# Patient Record
Sex: MALE | Race: WHITE | HISPANIC OR LATINO | Employment: OTHER | URBAN - METROPOLITAN AREA
[De-identification: names, ages, dates, MRNs, and addresses within clinical notes are randomized per-mention and may not be internally consistent; named-entity substitution may affect disease eponyms.]

---

## 2017-01-17 ENCOUNTER — TELEPHONE (OUTPATIENT)
Dept: FAMILY MEDICINE | Facility: CLINIC | Age: 62
End: 2017-01-17

## 2017-01-17 NOTE — TELEPHONE ENCOUNTER
Placed call to pt, no answer. Phone rings several times, then changes to busy. Pt will need appt prior to refills.

## 2017-01-17 NOTE — TELEPHONE ENCOUNTER
----- Message from Juli Geronimo sent at 1/17/2017  1:25 PM CST -----  Contact: self  Needs refill on Norco and Xanax.  Please call back at

## 2017-01-20 RX ORDER — HYDROCODONE BITARTRATE AND ACETAMINOPHEN 10; 325 MG/1; MG/1
1 TABLET ORAL 3 TIMES DAILY PRN
Qty: 90 TABLET | Refills: 0 | Status: SHIPPED | OUTPATIENT
Start: 2017-01-20 | End: 2017-01-23 | Stop reason: SDUPTHER

## 2017-01-20 RX ORDER — ALPRAZOLAM 1 MG/1
TABLET ORAL
Qty: 90 TABLET | Refills: 0 | Status: SHIPPED | OUTPATIENT
Start: 2017-01-20 | End: 2017-01-23 | Stop reason: SDUPTHER

## 2017-01-23 RX ORDER — ALPRAZOLAM 1 MG/1
TABLET ORAL
Qty: 90 TABLET | Refills: 0 | Status: SHIPPED | OUTPATIENT
Start: 2017-01-23 | End: 2017-02-20 | Stop reason: SDUPTHER

## 2017-01-23 RX ORDER — HYDROCODONE BITARTRATE AND ACETAMINOPHEN 10; 325 MG/1; MG/1
1 TABLET ORAL 3 TIMES DAILY PRN
Qty: 90 TABLET | Refills: 0 | Status: SHIPPED | OUTPATIENT
Start: 2017-01-23 | End: 2017-02-20 | Stop reason: SDUPTHER

## 2017-01-23 NOTE — TELEPHONE ENCOUNTER
----- Message from Shefali Johnson sent at 1/23/2017  2:01 PM CST -----  Contact:  call //[phone]    Calling to  Get  Refills  On hydrocodone ,xanax // pt  Stated  He   Requested  Several times   Last  Week // please call

## 2017-01-23 NOTE — TELEPHONE ENCOUNTER
----- Message from Jessica Bose sent at 1/23/2017  2:26 PM CST -----  Contact: Patient  Patient called stating that he is having a problem with his refills with [facility].Have additional questions Please call back at [phone]. Thanks,

## 2017-02-20 RX ORDER — ALPRAZOLAM 1 MG/1
TABLET ORAL
Qty: 90 TABLET | Refills: 0 | OUTPATIENT
Start: 2017-02-20

## 2017-02-20 RX ORDER — HYDROCODONE BITARTRATE AND ACETAMINOPHEN 10; 325 MG/1; MG/1
1 TABLET ORAL 3 TIMES DAILY PRN
Qty: 90 TABLET | Refills: 0 | OUTPATIENT
Start: 2017-02-20

## 2017-02-22 RX ORDER — HYDROCODONE BITARTRATE AND ACETAMINOPHEN 10; 325 MG/1; MG/1
1 TABLET ORAL 3 TIMES DAILY PRN
Qty: 90 TABLET | Refills: 0 | Status: SHIPPED | OUTPATIENT
Start: 2017-02-22 | End: 2017-03-20 | Stop reason: SDUPTHER

## 2017-02-22 RX ORDER — ALPRAZOLAM 1 MG/1
1 TABLET ORAL 3 TIMES DAILY
Qty: 90 TABLET | Refills: 0 | Status: SHIPPED | OUTPATIENT
Start: 2017-02-22 | End: 2017-03-20 | Stop reason: SDUPTHER

## 2017-03-06 ENCOUNTER — LAB VISIT (OUTPATIENT)
Dept: LAB | Facility: HOSPITAL | Age: 62
End: 2017-03-06
Attending: FAMILY MEDICINE
Payer: MEDICARE

## 2017-03-06 DIAGNOSIS — I10 ESSENTIAL HYPERTENSION: ICD-10-CM

## 2017-03-06 DIAGNOSIS — Z11.59 NEED FOR HEPATITIS C SCREENING TEST: ICD-10-CM

## 2017-03-06 LAB
ALBUMIN SERPL BCP-MCNC: 3.7 G/DL
ALP SERPL-CCNC: 65 U/L
ALT SERPL W/O P-5'-P-CCNC: 15 U/L
ANION GAP SERPL CALC-SCNC: 7 MMOL/L
AST SERPL-CCNC: 17 U/L
BILIRUB SERPL-MCNC: 0.5 MG/DL
BUN SERPL-MCNC: 18 MG/DL
CALCIUM SERPL-MCNC: 9.2 MG/DL
CHLORIDE SERPL-SCNC: 105 MMOL/L
CHOLEST/HDLC SERPL: 5.4 {RATIO}
CO2 SERPL-SCNC: 30 MMOL/L
CREAT SERPL-MCNC: 1.1 MG/DL
EST. GFR  (AFRICAN AMERICAN): >60 ML/MIN/1.73 M^2
EST. GFR  (NON AFRICAN AMERICAN): >60 ML/MIN/1.73 M^2
GLUCOSE SERPL-MCNC: 117 MG/DL
HCV AB SERPL QL IA: NEGATIVE
HDL/CHOLESTEROL RATIO: 18.5 %
HDLC SERPL-MCNC: 222 MG/DL
HDLC SERPL-MCNC: 41 MG/DL
LDLC SERPL CALC-MCNC: 158.2 MG/DL
NONHDLC SERPL-MCNC: 181 MG/DL
POTASSIUM SERPL-SCNC: 4.2 MMOL/L
PROT SERPL-MCNC: 7.3 G/DL
SODIUM SERPL-SCNC: 142 MMOL/L
TRIGL SERPL-MCNC: 114 MG/DL

## 2017-03-06 PROCEDURE — 36415 COLL VENOUS BLD VENIPUNCTURE: CPT | Mod: PO

## 2017-03-06 PROCEDURE — 80061 LIPID PANEL: CPT

## 2017-03-06 PROCEDURE — 80053 COMPREHEN METABOLIC PANEL: CPT

## 2017-03-06 PROCEDURE — 86803 HEPATITIS C AB TEST: CPT

## 2017-03-08 ENCOUNTER — OFFICE VISIT (OUTPATIENT)
Dept: FAMILY MEDICINE | Facility: CLINIC | Age: 62
End: 2017-03-08
Payer: MEDICARE

## 2017-03-08 VITALS
HEIGHT: 72 IN | DIASTOLIC BLOOD PRESSURE: 84 MMHG | WEIGHT: 263.88 LBS | HEART RATE: 72 BPM | SYSTOLIC BLOOD PRESSURE: 152 MMHG | BODY MASS INDEX: 35.74 KG/M2

## 2017-03-08 DIAGNOSIS — F41.9 ANXIETY: ICD-10-CM

## 2017-03-08 DIAGNOSIS — M79.7 FIBROMYALGIA: ICD-10-CM

## 2017-03-08 DIAGNOSIS — I10 ESSENTIAL HYPERTENSION: ICD-10-CM

## 2017-03-08 DIAGNOSIS — G89.4 CHRONIC PAIN SYNDROME: Primary | ICD-10-CM

## 2017-03-08 DIAGNOSIS — M51.9 LUMBAR DISC DISEASE: ICD-10-CM

## 2017-03-08 PROCEDURE — 99499 UNLISTED E&M SERVICE: CPT | Mod: S$GLB,,, | Performed by: FAMILY MEDICINE

## 2017-03-08 PROCEDURE — 3079F DIAST BP 80-89 MM HG: CPT | Mod: S$GLB,,, | Performed by: FAMILY MEDICINE

## 2017-03-08 PROCEDURE — 99214 OFFICE O/P EST MOD 30 MIN: CPT | Mod: S$GLB,,, | Performed by: FAMILY MEDICINE

## 2017-03-08 PROCEDURE — 99999 PR PBB SHADOW E&M-EST. PATIENT-LVL III: CPT | Mod: PBBFAC,,, | Performed by: FAMILY MEDICINE

## 2017-03-08 PROCEDURE — 3077F SYST BP >= 140 MM HG: CPT | Mod: S$GLB,,, | Performed by: FAMILY MEDICINE

## 2017-03-08 PROCEDURE — 1160F RVW MEDS BY RX/DR IN RCRD: CPT | Mod: S$GLB,,, | Performed by: FAMILY MEDICINE

## 2017-03-08 RX ORDER — CITALOPRAM 20 MG/1
20 TABLET, FILM COATED ORAL DAILY
Qty: 30 TABLET | Refills: 11 | Status: SHIPPED | OUTPATIENT
Start: 2017-03-08 | End: 2017-04-07

## 2017-03-15 ENCOUNTER — LAB VISIT (OUTPATIENT)
Dept: LAB | Facility: HOSPITAL | Age: 62
End: 2017-03-15
Attending: FAMILY MEDICINE
Payer: MEDICARE

## 2017-03-15 DIAGNOSIS — G89.4 CHRONIC PAIN SYNDROME: ICD-10-CM

## 2017-03-15 LAB
AMPHET+METHAMPHET UR QL: NEGATIVE
BARBITURATES UR QL SCN>200 NG/ML: NEGATIVE
BENZODIAZ UR QL SCN>200 NG/ML: NORMAL
BZE UR QL SCN: NEGATIVE
CANNABINOIDS UR QL SCN: NEGATIVE
CREAT UR-MCNC: 115 MG/DL
ETHANOL UR-MCNC: <10 MG/DL
METHADONE UR QL SCN>300 NG/ML: NEGATIVE
OPIATES UR QL SCN: NORMAL
PCP UR QL SCN>25 NG/ML: NEGATIVE
TOXICOLOGY INFORMATION: NORMAL

## 2017-03-15 PROCEDURE — 80307 DRUG TEST PRSMV CHEM ANLYZR: CPT

## 2017-03-19 NOTE — PROGRESS NOTES
HISTORY OF PRESENT ILLNESS:  A pleasant male, 61 years of age, well known to me.    He has a history of chronic pain syndrome, fibromyalgia, hypertension and   anxiety.  He also has significant lumbar disk disease, retired .  He is   working on smoking cessation.  No recent chest pain, shortness of breath,   nausea, vomiting, urgency, frequency or dysuria.  Pain and anxiety have been   generally well controlled.  We did discuss health maintenance and cancer   screening today.    PHYSICAL EXAMINATION:  CHEST:  Clear.  EXTREMITIES:  No edema of the extremities.  ABDOMEN:  Soft and nontender.  Blood pressure slightly elevated, much improved   at home.  No scleral icterus, jaundice or hepatosplenomegaly.  MUSCULOSKELETAL:  No acute joint swelling.  BACK:  No cervical, thoracic or lumbar spine tenderness.    ASSESSMENT:  Fibromyalgia, chronic pain, hypertension, anxiety, lumbar disk   disease.    PLAN:  We addressed each issue.  We increased his Celexa from 10 mg to 20 mg.    We will order urine toxicology screen.  We will see him back in followup.      RUSTY  dd: 03/19/2017 12:48:37 (CDT)  td: 03/19/2017 22:37:10 (CDT)  Doc ID   #[id number]  Job ID #[id number]    CC:

## 2017-03-20 NOTE — TELEPHONE ENCOUNTER
----- Message from Paulette Macias sent at 3/20/2017 12:55 PM CDT -----  Contact: Self-  [phone]  Patient needs a refill on rx hydrocodone, rx xanax with [facility].  Thanks!

## 2017-03-21 RX ORDER — ALPRAZOLAM 1 MG/1
TABLET ORAL
Qty: 90 TABLET | Refills: 0 | Status: SHIPPED | OUTPATIENT
Start: 2017-03-21 | End: 2017-04-18 | Stop reason: SDUPTHER

## 2017-03-21 RX ORDER — HYDROCODONE BITARTRATE AND ACETAMINOPHEN 10; 325 MG/1; MG/1
TABLET ORAL
Qty: 90 TABLET | Refills: 0 | Status: SHIPPED | OUTPATIENT
Start: 2017-03-21 | End: 2017-04-18 | Stop reason: SDUPTHER

## 2017-03-21 NOTE — TELEPHONE ENCOUNTER
Dr Lacy,  Dr Godinez is out of office on vacation. Pt last ov 3/8/17. May we have refill?  Thanks

## 2017-03-21 NOTE — TELEPHONE ENCOUNTER
----- Message from Miriam Jenkins sent at 3/20/2017  4:35 PM CDT -----  Contact: patient  Patient calling in regards to following up on his refill request for Xanax and Hydrocodone. Please advise.  Call back .  Thanks!  [facility] - [place], [place] - [address] [place] [address]  [address] [address]  Phone: [phone] Fax: [phone]

## 2017-03-22 RX ORDER — ALPRAZOLAM 1 MG/1
TABLET ORAL
Qty: 90 TABLET | Refills: 0 | Status: SHIPPED | OUTPATIENT
Start: 2017-03-22 | End: 2017-04-18 | Stop reason: SDUPTHER

## 2017-03-22 RX ORDER — HYDROCODONE BITARTRATE AND ACETAMINOPHEN 10; 325 MG/1; MG/1
TABLET ORAL
Qty: 90 TABLET | Refills: 0 | Status: SHIPPED | OUTPATIENT
Start: 2017-03-22 | End: 2017-04-18 | Stop reason: SDUPTHER

## 2017-03-22 NOTE — TELEPHONE ENCOUNTER
----- Message from Shilpi Peter sent at 3/22/2017  9:33 AM CDT -----  Contact: self  Patient called regarding a refill of medication. Stating he contacted on Friday regarding, had the pharmacy to submit request twice and as of today no refills. Please contact [phone]    hydrocodone-acetaminophen  mg Tab  XANAX 1 MG tablet    [facility] - [place], [place] - [address] [place] [address]  [address] [address]  Phone: [phone] Fax: [phone]

## 2017-03-22 NOTE — TELEPHONE ENCOUNTER
Dr Ortiziance  Transmission failed to pharmacy for the pts refill. Please advise if you can resend. [facility]

## 2017-03-22 NOTE — TELEPHONE ENCOUNTER
----- Message from Mary WHITE Tenaricardo sent at 3/22/2017 11:17 AM CDT -----  Contact: Patient [phone]  Rishi, patient [phone], Patient has called several times to get his Rx refilled Rx ok Hydrocodone and Xanax. [facility] stated they have not received the renewal.    Call to POD. Patient is now out of medication and very upset. Stated he has been calling since last Friday.    Please be advised after a supervisor checked the pharmacy send. It has a note that transmission failed, and may need to be called into pharmacy.    Please resend  And notify patient at [phone] of ETA.     [facility]  [address] [address]  Phone: [phone] Fax: [phone]    Please advise. Thanks.

## 2017-03-22 NOTE — TELEPHONE ENCOUNTER
----- Message from Shilpi Hinkle sent at 3/22/2017 12:18 PM CDT -----  Patient states his hydrocodone-acetaminophen 10-325mg (NORCO) 10-32 &   alprazolam (XANAX) 1 MG tablet5 mg Tab didn't go through to pharmacy please call patient [phone]    Patient states that it has been a week since he has requested Rx's    [facility] - [place] - [address] [place] [address]  [address] [address]  Phone: [phone] Fax: [phone]

## 2017-03-22 NOTE — TELEPHONE ENCOUNTER
----- Message from Mary WHITE Tenaricardo sent at 3/22/2017 11:17 AM CDT -----  Contact: Patient [phone]  Rishi, patient [phone], Patient has called several times to get his Rx refilled Rx ok Hydrocodone and Xanax. [facility] stated they have not received the renewal.    Call to POD. Patient is now out of medication and very upset. Stated he has been calling since last Friday.    Please be advised after a supervisor checked the pharmacy send. It has a note that transmission failed, and may need to be called into pharmacy.    Please resend  And notify patient at [phone] of ETA.     [facility]  [address] [address]  Phone: [phone] Fax: [phone]    Please advise. Thanks.

## 2017-04-18 NOTE — TELEPHONE ENCOUNTER
----- Message from Lexy Monroe sent at 4/18/2017  9:21 AM CDT -----  Contact: Fred  Patient requesting refill Rx Hydrocodone and Xanax sent to     [facility] - [facility] [address]  [address] [address]  Phone: [phone] Fax: [phone]    Out of medication. Please call [phone]. Thanks!

## 2017-04-19 RX ORDER — HYDROCODONE BITARTRATE AND ACETAMINOPHEN 10; 325 MG/1; MG/1
TABLET ORAL
Qty: 90 TABLET | Refills: 0 | Status: SHIPPED | OUTPATIENT
Start: 2017-04-19 | End: 2017-05-15 | Stop reason: SDUPTHER

## 2017-04-19 RX ORDER — ALPRAZOLAM 1 MG/1
TABLET ORAL
Qty: 90 TABLET | Refills: 0 | Status: SHIPPED | OUTPATIENT
Start: 2017-04-19 | End: 2017-05-15 | Stop reason: SDUPTHER

## 2017-04-21 RX ORDER — HYDROCODONE BITARTRATE AND ACETAMINOPHEN 10; 325 MG/1; MG/1
TABLET ORAL
Qty: 90 TABLET | Refills: 0 | Status: SHIPPED | OUTPATIENT
Start: 2017-04-21 | End: 2017-06-05 | Stop reason: SDUPTHER

## 2017-04-21 RX ORDER — ALPRAZOLAM 1 MG/1
TABLET ORAL
Qty: 90 TABLET | Refills: 0 | Status: SHIPPED | OUTPATIENT
Start: 2017-04-21 | End: 2017-07-20 | Stop reason: SDUPTHER

## 2017-05-22 RX ORDER — HYDROCODONE BITARTRATE AND ACETAMINOPHEN 10; 325 MG/1; MG/1
TABLET ORAL
Qty: 90 TABLET | Refills: 0 | Status: SHIPPED | OUTPATIENT
Start: 2017-05-22 | End: 2017-06-13 | Stop reason: SDUPTHER

## 2017-05-22 RX ORDER — ALPRAZOLAM 1 MG/1
TABLET ORAL
Qty: 90 TABLET | Refills: 0 | Status: SHIPPED | OUTPATIENT
Start: 2017-05-22 | End: 2017-06-05 | Stop reason: SDUPTHER

## 2017-05-22 RX ORDER — HYDROCODONE BITARTRATE AND ACETAMINOPHEN 10; 325 MG/1; MG/1
TABLET ORAL
Qty: 90 TABLET | Refills: 0 | Status: SHIPPED | OUTPATIENT
Start: 2017-05-22 | End: 2017-07-20 | Stop reason: SDUPTHER

## 2017-05-25 NOTE — PROGRESS NOTES
Patient, Fred Blackwell (MRN #[id number]), presented with a recorded BMI of 35.79 kg/m^2 and a documented comorbidity(s):  - Hypertension  to which the severe obesity is a contributing factor. This is consistent with the definition of severe obesity (BMI 35.0-35.9) with comorbidity (ICD-10 E66.01, Z68.35). The patient's severe obesity was monitored, evaluated, addressed and/or treated. This addendum to the medical record is made on 05/25/2017.

## 2017-06-05 NOTE — TELEPHONE ENCOUNTER
Spoke w/ pt. Med refill. Not out of med but will be estab care with Dr. Mckeon on 6/30/17. Does not want to run out of med. Aware you are out of clinic. Please advise.

## 2017-06-05 NOTE — TELEPHONE ENCOUNTER
----- Message from RT Ang sent at 6/5/2017  1:53 PM CDT -----  Contact: pt    pt , requesting medication refill: Xanax and Hydrocodone, please call to confirm, thanks.

## 2017-06-06 RX ORDER — ALPRAZOLAM 1 MG/1
TABLET ORAL
Qty: 90 TABLET | Refills: 0 | Status: SHIPPED | OUTPATIENT
Start: 2017-06-06 | End: 2017-06-13 | Stop reason: SDUPTHER

## 2017-06-06 RX ORDER — HYDROCODONE BITARTRATE AND ACETAMINOPHEN 10; 325 MG/1; MG/1
1 TABLET ORAL 3 TIMES DAILY PRN
Qty: 90 TABLET | Refills: 0 | Status: SHIPPED | OUTPATIENT
Start: 2017-06-06 | End: 2017-06-13 | Stop reason: SDUPTHER

## 2017-06-13 ENCOUNTER — TELEPHONE (OUTPATIENT)
Dept: FAMILY MEDICINE | Facility: CLINIC | Age: 62
End: 2017-06-13

## 2017-06-13 ENCOUNTER — OFFICE VISIT (OUTPATIENT)
Dept: FAMILY MEDICINE | Facility: CLINIC | Age: 62
End: 2017-06-13
Payer: MEDICARE

## 2017-06-13 VITALS
RESPIRATION RATE: 16 BRPM | OXYGEN SATURATION: 98 % | SYSTOLIC BLOOD PRESSURE: 128 MMHG | HEART RATE: 78 BPM | TEMPERATURE: 99 F | BODY MASS INDEX: 36.05 KG/M2 | DIASTOLIC BLOOD PRESSURE: 74 MMHG | WEIGHT: 266.19 LBS | HEIGHT: 72 IN

## 2017-06-13 DIAGNOSIS — F17.200 TOBACCO USE DISORDER: ICD-10-CM

## 2017-06-13 DIAGNOSIS — J01.00 ACUTE MAXILLARY SINUSITIS, RECURRENCE NOT SPECIFIED: Primary | ICD-10-CM

## 2017-06-13 DIAGNOSIS — J98.9 REACTIVE AIRWAY DISEASE THAT IS NOT ASTHMA: ICD-10-CM

## 2017-06-13 PROCEDURE — 99214 OFFICE O/P EST MOD 30 MIN: CPT | Mod: S$GLB,,, | Performed by: NURSE PRACTITIONER

## 2017-06-13 RX ORDER — PREDNISONE 20 MG/1
TABLET ORAL
Qty: 18 TABLET | Refills: 0 | Status: SHIPPED | OUTPATIENT
Start: 2017-06-13 | End: 2017-07-20

## 2017-06-13 RX ORDER — ALBUTEROL SULFATE 0.83 MG/ML
2.5 SOLUTION RESPIRATORY (INHALATION) EVERY 6 HOURS PRN
Qty: 30 ML | Refills: 3 | Status: SHIPPED | OUTPATIENT
Start: 2017-06-13 | End: 2017-07-20

## 2017-06-13 RX ORDER — DOXYCYCLINE 100 MG/1
100 CAPSULE ORAL 2 TIMES DAILY
Qty: 14 CAPSULE | Refills: 0 | Status: SHIPPED | OUTPATIENT
Start: 2017-06-13 | End: 2017-06-20

## 2017-06-13 RX ORDER — GUAIFENESIN 600 MG/1
1200 TABLET, EXTENDED RELEASE ORAL 2 TIMES DAILY
COMMUNITY
End: 2017-08-14

## 2017-06-13 NOTE — TELEPHONE ENCOUNTER
Please provide suggestions for a physician that can assist this patient with managing his chronic pain and anxiety issues. Thank you.

## 2017-06-13 NOTE — PROGRESS NOTES
Subjective:       Patient ID: Fred Blackwell is a 61 y.o. male.    Chief Complaint: Sinus Problem (C/o a sinus drip that is going to chest)    The patient has a long history of smoking but has never been diagnosed with COPD.  He has never been seen by pulmonology.      URI    This is a new problem. Episode onset: one week. The problem has been rapidly worsening. Associated symptoms include congestion, coughing, headaches, a plugged ear sensation, rhinorrhea, sneezing, a sore throat and wheezing. Pertinent negatives include no abdominal pain, chest pain, diarrhea, dysuria, ear pain, joint pain, joint swelling, nausea, neck pain, rash, sinus pain, swollen glands or vomiting.     Review of Systems   Constitutional: Positive for appetite change and fatigue.   HENT: Positive for congestion, postnasal drip, rhinorrhea, sinus pressure, sneezing and sore throat. Negative for ear pain.    Eyes: Negative for pain and redness.   Respiratory: Positive for cough and wheezing. Negative for choking, chest tightness and shortness of breath.    Cardiovascular: Negative for chest pain and palpitations.   Gastrointestinal: Negative for abdominal distention, abdominal pain, constipation, diarrhea, nausea and vomiting.   Endocrine: Negative for polydipsia and polyphagia.   Genitourinary: Negative for dysuria and hematuria.   Musculoskeletal: Negative for arthralgias, joint pain, joint swelling, myalgias and neck pain.   Skin: Negative for color change, pallor and rash.   Neurological: Positive for headaches. Negative for dizziness and light-headedness.       Objective:       Vitals:    06/13/17 1618   BP: 128/74   Pulse: 78   Resp: 16   Temp: 99 °F (37.2 °C)   TempSrc: Oral   SpO2: 98%   Weight: 120.8 kg (266 lb 3.3 oz)   Height: 6' (1.829 m)   PainSc: 0-No pain       Physical Exam   Constitutional: He is oriented to person, place, and time. He appears well-developed and well-nourished.   Obese male   HENT:   Head:  Normocephalic and atraumatic.   Right Ear: Hearing, tympanic membrane, external ear and ear canal normal.   Left Ear: Hearing, tympanic membrane, external ear and ear canal normal.   Nose: Mucosal edema and rhinorrhea present. No nose lacerations or sinus tenderness. Right sinus exhibits maxillary sinus tenderness and frontal sinus tenderness. Left sinus exhibits maxillary sinus tenderness and frontal sinus tenderness.   Mouth/Throat: Uvula is midline and mucous membranes are normal. Posterior oropharyngeal edema and posterior oropharyngeal erythema present.   Eyes: Conjunctivae are normal. Right eye exhibits no discharge. Left eye exhibits no discharge. No scleral icterus.   Left eye droop, pt states that he had bell's palsy 35 years ago   Neck: Normal range of motion. Neck supple. No tracheal deviation present.   Cardiovascular: Normal rate and regular rhythm.    No murmur heard.  Pulmonary/Chest: Effort normal. No stridor. No respiratory distress.   Inspiratory and expiratory wheezing noted throughout lung fields.   Musculoskeletal: Normal range of motion.   Lymphadenopathy:     He has cervical adenopathy.   Neurological: He is alert and oriented to person, place, and time.   Skin: Skin is warm and dry.   Psychiatric: He has a normal mood and affect. His behavior is normal. Judgment and thought content normal.       Assessment:       1. Acute maxillary sinusitis, recurrence not specified    2. Tobacco use disorder    3. Reactive airway disease that is not asthma        Plan:       Fred Santos was seen today for sinus problem.    Diagnoses and all orders for this visit:    Acute maxillary sinusitis, recurrence not specified  -     doxycycline (VIBRAMYCIN) 100 MG Cap; Take 1 capsule (100 mg total) by mouth 2 (two) times daily.    RAD-possible COPD exacerbation.  The patient has never seen a pulmonologist.  I did recommend this.  -     predniSONE (DELTASONE) 20 MG tablet; Take 3 tablets daily for 3 days, then 2  tablets daily for 3 days, then 1 tablet daily for 3 days.  -     albuterol (PROVENTIL) 2.5 mg /3 mL (0.083 %) nebulizer solution; Take 3 mLs (2.5 mg total) by nebulization every 6 (six) hours as needed for Wheezing. Rescue    Tobacco use disorder    Smoking cessation counseling provided

## 2017-06-15 ENCOUNTER — PATIENT OUTREACH (OUTPATIENT)
Dept: ADMINISTRATIVE | Facility: HOSPITAL | Age: 62
End: 2017-06-15
Payer: MEDICARE

## 2017-06-15 NOTE — LETTER
Patsy 15, 2017    Fred Blackwell  [address]  [place] [address]             [facility]  [address]  [facility] [address]  Phone: [phone] Dear Mr. Blakcwell:    [facility] is committed to your overall health.  To help you get the most out of each of your visits, we will review your information to make sure you are up to date on all of your recommended tests and/or procedures.      Dr. Ariadna Mckeon has found that you may be due for shingles and pneumonia immunizations.     Medicare does not cover all immunizations to be given in the clinic.  Check your benefits to ensure that you do not need to receive your immunizations at the pharmacy.    If you have had any of the above done at another facility, please bring the records or information with you so that your record at [facility] will be complete.  If you would like to schedule any of these, please contact me.    If you are currently taking medication, please bring it with you to your appointment for review.    If you have any questions or concerns, please don't hesitate to call.    Thank you for letting us care for you,  Pretty Munroe LPN Clinical Care Coordinator  [facility]  [phone]

## 2017-06-16 ENCOUNTER — OFFICE VISIT (OUTPATIENT)
Dept: FAMILY MEDICINE | Facility: CLINIC | Age: 62
End: 2017-06-16
Payer: MEDICARE

## 2017-06-16 DIAGNOSIS — J98.9 REACTIVE AIRWAY DISEASE THAT IS NOT ASTHMA: ICD-10-CM

## 2017-06-16 DIAGNOSIS — J32.9 SINUSITIS, UNSPECIFIED CHRONICITY, UNSPECIFIED LOCATION: Primary | ICD-10-CM

## 2017-06-16 PROCEDURE — 99213 OFFICE O/P EST LOW 20 MIN: CPT | Mod: S$GLB,,, | Performed by: NURSE PRACTITIONER

## 2017-06-16 PROCEDURE — 99499 UNLISTED E&M SERVICE: CPT | Mod: S$GLB,,, | Performed by: NURSE PRACTITIONER

## 2017-06-20 VITALS
WEIGHT: 263 LBS | TEMPERATURE: 99 F | HEIGHT: 72 IN | SYSTOLIC BLOOD PRESSURE: 138 MMHG | BODY MASS INDEX: 35.62 KG/M2 | HEART RATE: 72 BPM | RESPIRATION RATE: 20 BRPM | DIASTOLIC BLOOD PRESSURE: 86 MMHG

## 2017-06-20 NOTE — PROGRESS NOTES
Subjective:       Patient ID: Fred Blackwell is a 61 y.o. male.    Chief Complaint: Follow-up    The patient was seen by me on 6/13/17 and diagnosed with sinusitis and reactive airway disease.  He does have a long history of smoking but was significantly wheezing in the office at our last visit.  He was put on doxycycline, prednisone taper as well as given an albuterol inhaler.  He tells me that he is feeling significantly better and without any new complaints today.  He was told to follow-up today so I can recheck his lungs.      Review of Systems   Constitutional: Positive for appetite change and fatigue.   HENT: Positive for congestion, postnasal drip, rhinorrhea, sinus pressure, sneezing and sore throat.    Eyes: Negative for pain and redness.   Respiratory: Positive for cough and wheezing. Negative for choking, chest tightness and shortness of breath.    Cardiovascular: Negative for chest pain and palpitations.   Gastrointestinal: Negative for abdominal distention, abdominal pain, constipation, diarrhea, nausea and vomiting.   Endocrine: Negative for polydipsia and polyphagia.   Genitourinary: Negative for dysuria and hematuria.   Musculoskeletal: Negative for arthralgias, joint swelling and myalgias.   Skin: Negative for color change and pallor.   Neurological: Positive for headaches. Negative for dizziness and light-headedness.       Objective:       Vitals:    06/16/17 1549 06/16/17 1609   BP: (!) 152/106 138/86   Pulse: 72    Resp: 20    Temp: 98.5 °F (36.9 °C)    TempSrc: Oral    Weight: 119.3 kg (263 lb 0.1 oz)    Height: 6' (1.829 m)    PainSc: 0-No pain        Physical Exam   Constitutional: He is oriented to person, place, and time. He appears well-developed.   Obese male   HENT:   Head: Normocephalic and atraumatic.   Right Ear: Hearing, tympanic membrane, external ear and ear canal normal.   Left Ear: Hearing, tympanic membrane, external ear and ear canal normal.   Nose: Mucosal edema and  rhinorrhea present. No nose lacerations or sinus tenderness. Right sinus exhibits maxillary sinus tenderness and frontal sinus tenderness. Left sinus exhibits maxillary sinus tenderness and frontal sinus tenderness.   Mouth/Throat: Uvula is midline and mucous membranes are normal. Posterior oropharyngeal edema and posterior oropharyngeal erythema present.   Eyes: Conjunctivae are normal. Right eye exhibits no discharge. Left eye exhibits no discharge. No scleral icterus.   Neck: Normal range of motion. Neck supple. No tracheal deviation present.   Cardiovascular: Normal rate and regular rhythm.    No murmur heard.  Pulmonary/Chest: Effort normal and breath sounds normal. No stridor. No respiratory distress. He has no wheezes. He has no rales. He exhibits no tenderness.   O2 sats 98%.   Musculoskeletal: Normal range of motion.   Lymphadenopathy:     He has no cervical adenopathy.   Neurological: He is alert and oriented to person, place, and time.   Skin: Skin is warm and dry.   Psychiatric: He has a normal mood and affect. His behavior is normal. Judgment and thought content normal.       Assessment:       1. Sinusitis, unspecified chronicity, unspecified location    2. Reactive airway disease that is not asthma        Plan:       Fred Santos was seen today for follow-up.    Diagnoses and all orders for this visit:    Sinusitis, unspecified chronicity, unspecified location/Reactive airway disease that is not asthma  The patient has significantly improved.  Continue current medications

## 2017-07-20 ENCOUNTER — OFFICE VISIT (OUTPATIENT)
Dept: FAMILY MEDICINE | Facility: CLINIC | Age: 62
End: 2017-07-20
Payer: MEDICARE

## 2017-07-20 VITALS
RESPIRATION RATE: 17 BRPM | BODY MASS INDEX: 35.66 KG/M2 | TEMPERATURE: 98 F | SYSTOLIC BLOOD PRESSURE: 152 MMHG | OXYGEN SATURATION: 97 % | HEART RATE: 64 BPM | HEIGHT: 72 IN | WEIGHT: 263.25 LBS | DIASTOLIC BLOOD PRESSURE: 94 MMHG

## 2017-07-20 DIAGNOSIS — Z72.0 TOBACCO ABUSE: ICD-10-CM

## 2017-07-20 DIAGNOSIS — F13.20 ANXIOLYTIC DEPENDENCE: ICD-10-CM

## 2017-07-20 DIAGNOSIS — F41.0 PANIC ATTACK: ICD-10-CM

## 2017-07-20 DIAGNOSIS — R73.09 ELEVATED GLUCOSE: ICD-10-CM

## 2017-07-20 DIAGNOSIS — M51.36 DEGENERATIVE DISC DISEASE, LUMBAR: ICD-10-CM

## 2017-07-20 DIAGNOSIS — Z12.5 SCREENING FOR PROSTATE CANCER: ICD-10-CM

## 2017-07-20 DIAGNOSIS — Z23 NEED FOR PNEUMOCOCCAL VACCINATION: ICD-10-CM

## 2017-07-20 DIAGNOSIS — M50.30 DEGENERATIVE DISC DISEASE, CERVICAL: ICD-10-CM

## 2017-07-20 DIAGNOSIS — K21.9 GASTROESOPHAGEAL REFLUX DISEASE WITHOUT ESOPHAGITIS: ICD-10-CM

## 2017-07-20 DIAGNOSIS — F11.20 UNCOMPLICATED OPIOID DEPENDENCE: ICD-10-CM

## 2017-07-20 DIAGNOSIS — M54.17 RADICULAR PAIN OF LUMBOSACRAL REGION: ICD-10-CM

## 2017-07-20 DIAGNOSIS — E78.5 HYPERLIPIDEMIA, UNSPECIFIED HYPERLIPIDEMIA TYPE: ICD-10-CM

## 2017-07-20 DIAGNOSIS — F41.1 GAD (GENERALIZED ANXIETY DISORDER): ICD-10-CM

## 2017-07-20 DIAGNOSIS — I10 ESSENTIAL HYPERTENSION: Primary | ICD-10-CM

## 2017-07-20 DIAGNOSIS — G51.0 RIGHT-SIDED BELL'S PALSY: ICD-10-CM

## 2017-07-20 PROBLEM — M51.369 DEGENERATIVE DISC DISEASE, LUMBAR: Status: ACTIVE | Noted: 2017-07-20

## 2017-07-20 PROCEDURE — 90732 PPSV23 VACC 2 YRS+ SUBQ/IM: CPT | Mod: S$GLB,,, | Performed by: INTERNAL MEDICINE

## 2017-07-20 PROCEDURE — 99499 UNLISTED E&M SERVICE: CPT | Mod: S$GLB,,, | Performed by: INTERNAL MEDICINE

## 2017-07-20 PROCEDURE — G0009 ADMIN PNEUMOCOCCAL VACCINE: HCPCS | Mod: S$GLB,,, | Performed by: INTERNAL MEDICINE

## 2017-07-20 PROCEDURE — 99214 OFFICE O/P EST MOD 30 MIN: CPT | Mod: S$GLB,,, | Performed by: INTERNAL MEDICINE

## 2017-07-20 RX ORDER — ALPRAZOLAM 1 MG/1
1 TABLET ORAL 3 TIMES DAILY
Qty: 90 TABLET | Refills: 0 | Status: SHIPPED | OUTPATIENT
Start: 2017-07-20 | End: 2017-08-14 | Stop reason: SDUPTHER

## 2017-07-20 RX ORDER — ATORVASTATIN CALCIUM 40 MG/1
40 TABLET, FILM COATED ORAL DAILY
Qty: 90 TABLET | Refills: 3 | Status: SHIPPED | OUTPATIENT
Start: 2017-07-20 | End: 2017-10-11

## 2017-07-20 RX ORDER — ASPIRIN 81 MG/1
81 TABLET ORAL DAILY
Refills: 0 | COMMUNITY
Start: 2017-07-20 | End: 2017-10-11

## 2017-07-20 RX ORDER — ALPRAZOLAM 1 MG/1
1 TABLET ORAL 3 TIMES DAILY
Qty: 90 TABLET | Refills: 0 | Status: SHIPPED | OUTPATIENT
Start: 2017-07-20 | End: 2017-07-20 | Stop reason: SDUPTHER

## 2017-07-20 RX ORDER — HYDROCODONE BITARTRATE AND ACETAMINOPHEN 10; 325 MG/1; MG/1
TABLET ORAL
Qty: 90 TABLET | Refills: 0 | Status: SHIPPED | OUTPATIENT
Start: 2017-07-20 | End: 2017-07-20 | Stop reason: SDUPTHER

## 2017-07-20 RX ORDER — AMLODIPINE BESYLATE 5 MG/1
5 TABLET ORAL DAILY
Qty: 30 TABLET | Refills: 11 | Status: SHIPPED | OUTPATIENT
Start: 2017-07-20 | End: 2017-10-11

## 2017-07-20 RX ORDER — HYDROCODONE BITARTRATE AND ACETAMINOPHEN 10; 325 MG/1; MG/1
TABLET ORAL
Qty: 90 TABLET | Refills: 0 | Status: SHIPPED | OUTPATIENT
Start: 2017-07-20 | End: 2017-08-14 | Stop reason: SDUPTHER

## 2017-07-20 RX ORDER — DULOXETIN HYDROCHLORIDE 30 MG/1
30 CAPSULE, DELAYED RELEASE ORAL DAILY
Qty: 30 CAPSULE | Refills: 11 | Status: SHIPPED | OUTPATIENT
Start: 2017-07-20 | End: 2017-10-11

## 2017-07-20 NOTE — PATIENT INSTRUCTIONS
[facility]  [address] [facility] · [phone]   Directions   Website     2   [facility]  [address] [facility] [address] [facility] [address] · [phone]   Directions   Website     3   [facility]  [address] [facility] [address] [place] · [phone]   Directions   Website     4   [facility]  [facility] [address] [place] [address] · [phone]   Directions

## 2017-07-20 NOTE — PROGRESS NOTES
Subjective:       Patient ID: Fred Blackwell is a 61 y.o. male.    Medication List with Changes/Refills   New Medications    AMLODIPINE (NORVASC) 5 MG TABLET    Take 1 tablet (5 mg total) by mouth once daily.    ASPIRIN (ECOTRIN) 81 MG EC TABLET    Take 1 tablet (81 mg total) by mouth once daily.    ATORVASTATIN (LIPITOR) 40 MG TABLET    Take 1 tablet (40 mg total) by mouth once daily.    DULOXETINE (CYMBALTA) 30 MG CAPSULE    Take 1 capsule (30 mg total) by mouth once daily.   Current Medications    ALBUTEROL 90 MCG/ACTUATION INHALER    Inhale 2 puffs into the lungs every 6 (six) hours as needed for Wheezing.    GUAIFENESIN (MUCINEX) 600 MG 12 HR TABLET    Take 1,200 mg by mouth 2 (two) times daily.    MOEXIPRIL-HYDROCHLOROTHIAZIDE (UNIRETIC) 15-25 MG PER TABLET    Take 1 tablet by mouth once daily.    OMEPRAZOLE (PRILOSEC) 20 MG CAPSULE    Take 1 capsule (20 mg total) by mouth once daily.   Changed and/or Refilled Medications    Modified Medication Previous Medication    ALPRAZOLAM (XANAX) 1 MG TABLET alprazolam (XANAX) 1 MG tablet       Take 1 tablet (1 mg total) by mouth 3 (three) times daily.    TAKE 1 TABLET (1 MG TOTAL) BY MOUTH THREE TIMES A DAY.    HYDROCODONE-ACETAMINOPHEN 10-325MG (NORCO)  MG TAB hydrocodone-acetaminophen 10-325mg (NORCO)  mg Tab       TAKE 1 TABLET BY MOUTH 3 (THREE) TIMES DAILY AS NEEDED.    TAKE 1 TABLET BY MOUTH 3 (THREE) TIMES DAILY AS NEEDED.   Discontinued Medications    ALBUTEROL (PROVENTIL) 2.5 MG /3 ML (0.083 %) NEBULIZER SOLUTION    Take 3 mLs (2.5 mg total) by nebulization every 6 (six) hours as needed for Wheezing. Rescue    CITALOPRAM (CELEXA) 10 MG TABLET    TAKE 1 TABLET BY MOUTH ONCE A DAY    DULOXETINE (CYMBALTA) 30 MG CAPSULE    Take 1 capsule (30 mg total) by mouth once daily.    DULOXETINE (CYMBALTA) 60 MG CAPSULE    Take 1 capsule (60 mg total) by mouth once daily.    PREDNISONE (DELTASONE) 20 MG TABLET    Take 3 tablets daily for 3 days, then 2  tablets daily for 3 days, then 1 tablet daily for 3 days.       Chief Complaint: Follow-up (1 month fu for bronchitis)  He is here today to establish care.     He has hypertension that is uncontrolled on moexipril-HCTZ 15-25 mg qday.  He has had HTN for over 30 years.  He has no known CAD.  He denies chest pain or shortness of breath.     He has hyperlipidemia that is not being treated. His most recent lipids were 222/114/40/158 on 3/2017.  He is not on aspirin.     He has GERD that is controlled on omeprazole 20 mg qday.  He says symptoms are well controlled but if he misses a dose of medication then he has symptoms.  His last EGD was many years ago.     He has a history of Bell's palsy with residual right eye ptosis for many years.  He says eye is red and gets very dry.  He can close but he must do it purposefully.      He continues to smoke but he is trying to cut back. He is now smoking only 3 cigarettes a day.  He has a history of over 45 pack years.     He has severe anxiety dx over 35 years ago. He says he gets panic attacks that feel like heart attacks. He ended up in ER and was started on xanax tid. He has been on this medication ever since.  He describes his panic attacks as nauseous with abdominal cramps, a rushing feeling in his body and then chest pain with palpitations.  This is relieved with xanax. He says if he does not take xanax then he can not sleep.  His last fill was on 6/15/17 #90 and he gets #90 pills every month.      He has a history of chronic pain and fibromyalgia. He has severe lumbar degenerative disc disease with right leg radiculopathy. His most recent MRI of the lumbar spine was in 2012 that showed multilevel disc disease most pronounced at L3-L4 with disc bulge and possible contact with Left nerve root and at L4-L5 with disc bulge and contact with right nerve root.  He had a cervical MRI in 2012 that showed multilevel degenerative changes with osteophyte complexes from C3-C7.  He has  been seen by neurosurgery in the past but he is adamant about not wanting surgery.  He was seen by pain management in the past and had MARTY which he reports did help temporarily with his pain.  He describes pain as constant in low back with intermittent sharp pain down right leg. Pain is 6/10 on a good day and 10/10 on a bad day.  Pain is worse with sitting and lifting and better with standing and readjusting position. He is constantly trying to lean back to alleviate his pain.  He denies weakness but occasionally has tingling in outer ankle on right.  He is taking hydrocodone 10 mg tid for many years.  He has been out of medication for 3 days and is complains of severe pain.  He was recently started on celexa 10 mg and one month ago increased to 20 mg qday to help with his pain. He does feel this helps a little.      Colonoscopy---he thinks in the last 2 years  Tdap---2016 at ER   Influenza vaccine---none  Pneumovax 23----none  Shingles vaccine-----none    Review of Systems   Constitutional: Negative for appetite change, fatigue, fever and unexpected weight change.   HENT: Positive for postnasal drip. Negative for congestion, ear pain, hearing loss, sore throat and trouble swallowing.    Eyes: Negative for pain and visual disturbance.   Respiratory: Negative for cough, chest tightness, shortness of breath and wheezing.    Cardiovascular: Negative for chest pain, palpitations and leg swelling.   Gastrointestinal: Negative for abdominal pain, blood in stool, constipation, diarrhea, nausea and vomiting.   Endocrine: Negative for polyuria.   Genitourinary: Negative for dysuria and hematuria.   Musculoskeletal: Positive for arthralgias and back pain. Negative for myalgias.   Skin: Negative for rash.   Allergic/Immunologic: Positive for environmental allergies.   Neurological: Negative for dizziness, weakness, numbness and headaches.   Hematological: Does not bruise/bleed easily.   Psychiatric/Behavioral: Positive for  sleep disturbance. Negative for dysphoric mood and suicidal ideas. The patient is nervous/anxious.        Objective:      Vitals:    07/20/17 0756   BP: (!) 152/94   BP Location: Right arm   Patient Position: Sitting   BP Method: Manual   Pulse: 64   Resp: 17   Temp: 98.1 °F (36.7 °C)   TempSrc: Oral   SpO2: 97%   Weight: 119.4 kg (263 lb 3.7 oz)   Height: 6' (1.829 m)     Body mass index is 35.7 kg/m².  Physical Exam    General appearance: No acute distress, cooperative  Eyes: PERRL, EOMI, conjunctiva clear on left, right eye with erythema and ointment in eye  Ears: normal external ear and pinna, tm clear without drainage, canals clear  Nose: Normal mucosa without drainage  Throat: no exudates or erythema, tonsils not enlarged  Mouth: no sores or lesions, moist mucous membranes, good dentition  Neck: FROM, soft, supple, no thyromegaly, no bruits  Lymph: no anterior or posterior cervical adenopathy  Heart::  Regular rate and rhythm, no murmur  Lung: Clear to ascultation bilaterally, no wheezing, no rales, no rhonchi, no distress  Abdomen: Soft, nontender, no distention, no hepatosplenomegaly, bowel sounds normal, no guarding, no rebound, no peritoneal signs  Skin: no rashes, no lesions  Extremities: no edema, no cyanosis  Neuro: CN 2-12 intact (except weakness of eyelid closure---good facial movement, no droop), 5/5 muscle strength upper and lower extremity bilaterally, 2+ DTRs UE and LE bilaterally, normal gait, normal sensation  Peripheral pulses: 2+ pedal pulses bilaterally, good perfusion and color  Musculoskeletal: FROM, good strenth, no tenderness  Joint: normal appearance, no swelling, no warmth, no deformity in all joints    Assessment:       1. Essential hypertension    2. Hyperlipidemia, unspecified hyperlipidemia type    3. Tobacco abuse    4. SHANNAN (generalized anxiety disorder)    5. Panic attack    6. Anxiolytic dependence    7. Degenerative disc disease, lumbar    8. Radicular pain of lumbosacral region     9. Degenerative disc disease, cervical    10. Uncomplicated opioid dependence    11. Gastroesophageal reflux disease without esophagitis    12. Right-sided Bell's palsy    13. Elevated glucose    14. Screening for prostate cancer    15. Need for pneumococcal vaccination        Plan:       Essential hypertension  Uncontrolled and will add amlodipine 5 mg qday to his regimen. He will f/u in 3 weeks to recheck BP.    -     amlodipine (NORVASC) 5 MG tablet; Take 1 tablet (5 mg total) by mouth once daily.  Dispense: 30 tablet; Refill: 11    Hyperlipidemia, unspecified hyperlipidemia type  Uncontrolled and will start atorvastatin 40 mg qday and recheck lipids in 3 months. Will start aspirin daily.   -     atorvastatin (LIPITOR) 40 MG tablet; Take 1 tablet (40 mg total) by mouth once daily.  Dispense: 90 tablet; Refill: 3  -     aspirin (ECOTRIN) 81 MG EC tablet; Take 1 tablet (81 mg total) by mouth once daily.; Refill: 0  -     Lipid panel; Future; Expected date: 07/20/2017  -     Comprehensive metabolic panel; Future; Expected date: 07/20/2017    Tobacco abuse  Strongly advised to stop smoking.     SHANNAN (generalized anxiety disorder) with panic attacks.   Uncontrolled because he is off anxiolytics now for 3 days after taking xanax 1 mg tid x 35 years. Long discussion about anxiety and current stand of care.  I will stop celexa and start cymbalta ( to help with pain and anxiety).  Will continue xanax tid for now because he will need a very slow wean over months to get off xanax and I am not sure after 35 years if he will be able to achieve this goal.  We will start once his pain is stable.    -     duloxetine (CYMBALTA) 30 MG capsule; Take 1 capsule (30 mg total) by mouth once daily.  Dispense: 30 capsule; Refill: 11  -     TSH; Future; Expected date: 07/20/2017  -     alprazolam (XANAX) 1 MG tablet; Take 1 tablet (1 mg total) by mouth 3 (three) times daily.  Dispense: 90 tablet; Refill: 0    Anxiolytic  dependence  Refill given today. No evidence of abuse by .  Stop date is 8/19/17.      Degenerative disc disease, lumbar with radicular pain and Degenerative disc disease, cervical  Uncontrolled pain requiring chronic hydrocodone.  I will refer him to pain clinic for management of his opioids and I am hoping they discuss possibly changing him to a patch (butrans) to help his pain.  This is a better long term treatment then tid pain medications.  He is also open to trying minimally invasive procedures to help his back pain.  I think he will need an MRI but will let pain management guide his treatment.  Referral made and a list of providers given.    -     Ambulatory referral to Pain Clinic  -     hydrocodone-acetaminophen 10-325mg (NORCO)  mg Tab; TAKE 1 TABLET BY MOUTH 3 (THREE) TIMES DAILY AS NEEDED.  Dispense: 90 tablet; Refill: 0    Uncomplicated opioid dependence  No evidence of abuse by . His last fill was 6/15/17 #90.  Will given him one rx today until he can establish with pain clinic.  Stop date is 8/19/17.      Gastroesophageal reflux disease without esophagitis  Controlled on omeprazole daily.     Right-sided Bell's palsy  Unchanged for many years with only partial recovery of eyelid function.     Elevated glucose  -     Hemoglobin A1c; Future; Expected date: 07/20/2017    Screening for prostate cancer  -     PSA, Screening; Future; Expected date: 07/20/2017    Need for pneumococcal vaccination  -     Pneumococcal Polysaccharide Vaccine (23 Valent) (SQ/IM)    Return in about 3 weeks (around 8/10/2017) for recheck anxiety, pain, BP.

## 2017-08-14 ENCOUNTER — OFFICE VISIT (OUTPATIENT)
Dept: FAMILY MEDICINE | Facility: CLINIC | Age: 62
End: 2017-08-14
Payer: MEDICARE

## 2017-08-14 VITALS
HEIGHT: 72 IN | DIASTOLIC BLOOD PRESSURE: 104 MMHG | RESPIRATION RATE: 18 BRPM | HEART RATE: 78 BPM | OXYGEN SATURATION: 97 % | WEIGHT: 263.88 LBS | BODY MASS INDEX: 35.74 KG/M2 | TEMPERATURE: 98 F | SYSTOLIC BLOOD PRESSURE: 158 MMHG

## 2017-08-14 DIAGNOSIS — M51.36 DEGENERATIVE DISC DISEASE, LUMBAR: ICD-10-CM

## 2017-08-14 DIAGNOSIS — F41.1 GAD (GENERALIZED ANXIETY DISORDER): ICD-10-CM

## 2017-08-14 DIAGNOSIS — I10 ESSENTIAL HYPERTENSION: Primary | ICD-10-CM

## 2017-08-14 DIAGNOSIS — F13.20 ANXIOLYTIC DEPENDENCE: ICD-10-CM

## 2017-08-14 PROCEDURE — 99499 UNLISTED E&M SERVICE: CPT | Mod: S$GLB,,, | Performed by: INTERNAL MEDICINE

## 2017-08-14 PROCEDURE — 3080F DIAST BP >= 90 MM HG: CPT | Mod: S$GLB,,, | Performed by: INTERNAL MEDICINE

## 2017-08-14 PROCEDURE — 3077F SYST BP >= 140 MM HG: CPT | Mod: S$GLB,,, | Performed by: INTERNAL MEDICINE

## 2017-08-14 PROCEDURE — 99214 OFFICE O/P EST MOD 30 MIN: CPT | Mod: S$GLB,,, | Performed by: INTERNAL MEDICINE

## 2017-08-14 PROCEDURE — 3008F BODY MASS INDEX DOCD: CPT | Mod: S$GLB,,, | Performed by: INTERNAL MEDICINE

## 2017-08-14 RX ORDER — HYDROCODONE BITARTRATE AND ACETAMINOPHEN 10; 325 MG/1; MG/1
TABLET ORAL
Qty: 90 TABLET | Refills: 0 | Status: SHIPPED | OUTPATIENT
Start: 2017-08-14 | End: 2017-10-11

## 2017-08-14 RX ORDER — ALPRAZOLAM 1 MG/1
1 TABLET ORAL 3 TIMES DAILY
Qty: 90 TABLET | Refills: 0 | Status: SHIPPED | OUTPATIENT
Start: 2017-08-14 | End: 2017-09-14 | Stop reason: SDUPTHER

## 2017-08-14 RX ORDER — LISINOPRIL AND HYDROCHLOROTHIAZIDE 10; 12.5 MG/1; MG/1
1 TABLET ORAL DAILY
Qty: 90 TABLET | Refills: 3 | Status: SHIPPED | OUTPATIENT
Start: 2017-08-14 | End: 2017-10-11

## 2017-08-14 NOTE — PROGRESS NOTES
Subjective:       Patient ID: Fred Blackwell is a 62 y.o. male.    Medication List with Changes/Refills   New Medications    LISINOPRIL-HYDROCHLOROTHIAZIDE (PRINZIDE,ZESTORETIC) 10-12.5 MG PER TABLET    Take 1 tablet by mouth once daily.   Current Medications    ALBUTEROL 90 MCG/ACTUATION INHALER    Inhale 2 puffs into the lungs every 6 (six) hours as needed for Wheezing.    ALPRAZOLAM (XANAX) 1 MG TABLET    Take 1 tablet (1 mg total) by mouth 3 (three) times daily.    AMLODIPINE (NORVASC) 5 MG TABLET    Take 1 tablet (5 mg total) by mouth once daily.    ASPIRIN (ECOTRIN) 81 MG EC TABLET    Take 1 tablet (81 mg total) by mouth once daily.    ATORVASTATIN (LIPITOR) 40 MG TABLET    Take 1 tablet (40 mg total) by mouth once daily.    DULOXETINE (CYMBALTA) 30 MG CAPSULE    Take 1 capsule (30 mg total) by mouth once daily.    HYDROCODONE-ACETAMINOPHEN 10-325MG (NORCO)  MG TAB    TAKE 1 TABLET BY MOUTH 3 (THREE) TIMES DAILY AS NEEDED.    OMEPRAZOLE (PRILOSEC) 20 MG CAPSULE    Take 1 capsule (20 mg total) by mouth once daily.   Discontinued Medications    GUAIFENESIN (MUCINEX) 600 MG 12 HR TABLET    Take 1,200 mg by mouth 2 (two) times daily.    MOEXIPRIL-HYDROCHLOROTHIAZIDE (UNIRETIC) 15-25 MG PER TABLET    Take 1 tablet by mouth once daily.       Chief Complaint: Follow-up, elevated BP and Medication Refill  He is here today for a BP check.     He does not check his BP at home. He is taking amlodipine 5 mg qday and tolerating well. He denies chest pain or shortness of breath.     He has a history of chronic pain and is taking ATC opioids. As discussed at his last visit he was to establish with pain clinic. He has not made an appt at this time and is due for a refill on 8/19/17.    He also has anxiety for many years and is taking xanax tid.  Again we discussed at his last visit the increase risk with opioids and xanax use.      Review of Systems   Constitutional: Negative for appetite change, fatigue, fever  and unexpected weight change.   HENT: Negative for congestion, ear pain, hearing loss, sore throat and trouble swallowing.    Eyes: Negative for pain and visual disturbance.   Respiratory: Negative for cough, chest tightness, shortness of breath and wheezing.    Cardiovascular: Negative for chest pain, palpitations and leg swelling.   Gastrointestinal: Negative for abdominal pain, blood in stool, constipation, diarrhea, nausea and vomiting.   Endocrine: Negative for polyuria.   Genitourinary: Negative for dysuria and hematuria.   Musculoskeletal: Positive for arthralgias and back pain. Negative for myalgias.   Skin: Negative for rash.   Neurological: Negative for dizziness, weakness, numbness and headaches.   Hematological: Does not bruise/bleed easily.   Psychiatric/Behavioral: Negative for dysphoric mood, sleep disturbance and suicidal ideas. The patient is not nervous/anxious.        Objective:      Vitals:    08/14/17 0958   BP: (!) 158/104   BP Location: Left arm   Patient Position: Sitting   BP Method: Large (Manual)   Pulse: 78   Resp: 18   Temp: 97.9 °F (36.6 °C)   TempSrc: Oral   SpO2: 97%   Weight: 119.7 kg (263 lb 14.3 oz)   Height: 6' (1.829 m)     Body mass index is 35.79 kg/m².  Physical Exam    General appearance: No acute distress, cooperative  Neck: FROM, soft, supple, no thyromegaly, no bruits  Lymph: no anterior or posterior cervical adenopathy  Heart::  Regular rate and rhythm, no murmur  Lung: Clear to ascultation bilaterally, no wheezing, no rales, no rhonchi, no distress  Abdomen: Soft, nontender, no distention, no hepatosplenomegaly, bowel sounds normal, no guarding, no rebound, no peritoneal signs  Skin: no rashes, no lesions  Extremities: no edema, no cyanosis  Neuro: no focal abnormalities, strength 5/5 b/l UE and LE, 2+ DTRs b/l UE and LE, normal gait  Peripheral pulses: 2+ pedal pulses bilaterally, good perfusion and color      Assessment:       1. Essential hypertension    2. SHANNAN  (generalized anxiety disorder)    3. Anxiolytic dependence    4. Degenerative disc disease, lumbar        Plan:       Essential hypertension  Uncontrolled and will add lisinopril-HCTZ to his regimen. He will f/u in 2 weeks with nurse to recheck BP. He is to continue amlodipine 5 mg qday.   -     lisinopril-hydrochlorothiazide (PRINZIDE,ZESTORETIC) 10-12.5 mg per tablet; Take 1 tablet by mouth once daily.  Dispense: 90 tablet; Refill: 3    SHANNAN (generalized anxiety disorder) with Anxiolytic dependence  As we discussed in detail at his last appt this is not a safe practice and we need to wean him off xanax.  I wanted to start this process once he was established with a pain doctor incase changes were made to his regimen. I have explained this in detail and he understands.  Will refill xanax today and at his f/u in one month will discuss our strategy to wean off xanax (start with 1 tablet bid x 3 weeks then continue to decrease dose).   Rx for xanax given today #90 with stop date of 9/18/17.      Degenerative disc disease, lumbar  Controlled on chronic opioids.  I have asked him to establish with pain clinic. He has not done this yet. I will refill for one more month but will not continue to fill after this. Stop date is 9/18/17 # 90 given today.      Return in about 2 weeks (around 8/28/2017) for recheck BP.  He has an appt schedule with me for f/u on chronic medical issues on 10/2017 (he will run out of xanax prior to this appt and will need another refill but I am no longer going to fill hydrocodone after today).

## 2017-08-18 NOTE — TELEPHONE ENCOUNTER
Patient currently on lisinopril/HCTZ, please advise if patient is to discontinue the requested medication.

## 2017-09-12 DIAGNOSIS — F41.1 GAD (GENERALIZED ANXIETY DISORDER): ICD-10-CM

## 2017-09-12 DIAGNOSIS — M51.36 DEGENERATIVE DISC DISEASE, LUMBAR: ICD-10-CM

## 2017-09-12 NOTE — TELEPHONE ENCOUNTER
"Spoke w/ pt , he has not scheduled an appt w/ pain management yet. He says that the closest physician on his insurance is in [place]. Pt is trying to find someone closer. Pt is asking "for one more refill and I won't bother her again" . Pt informed that at last visit it was discussed that he would not receive any more refills on pain meds and that he is to be weened off Xanax . Please advise.--lp   "

## 2017-09-12 NOTE — TELEPHONE ENCOUNTER
----- Message from Giovani Macias sent at 9/12/2017 12:20 PM CDT -----  Contact: same  Patient called in and is requesting refills on the following 2 Rx's:    1.  Hydrocodone 10/325  2.  Xanax 1 mg      [facility] - [place] [address]  [address] [address]  Phone: [phone] Fax: [phone]    Patient call back number is [phone]

## 2017-09-14 ENCOUNTER — TELEPHONE (OUTPATIENT)
Dept: FAMILY MEDICINE | Facility: CLINIC | Age: 62
End: 2017-09-14

## 2017-09-14 DIAGNOSIS — F41.1 GAD (GENERALIZED ANXIETY DISORDER): ICD-10-CM

## 2017-09-14 DIAGNOSIS — F41.1 GAD (GENERALIZED ANXIETY DISORDER): Primary | ICD-10-CM

## 2017-09-14 RX ORDER — HYDROCODONE BITARTRATE AND ACETAMINOPHEN 10; 325 MG/1; MG/1
TABLET ORAL
Qty: 90 TABLET | Refills: 0 | OUTPATIENT
Start: 2017-09-14

## 2017-09-14 RX ORDER — ALPRAZOLAM 1 MG/1
1 TABLET ORAL 3 TIMES DAILY
Qty: 90 TABLET | Refills: 0 | OUTPATIENT
Start: 2017-09-14

## 2017-09-14 RX ORDER — ALPRAZOLAM 1 MG/1
1 TABLET ORAL 3 TIMES DAILY
Qty: 90 TABLET | Refills: 0 | Status: SHIPPED | OUTPATIENT
Start: 2017-09-14 | End: 2017-10-11 | Stop reason: SDUPTHER

## 2017-09-14 NOTE — TELEPHONE ENCOUNTER
----- Message from Miriam Jenkins sent at 9/14/2017 12:47 PM CDT -----  Contact: patient  Patient calling in regards to requesting a refill for Hydrocodone and Xanax. Please advise.  Call back   Thanks!  [facility] - [place], [place] - [address] [place] [address]  [address] [address]  Phone: [phone] Fax: [phone]

## 2017-09-14 NOTE — TELEPHONE ENCOUNTER
Returned pt's call. Pt inquiring about rx refills on alprazolam and hydrocone. Rx has been previously pended. Please advise.

## 2017-09-15 ENCOUNTER — TELEPHONE (OUTPATIENT)
Dept: FAMILY MEDICINE | Facility: CLINIC | Age: 62
End: 2017-09-15

## 2017-09-15 NOTE — TELEPHONE ENCOUNTER
"Pt says that his Celexa was changed to Cymbalta. Pt says that he took 2 doses and he felt dizzy, nausea. Pt stopped Cymbalta immediately. Pt went back to Celexa. Pt states that his blood pressure medication was changed and he does not understand why these med changes were done . Pt says that when he saw Dr Tyler , his meds were working "just fine" . Offered pt a sooner appt to see Dr Mckoen, pt refused. --lp  "

## 2017-09-15 NOTE — TELEPHONE ENCOUNTER
As we discussed last visit. I am no longer filling his pain medication. He was to establish with pain management. I did fill his xanax.     I do not manage chronic anxiolytic therapy and I am referring him to psychiatry for further management.     Please schedule

## 2017-09-15 NOTE — TELEPHONE ENCOUNTER
Pt aware that Xanax was sent in to the pharmacy . Pt aware that he is being referred to psychiatry for future management of Xanax. Offered to give pt # to Psych, pt refused . Pt said that he is not going to see psych. Pt states he will keep appt sched in October with Dr Mckeon.--lp

## 2017-09-15 NOTE — TELEPHONE ENCOUNTER
Pt aware that refills for Xanax and [place]  were refused . Pt was not happy . Pt states that he does not understand why he cannot get one more refill. Pt thought he would be weened off the Xanax. He says his body is shaking because he has not had his Xanax. Pt asked for Dr Mckeon to call pt to explain this to him .--lp

## 2017-10-06 ENCOUNTER — PATIENT OUTREACH (OUTPATIENT)
Dept: ADMINISTRATIVE | Facility: HOSPITAL | Age: 62
End: 2017-10-06

## 2017-10-06 NOTE — LETTER
October 6, 2017    Fred Blackwell  [address]  [place] [address]             [facility]  [address]  [facility] [address]  Phone: [phone] Dear Mr. Blackwell:    [facility] is committed to your overall health.  To help you get the most out of each of your visits, we will review your information to make sure you are up to date on all of your recommended tests and/or procedures.      Dr. Ariadna Mckeon has found that your chart shows you may be due for shingles and flu immunizations.     Medicare does not cover all immunizations to be given in the clinic.  Check your benefits to ensure that you do not need to receive your immunizations at the pharmacy.    If you have had any of the above done at another facility, please bring the records or information with you so that your record at [facility] will be complete.  If you would like to schedule any of these, please contact me.    If you are currently taking medication, please bring it with you to your appointment for review.    If you have any questions or concerns, please don't hesitate to call.    Thank you for letting us care for you,  Pretty Munroe LPN Clinical Care Coordinator  [facility] [place]  [phone]

## 2017-10-11 ENCOUNTER — OFFICE VISIT (OUTPATIENT)
Dept: FAMILY MEDICINE | Facility: CLINIC | Age: 62
End: 2017-10-11
Payer: MEDICARE

## 2017-10-11 VITALS
SYSTOLIC BLOOD PRESSURE: 168 MMHG | HEART RATE: 70 BPM | HEIGHT: 72 IN | DIASTOLIC BLOOD PRESSURE: 98 MMHG | WEIGHT: 261.69 LBS | BODY MASS INDEX: 35.44 KG/M2

## 2017-10-11 VITALS
HEIGHT: 72 IN | WEIGHT: 261.69 LBS | SYSTOLIC BLOOD PRESSURE: 138 MMHG | RESPIRATION RATE: 18 BRPM | HEART RATE: 72 BPM | DIASTOLIC BLOOD PRESSURE: 86 MMHG | BODY MASS INDEX: 35.44 KG/M2

## 2017-10-11 DIAGNOSIS — I10 ESSENTIAL HYPERTENSION: Primary | ICD-10-CM

## 2017-10-11 DIAGNOSIS — Z00.00 ENCOUNTER FOR PREVENTIVE HEALTH EXAMINATION: Primary | ICD-10-CM

## 2017-10-11 DIAGNOSIS — F41.0 PANIC ATTACK: ICD-10-CM

## 2017-10-11 DIAGNOSIS — F17.200 TOBACCO DEPENDENCE: ICD-10-CM

## 2017-10-11 DIAGNOSIS — F11.20 UNCOMPLICATED OPIOID DEPENDENCE: ICD-10-CM

## 2017-10-11 DIAGNOSIS — M50.30 DEGENERATIVE DISC DISEASE, CERVICAL: ICD-10-CM

## 2017-10-11 DIAGNOSIS — E78.5 HYPERLIPIDEMIA, UNSPECIFIED HYPERLIPIDEMIA TYPE: ICD-10-CM

## 2017-10-11 DIAGNOSIS — M79.7 FIBROMYALGIA: ICD-10-CM

## 2017-10-11 DIAGNOSIS — M51.36 DEGENERATIVE DISC DISEASE, LUMBAR: ICD-10-CM

## 2017-10-11 DIAGNOSIS — F13.20 ANXIOLYTIC DEPENDENCE: ICD-10-CM

## 2017-10-11 DIAGNOSIS — G51.0 RIGHT-SIDED BELL'S PALSY: ICD-10-CM

## 2017-10-11 DIAGNOSIS — F41.1 GAD (GENERALIZED ANXIETY DISORDER): ICD-10-CM

## 2017-10-11 DIAGNOSIS — I70.0 AORTOILIAC STENOSIS: ICD-10-CM

## 2017-10-11 DIAGNOSIS — K21.9 GASTROESOPHAGEAL REFLUX DISEASE WITHOUT ESOPHAGITIS: ICD-10-CM

## 2017-10-11 DIAGNOSIS — I10 ESSENTIAL HYPERTENSION: ICD-10-CM

## 2017-10-11 DIAGNOSIS — M54.17 RADICULAR PAIN OF LUMBOSACRAL REGION: ICD-10-CM

## 2017-10-11 DIAGNOSIS — B35.1 ONYCHOMYCOSIS: ICD-10-CM

## 2017-10-11 PROCEDURE — G0439 PPPS, SUBSEQ VISIT: HCPCS | Mod: S$GLB,,, | Performed by: NURSE PRACTITIONER

## 2017-10-11 PROCEDURE — 99214 OFFICE O/P EST MOD 30 MIN: CPT | Mod: S$GLB,,, | Performed by: FAMILY MEDICINE

## 2017-10-11 PROCEDURE — 99999 PR PBB SHADOW E&M-EST. PATIENT-LVL IV: CPT | Mod: PBBFAC,,, | Performed by: NURSE PRACTITIONER

## 2017-10-11 PROCEDURE — 99499 UNLISTED E&M SERVICE: CPT | Mod: S$GLB,,, | Performed by: FAMILY MEDICINE

## 2017-10-11 PROCEDURE — 99499 UNLISTED E&M SERVICE: CPT | Mod: S$GLB,,, | Performed by: NURSE PRACTITIONER

## 2017-10-11 PROCEDURE — 99999 PR PBB SHADOW E&M-EST. PATIENT-LVL III: CPT | Mod: PBBFAC,,, | Performed by: FAMILY MEDICINE

## 2017-10-11 RX ORDER — ALPRAZOLAM 1 MG/1
1 TABLET ORAL 3 TIMES DAILY PRN
Qty: 80 TABLET | Refills: 0 | Status: SHIPPED | OUTPATIENT
Start: 2017-10-11 | End: 2017-11-07 | Stop reason: SDUPTHER

## 2017-10-11 RX ORDER — HYDROCODONE BITARTRATE AND ACETAMINOPHEN 10; 325 MG/1; MG/1
TABLET ORAL
Qty: 80 TABLET | Refills: 0 | Status: SHIPPED | OUTPATIENT
Start: 2017-10-11 | End: 2017-11-07 | Stop reason: SDUPTHER

## 2017-10-11 RX ORDER — OMEPRAZOLE 20 MG/1
20 CAPSULE, DELAYED RELEASE ORAL DAILY
Qty: 90 CAPSULE | Refills: 3 | Status: SHIPPED | OUTPATIENT
Start: 2017-10-11 | End: 2018-11-21 | Stop reason: SDUPTHER

## 2017-10-11 NOTE — PATIENT INSTRUCTIONS
Counseling and Referral of Other Preventative  (Italic type indicates deductible and co-insurance are waived)    Patient Name: Fred Blackwell  Today's Date: 10/11/2017      SERVICE LIMITATIONS RECOMMENDATION    Vaccines    · Pneumococcal (once after 65)    · Influenza (annually)    · Hepatitis B (if medium/high risk)    · Prevnar 13      Hepatitis B medium/high risk factors:       - End-stage renal disease       - Hemophiliacs who received Factor VII or         IX concentrates       - Clients of institutions for the mentally             retarded       - Persons who live in the same house as          a HepB carrier       - Homosexual men       - Illicit injectable drug abusers     Pneumococcal: Done, no repeat necessary     Influenza: Scheduled - see appointments     Hepatitis B: N/A     Prevnar 13: N/A    Prostate cancer screening (annually to age 75)     Prostate specific antigen (PSA) Shared decision making with Provider. Sometimes a co-pay may be required if the patient decides to have this test. The USPSTF no longer recommends prostate cancer screening routinely in medicine: not to follow    Colorectal cancer screening (to age 75)    · Fecal occult blood test (annual)  · Flexible sigmoidoscopy (5y)  · Screening colonoscopy (10y)  · Barium enema   Last done 2011, recommend to repeat every 6-7  years    Diabetes self-management training (no USPSTF recommendations)  Requires referral by treating physician for patient with diabetes or renal disease. 10 hours of initial DSMT sessions of no less than 30 minutes each in a continuous 12-month period. 2 hours of follow-up DSMT in subsequent years.  N/A    Glaucoma screening (no USPSTF recommendation)  Diabetes mellitus, family history   , age 50 or over    American, age 65 or over  Recommend follow up with eye care professional regularly    Medical nutrition therapy for diabetes or renal disease (no recommended schedule)  Requires  referral by treating physician for patient with diabetes or renal disease or kidney transplant within the past 3 years.  Can be provided in same year as diabetes self-management training (DSMT), and CMS recommends medical nutrition therapy take place after DSMT. Up to 3 hours for initial year and 2 hours in subsequent years.  N/A    Cardiovascular screening blood tests (every 5 years)  · Fasting lipid panel  Order as a panel if possible  Done this year, repeat every year    Diabetes screening tests (at least every 3 years, Medicare covers annually or at 6-month intervals for prediabetic patients)  · Fasting blood sugar (FBS) or glucose tolerance test (GTT)  Patient must be diagnosed with one of the following:       - Hypertension       - Dyslipidemia       - Obesity (BMI 30kg/m2)       - Previous elevated impaired FBS or GTT       ... or any two of the following:       - Overweight (BMI 25 but <30)       - Family history of diabetes       - Age 65 or older       - History of gestational diabetes or birth of baby weighing more than 9 pounds  Done this year, repeat every year    Abdominal aortic aneurysm screening (once)  · Sonogram   Limited to patients who meet one of the following criteria:       - Men who are 65-75 years old and have smoked more than 100 cigarette in their lifetime       - Anyone with a family history of abdominal aortic aneurysm       - Anyone recommended for screening by the USPSTF  N/A    HIV screening (annually for increased risk patients)  · HIV-1 and HIV-2 by EIA, or TURNER, rapid antibody test or oral mucosa transudate  Patients must be at increased risk for HIV infection per USPSTF guidelines or pregnant. Tests covered annually for patient at increased risk or as requested by the patient. Pregnant patients may receive up to 3 tests during pregnancy.  Risks discussed, screening is not recommended    Smoking cessation counseling (up to 8 sessions per year)  Patients must be asymptomatic of  tobacco-related conditions to receive as a preventative service.  Counseled for 3-10 minutes.    Subsequent annual wellness visit  At least 12 months since last AWV  Return in one year     The following information is provided to all patients.  This information is to help you find resources for any of the problems found today that may be affecting your health:                Living healthy guide: www.[facility].[place].[facility]      Understanding Diabetes: www.diabetes.org      Eating healthy: www.cdc.gov/healthyweight      CDC home safety checklist: www.cdc.gov/steadi/patient.html      Agency on Aging: www.goea.[place].[facility]      Alcoholics anonymous (AA): www.aa.org      Physical Activity: www.leida.nih.gov/ms3yshi      Tobacco use: www.quitwithusla.org

## 2017-10-11 NOTE — PROGRESS NOTES
Subjective:       Patient ID: Fred Blackwell is a 62 y.o. male.    Chief Complaint: Establish Care (Patient here to establish care )    Here for acute visit for f/u htn , pain syndrome, and anxiety.  Has been seeing Dr. Ariadna Mckeon after seeing Dr. Tyler.  On uniretic currently for bp with good results.  Taking xanax tid for years for anxiety/panic attacks.  Has seen psychology in past for breathing techniques.      Hypertension   This is a chronic problem. The current episode started more than 1 year ago. The problem is controlled. Pertinent negatives include no chest pain, palpitations or shortness of breath. Past treatments include diuretics. The current treatment provides moderate improvement.     Review of Systems   Constitutional: Negative for chills, fatigue and fever.   Respiratory: Negative for cough, chest tightness and shortness of breath.    Cardiovascular: Negative for chest pain, palpitations and leg swelling.   Gastrointestinal: Negative for abdominal distention and abdominal pain.   Endocrine: Negative for cold intolerance and heat intolerance.   Musculoskeletal: Positive for arthralgias.   Skin: Negative for rash.   Psychiatric/Behavioral: Negative for dysphoric mood. The patient is not nervous/anxious.        Objective:      Physical Exam   Constitutional: He appears well-developed and well-nourished.   HENT:   Head: Normocephalic and atraumatic.   Cardiovascular: Normal rate, regular rhythm and normal heart sounds.    Pulmonary/Chest: Effort normal and breath sounds normal.   Psychiatric: He has a normal mood and affect.   Nursing note and vitals reviewed.      Assessment:       1. Essential hypertension    2. SHANNAN (generalized anxiety disorder)    3. Degenerative disc disease, lumbar    4. Hyperlipidemia, unspecified hyperlipidemia type    5. Anxiolytic dependence    6. Uncomplicated opioid dependence        Plan:       Essential hypertension    SHANNAN (generalized anxiety disorder)  -      alprazolam (XANAX) 1 MG tablet; Take 1 tablet (1 mg total) by mouth 3 (three) times daily as needed for Anxiety.  Dispense: 80 tablet; Refill: 0    Degenerative disc disease, lumbar  -     hydrocodone-acetaminophen 10-325mg (NORCO)  mg Tab; TAKE 1 TABLET BY MOUTH 3 (THREE) TIMES DAILY AS NEEDED.  Dispense: 80 tablet; Refill: 0    Hyperlipidemia, unspecified hyperlipidemia type    Anxiolytic dependence    Uncomplicated opioid dependence    Other orders  -     omeprazole (PRILOSEC) 20 MG capsule; Take 1 capsule (20 mg total) by mouth once daily.  Dispense: 90 capsule; Refill: 3      Lengthy discussion of current state of meds and needs to cut back and try to wean with max #60 per month.  Will do #80 this month.    Checked state database and no irregularities.      Return in about 1 month (around 11/11/2017), or if symptoms worsen or fail to improve.

## 2017-10-11 NOTE — PROGRESS NOTES
Fred Chumckenna Rishi presented for a  Medicare AWV and comprehensive Health Risk Assessment today. The following components were reviewed and updated:    · Medical history  · Family History  · Social history  · Allergies and Current Medications  · Health Risk Assessment  · Health Maintenance  · Care Team     ** See Completed Assessments for Annual Wellness Visit within the encounter summary.**       The following assessments were completed:  · Living Situation  · CAGE  · Depression Screening  · Timed Get Up and Go  · Whisper Test  · Cognitive Function Screening          · Nutrition Screening  · ADL Screening  · PAQ Screening    Pt reports getting upset when discussing meds at check-in.  Takes bp meds as prescribed. Has appt with pcp today immediately after HRA appt    Vitals:    10/11/17 1301 10/11/17 1339   BP: (!) 155/106 (!) 168/98   BP Location: Left arm    Patient Position: Sitting    BP Method: Large (Automatic)    Pulse: 70    Weight: 118.7 kg (261 lb 11 oz)    Height: 6' (1.829 m)      Body mass index is 35.49 kg/m².  Physical Exam   Constitutional: He appears well-developed and well-nourished.   HENT:   Head: Normocephalic and atraumatic.   Cardiovascular: Normal rate, regular rhythm and normal heart sounds.    Pulmonary/Chest: Effort normal and breath sounds normal.   Neurological: He is alert.   Skin: Skin is warm and dry.   Psychiatric: He has a normal mood and affect.   Nursing note and vitals reviewed.        Diagnoses and health risks identified today and associated recommendations/orders:    1. Encounter for preventive health examination  Reviewed health maintenance and provided recommendations  Provided written rx for flu vaccine  - NURSING COMMUNICATION: Create [facility] Account  - Hypertension Digital Medicine (HDMP) Enrollment Order  - Hypertension Digital Medicine (HDMP): Assign Onboarding Questionnaires    2. Essential hypertension    Poorly controlled  Recommend f/u with pcp  Followed by ROBERT  Nelson Bahena MD .       3. Hyperlipidemia, unspecified hyperlipidemia type  Continue to monitor   Followed by ROBERT Bahena MD .       4. Aortoiliac stenosis  Continue to monitor lipids  Followed by MD Weston Will       5. SHANNAN (generalized anxiety disorder)  Stable.   Taking xanax  Lengthy discussion regarding long term use of xanax  Followed by MD Weston Will       6. Anxiolytic dependence  Stable.   Encourage alternative approach for anxiety control  Followed by MD Weston Will       7. Degenerative disc disease, cervical  Stable.   Controlled on current medications.   Followed by ROBERT Bahena MD .       8. Degenerative disc disease, lumbar  Stable.   Controlled on current medications.  Followed by ROBERT Bahena MD .       9. Gastroesophageal reflux disease without esophagitis  Stable.   Controlled on current medications.  Followed by ROBERT Bahena MD .       10. Radicular pain of lumbosacral region  Stable.   Controlled on current medications.  Followed by ROBERT Bahena MD .       11. Fibromyalgia  Stable.   Controlled on current medications.  Followed by ROBERT Bahena MD .       12. Uncomplicated opioid dependence  Stable.   Continue to narcotic meds as prescribed  Recommend est care with pain mgmt  Followed by ROBERT Bahena MD .       13. Tobacco dependence  Stable.   Encourage to continue to decrease amount of smoking  Followed by ROBERT Bahena MD .       14. Right-sided Bell's palsy  Stable.     Followed by ROBERT Bahena MD .       15. Panic attack  Stable.   Controlled on current medications and relaxation techniques.  Followed by ROBERT Bahena MD .       16. Onychomycosis  Stable.     Followed by ROBERT Bahena MD .         Patricia Fred Santos with a 5-10 year written screening schedule and personal prevention plan. Recommendations were developed using the USPSTF age appropriate recommendations. Education, counseling,  and referrals were provided as needed. After Visit Summary printed and given to patient which includes a list of additional screenings\tests needed.    Return in about 1 year (around 10/11/2018).    Aubrie Sebastian NP

## 2017-10-19 ENCOUNTER — TELEPHONE (OUTPATIENT)
Dept: FAMILY MEDICINE | Facility: CLINIC | Age: 62
End: 2017-10-19

## 2017-10-19 DIAGNOSIS — E78.5 HYPERLIPIDEMIA, UNSPECIFIED HYPERLIPIDEMIA TYPE: ICD-10-CM

## 2017-10-19 DIAGNOSIS — I10 ESSENTIAL HYPERTENSION: ICD-10-CM

## 2017-10-19 DIAGNOSIS — F41.0 PANIC ATTACK: Primary | ICD-10-CM

## 2017-10-19 DIAGNOSIS — Z12.5 SCREENING FOR PROSTATE CANCER: ICD-10-CM

## 2017-10-19 NOTE — TELEPHONE ENCOUNTER
Patient called requesting lab orders, cannot sign PSA via [facility]. Labs were previously scheduled for Dr Mckeon but are no longer valid. Please sign pended orders. Thank you.

## 2017-10-19 NOTE — TELEPHONE ENCOUNTER
----- Message from Keri Tijerina sent at 10/19/2017  8:55 AM CDT -----  Contact: pt   Wants lab ordered   Call back

## 2017-10-20 NOTE — TELEPHONE ENCOUNTER
----- Message from Klarissa Barnard sent at 10/19/2017  1:28 PM CDT -----  [phone] ... Not sure he came in on Monday for labs / but according to our records he checked in at 12:30 10/16

## 2017-10-28 ENCOUNTER — LAB VISIT (OUTPATIENT)
Dept: LAB | Facility: HOSPITAL | Age: 62
End: 2017-10-28
Attending: FAMILY MEDICINE
Payer: MEDICARE

## 2017-10-28 DIAGNOSIS — E78.5 HYPERLIPIDEMIA, UNSPECIFIED HYPERLIPIDEMIA TYPE: ICD-10-CM

## 2017-10-28 DIAGNOSIS — Z12.5 SCREENING FOR PROSTATE CANCER: ICD-10-CM

## 2017-10-28 DIAGNOSIS — I10 ESSENTIAL HYPERTENSION: ICD-10-CM

## 2017-10-28 LAB
ALBUMIN SERPL BCP-MCNC: 3.5 G/DL
ALP SERPL-CCNC: 70 U/L
ALT SERPL W/O P-5'-P-CCNC: 16 U/L
ANION GAP SERPL CALC-SCNC: 11 MMOL/L
AST SERPL-CCNC: 16 U/L
BASOPHILS # BLD AUTO: 0.09 K/UL
BASOPHILS NFR BLD: 1.1 %
BILIRUB SERPL-MCNC: 0.8 MG/DL
BUN SERPL-MCNC: 17 MG/DL
CALCIUM SERPL-MCNC: 9.4 MG/DL
CHLORIDE SERPL-SCNC: 100 MMOL/L
CHOLEST SERPL-MCNC: 222 MG/DL
CHOLEST/HDLC SERPL: 5.4 {RATIO}
CO2 SERPL-SCNC: 31 MMOL/L
COMPLEXED PSA SERPL-MCNC: 0.39 NG/ML
CREAT SERPL-MCNC: 1 MG/DL
DIFFERENTIAL METHOD: NORMAL
EOSINOPHIL # BLD AUTO: 0.1 K/UL
EOSINOPHIL NFR BLD: 1.8 %
ERYTHROCYTE [DISTWIDTH] IN BLOOD BY AUTOMATED COUNT: 13.1 %
EST. GFR  (AFRICAN AMERICAN): >60 ML/MIN/1.73 M^2
EST. GFR  (NON AFRICAN AMERICAN): >60 ML/MIN/1.73 M^2
GLUCOSE SERPL-MCNC: 127 MG/DL
HCT VFR BLD AUTO: 46.4 %
HDLC SERPL-MCNC: 41 MG/DL
HDLC SERPL: 18.5 %
HGB BLD-MCNC: 15.1 G/DL
IMM GRANULOCYTES # BLD AUTO: 0.02 K/UL
IMM GRANULOCYTES NFR BLD AUTO: 0.3 %
LDLC SERPL CALC-MCNC: 161.4 MG/DL
LYMPHOCYTES # BLD AUTO: 3.7 K/UL
LYMPHOCYTES NFR BLD: 46.2 %
MCH RBC QN AUTO: 28.1 PG
MCHC RBC AUTO-ENTMCNC: 32.5 G/DL
MCV RBC AUTO: 86 FL
MONOCYTES # BLD AUTO: 0.7 K/UL
MONOCYTES NFR BLD: 8.3 %
NEUTROPHILS # BLD AUTO: 3.4 K/UL
NEUTROPHILS NFR BLD: 42.3 %
NONHDLC SERPL-MCNC: 181 MG/DL
NRBC BLD-RTO: 0 /100 WBC
PLATELET # BLD AUTO: 222 K/UL
PMV BLD AUTO: 11.5 FL
POTASSIUM SERPL-SCNC: 3.4 MMOL/L
PROT SERPL-MCNC: 7.3 G/DL
RBC # BLD AUTO: 5.38 M/UL
SODIUM SERPL-SCNC: 142 MMOL/L
TRIGL SERPL-MCNC: 98 MG/DL
TSH SERPL DL<=0.005 MIU/L-ACNC: 1.75 UIU/ML
WBC # BLD AUTO: 7.94 K/UL

## 2017-10-28 PROCEDURE — 36415 COLL VENOUS BLD VENIPUNCTURE: CPT | Mod: PO

## 2017-10-28 PROCEDURE — 84443 ASSAY THYROID STIM HORMONE: CPT

## 2017-10-28 PROCEDURE — 84153 ASSAY OF PSA TOTAL: CPT

## 2017-10-28 PROCEDURE — 80053 COMPREHEN METABOLIC PANEL: CPT

## 2017-10-28 PROCEDURE — 85025 COMPLETE CBC W/AUTO DIFF WBC: CPT

## 2017-10-28 PROCEDURE — 80061 LIPID PANEL: CPT

## 2017-11-01 ENCOUNTER — LAB VISIT (OUTPATIENT)
Dept: LAB | Facility: HOSPITAL | Age: 62
End: 2017-11-01
Attending: FAMILY MEDICINE
Payer: MEDICARE

## 2017-11-01 ENCOUNTER — OFFICE VISIT (OUTPATIENT)
Dept: FAMILY MEDICINE | Facility: CLINIC | Age: 62
End: 2017-11-01
Payer: MEDICARE

## 2017-11-01 ENCOUNTER — CLINICAL SUPPORT (OUTPATIENT)
Dept: FAMILY MEDICINE | Facility: CLINIC | Age: 62
End: 2017-11-01
Attending: FAMILY MEDICINE
Payer: MEDICARE

## 2017-11-01 VITALS
DIASTOLIC BLOOD PRESSURE: 96 MMHG | WEIGHT: 262.56 LBS | SYSTOLIC BLOOD PRESSURE: 158 MMHG | BODY MASS INDEX: 35.56 KG/M2 | RESPIRATION RATE: 16 BRPM | HEART RATE: 72 BPM | HEIGHT: 72 IN

## 2017-11-01 DIAGNOSIS — I10 ESSENTIAL HYPERTENSION: ICD-10-CM

## 2017-11-01 DIAGNOSIS — F11.20 UNCOMPLICATED OPIOID DEPENDENCE: ICD-10-CM

## 2017-11-01 DIAGNOSIS — E78.5 HYPERLIPIDEMIA, UNSPECIFIED HYPERLIPIDEMIA TYPE: ICD-10-CM

## 2017-11-01 DIAGNOSIS — E11.69 TYPE 2 DIABETES MELLITUS WITH OTHER SPECIFIED COMPLICATION, WITHOUT LONG-TERM CURRENT USE OF INSULIN: Primary | ICD-10-CM

## 2017-11-01 DIAGNOSIS — M79.7 FIBROMYALGIA: ICD-10-CM

## 2017-11-01 DIAGNOSIS — E11.69 TYPE 2 DIABETES MELLITUS WITH OTHER SPECIFIED COMPLICATION, WITHOUT LONG-TERM CURRENT USE OF INSULIN: ICD-10-CM

## 2017-11-01 DIAGNOSIS — M54.17 RADICULAR PAIN OF LUMBOSACRAL REGION: ICD-10-CM

## 2017-11-01 PROBLEM — E11.9 TYPE 2 DIABETES MELLITUS, WITHOUT LONG-TERM CURRENT USE OF INSULIN: Status: ACTIVE | Noted: 2017-11-01

## 2017-11-01 LAB
ESTIMATED AVG GLUCOSE: 134 MG/DL
HBA1C MFR BLD HPLC: 6.3 %

## 2017-11-01 PROCEDURE — 99999 PR PBB SHADOW E&M-EST. PATIENT-LVL III: CPT | Mod: PBBFAC,,, | Performed by: FAMILY MEDICINE

## 2017-11-01 PROCEDURE — 99214 OFFICE O/P EST MOD 30 MIN: CPT | Mod: S$GLB,,, | Performed by: FAMILY MEDICINE

## 2017-11-01 PROCEDURE — 99499 UNLISTED E&M SERVICE: CPT | Mod: S$GLB,,, | Performed by: FAMILY MEDICINE

## 2017-11-01 PROCEDURE — 83036 HEMOGLOBIN GLYCOSYLATED A1C: CPT

## 2017-11-01 PROCEDURE — 36415 COLL VENOUS BLD VENIPUNCTURE: CPT | Mod: PO

## 2017-11-01 RX ORDER — LOSARTAN POTASSIUM AND HYDROCHLOROTHIAZIDE 12.5; 5 MG/1; MG/1
1 TABLET ORAL DAILY
Qty: 30 TABLET | Refills: 2 | Status: SHIPPED | OUTPATIENT
Start: 2017-11-01 | End: 2017-11-29

## 2017-11-01 NOTE — PROGRESS NOTES
Subjective:       Patient ID: Fred Blackwell is a 62 y.o. male.    Chief Complaint: Hypertension (Patient here for 1 month follow up)    Here for f/u chronic medical issues.  htn not controlled.  States doing well overall.      Hypertension   This is a chronic problem. The current episode started more than 1 year ago. The problem is uncontrolled. Pertinent negatives include no chest pain, palpitations or shortness of breath. Past treatments include diuretics.   Hyperlipidemia   This is a chronic problem. The current episode started more than 1 year ago. Pertinent negatives include no chest pain or shortness of breath.     Review of Systems   Constitutional: Negative for chills, fatigue and fever.   Respiratory: Negative for cough, chest tightness and shortness of breath.    Cardiovascular: Negative for chest pain, palpitations and leg swelling.   Gastrointestinal: Negative for abdominal distention and abdominal pain.   Endocrine: Negative for cold intolerance and heat intolerance.   Skin: Negative for rash.   Psychiatric/Behavioral: Negative for dysphoric mood. The patient is not nervous/anxious.        Objective:      Physical Exam   Constitutional: He appears well-developed and well-nourished.   HENT:   Head: Normocephalic and atraumatic.   Cardiovascular: Normal rate, regular rhythm and normal heart sounds.    Pulmonary/Chest: Effort normal and breath sounds normal.   Abdominal: Soft. Bowel sounds are normal.   Musculoskeletal: Normal range of motion.   Psychiatric: He has a normal mood and affect.   Nursing note and vitals reviewed.      Assessment:       1. Type 2 diabetes mellitus with other specified complication, without long-term current use of insulin    2. Essential hypertension    3. Hyperlipidemia, unspecified hyperlipidemia type    4. Uncomplicated opioid dependence    5. Fibromyalgia    6. Radicular pain of lumbosacral region        Plan:       Type 2 diabetes mellitus with other specified  complication, without long-term current use of insulin  -     Hemoglobin A1c; Future; Expected date: 11/01/2017  -     Microalbumin/creatinine urine ratio; Future; Expected date: 11/01/2017  -     Diabetic Eye Screening Photo; Future  -     Ambulatory referral to Podiatry    Essential hypertension    Hyperlipidemia, unspecified hyperlipidemia type    Uncomplicated opioid dependence    Fibromyalgia    Radicular pain of lumbosacral region    Other orders  -     losartan-hydrochlorothiazide 50-12.5 mg (HYZAAR) 50-12.5 mg per tablet; Take 1 tablet by mouth once daily.  Dispense: 30 tablet; Refill: 2      New onset dm II.  Update hga1c.  Will monitor chronic medical issues and continue current plan of care.      Return in about 1 month (around 12/1/2017), or if symptoms worsen or fail to improve.

## 2017-11-01 NOTE — PROGRESS NOTES
Fred Blackwell is a 62 y.o. male here for a diabetic eye screening with non-dilated fundus photos per Dr Bahena .    Patient cooperative?: Yes  Small pupils?: No  Last eye exam: 8/2/1965    For exam results, see Encounter Report.

## 2017-11-06 PROCEDURE — 92250 FUNDUS PHOTOGRAPHY W/I&R: CPT | Mod: S$GLB,,, | Performed by: OPTOMETRIST

## 2017-11-07 DIAGNOSIS — F41.1 GAD (GENERALIZED ANXIETY DISORDER): ICD-10-CM

## 2017-11-07 DIAGNOSIS — M51.36 DEGENERATIVE DISC DISEASE, LUMBAR: ICD-10-CM

## 2017-11-08 RX ORDER — ALPRAZOLAM 1 MG/1
1 TABLET ORAL 3 TIMES DAILY PRN
Qty: 80 TABLET | Refills: 0 | Status: SHIPPED | OUTPATIENT
Start: 2017-11-08 | End: 2017-11-29 | Stop reason: SDUPTHER

## 2017-11-08 RX ORDER — HYDROCODONE BITARTRATE AND ACETAMINOPHEN 10; 325 MG/1; MG/1
TABLET ORAL
Qty: 80 TABLET | Refills: 0 | Status: SHIPPED | OUTPATIENT
Start: 2017-11-08 | End: 2017-11-29 | Stop reason: SDUPTHER

## 2017-11-15 DIAGNOSIS — I10 ESSENTIAL HYPERTENSION: Primary | ICD-10-CM

## 2017-11-28 ENCOUNTER — LAB VISIT (OUTPATIENT)
Dept: LAB | Facility: HOSPITAL | Age: 62
End: 2017-11-28
Attending: FAMILY MEDICINE
Payer: MEDICARE

## 2017-11-28 DIAGNOSIS — I10 ESSENTIAL HYPERTENSION: ICD-10-CM

## 2017-11-28 LAB
ANION GAP SERPL CALC-SCNC: 10 MMOL/L
BUN SERPL-MCNC: 14 MG/DL
CALCIUM SERPL-MCNC: 9 MG/DL
CHLORIDE SERPL-SCNC: 102 MMOL/L
CO2 SERPL-SCNC: 31 MMOL/L
CREAT SERPL-MCNC: 0.9 MG/DL
EST. GFR  (AFRICAN AMERICAN): >60 ML/MIN/1.73 M^2
EST. GFR  (NON AFRICAN AMERICAN): >60 ML/MIN/1.73 M^2
GLUCOSE SERPL-MCNC: 110 MG/DL
POTASSIUM SERPL-SCNC: 4 MMOL/L
SODIUM SERPL-SCNC: 143 MMOL/L

## 2017-11-28 PROCEDURE — 80048 BASIC METABOLIC PNL TOTAL CA: CPT

## 2017-11-28 PROCEDURE — 36415 COLL VENOUS BLD VENIPUNCTURE: CPT | Mod: PO

## 2017-11-29 ENCOUNTER — OFFICE VISIT (OUTPATIENT)
Dept: FAMILY MEDICINE | Facility: CLINIC | Age: 62
End: 2017-11-29
Payer: MEDICARE

## 2017-11-29 VITALS
WEIGHT: 262.56 LBS | DIASTOLIC BLOOD PRESSURE: 96 MMHG | HEART RATE: 72 BPM | HEIGHT: 72 IN | RESPIRATION RATE: 18 BRPM | BODY MASS INDEX: 35.56 KG/M2 | SYSTOLIC BLOOD PRESSURE: 142 MMHG

## 2017-11-29 DIAGNOSIS — I10 ESSENTIAL HYPERTENSION: ICD-10-CM

## 2017-11-29 DIAGNOSIS — E11.69 TYPE 2 DIABETES MELLITUS WITH OTHER SPECIFIED COMPLICATION, WITHOUT LONG-TERM CURRENT USE OF INSULIN: Primary | ICD-10-CM

## 2017-11-29 DIAGNOSIS — M51.36 DEGENERATIVE DISC DISEASE, LUMBAR: ICD-10-CM

## 2017-11-29 DIAGNOSIS — E78.5 HYPERLIPIDEMIA, UNSPECIFIED HYPERLIPIDEMIA TYPE: ICD-10-CM

## 2017-11-29 DIAGNOSIS — F41.1 GAD (GENERALIZED ANXIETY DISORDER): ICD-10-CM

## 2017-11-29 PROCEDURE — 99999 PR PBB SHADOW E&M-EST. PATIENT-LVL III: CPT | Mod: PBBFAC,,, | Performed by: FAMILY MEDICINE

## 2017-11-29 PROCEDURE — 99499 UNLISTED E&M SERVICE: CPT | Mod: S$GLB,,, | Performed by: FAMILY MEDICINE

## 2017-11-29 PROCEDURE — 99214 OFFICE O/P EST MOD 30 MIN: CPT | Mod: S$GLB,,, | Performed by: FAMILY MEDICINE

## 2017-11-29 RX ORDER — LOSARTAN POTASSIUM AND HYDROCHLOROTHIAZIDE 12.5; 1 MG/1; MG/1
1 TABLET ORAL DAILY
Qty: 90 TABLET | Refills: 3 | Status: SHIPPED | OUTPATIENT
Start: 2017-11-29 | End: 2018-09-25 | Stop reason: SDUPTHER

## 2017-11-29 RX ORDER — ATORVASTATIN CALCIUM 40 MG/1
40 TABLET, FILM COATED ORAL DAILY
Qty: 90 TABLET | Refills: 3 | Status: SHIPPED | OUTPATIENT
Start: 2017-11-29 | End: 2019-04-01

## 2017-11-29 RX ORDER — CITALOPRAM 10 MG/1
10 TABLET ORAL DAILY
Qty: 90 TABLET | Refills: 3 | Status: SHIPPED | OUTPATIENT
Start: 2017-11-29 | End: 2018-10-30 | Stop reason: SDUPTHER

## 2017-11-29 RX ORDER — LOSARTAN POTASSIUM AND HYDROCHLOROTHIAZIDE 12.5; 5 MG/1; MG/1
1 TABLET ORAL DAILY
Qty: 30 TABLET | Refills: 2 | Status: CANCELLED | OUTPATIENT
Start: 2017-11-29 | End: 2018-11-29

## 2017-11-29 RX ORDER — HYDROCODONE BITARTRATE AND ACETAMINOPHEN 10; 325 MG/1; MG/1
TABLET ORAL
Qty: 80 TABLET | Refills: 0 | Status: SHIPPED | OUTPATIENT
Start: 2017-11-29 | End: 2018-01-03 | Stop reason: SDUPTHER

## 2017-11-29 RX ORDER — CITALOPRAM 10 MG/1
10 TABLET ORAL DAILY
COMMUNITY
End: 2017-11-29 | Stop reason: SDUPTHER

## 2017-11-29 RX ORDER — ALPRAZOLAM 1 MG/1
1 TABLET ORAL 3 TIMES DAILY PRN
Qty: 80 TABLET | Refills: 0 | Status: SHIPPED | OUTPATIENT
Start: 2017-11-29 | End: 2018-01-03 | Stop reason: SDUPTHER

## 2017-11-29 NOTE — PROGRESS NOTES
Subjective:       Patient ID: Fred Blackwell is a 62 y.o. male.    Chief Complaint: Diabetes (Patient here for 3 month follow up with review of labs. ) and Hypertension (Patient here for 3 month follow up)    Here for f/u chronic medical issues.  States doing well overall.  Mood improved.      Diabetes   He presents for his follow-up diabetic visit. He has type 2 diabetes mellitus. His disease course has been stable. Pertinent negatives for hypoglycemia include no nervousness/anxiousness. Pertinent negatives for diabetes include no chest pain and no fatigue.   Hypertension   This is a chronic problem. The current episode started more than 1 year ago. The problem is uncontrolled. Pertinent negatives include no chest pain, palpitations or shortness of breath. Past treatments include diuretics and angiotensin blockers. The current treatment provides mild improvement.     Review of Systems   Constitutional: Negative for chills, fatigue and fever.   Respiratory: Negative for cough, chest tightness and shortness of breath.    Cardiovascular: Negative for chest pain, palpitations and leg swelling.   Gastrointestinal: Negative for abdominal distention and abdominal pain.   Endocrine: Negative for cold intolerance and heat intolerance.   Skin: Negative for rash.   Psychiatric/Behavioral: Negative for dysphoric mood. The patient is not nervous/anxious.        Objective:      Physical Exam   Constitutional: He appears well-developed and well-nourished.   HENT:   Head: Normocephalic and atraumatic.   Cardiovascular: Normal rate, regular rhythm and normal heart sounds.    Pulses:       Dorsalis pedis pulses are 2+ on the right side, and 2+ on the left side.   Pulmonary/Chest: Effort normal and breath sounds normal.   Abdominal: Soft. Bowel sounds are normal.   Musculoskeletal:        Right foot: There is no deformity.        Left foot: There is no deformity.   Feet:   Right Foot:   Protective Sensation: 7 sites tested.  7 sites sensed.   Skin Integrity: Negative for skin breakdown.   Left Foot:   Protective Sensation: 7 sites tested. 7 sites sensed.   Skin Integrity: Negative for skin breakdown.   Psychiatric: He has a normal mood and affect.   Nursing note and vitals reviewed.      Assessment:       1. Type 2 diabetes mellitus with other specified complication, without long-term current use of insulin    2. Degenerative disc disease, lumbar    3. SHANNAN (generalized anxiety disorder)    4. Hyperlipidemia, unspecified hyperlipidemia type    5. Essential hypertension        Plan:       Type 2 diabetes mellitus with other specified complication, without long-term current use of insulin  -     atorvastatin (LIPITOR) 40 MG tablet; Take 1 tablet (40 mg total) by mouth once daily.  Dispense: 90 tablet; Refill: 3  -     Comprehensive metabolic panel; Future; Expected date: 11/29/2017  -     Lipid panel; Future; Expected date: 11/29/2017    Degenerative disc disease, lumbar  -     hydrocodone-acetaminophen 10-325mg (NORCO)  mg Tab; TAKE 1 TABLET BY MOUTH 3 (THREE) TIMES DAILY AS NEEDED.  Dispense: 80 tablet; Refill: 0    SHANNAN (generalized anxiety disorder)  -     ALPRAZolam (XANAX) 1 MG tablet; Take 1 tablet (1 mg total) by mouth 3 (three) times daily as needed for Anxiety.  Dispense: 80 tablet; Refill: 0    Hyperlipidemia, unspecified hyperlipidemia type  -     atorvastatin (LIPITOR) 40 MG tablet; Take 1 tablet (40 mg total) by mouth once daily.  Dispense: 90 tablet; Refill: 3  -     Comprehensive metabolic panel; Future; Expected date: 11/29/2017  -     Lipid panel; Future; Expected date: 11/29/2017    Essential hypertension  -     atorvastatin (LIPITOR) 40 MG tablet; Take 1 tablet (40 mg total) by mouth once daily.  Dispense: 90 tablet; Refill: 3  -     Comprehensive metabolic panel; Future; Expected date: 11/29/2017  -     Lipid panel; Future; Expected date: 11/29/2017    Other orders  -     Cancel: losartan-hydrochlorothiazide  50-12.5 mg (HYZAAR) 50-12.5 mg per tablet; Take 1 tablet by mouth once daily.  Dispense: 30 tablet; Refill: 2  -     losartan-hydrochlorothiazide 100-12.5 mg (HYZAAR) 100-12.5 mg Tab; Take 1 tablet by mouth once daily.  Dispense: 90 tablet; Refill: 3  -     citalopram (CELEXA) 10 MG tablet; Take 1 tablet (10 mg total) by mouth once daily.  Dispense: 90 tablet; Refill: 3      Increase hyzaar from 50/12.5 to 100/12.5 and nurse bp check in 2 weeks with home log.  Start lipitor for cholesterol.  Will monitor chronic medical issues and continue current plan of care.    Return in about 3 months (around 2/28/2018), or if symptoms worsen or fail to improve.

## 2017-12-13 ENCOUNTER — TELEPHONE (OUTPATIENT)
Dept: FAMILY MEDICINE | Facility: CLINIC | Age: 62
End: 2017-12-13

## 2017-12-13 ENCOUNTER — CLINICAL SUPPORT (OUTPATIENT)
Dept: FAMILY MEDICINE | Facility: CLINIC | Age: 62
End: 2017-12-13
Payer: MEDICARE

## 2017-12-13 VITALS — DIASTOLIC BLOOD PRESSURE: 90 MMHG | SYSTOLIC BLOOD PRESSURE: 130 MMHG

## 2017-12-13 PROCEDURE — 99999 PR PBB SHADOW E&M-EST. PATIENT-LVL I: CPT | Mod: PBBFAC,,,

## 2017-12-13 NOTE — PROGRESS NOTES
Patient is here for BP check. BP is 130/90 in L arm while seated. Patient does not have a follow up appt with Dr Bahena- last office visit note states he should return on or around 2/28/18 for follow up. Will ask Dr Bahena's nurse Ale for assistance in scheduling as the first available is not until mid April. Educated patient as follows:      Therapeutic Lifestyle Changes   LIFESTYLE CHANGE RECOMMENDATION APPROXIMATE REDUCTION IN SBP   Weight reduction Maintain a normal body weight                      (BMI 19-25) 5-20 mm Hg per 10 kg lost   Dash diet Consume a diet rich in fruits, vegetables, and low-fat dairy products with a reduced content of saturated fat and total fat 8-14 mg Hg   Low-sodium diet Consume <2400 mg of sodium per day 2-8 mg Hg   Increase physical activity Regular aerobic physical activity (i.e. brisk walking at least 40 minutes/session 3-4 days a week) 4-9 mm Hg   Limit alcohol consumption Less than 2 drinks/day in most men or less than 1 drink/day in women and lighter weight persons (1 drink = 12 oz. Beer, 5 oz. Wine, 1.5 oz. hard alcohol) 2-4 mm Hg   Smoking cessation Quit Smoking (Not reported) - known to reduce risk of developing cardiovascular disease.

## 2017-12-20 RX ORDER — ALBUTEROL SULFATE 0.83 MG/ML
2.5 SOLUTION RESPIRATORY (INHALATION) EVERY 6 HOURS PRN
Qty: 90 ML | Refills: 0 | Status: SHIPPED | OUTPATIENT
Start: 2017-12-20 | End: 2018-12-04 | Stop reason: SDUPTHER

## 2017-12-20 NOTE — TELEPHONE ENCOUNTER
Patient was seen in ER on 12/16 and was Dx with Flu. Patient is asking if he can get some albuterol for his nebulizer, states he has had it in the past and it works well. CXR from ER was clear. Please advise.

## 2017-12-20 NOTE — TELEPHONE ENCOUNTER
----- Message from Rolanda Goldstein sent at 12/20/2017  9:45 AM CST -----  Refill on Rx Albuterol and pump.  [facility].  Any questions call  [phone]

## 2018-01-03 ENCOUNTER — OFFICE VISIT (OUTPATIENT)
Dept: URGENT CARE | Facility: CLINIC | Age: 63
End: 2018-01-03
Payer: MEDICARE

## 2018-01-03 VITALS
DIASTOLIC BLOOD PRESSURE: 107 MMHG | HEART RATE: 84 BPM | RESPIRATION RATE: 16 BRPM | HEIGHT: 72 IN | OXYGEN SATURATION: 99 % | SYSTOLIC BLOOD PRESSURE: 177 MMHG | TEMPERATURE: 98 F | WEIGHT: 260 LBS | BODY MASS INDEX: 35.21 KG/M2

## 2018-01-03 DIAGNOSIS — F41.1 GAD (GENERALIZED ANXIETY DISORDER): ICD-10-CM

## 2018-01-03 DIAGNOSIS — M51.36 DEGENERATIVE DISC DISEASE, LUMBAR: ICD-10-CM

## 2018-01-03 DIAGNOSIS — S61.412A LACERATION OF SKIN OF LEFT HAND, INITIAL ENCOUNTER: Primary | ICD-10-CM

## 2018-01-03 PROCEDURE — 12042 INTMD RPR N-HF/GENIT2.6-7.5: CPT | Mod: S$GLB,,, | Performed by: FAMILY MEDICINE

## 2018-01-03 PROCEDURE — 99214 OFFICE O/P EST MOD 30 MIN: CPT | Mod: 25,S$GLB,, | Performed by: FAMILY MEDICINE

## 2018-01-03 RX ORDER — DOXYCYCLINE 100 MG/1
100 CAPSULE ORAL 2 TIMES DAILY
Qty: 20 CAPSULE | Refills: 0 | Status: SHIPPED | OUTPATIENT
Start: 2018-01-03 | End: 2018-01-13

## 2018-01-03 RX ORDER — MUPIROCIN 20 MG/G
OINTMENT TOPICAL
Qty: 22 G | Refills: 1 | Status: SHIPPED | OUTPATIENT
Start: 2018-01-03 | End: 2018-12-04

## 2018-01-04 NOTE — PROGRESS NOTES
Subjective:       Patient ID: Fred Blackwell is a 62 y.o. male.    Vitals:  height is 6' (1.829 m) and weight is 117.9 kg (260 lb). His tympanic temperature is 98.1 °F (36.7 °C). His blood pressure is 177/107 (abnormal) and his pulse is 84. His respiration is 16 and oxygen saturation is 99%.     Chief Complaint: Laceration (left hand, 20 mins ago. )    Laceration    The incident occurred less than 1 hour ago. The laceration is located on the left hand. The laceration mechanism was a clean knife. The pain is at a severity of 0/10. The patient is experiencing no pain. He reports no foreign bodies present. His tetanus status is UTD.     Review of Systems   Constitution: Negative for weakness and malaise/fatigue.   HENT: Negative for nosebleeds.    Cardiovascular: Negative for chest pain and syncope.   Respiratory: Negative for shortness of breath.    Skin: Negative for color change, dry skin and itching.   Musculoskeletal: Negative for back pain, joint pain and neck pain.   Gastrointestinal: Negative for abdominal pain.   Genitourinary: Negative for hematuria.   Neurological: Negative for dizziness and numbness.       Objective:      Physical Exam   Constitutional: He is oriented to person, place, and time. He appears well-developed and well-nourished. He is cooperative.  Non-toxic appearance. He does not appear ill. No distress.   HENT:   Head: Normocephalic and atraumatic.   Right Ear: Hearing, tympanic membrane and ear canal normal.   Left Ear: Hearing, tympanic membrane and ear canal normal.   Nose: No mucosal edema, rhinorrhea or nasal deformity. No epistaxis. Right sinus exhibits no maxillary sinus tenderness and no frontal sinus tenderness. Left sinus exhibits no maxillary sinus tenderness and no frontal sinus tenderness.   Mouth/Throat: Uvula is midline and mucous membranes are normal. No trismus in the jaw. Normal dentition. No uvula swelling. No posterior oropharyngeal erythema.   Eyes: Conjunctivae  and lids are normal. No scleral icterus.   Sclera clear bilat   Neck: Trachea normal, full passive range of motion without pain and phonation normal. Neck supple.   Cardiovascular: Normal rate and normal pulses.    Pulmonary/Chest: Effort normal. No respiratory distress.   Abdominal: Soft. Normal appearance and bowel sounds are normal. He exhibits no distension. There is no tenderness.   Musculoskeletal: Normal range of motion. He exhibits tenderness. He exhibits no edema or deformity.   Neurological: He is alert and oriented to person, place, and time. He exhibits normal muscle tone. Coordination normal.   Skin: Skin is warm and dry. Laceration noted. He is not diaphoretic. No pallor.        Psychiatric: He has a normal mood and affect. His speech is normal and behavior is normal. Judgment and thought content normal. Cognition and memory are normal.   Nursing note and vitals reviewed.      Assessment:       1. Laceration of skin of left hand, initial encounter        Plan:         Laceration of skin of left hand, initial encounter    Other orders  -     doxycycline (VIBRAMYCIN) 100 MG Cap; Take 1 capsule (100 mg total) by mouth 2 (two) times daily.  Dispense: 20 capsule; Refill: 0  -     mupirocin (BACTROBAN) 2 % ointment; Apply to affected area 3 times daily  Dispense: 22 g; Refill: 1

## 2018-01-04 NOTE — PROCEDURES
Laceration Repair  Date/Time: 1/3/2018 6:56 PM  Performed by: GAYLE MILLS  Authorized by: GAYLE MILLS   Body area: upper extremity  Location details: left thumb  Laceration length: 2.9 cm  Foreign bodies: no foreign bodies  Tendon involvement: none  Nerve involvement: superficial  Vascular damage: no  Anesthesia: local infiltration    Anesthesia:  Local Anesthetic: bupivacaine 0.25% without epinephrine  Anesthetic total: 3 mL  Irrigation solution: saline  Irrigation method: syringe  Amount of cleaning: standard  Debridement: none  Degree of undermining: none  Skin closure: 4-0 nylon (4)  Subcutaneous closure: 3-0 Chromic gut (3)  Number of sutures: 7  Technique: simple  Approximation: close  Approximation difficulty: complex  Dressing: antibiotic ointment and non-stick sterile dressing  Patient tolerance: Patient tolerated the procedure well with no immediate complications

## 2018-01-05 RX ORDER — HYDROCODONE BITARTRATE AND ACETAMINOPHEN 10; 325 MG/1; MG/1
TABLET ORAL
Qty: 80 TABLET | Refills: 0 | Status: SHIPPED | OUTPATIENT
Start: 2018-01-05 | End: 2018-02-05 | Stop reason: SDUPTHER

## 2018-01-05 RX ORDER — ALPRAZOLAM 1 MG/1
TABLET ORAL
Qty: 80 TABLET | Refills: 0 | Status: SHIPPED | OUTPATIENT
Start: 2018-01-05 | End: 2018-02-05 | Stop reason: SDUPTHER

## 2018-02-05 DIAGNOSIS — M51.36 DEGENERATIVE DISC DISEASE, LUMBAR: ICD-10-CM

## 2018-02-05 DIAGNOSIS — F41.1 GAD (GENERALIZED ANXIETY DISORDER): ICD-10-CM

## 2018-02-07 RX ORDER — ALPRAZOLAM 1 MG/1
TABLET ORAL
Qty: 80 TABLET | Refills: 0 | Status: SHIPPED | OUTPATIENT
Start: 2018-02-07 | End: 2018-03-05 | Stop reason: SDUPTHER

## 2018-02-07 RX ORDER — HYDROCODONE BITARTRATE AND ACETAMINOPHEN 10; 325 MG/1; MG/1
TABLET ORAL
Qty: 80 TABLET | Refills: 0 | Status: SHIPPED | OUTPATIENT
Start: 2018-02-07 | End: 2018-03-05 | Stop reason: SDUPTHER

## 2018-03-05 DIAGNOSIS — M51.36 DEGENERATIVE DISC DISEASE, LUMBAR: ICD-10-CM

## 2018-03-05 DIAGNOSIS — F41.1 GAD (GENERALIZED ANXIETY DISORDER): ICD-10-CM

## 2018-03-07 RX ORDER — ALPRAZOLAM 1 MG/1
TABLET ORAL
Qty: 80 TABLET | Refills: 0 | Status: SHIPPED | OUTPATIENT
Start: 2018-03-07 | End: 2018-04-05 | Stop reason: SDUPTHER

## 2018-03-07 RX ORDER — HYDROCODONE BITARTRATE AND ACETAMINOPHEN 10; 325 MG/1; MG/1
TABLET ORAL
Qty: 80 TABLET | Refills: 0 | Status: SHIPPED | OUTPATIENT
Start: 2018-03-07 | End: 2018-04-05 | Stop reason: SDUPTHER

## 2018-04-02 DIAGNOSIS — F41.1 GAD (GENERALIZED ANXIETY DISORDER): ICD-10-CM

## 2018-04-02 DIAGNOSIS — M51.36 DEGENERATIVE DISC DISEASE, LUMBAR: ICD-10-CM

## 2018-04-04 RX ORDER — ALPRAZOLAM 1 MG/1
TABLET ORAL
Qty: 80 TABLET | Refills: 0 | OUTPATIENT
Start: 2018-04-04

## 2018-04-04 RX ORDER — HYDROCODONE BITARTRATE AND ACETAMINOPHEN 10; 325 MG/1; MG/1
TABLET ORAL
Qty: 80 TABLET | Refills: 0 | OUTPATIENT
Start: 2018-04-04

## 2018-04-05 ENCOUNTER — OFFICE VISIT (OUTPATIENT)
Dept: FAMILY MEDICINE | Facility: CLINIC | Age: 63
End: 2018-04-05
Payer: MEDICARE

## 2018-04-05 ENCOUNTER — TELEPHONE (OUTPATIENT)
Dept: FAMILY MEDICINE | Facility: CLINIC | Age: 63
End: 2018-04-05

## 2018-04-05 VITALS
DIASTOLIC BLOOD PRESSURE: 94 MMHG | BODY MASS INDEX: 36.58 KG/M2 | HEART RATE: 70 BPM | HEIGHT: 72 IN | WEIGHT: 270.06 LBS | SYSTOLIC BLOOD PRESSURE: 158 MMHG

## 2018-04-05 DIAGNOSIS — S61.412A LACERATION OF LEFT HAND WITHOUT FOREIGN BODY, INITIAL ENCOUNTER: ICD-10-CM

## 2018-04-05 DIAGNOSIS — F41.1 GAD (GENERALIZED ANXIETY DISORDER): ICD-10-CM

## 2018-04-05 DIAGNOSIS — M51.36 DEGENERATIVE DISC DISEASE, LUMBAR: Primary | ICD-10-CM

## 2018-04-05 PROCEDURE — 99499 UNLISTED E&M SERVICE: CPT | Mod: S$GLB,,, | Performed by: FAMILY MEDICINE

## 2018-04-05 PROCEDURE — 99214 OFFICE O/P EST MOD 30 MIN: CPT | Mod: 25,S$GLB,, | Performed by: FAMILY MEDICINE

## 2018-04-05 PROCEDURE — 3080F DIAST BP >= 90 MM HG: CPT | Mod: CPTII,S$GLB,, | Performed by: FAMILY MEDICINE

## 2018-04-05 PROCEDURE — 3077F SYST BP >= 140 MM HG: CPT | Mod: CPTII,S$GLB,, | Performed by: FAMILY MEDICINE

## 2018-04-05 PROCEDURE — 99999 PR PBB SHADOW E&M-EST. PATIENT-LVL III: CPT | Mod: PBBFAC,,, | Performed by: FAMILY MEDICINE

## 2018-04-05 PROCEDURE — 99024 POSTOP FOLLOW-UP VISIT: CPT | Mod: S$GLB,,, | Performed by: FAMILY MEDICINE

## 2018-04-05 RX ORDER — ALPRAZOLAM 1 MG/1
TABLET ORAL
Qty: 80 TABLET | Refills: 0 | Status: SHIPPED | OUTPATIENT
Start: 2018-04-05 | End: 2018-05-01 | Stop reason: SDUPTHER

## 2018-04-05 RX ORDER — HYDROCODONE BITARTRATE AND ACETAMINOPHEN 10; 325 MG/1; MG/1
1 TABLET ORAL 3 TIMES DAILY PRN
Qty: 80 TABLET | Refills: 0 | Status: SHIPPED | OUTPATIENT
Start: 2018-04-05 | End: 2018-05-01 | Stop reason: SDUPTHER

## 2018-04-05 NOTE — PROGRESS NOTES
Subjective:       Patient ID: Fred Blackwell is a 62 y.o. male.    Chief Complaint: Follow-up (needs medication refills ) and Hypertension    Here for f/u htn and dm II. Prn pain and anxiety meds working well.  Still has stitches from February from urgent care after cutting hand with filet knife.        Hypertension   This is a chronic problem. The current episode started more than 1 year ago. The problem is controlled. Pertinent negatives include no chest pain, palpitations or shortness of breath.   Diabetes   He presents for his follow-up diabetic visit. He has type 2 diabetes mellitus. Pertinent negatives for diabetes include no chest pain and no fatigue.     Review of Systems   Constitutional: Negative for chills, fatigue and fever.   Respiratory: Negative for cough, chest tightness and shortness of breath.    Cardiovascular: Negative for chest pain, palpitations and leg swelling.   Gastrointestinal: Negative for abdominal distention and abdominal pain.   Endocrine: Negative for cold intolerance and heat intolerance.   Musculoskeletal: Positive for arthralgias.   Skin: Negative for rash.       Objective:      Physical Exam   Constitutional: He appears well-developed and well-nourished.   HENT:   Head: Normocephalic and atraumatic.   Cardiovascular: Normal rate, regular rhythm and normal heart sounds.    Pulmonary/Chest: Effort normal and breath sounds normal.   Psychiatric: He has a normal mood and affect.   Nursing note and vitals reviewed.    Suture Removal  Date/Time: 4/5/2018 4:03 PM  Performed by: MARITZA BIRCH  Authorized by: MARITZA BIRCH   Body area: upper extremity  Location details: left hand  Complications: No  Estimated blood loss (mL): 0  Specimens: No  Implants: No  Patient tolerance: Patient tolerated the procedure well with no immediate complications        Assessment:       1. Laceration of left hand without foreign body, initial encounter    2. SHANNAN (generalized anxiety  disorder)    3. Degenerative disc disease, lumbar        Plan:       Laceration of left hand without foreign body, initial encounter    SHANNAN (generalized anxiety disorder)  -     ALPRAZolam (XANAX) 1 MG tablet; TAKE 1 TABLET (1 MG TOTAL) BY MOUTH THREE TIMES A DAY AS NEEDED FOR ANXIETY.  Dispense: 80 tablet; Refill: 0    Degenerative disc disease, lumbar  -     hydrocodone-acetaminophen 10-325mg (NORCO)  mg Tab; Take 1 tablet by mouth 3 (three) times daily as needed.  Dispense: 80 tablet; Refill: 0    Other orders  -     SUTURE REMOVAL      Will monitor chronic medical issues and continue current plan of care.      Follow-up in about 3 months (around 7/5/2018), or if symptoms worsen or fail to improve.

## 2018-04-05 NOTE — TELEPHONE ENCOUNTER
----- Message from Maricarmen Lombardi sent at 4/5/2018 10:14 AM CDT -----  Patient requested to schedule a follow up appointment for medication refills on pain medicationg an xanax, offered first available on 05/24/18, patient is requesting to be seen sooner by Dr. Bahena, contact patient at [phone].     Thank you

## 2018-05-01 DIAGNOSIS — F41.1 GAD (GENERALIZED ANXIETY DISORDER): ICD-10-CM

## 2018-05-01 DIAGNOSIS — M51.36 DEGENERATIVE DISC DISEASE, LUMBAR: ICD-10-CM

## 2018-05-02 RX ORDER — ALPRAZOLAM 1 MG/1
TABLET ORAL
Qty: 80 TABLET | Refills: 0 | Status: SHIPPED | OUTPATIENT
Start: 2018-05-02 | End: 2018-06-04 | Stop reason: SDUPTHER

## 2018-05-02 RX ORDER — HYDROCODONE BITARTRATE AND ACETAMINOPHEN 10; 325 MG/1; MG/1
TABLET ORAL
Qty: 80 TABLET | Refills: 0 | Status: SHIPPED | OUTPATIENT
Start: 2018-05-02 | End: 2018-06-04 | Stop reason: SDUPTHER

## 2018-05-31 DIAGNOSIS — M51.36 DEGENERATIVE DISC DISEASE, LUMBAR: ICD-10-CM

## 2018-05-31 DIAGNOSIS — F41.1 GAD (GENERALIZED ANXIETY DISORDER): ICD-10-CM

## 2018-05-31 RX ORDER — HYDROCODONE BITARTRATE AND ACETAMINOPHEN 10; 325 MG/1; MG/1
TABLET ORAL
Qty: 80 TABLET | Refills: 0 | Status: CANCELLED | OUTPATIENT
Start: 2018-05-31

## 2018-05-31 RX ORDER — ALPRAZOLAM 1 MG/1
TABLET ORAL
Qty: 80 TABLET | Refills: 0 | Status: CANCELLED | OUTPATIENT
Start: 2018-05-31

## 2018-05-31 NOTE — TELEPHONE ENCOUNTER
----- Message from Miriam Gregory sent at 5/31/2018  3:19 PM CDT -----  Contact: Patient  Type:  RX Refill Request    Who Called:  Patient  Refill or New Rx:  Refill  RX Name and Strength:  ALPRAZolam (XANAX) 1 MG tablet  And  hydrocodone-acetaminophen 10-325mg (NORCO)  mg Tab  How is the patient currently taking it? (ex. 1XDay): Xanax- TAKE 1 TABLET (1 MG TOTAL) BY MOUTH THREE TIMES A DAY AS NEEDED FOR ANXIETY. And  Norco-TAKE 1 TABLET BY MOUTH THREE TIMES DAILY AS NEEDED.  Is this a 30 day or 90 day RX:  Both are 80 tablets  Preferred Pharmacy with phone number:    [facility] - [place], [place] - [facility] [address]  [address] [place] [address]  Phone: [phone] Fax: [phone]  Local or Mail Order:  Local  Ordering Provider:  Dr Jc Spear Call Back Number:   or   Additional Information:  Calling to request a refill. Please advise.

## 2018-06-01 ENCOUNTER — TELEPHONE (OUTPATIENT)
Dept: FAMILY MEDICINE | Facility: CLINIC | Age: 63
End: 2018-06-01

## 2018-06-01 NOTE — TELEPHONE ENCOUNTER
----- Message from Key Dubose sent at 6/1/2018  2:48 PM CDT -----  Contact: self  Refill (patient says he's completely out of medication)  ALPRAZolam (XANAX) 1 MG tablet  hydrocodone-acetaminophen 10-325mg (NORCO)  mg Tab  Callback number [phone]    [facility] - [place] [address]  [address] [address]  Phone: [phone] Fax: [phone]

## 2018-06-04 ENCOUNTER — TELEPHONE (OUTPATIENT)
Dept: FAMILY MEDICINE | Facility: CLINIC | Age: 63
End: 2018-06-04

## 2018-06-04 DIAGNOSIS — F41.1 GAD (GENERALIZED ANXIETY DISORDER): ICD-10-CM

## 2018-06-04 DIAGNOSIS — M51.36 DEGENERATIVE DISC DISEASE, LUMBAR: ICD-10-CM

## 2018-06-04 NOTE — TELEPHONE ENCOUNTER
----- Message from Glenda Carmona sent at 6/1/2018  4:27 PM CDT -----  Contact: patient   Patient calling to speak to the Nurse regarding prescription request. Please advise.   Call back    Thanks!

## 2018-06-05 ENCOUNTER — TELEPHONE (OUTPATIENT)
Dept: FAMILY MEDICINE | Facility: CLINIC | Age: 63
End: 2018-06-05

## 2018-06-05 RX ORDER — ALPRAZOLAM 1 MG/1
TABLET ORAL
Qty: 80 TABLET | Refills: 0 | Status: SHIPPED | OUTPATIENT
Start: 2018-06-05 | End: 2018-06-21 | Stop reason: SDUPTHER

## 2018-06-05 RX ORDER — HYDROCODONE BITARTRATE AND ACETAMINOPHEN 10; 325 MG/1; MG/1
1 TABLET ORAL 3 TIMES DAILY PRN
Qty: 80 TABLET | Refills: 0 | Status: SHIPPED | OUTPATIENT
Start: 2018-06-05 | End: 2018-07-02 | Stop reason: SDUPTHER

## 2018-06-05 RX ORDER — ALPRAZOLAM 1 MG/1
TABLET ORAL
Qty: 80 TABLET | Refills: 0 | OUTPATIENT
Start: 2018-06-05

## 2018-06-05 RX ORDER — HYDROCODONE BITARTRATE AND ACETAMINOPHEN 10; 325 MG/1; MG/1
TABLET ORAL
Qty: 80 TABLET | Refills: 0 | OUTPATIENT
Start: 2018-06-05

## 2018-06-05 NOTE — TELEPHONE ENCOUNTER
----- Message from Giovani WHITE Gilberto sent at 6/5/2018 11:16 AM CDT -----  Contact: same  Patient called in and wanted to check on the status of his refill request on the following Rx's:    1.  hydrocodone-acetaminophen 10-325mg (NORCO)  mg Tab, TAKE 1 TABLET BY MOUTH THREE TIMES DAILY AS NEEDED. Last filled for 80 tablets on 5/2/18    2.  ALPRAZolam (XANAX) 1 MG tablet, TAKE 1 TABLET (1 MG TOTAL) BY MOUTH THREE TIMES A DAY AS NEEDED FOR ANXIETY., last refilled for 80 tablets on 5/2/18      [facility] - [place] [address]  [address] [address]  Phone: [phone] Fax: [phone]    Patient call back number is [phone] cell or home [phone]

## 2018-06-07 DIAGNOSIS — E11.9 TYPE 2 DIABETES MELLITUS WITHOUT COMPLICATION: ICD-10-CM

## 2018-06-14 ENCOUNTER — PATIENT OUTREACH (OUTPATIENT)
Dept: ADMINISTRATIVE | Facility: HOSPITAL | Age: 63
End: 2018-06-14

## 2018-06-14 NOTE — LETTER
June 14, 2018    Fred Blackwell  [address]  [facility] [address]             [facility]  [address]  [facility] [address]  Phone: [phone] Dear Mr. Blackwell:    [facility] is committed to your overall health and would like to ensure that you are up to date on your recommended test and/or procedures.   Dr. Bahena has found that your chart shows you may be due for the following:    Shingles immunization  Colon cancer screening  Diabetic lab testing    If you have had any of the above done at another facility, please let us know so that we may obtain copies from that facility.  If you have a copy of these records, please provide a copy for us to scan into your chart.  You are welcome to request that the report be faxed to us at  ([phone]).      Otherwise, please schedule these appointments at your earliest convenience by calling [phone] or going to [email or web address].      If you have any questions or concerns, please don't hesitate to call.    Sincerely,  Kimberly Ferreira  Clinical Care Coordinator  [facility]  [address] [facility] [address]  [place] [address]  Phone: [phone]   Fax: [phone]

## 2018-06-14 NOTE — PROGRESS NOTES
Health Maintenance Due   Topic Date Due    Sign Pain Contract  08/02/1973    Complete Opioid Risk Tool  08/02/1973    Zoster Vaccine  08/02/2015    Colonoscopy  01/10/2018    Hemoglobin A1c  05/01/2018     Letter mailed

## 2018-06-18 ENCOUNTER — PES CALL (OUTPATIENT)
Dept: ADMINISTRATIVE | Facility: CLINIC | Age: 63
End: 2018-06-18

## 2018-06-21 DIAGNOSIS — F41.1 GAD (GENERALIZED ANXIETY DISORDER): ICD-10-CM

## 2018-06-21 RX ORDER — ALPRAZOLAM 1 MG/1
TABLET ORAL
Qty: 80 TABLET | Refills: 0 | Status: SHIPPED | OUTPATIENT
Start: 2018-06-21 | End: 2018-07-25 | Stop reason: SDUPTHER

## 2018-06-21 NOTE — TELEPHONE ENCOUNTER
----- Message from Shawna Zambrano sent at 6/21/2018 10:37 AM CDT -----  Contact: Self  Call placed to POD   Patient needs to speak to nurse about his medications   Patient states his wife passed away and he needs to just talk to Dr       Please call back [phone]    home  [phone]

## 2018-06-21 NOTE — TELEPHONE ENCOUNTER
"Spoke to patient.   States he found his wife dead two days ago and had to do CPR on her. States that the  is this weekend and that he needs a refill on Rx to get him through until he is able to see Dr Bahena next month.     Currently has 6 pills left.     Last refill was 2018 for #80 at 3x's a day PRN. Under these instructions, the patient should have enough to last until .   Patient admits to taking more that prescribed dose during this stressful time. Advised patient that changing the amount of frequency of how he takes this medication could interfere with his ability to obtain future refills.  Advised patient that he must adhere to physicians instructions and discuss changes with provider before initiating them. States he has been on this medication since  at higher doses, so he knows how to take it. Is asking, "please help me get through this difficult time".     Advised that this request would be reviewed by a provider, and that our focus is patient safety and not to make this time more difficult for him. But also encouraged to adhere to instructions for administration. Voiced understanding.       Medication pended for review.   Please advise.   "

## 2018-06-21 NOTE — TELEPHONE ENCOUNTER
Called to speak to patient, advised that the refill needed to last until appointment with Dr Bahena on 7/18. Discussed with patient that he needs to use medication with care as there will not be another refill until then.     Voiced appreciation and understanding of instructions.

## 2018-06-25 ENCOUNTER — TELEPHONE (OUTPATIENT)
Dept: FAMILY MEDICINE | Facility: CLINIC | Age: 63
End: 2018-06-25

## 2018-06-25 DIAGNOSIS — Z12.11 SCREENING FOR COLON CANCER: ICD-10-CM

## 2018-06-25 DIAGNOSIS — Z23 NEED FOR SHINGLES VACCINE: Primary | ICD-10-CM

## 2018-06-25 NOTE — TELEPHONE ENCOUNTER
Spoke to patient and he states he received a letter from DANIELA Harris stating he needs a colonoscopy, shingles vaccine and Hemoglobin A1C. Pt needing order for shingles vaccine and will put on nurse schedule. Pt also asking about colonoscopy. Pt would prefer to do fitkit. Is patient a candidate for that? Please advise. Thanks.

## 2018-06-25 NOTE — TELEPHONE ENCOUNTER
----- Message from Rodney Briones sent at 6/25/2018 12:31 PM CDT -----  Contact: alexis Blackwell is calling about a letter he received signed by Kimberly for some testing to be done. No orders were in the system. Please call him [phone] thanks!

## 2018-07-02 DIAGNOSIS — M51.36 DEGENERATIVE DISC DISEASE, LUMBAR: ICD-10-CM

## 2018-07-02 NOTE — TELEPHONE ENCOUNTER
----- Message from Alix Bennett sent at 7/2/2018  8:38 AM CDT -----  Contact: pt  Pt is requesting a refill on his (HYDROcodone-acetaminophen (NORCO)  mg per tablet), please call pt to advise  Call back   Thanks    ..  [facility] - [place], [place] - [address]  [address] [address]  Phone: [phone] Fax: [phone]       Statement Selected

## 2018-07-03 NOTE — TELEPHONE ENCOUNTER
----- Message from Marlin Allen sent at 7/3/2018  8:00 AM CDT -----  Contact: Patient  Fred, patient [phone] calling to reschedule his nurse visit for today, his AC broke and he has to stay home to try and get someone to fix it. Please advise. Thanks.

## 2018-07-05 DIAGNOSIS — M51.36 DEGENERATIVE DISC DISEASE, LUMBAR: ICD-10-CM

## 2018-07-05 RX ORDER — HYDROCODONE BITARTRATE AND ACETAMINOPHEN 10; 325 MG/1; MG/1
1 TABLET ORAL 3 TIMES DAILY PRN
Qty: 80 TABLET | Refills: 0 | Status: SHIPPED | OUTPATIENT
Start: 2018-07-05 | End: 2018-07-25 | Stop reason: SDUPTHER

## 2018-07-06 RX ORDER — HYDROCODONE BITARTRATE AND ACETAMINOPHEN 10; 325 MG/1; MG/1
TABLET ORAL
Qty: 80 TABLET | Refills: 0 | Status: SHIPPED | OUTPATIENT
Start: 2018-07-06 | End: 2018-08-02 | Stop reason: SDUPTHER

## 2018-07-25 ENCOUNTER — OFFICE VISIT (OUTPATIENT)
Dept: FAMILY MEDICINE | Facility: CLINIC | Age: 63
End: 2018-07-25
Payer: MEDICARE

## 2018-07-25 ENCOUNTER — LAB VISIT (OUTPATIENT)
Dept: LAB | Facility: HOSPITAL | Age: 63
End: 2018-07-25
Attending: FAMILY MEDICINE
Payer: MEDICARE

## 2018-07-25 VITALS
HEART RATE: 72 BPM | BODY MASS INDEX: 35.29 KG/M2 | SYSTOLIC BLOOD PRESSURE: 152 MMHG | WEIGHT: 260.56 LBS | HEIGHT: 72 IN | DIASTOLIC BLOOD PRESSURE: 100 MMHG

## 2018-07-25 DIAGNOSIS — I10 ESSENTIAL HYPERTENSION: ICD-10-CM

## 2018-07-25 DIAGNOSIS — E11.69 TYPE 2 DIABETES MELLITUS WITH OTHER SPECIFIED COMPLICATION, WITHOUT LONG-TERM CURRENT USE OF INSULIN: ICD-10-CM

## 2018-07-25 DIAGNOSIS — F41.1 GAD (GENERALIZED ANXIETY DISORDER): ICD-10-CM

## 2018-07-25 DIAGNOSIS — E78.5 HYPERLIPIDEMIA, UNSPECIFIED HYPERLIPIDEMIA TYPE: ICD-10-CM

## 2018-07-25 DIAGNOSIS — F11.20 UNCOMPLICATED OPIOID DEPENDENCE: ICD-10-CM

## 2018-07-25 DIAGNOSIS — E11.69 TYPE 2 DIABETES MELLITUS WITH OTHER SPECIFIED COMPLICATION, WITHOUT LONG-TERM CURRENT USE OF INSULIN: Primary | ICD-10-CM

## 2018-07-25 DIAGNOSIS — F13.20 ANXIOLYTIC DEPENDENCE: ICD-10-CM

## 2018-07-25 DIAGNOSIS — I70.0 AORTOILIAC STENOSIS: ICD-10-CM

## 2018-07-25 LAB
ALBUMIN SERPL BCP-MCNC: 4 G/DL
ALP SERPL-CCNC: 67 U/L
ALT SERPL W/O P-5'-P-CCNC: 17 U/L
ANION GAP SERPL CALC-SCNC: 6 MMOL/L
AST SERPL-CCNC: 15 U/L
BASOPHILS # BLD AUTO: 0.08 K/UL
BASOPHILS NFR BLD: 1.2 %
BILIRUB SERPL-MCNC: 0.6 MG/DL
BUN SERPL-MCNC: 11 MG/DL
CALCIUM SERPL-MCNC: 9.5 MG/DL
CHLORIDE SERPL-SCNC: 102 MMOL/L
CO2 SERPL-SCNC: 32 MMOL/L
CREAT SERPL-MCNC: 1 MG/DL
DIFFERENTIAL METHOD: NORMAL
EOSINOPHIL # BLD AUTO: 0 K/UL
EOSINOPHIL NFR BLD: 0.6 %
ERYTHROCYTE [DISTWIDTH] IN BLOOD BY AUTOMATED COUNT: 13.2 %
EST. GFR  (AFRICAN AMERICAN): >60 ML/MIN/1.73 M^2
EST. GFR  (NON AFRICAN AMERICAN): >60 ML/MIN/1.73 M^2
ESTIMATED AVG GLUCOSE: 126 MG/DL
GLUCOSE SERPL-MCNC: 140 MG/DL
HBA1C MFR BLD HPLC: 6 %
HCT VFR BLD AUTO: 47 %
HGB BLD-MCNC: 15.5 G/DL
IMM GRANULOCYTES # BLD AUTO: 0.02 K/UL
IMM GRANULOCYTES NFR BLD AUTO: 0.3 %
LYMPHOCYTES # BLD AUTO: 3.1 K/UL
LYMPHOCYTES NFR BLD: 45.4 %
MCH RBC QN AUTO: 29.2 PG
MCHC RBC AUTO-ENTMCNC: 33 G/DL
MCV RBC AUTO: 89 FL
MONOCYTES # BLD AUTO: 0.4 K/UL
MONOCYTES NFR BLD: 6.5 %
NEUTROPHILS # BLD AUTO: 3.1 K/UL
NEUTROPHILS NFR BLD: 46 %
NRBC BLD-RTO: 0 /100 WBC
PLATELET # BLD AUTO: 198 K/UL
PMV BLD AUTO: 11.3 FL
POTASSIUM SERPL-SCNC: 4.9 MMOL/L
PROT SERPL-MCNC: 7.5 G/DL
RBC # BLD AUTO: 5.3 M/UL
SODIUM SERPL-SCNC: 140 MMOL/L
WBC # BLD AUTO: 6.72 K/UL

## 2018-07-25 PROCEDURE — 99999 PR PBB SHADOW E&M-EST. PATIENT-LVL III: CPT | Mod: PBBFAC,,, | Performed by: FAMILY MEDICINE

## 2018-07-25 PROCEDURE — 3008F BODY MASS INDEX DOCD: CPT | Mod: CPTII,S$GLB,, | Performed by: FAMILY MEDICINE

## 2018-07-25 PROCEDURE — 3044F HG A1C LEVEL LT 7.0%: CPT | Mod: CPTII,S$GLB,, | Performed by: FAMILY MEDICINE

## 2018-07-25 PROCEDURE — 3080F DIAST BP >= 90 MM HG: CPT | Mod: CPTII,S$GLB,, | Performed by: FAMILY MEDICINE

## 2018-07-25 PROCEDURE — 36415 COLL VENOUS BLD VENIPUNCTURE: CPT | Mod: PO

## 2018-07-25 PROCEDURE — 99214 OFFICE O/P EST MOD 30 MIN: CPT | Mod: S$GLB,,, | Performed by: FAMILY MEDICINE

## 2018-07-25 PROCEDURE — 85025 COMPLETE CBC W/AUTO DIFF WBC: CPT

## 2018-07-25 PROCEDURE — 99499 UNLISTED E&M SERVICE: CPT | Mod: HCNC,S$GLB,, | Performed by: FAMILY MEDICINE

## 2018-07-25 PROCEDURE — 83036 HEMOGLOBIN GLYCOSYLATED A1C: CPT

## 2018-07-25 PROCEDURE — 3077F SYST BP >= 140 MM HG: CPT | Mod: CPTII,S$GLB,, | Performed by: FAMILY MEDICINE

## 2018-07-25 PROCEDURE — 80053 COMPREHEN METABOLIC PANEL: CPT

## 2018-07-25 RX ORDER — ALPRAZOLAM 1 MG/1
TABLET ORAL
Qty: 90 TABLET | Refills: 0 | Status: CANCELLED | OUTPATIENT
Start: 2018-07-25

## 2018-07-25 RX ORDER — ALPRAZOLAM 1 MG/1
TABLET ORAL
Qty: 90 TABLET | Refills: 0 | Status: SHIPPED | OUTPATIENT
Start: 2018-07-25 | End: 2018-07-25 | Stop reason: SDUPTHER

## 2018-07-25 RX ORDER — ALPRAZOLAM 1 MG/1
TABLET ORAL
Qty: 90 TABLET | Refills: 0 | Status: SHIPPED | OUTPATIENT
Start: 2018-07-25 | End: 2018-08-21 | Stop reason: SDUPTHER

## 2018-07-25 NOTE — PROGRESS NOTES
Subjective:       Patient ID: Fred Blackwell is a 62 y.o. male.    Chief Complaint: Diabetes; Hypertension; and Hyperlipidemia    Here for f/u htn and DM II and chronic medical issues.  His wife  3 weeks ago and dealing with that.      Diabetes   He presents for his follow-up diabetic visit. He has type 2 diabetes mellitus. His disease course has been stable. Hypoglycemia symptoms include nervousness/anxiousness. Pertinent negatives for diabetes include no chest pain and no fatigue.   Hypertension   This is a chronic problem. The current episode started more than 1 year ago. Pertinent negatives include no chest pain, palpitations or shortness of breath.   Hyperlipidemia   This is a chronic problem. The current episode started more than 1 year ago. Pertinent negatives include no chest pain or shortness of breath.     Review of Systems   Constitutional: Negative for chills, fatigue and fever.   Respiratory: Negative for cough, chest tightness and shortness of breath.    Cardiovascular: Negative for chest pain, palpitations and leg swelling.   Gastrointestinal: Negative for abdominal distention and abdominal pain.   Endocrine: Negative for cold intolerance and heat intolerance.   Musculoskeletal: Positive for arthralgias.   Skin: Negative for rash.   Psychiatric/Behavioral: Positive for dysphoric mood. The patient is nervous/anxious.        Objective:      Physical Exam   Constitutional: He appears well-developed and well-nourished.   HENT:   Head: Normocephalic and atraumatic.   Cardiovascular: Normal rate, regular rhythm and normal heart sounds.    Pulmonary/Chest: Effort normal and breath sounds normal.   Psychiatric: He has a normal mood and affect.   Nursing note and vitals reviewed.      Assessment:       1. Type 2 diabetes mellitus with other specified complication, without long-term current use of insulin    2. Essential hypertension    3. Hyperlipidemia, unspecified hyperlipidemia type    4.  Uncomplicated opioid dependence    5. Anxiolytic dependence    6. Aortoiliac stenosis    7. SHANNAN (generalized anxiety disorder)        Plan:       Type 2 diabetes mellitus with other specified complication, without long-term current use of insulin  -     Hemoglobin A1c; Future; Expected date: 07/25/2018  -     CBC auto differential; Future; Expected date: 07/25/2018  -     Comprehensive metabolic panel; Future; Expected date: 07/25/2018    Essential hypertension  -     Hemoglobin A1c; Future; Expected date: 07/25/2018  -     CBC auto differential; Future; Expected date: 07/25/2018  -     Comprehensive metabolic panel; Future; Expected date: 07/25/2018    Hyperlipidemia, unspecified hyperlipidemia type    Uncomplicated opioid dependence    Anxiolytic dependence    Aortoiliac stenosis    SHANNAN (generalized anxiety disorder)    Other orders  -     Cancel: ALPRAZolam (XANAX) 1 MG tablet; TAKE 1 TABLET (1 MG TOTAL) BY MOUTH THREE TIMES A DAY AS NEEDED FOR ANXIETY.  Dispense: 90 tablet; Refill: 0        Nurse bp check in 2 weeks. Update labs.  Will monitor chronic medical issues and continue current plan of care.    Follow-up in about 1 month (around 8/25/2018), or if symptoms worsen or fail to improve.

## 2018-08-02 DIAGNOSIS — M51.36 DEGENERATIVE DISC DISEASE, LUMBAR: ICD-10-CM

## 2018-08-02 RX ORDER — HYDROCODONE BITARTRATE AND ACETAMINOPHEN 10; 325 MG/1; MG/1
1 TABLET ORAL 3 TIMES DAILY PRN
Qty: 80 TABLET | Refills: 0 | Status: SHIPPED | OUTPATIENT
Start: 2018-08-02 | End: 2018-09-25 | Stop reason: SDUPTHER

## 2018-08-02 NOTE — TELEPHONE ENCOUNTER
----- Message from Shawna Zambrano sent at 8/2/2018  1:43 PM CDT -----  Contact: Self  Type:  RX Refill Request    Who Called:  Patient   Refill or New Rx:  Refill   RX Name and Strength:  HYDROcodone-acetaminophen (NORCO)  mg per tablet  How is the patient currently taking it? (ex. 1XDay):  TAKE 1 TABLET BY MOUTH THREE TIMES DAILY AS NEEDED.  Is this a 30 day or 90 day RX:  80  Preferred Pharmacy     [facility] [place] [address]  [address] [address]  Phone: [phone] Fax: [phone]  Local or Mail Order:  Local   Ordering Provider:  Josef Spear Call Back Number:  [phone] (home)     Additional Information:

## 2018-08-08 ENCOUNTER — PES CALL (OUTPATIENT)
Dept: ADMINISTRATIVE | Facility: CLINIC | Age: 63
End: 2018-08-08

## 2018-08-08 ENCOUNTER — CLINICAL SUPPORT (OUTPATIENT)
Dept: FAMILY MEDICINE | Facility: CLINIC | Age: 63
End: 2018-08-08
Payer: MEDICARE

## 2018-08-08 VITALS — DIASTOLIC BLOOD PRESSURE: 110 MMHG | HEART RATE: 76 BPM | SYSTOLIC BLOOD PRESSURE: 180 MMHG

## 2018-08-08 DIAGNOSIS — I10 ESSENTIAL HYPERTENSION: Primary | ICD-10-CM

## 2018-08-08 DIAGNOSIS — Z01.30 BP CHECK: Primary | ICD-10-CM

## 2018-08-08 PROCEDURE — 99999 PR PBB SHADOW E&M-EST. PATIENT-LVL II: CPT | Mod: PBBFAC,,,

## 2018-08-08 PROCEDURE — 99211 OFF/OP EST MAY X REQ PHY/QHP: CPT | Mod: S$GLB,,, | Performed by: FAMILY MEDICINE

## 2018-08-08 RX ORDER — AMLODIPINE BESYLATE 5 MG/1
5 TABLET ORAL DAILY
Qty: 90 TABLET | Refills: 1 | Status: SHIPPED | OUTPATIENT
Start: 2018-08-08 | End: 2018-08-21

## 2018-08-08 NOTE — PROGRESS NOTES
Dr. Bahena notified pt has new rx Norvasc 5 mg sent to [facility] and 2 week nurse visit.

## 2018-08-08 NOTE — PATIENT INSTRUCTIONS
Pt came in for bp check 180/110, recheck 180/110. Dr Bahena notified new rx sent to [facility] Norvasc 5 mg and scheduled 2 week nurse visit.

## 2018-08-08 NOTE — PROGRESS NOTES
Fred Blackwell 63 y.o. male is here today for Blood Pressure check.   History of HTN yes.    Review of patient's allergies indicates:   Allergen Reactions    Cymbalta [duloxetine] Nausea And Vomiting    Penicillins      Other reaction(s): Hives     Creatinine   Date Value Ref Range Status   07/25/2018 1.0 0.5 - 1.4 mg/dL Final     Sodium   Date Value Ref Range Status   07/25/2018 140 136 - 145 mmol/L Final     Potassium   Date Value Ref Range Status   07/25/2018 4.9 3.5 - 5.1 mmol/L Final   ]  Patient verifies taking blood pressure medications on a regular basis at the same time of the day.     Current Outpatient Prescriptions:     albuterol (PROVENTIL) 2.5 mg /3 mL (0.083 %) nebulizer solution, Take 3 mLs (2.5 mg total) by nebulization every 6 (six) hours as needed for Wheezing. Rescue, Disp: 90 mL, Rfl: 0    ALPRAZolam (XANAX) 1 MG tablet, TAKE 1 TABLET (1 MG TOTAL) BY MOUTH THREE TIMES A DAY AS NEEDED FOR ANXIETY., Disp: 90 tablet, Rfl: 0    amLODIPine (NORVASC) 5 MG tablet, Take 1 tablet (5 mg total) by mouth once daily., Disp: 90 tablet, Rfl: 1    atorvastatin (LIPITOR) 40 MG tablet, Take 1 tablet (40 mg total) by mouth once daily., Disp: 90 tablet, Rfl: 3    citalopram (CELEXA) 10 MG tablet, Take 1 tablet (10 mg total) by mouth once daily., Disp: 90 tablet, Rfl: 3    HYDROcodone-acetaminophen (NORCO)  mg per tablet, Take 1 tablet by mouth 3 (three) times daily as needed., Disp: 80 tablet, Rfl: 0    losartan-hydrochlorothiazide 100-12.5 mg (HYZAAR) 100-12.5 mg Tab, Take 1 tablet by mouth once daily., Disp: 90 tablet, Rfl: 3    mupirocin (BACTROBAN) 2 % ointment, Apply to affected area 3 times daily, Disp: 22 g, Rfl: 1    omeprazole (PRILOSEC) 20 MG capsule, Take 1 capsule (20 mg total) by mouth once daily., Disp: 90 capsule, Rfl: 3  Does patient have record of home blood pressure readings no. Readings have been averaging n/a.   Last dose of blood pressure medication was taken at  am.  Patient is asymptomatic.   Complains of na.    BP: (!) 180/110 , Pulse: 76 .    Blood pressure reading after 15 minutes was 180/100/, Pulse 76   notified.

## 2018-08-21 ENCOUNTER — OFFICE VISIT (OUTPATIENT)
Dept: FAMILY MEDICINE | Facility: CLINIC | Age: 63
End: 2018-08-21
Payer: MEDICARE

## 2018-08-21 VITALS
SYSTOLIC BLOOD PRESSURE: 157 MMHG | HEART RATE: 68 BPM | OXYGEN SATURATION: 97 % | HEIGHT: 72 IN | WEIGHT: 259.06 LBS | DIASTOLIC BLOOD PRESSURE: 95 MMHG | BODY MASS INDEX: 35.09 KG/M2

## 2018-08-21 VITALS
HEART RATE: 68 BPM | SYSTOLIC BLOOD PRESSURE: 152 MMHG | DIASTOLIC BLOOD PRESSURE: 98 MMHG | HEIGHT: 72 IN | WEIGHT: 259 LBS | RESPIRATION RATE: 18 BRPM | BODY MASS INDEX: 35.08 KG/M2

## 2018-08-21 DIAGNOSIS — F32.1 CURRENT MODERATE EPISODE OF MAJOR DEPRESSIVE DISORDER WITHOUT PRIOR EPISODE: ICD-10-CM

## 2018-08-21 DIAGNOSIS — I10 ESSENTIAL HYPERTENSION: ICD-10-CM

## 2018-08-21 DIAGNOSIS — F41.1 GAD (GENERALIZED ANXIETY DISORDER): ICD-10-CM

## 2018-08-21 DIAGNOSIS — M79.7 FIBROMYALGIA: ICD-10-CM

## 2018-08-21 DIAGNOSIS — F13.20 ANXIOLYTIC DEPENDENCE: ICD-10-CM

## 2018-08-21 DIAGNOSIS — F43.21 GRIEVING: ICD-10-CM

## 2018-08-21 DIAGNOSIS — E11.69 TYPE 2 DIABETES MELLITUS WITH OTHER SPECIFIED COMPLICATION, WITHOUT LONG-TERM CURRENT USE OF INSULIN: Primary | ICD-10-CM

## 2018-08-21 DIAGNOSIS — E66.01 SEVERE OBESITY (BMI 35.0-35.9 WITH COMORBIDITY): ICD-10-CM

## 2018-08-21 DIAGNOSIS — E11.9 DIABETES MELLITUS, STABLE: ICD-10-CM

## 2018-08-21 DIAGNOSIS — Z00.00 ENCOUNTER FOR PREVENTIVE HEALTH EXAMINATION: Primary | ICD-10-CM

## 2018-08-21 DIAGNOSIS — F17.200 TOBACCO DEPENDENCE: ICD-10-CM

## 2018-08-21 DIAGNOSIS — I70.0 AORTOILIAC STENOSIS: ICD-10-CM

## 2018-08-21 DIAGNOSIS — E11.69 TYPE 2 DIABETES MELLITUS WITH OTHER SPECIFIED COMPLICATION, WITHOUT LONG-TERM CURRENT USE OF INSULIN: ICD-10-CM

## 2018-08-21 DIAGNOSIS — M51.36 DEGENERATIVE DISC DISEASE, LUMBAR: ICD-10-CM

## 2018-08-21 DIAGNOSIS — F11.20 UNCOMPLICATED OPIOID DEPENDENCE: ICD-10-CM

## 2018-08-21 DIAGNOSIS — M50.30 DEGENERATIVE DISC DISEASE, CERVICAL: ICD-10-CM

## 2018-08-21 DIAGNOSIS — E78.5 HYPERLIPIDEMIA, UNSPECIFIED HYPERLIPIDEMIA TYPE: ICD-10-CM

## 2018-08-21 PROCEDURE — 99499 UNLISTED E&M SERVICE: CPT | Mod: S$GLB,,, | Performed by: NURSE PRACTITIONER

## 2018-08-21 PROCEDURE — 99999 PR PBB SHADOW E&M-EST. PATIENT-LVL III: CPT | Mod: PBBFAC,,, | Performed by: FAMILY MEDICINE

## 2018-08-21 PROCEDURE — 3080F DIAST BP >= 90 MM HG: CPT | Mod: CPTII,S$GLB,, | Performed by: FAMILY MEDICINE

## 2018-08-21 PROCEDURE — 3044F HG A1C LEVEL LT 7.0%: CPT | Mod: CPTII,S$GLB,, | Performed by: FAMILY MEDICINE

## 2018-08-21 PROCEDURE — 3080F DIAST BP >= 90 MM HG: CPT | Mod: CPTII,S$GLB,, | Performed by: NURSE PRACTITIONER

## 2018-08-21 PROCEDURE — 3044F HG A1C LEVEL LT 7.0%: CPT | Mod: CPTII,S$GLB,, | Performed by: NURSE PRACTITIONER

## 2018-08-21 PROCEDURE — 99999 PR PBB SHADOW E&M-EST. PATIENT-LVL V: CPT | Mod: PBBFAC,,, | Performed by: NURSE PRACTITIONER

## 2018-08-21 PROCEDURE — 99214 OFFICE O/P EST MOD 30 MIN: CPT | Mod: S$GLB,,, | Performed by: FAMILY MEDICINE

## 2018-08-21 PROCEDURE — 3077F SYST BP >= 140 MM HG: CPT | Mod: CPTII,S$GLB,, | Performed by: NURSE PRACTITIONER

## 2018-08-21 PROCEDURE — 3008F BODY MASS INDEX DOCD: CPT | Mod: CPTII,S$GLB,, | Performed by: FAMILY MEDICINE

## 2018-08-21 PROCEDURE — G0439 PPPS, SUBSEQ VISIT: HCPCS | Mod: S$GLB,,, | Performed by: NURSE PRACTITIONER

## 2018-08-21 PROCEDURE — 3077F SYST BP >= 140 MM HG: CPT | Mod: CPTII,S$GLB,, | Performed by: FAMILY MEDICINE

## 2018-08-21 PROCEDURE — 99499 UNLISTED E&M SERVICE: CPT | Mod: HCNC,S$GLB,, | Performed by: FAMILY MEDICINE

## 2018-08-21 RX ORDER — AMLODIPINE BESYLATE 10 MG/1
10 TABLET ORAL DAILY
Qty: 90 TABLET | Refills: 1 | Status: SHIPPED | OUTPATIENT
Start: 2018-08-21 | End: 2019-02-12 | Stop reason: SDUPTHER

## 2018-08-21 RX ORDER — ALPRAZOLAM 1 MG/1
TABLET ORAL
Qty: 90 TABLET | Refills: 0 | Status: SHIPPED | OUTPATIENT
Start: 2018-08-21 | End: 2018-09-19 | Stop reason: SDUPTHER

## 2018-08-21 NOTE — PROGRESS NOTES
Subjective:       Patient ID: Fred Blackwell is a 63 y.o. male.    Chief Complaint: Diabetes (Patient here for 1 month follow up) and Depression (Patient here for 1 month follow yo)    Here for f/u htn and DM II. Still dealing with grief. Doing well overall and had hra today.      Hypertension   This is a chronic problem. The current episode started more than 1 year ago. The problem is uncontrolled. Pertinent negatives include no chest pain, palpitations or shortness of breath.   Hyperlipidemia   This is a chronic problem. The current episode started more than 1 year ago. Pertinent negatives include no chest pain or shortness of breath.   Diabetes   He presents for his follow-up diabetic visit. He has type 2 diabetes mellitus. His disease course has been stable. Pertinent negatives for hypoglycemia include no nervousness/anxiousness. Pertinent negatives for diabetes include no chest pain and no fatigue.     Review of Systems   Constitutional: Negative for chills, fatigue and fever.   Respiratory: Negative for cough, chest tightness and shortness of breath.    Cardiovascular: Negative for chest pain, palpitations and leg swelling.   Gastrointestinal: Negative for abdominal distention and abdominal pain.   Endocrine: Negative for cold intolerance and heat intolerance.   Skin: Negative for rash.   Psychiatric/Behavioral: Negative for dysphoric mood. The patient is not nervous/anxious.        Objective:      Physical Exam   Constitutional: He appears well-developed and well-nourished.   HENT:   Head: Normocephalic and atraumatic.   Cardiovascular: Normal rate, regular rhythm and normal heart sounds.   Pulmonary/Chest: Effort normal and breath sounds normal.   Psychiatric: He has a normal mood and affect.   Nursing note and vitals reviewed.      Assessment:       1. Type 2 diabetes mellitus with other specified complication, without long-term current use of insulin    2. SHANNAN (generalized anxiety disorder)    3.  Degenerative disc disease, lumbar        Plan:       Type 2 diabetes mellitus with other specified complication, without long-term current use of insulin    SHANNAN (generalized anxiety disorder)  -     ALPRAZolam (XANAX) 1 MG tablet; TAKE 1 TABLET (1 MG TOTAL) BY MOUTH THREE TIMES A DAY AS NEEDED FOR ANXIETY.  Dispense: 90 tablet; Refill: 0    Degenerative disc disease, lumbar    Other orders  -     amLODIPine (NORVASC) 10 MG tablet; Take 1 tablet (10 mg total) by mouth once daily.  Dispense: 90 tablet; Refill: 1      Increase norvasc from 5 mg to 10 mg ; nurse bp check in 2 weeks.  Start grief counseling as planned.  Will monitor chronic medical issues and continue current plan of care.      Follow-up in about 1 month (around 9/21/2018), or if symptoms worsen or fail to improve.

## 2018-08-21 NOTE — PROGRESS NOTES
Fred Blackwell presented for a  Medicare AWV and comprehensive Health Risk Assessment today. The following components were reviewed and updated:    · Medical history  · Family History  · Social history  · Allergies and Current Medications  · Health Risk Assessment  · Health Maintenance  · Care Team     Review of Systems   Constitutional: Negative for fever, malaise/fatigue and weight loss.   Respiratory: Negative for cough, shortness of breath and wheezing.    Cardiovascular: Negative for chest pain.   Gastrointestinal: Negative for abdominal pain, blood in stool, constipation, diarrhea, nausea and vomiting.   Neurological: Negative for dizziness and weakness.   Psychiatric/Behavioral: Positive for depression (grieving from wifes death).     ** See Completed Assessments for Annual Wellness Visit within the encounter summary.**     The following assessments were completed:  · Living Situation  · CAGE  · Depression Screening  · Timed Get Up and Go  · Whisper Test  · Cognitive Function Screening        · Nutrition Screening  · ADL Screening  · PAQ Screening    Vitals:    08/21/18 1309   BP: (!) 157/95   BP Location: Left arm   Patient Position: Sitting   BP Method: Medium (Automatic)   Pulse: 68   SpO2: 97%   Weight: 117.5 kg (259 lb 0.7 oz)   Height: 6' (1.829 m)     Body mass index is 35.13 kg/m².  Physical Exam   Constitutional: He is oriented to person, place, and time. He appears well-nourished.   Cardiovascular: Normal rate, regular rhythm, normal heart sounds and intact distal pulses.   Pulmonary/Chest: Effort normal and breath sounds normal.   Neurological: He is alert and oriented to person, place, and time.   Skin: Skin is warm and dry.   Vitals reviewed.        Diagnoses and health risks identified today and associated recommendations/orders:    1. Encounter for preventive health examination  Reviewed and discussed health maintenance.    FitKit was given to patient on 8/21/2018 1:58 PM   -  Ambulatory consult to Psychiatry  - Varicella zoster antibody, IgG; Future  - Ambulatory referral to Smoking Cessation Program    2. Uncomplicated opioid dependence  Stable- continue current treatment and follow up routinely with PCP     3. Anxiolytic dependence  Stable- continue current treatment and follow up routinely with PCP     4. Degenerative disc disease, lumbar  Stable- continue current treatment and follow up routinely with PCP     5. Degenerative disc disease, cervical  Stable- continue current treatment and follow up routinely with PCP     6. Essential hypertension  Stable- continue current treatment and follow up routinely with PCP     7. Hyperlipidemia, unspecified hyperlipidemia type  Stable- continue current treatment and follow up routinely with PCP     8. Aortoiliac stenosis  Stable- continue current treatment and follow up routinely with PCP     9. Type 2 diabetes mellitus with other specified complication, without long-term current use of insulin  Stable- continue current treatment and follow up routinely with PCP   Encouraged healthy eating, weight loss and routine exercise     10. Diabetes mellitus, stable  Stable- continue current treatment and follow up routinely with PCP   Encouraged healthy eating, weight loss and routine exercise     11. Tobacco dependence  - Ambulatory referral to Smoking Cessation Program    12. Fibromyalgia  sore throat a    13. Current moderate episode of major depressive disorder without prior episode  Grieving from wifes unexpected death  - Ambulatory consult to Psychiatry  - Ambulatory referral to Smoking Cessation Program  Recommend increasing Celexa. Pt will discuss with PCP today    14. Grieving  Grieving from wifes unexpected death  - Ambulatory consult to Psychiatry  Recommend increasing Celexa. Pt will discuss with PCP today    15. Severe obesity (BMI 35.0-35.9 with comorbidity)  Encouraged healthy eating, weight loss and routine exercise     I offered to  discuss end of life issues, including information on how to make advance directives that the patient could use to name someone who would make medical decisions on their behalf if they became too ill to make themselves.    _X_Patient declined  ___Patient is interested, I provided paper work and offered to discuss.    Provided Fred Santos with a 5-10 year written screening schedule and personal prevention plan. Recommendations were developed using the USPSTF age appropriate recommendations. Education, counseling, and referrals were provided as needed. After Visit Summary printed and given to patient which includes a list of additional screenings\tests needed.    Colleen Guerrero, NP

## 2018-08-21 NOTE — PATIENT INSTRUCTIONS
Counseling and Referral of Other Preventative  (Italic type indicates deductible and co-insurance are waived)    Patient Name: Fred Blackwell  Today's Date: 8/21/2018    Health Maintenance       Date Due Completion Date    Fecal Occult Blood Test (FOBT)/FitKit 1955 ---    Sign Pain Contract 08/02/1973 ---    Complete Opioid Risk Tool 08/02/1973 ---    Zoster Vaccine 08/02/2015 ---    Colonoscopy 01/10/2018 1/10/2011 (Done)    Override on 1/10/2011: Done (legacy documents, repeat in 7 years)    Influenza Vaccine 08/01/2018 10/22/2017    Lipid Panel 10/28/2018 10/28/2017    Eye Exam 11/08/2018 11/8/2017    Foot Exam 11/29/2018 11/29/2017    Hemoglobin A1c 01/25/2019 7/25/2018    Pneumococcal PPSV23 (Medium Risk) (2) 07/20/2022 7/20/2017    TETANUS VACCINE 09/29/2024 9/29/2014 (Done)    Override on 9/29/2014: Done        No orders of the defined types were placed in this encounter.    The following information is provided to all patients.  This information is to help you find resources for any of the problems found today that may be affecting your health:                Living healthy guide: www.[facility].[place].gov      Understanding Diabetes: www.diabetes.org      Eating healthy: www.cdc.gov/healthyweight      CDC home safety checklist: www.cdc.gov/steadi/patient.html      Agency on Aging: www.goea.[place].[facility]      Alcoholics anonymous (AA): www.aa.org      Physical Activity: www.leida.nih.gov/yn7idzx      Tobacco use: www.quitwithusla.org

## 2018-08-29 ENCOUNTER — TELEPHONE (OUTPATIENT)
Dept: FAMILY MEDICINE | Facility: CLINIC | Age: 63
End: 2018-08-29

## 2018-08-29 NOTE — TELEPHONE ENCOUNTER
Spoke to pt and let him know that we would not handle the billing part of care. I looked in pt chart and on 11/1/17 the pt did have the eye screening photos taken. Advised pt that he in fact did have the eye screening done on 11/1/17. Pt was questioning that it sated he saw an eye dr. I advised pt that the dr listed on the bill is probably the provider who read his results. Advised pt that if he has any other questions in regard to the bill that he should contact our billing department. Pt thanked me for breaking it down for him. Advised pt that if he needed anything else to let us know. Pt verbalized understanding.

## 2018-08-29 NOTE — TELEPHONE ENCOUNTER
----- Message from Svitlana Carmen sent at 8/29/2018  9:09 AM CDT -----  Contact: self  Patient is having a problem with a bill stating he had the diabetic eye exam which he is saying never happened. Billing states it was done on 11/06/18. Please call patient at [phone]. Thanks!

## 2018-09-06 ENCOUNTER — TELEPHONE (OUTPATIENT)
Dept: SMOKING CESSATION | Facility: CLINIC | Age: 63
End: 2018-09-06

## 2018-09-19 DIAGNOSIS — F41.1 GAD (GENERALIZED ANXIETY DISORDER): ICD-10-CM

## 2018-09-19 RX ORDER — ALPRAZOLAM 1 MG/1
TABLET ORAL
Qty: 90 TABLET | Refills: 0 | Status: SHIPPED | OUTPATIENT
Start: 2018-09-19 | End: 2018-10-17 | Stop reason: SDUPTHER

## 2018-09-19 NOTE — TELEPHONE ENCOUNTER
----- Message from Meg Soriano sent at 9/19/2018 10:26 AM CDT -----  Contact: self  Pt called to request a refill of ALPRAZolam (XANAX) 1 MG tablet to be sent to [facility]. Pt can be reached at [phone].

## 2018-09-25 ENCOUNTER — OFFICE VISIT (OUTPATIENT)
Dept: FAMILY MEDICINE | Facility: CLINIC | Age: 63
End: 2018-09-25
Payer: MEDICARE

## 2018-09-25 VITALS
WEIGHT: 270.06 LBS | SYSTOLIC BLOOD PRESSURE: 152 MMHG | DIASTOLIC BLOOD PRESSURE: 88 MMHG | HEART RATE: 76 BPM | HEIGHT: 72 IN | BODY MASS INDEX: 36.58 KG/M2

## 2018-09-25 DIAGNOSIS — I10 ESSENTIAL HYPERTENSION: ICD-10-CM

## 2018-09-25 DIAGNOSIS — E11.69 TYPE 2 DIABETES MELLITUS WITH OTHER SPECIFIED COMPLICATION, WITHOUT LONG-TERM CURRENT USE OF INSULIN: Primary | ICD-10-CM

## 2018-09-25 DIAGNOSIS — M51.36 DEGENERATIVE DISC DISEASE, LUMBAR: ICD-10-CM

## 2018-09-25 DIAGNOSIS — E78.5 HYPERLIPIDEMIA, UNSPECIFIED HYPERLIPIDEMIA TYPE: ICD-10-CM

## 2018-09-25 PROCEDURE — 99213 OFFICE O/P EST LOW 20 MIN: CPT | Mod: PBBFAC,PO | Performed by: FAMILY MEDICINE

## 2018-09-25 PROCEDURE — 3079F DIAST BP 80-89 MM HG: CPT | Mod: CPTII,,, | Performed by: FAMILY MEDICINE

## 2018-09-25 PROCEDURE — 99214 OFFICE O/P EST MOD 30 MIN: CPT | Mod: S$PBB,,, | Performed by: FAMILY MEDICINE

## 2018-09-25 PROCEDURE — 99499 UNLISTED E&M SERVICE: CPT | Mod: HCNC,S$GLB,, | Performed by: FAMILY MEDICINE

## 2018-09-25 PROCEDURE — 3077F SYST BP >= 140 MM HG: CPT | Mod: CPTII,,, | Performed by: FAMILY MEDICINE

## 2018-09-25 PROCEDURE — 99999 PR PBB SHADOW E&M-EST. PATIENT-LVL III: CPT | Mod: PBBFAC,,, | Performed by: FAMILY MEDICINE

## 2018-09-25 PROCEDURE — 3008F BODY MASS INDEX DOCD: CPT | Mod: CPTII,,, | Performed by: FAMILY MEDICINE

## 2018-09-25 PROCEDURE — 3044F HG A1C LEVEL LT 7.0%: CPT | Mod: CPTII,,, | Performed by: FAMILY MEDICINE

## 2018-09-25 RX ORDER — LOSARTAN POTASSIUM AND HYDROCHLOROTHIAZIDE 12.5; 1 MG/1; MG/1
1 TABLET ORAL DAILY
Qty: 90 TABLET | Refills: 3 | Status: SHIPPED | OUTPATIENT
Start: 2018-09-25 | End: 2019-09-26 | Stop reason: SDUPTHER

## 2018-09-25 RX ORDER — HYDROCODONE BITARTRATE AND ACETAMINOPHEN 10; 325 MG/1; MG/1
1 TABLET ORAL 3 TIMES DAILY PRN
Qty: 80 TABLET | Refills: 0 | Status: SHIPPED | OUTPATIENT
Start: 2018-09-25 | End: 2018-10-30 | Stop reason: SDUPTHER

## 2018-09-25 NOTE — PROGRESS NOTES
Subjective:       Patient ID: Fred Blackwell is a 63 y.o. male.    Chief Complaint: Depression (follow up )    Here for f/u depression. Improving since his daughter moved in.  Fishing for fun again. Ran out of hyzaar.      Hypertension   This is a chronic problem. The current episode started more than 1 year ago. Pertinent negatives include no chest pain, palpitations or shortness of breath.   Diabetes   He presents for his follow-up diabetic visit. He has type 2 diabetes mellitus. Pertinent negatives for diabetes include no chest pain and no fatigue.     Review of Systems   Constitutional: Negative for chills, fatigue and fever.   Respiratory: Negative for cough, chest tightness and shortness of breath.    Cardiovascular: Negative for chest pain, palpitations and leg swelling.   Endocrine: Negative for cold intolerance and heat intolerance.   Skin: Negative for rash.       Objective:      Physical Exam   Constitutional: He appears well-developed and well-nourished.   HENT:   Head: Normocephalic and atraumatic.   Cardiovascular: Normal rate, regular rhythm and normal heart sounds.   Pulmonary/Chest: Effort normal and breath sounds normal.   Psychiatric: He has a normal mood and affect.   Nursing note and vitals reviewed.      Assessment:       1. Type 2 diabetes mellitus with other specified complication, without long-term current use of insulin    2. Degenerative disc disease, lumbar    3. Essential hypertension    4. Hyperlipidemia, unspecified hyperlipidemia type        Plan:       Type 2 diabetes mellitus with other specified complication, without long-term current use of insulin    Degenerative disc disease, lumbar  -     HYDROcodone-acetaminophen (NORCO)  mg per tablet; Take 1 tablet by mouth 3 (three) times daily as needed.  Dispense: 80 tablet; Refill: 0    Essential hypertension    Hyperlipidemia, unspecified hyperlipidemia type    Other orders  -     losartan-hydrochlorothiazide 100-12.5 mg  (HYZAAR) 100-12.5 mg Tab; Take 1 tablet by mouth once daily.  Dispense: 90 tablet; Refill: 3    restart hyzaar and nurse bp check in 2 weeks.  Will monitor chronic medical issues and continue current plan of care.      Follow-up in about 3 months (around 12/25/2018), or if symptoms worsen or fail to improve.

## 2018-10-09 ENCOUNTER — TELEPHONE (OUTPATIENT)
Dept: FAMILY MEDICINE | Facility: CLINIC | Age: 63
End: 2018-10-09

## 2018-10-09 NOTE — TELEPHONE ENCOUNTER
----- Message from Paulette Macias sent at 10/9/2018  8:55 AM CDT -----  Type:  Sooner Apoointment Request    Caller is requesting a sooner appointment.  Caller declined first available appointment listed below.  Caller will not accept being placed on the waitlist and is requesting a message be sent to doctor.    Name of Caller: self When is the first available appointment?  NA   Symptoms:  NA Best Call Back Number:  [phone]  Additional Information:  Patient asking to reschedule nurse appointment for today or any day of the week to be blood pressure checked

## 2018-10-10 ENCOUNTER — TELEPHONE (OUTPATIENT)
Dept: FAMILY MEDICINE | Facility: CLINIC | Age: 63
End: 2018-10-10

## 2018-10-10 ENCOUNTER — CLINICAL SUPPORT (OUTPATIENT)
Dept: FAMILY MEDICINE | Facility: CLINIC | Age: 63
End: 2018-10-10
Payer: MEDICARE

## 2018-10-10 VITALS — SYSTOLIC BLOOD PRESSURE: 142 MMHG | DIASTOLIC BLOOD PRESSURE: 90 MMHG

## 2018-10-10 DIAGNOSIS — Z01.30 BP CHECK: Primary | ICD-10-CM

## 2018-10-10 PROCEDURE — 99999 PR PBB SHADOW E&M-EST. PATIENT-LVL I: CPT | Mod: PBBFAC,,,

## 2018-10-10 PROCEDURE — 99211 OFF/OP EST MAY X REQ PHY/QHP: CPT | Mod: S$PBB,,, | Performed by: FAMILY MEDICINE

## 2018-10-10 PROCEDURE — 99211 OFF/OP EST MAY X REQ PHY/QHP: CPT | Mod: PBBFAC,PO

## 2018-10-10 NOTE — TELEPHONE ENCOUNTER
Please see message below:    Pt had B/P check 148/100 to right arm, rechecked 142/90 to left arm pt states he doesn't drink water and add salt to diet, pt encouraged to drink fluids and stop adding salt to diet, pt verbalized understanding. Pt rescheduled for nurse visit. Please advise.

## 2018-10-10 NOTE — PROGRESS NOTES
.  Fred Blackwell 63 y.o. male is here today for Blood Pressure check.   History of HTN yes.    Review of patient's allergies indicates:   Allergen Reactions    Cymbalta [duloxetine] Nausea And Vomiting    Penicillins      Other reaction(s): Hives     Creatinine   Date Value Ref Range Status   07/25/2018 1.0 0.5 - 1.4 mg/dL Final     Sodium   Date Value Ref Range Status   07/25/2018 140 136 - 145 mmol/L Final     Potassium   Date Value Ref Range Status   07/25/2018 4.9 3.5 - 5.1 mmol/L Final   ]  Patient verifies taking blood pressure medications on a regular basis at the same time of the day.     Current Outpatient Medications:     albuterol (PROVENTIL) 2.5 mg /3 mL (0.083 %) nebulizer solution, Take 3 mLs (2.5 mg total) by nebulization every 6 (six) hours as needed for Wheezing. Rescue, Disp: 90 mL, Rfl: 0    ALPRAZolam (XANAX) 1 MG tablet, TAKE 1 TABLET (1 MG TOTAL) BY MOUTH THREE TIMES A DAY AS NEEDED FOR ANXIETY., Disp: 90 tablet, Rfl: 0    amLODIPine (NORVASC) 10 MG tablet, Take 1 tablet (10 mg total) by mouth once daily., Disp: 90 tablet, Rfl: 1    atorvastatin (LIPITOR) 40 MG tablet, Take 1 tablet (40 mg total) by mouth once daily., Disp: 90 tablet, Rfl: 3    citalopram (CELEXA) 10 MG tablet, Take 1 tablet (10 mg total) by mouth once daily., Disp: 90 tablet, Rfl: 3    HYDROcodone-acetaminophen (NORCO)  mg per tablet, Take 1 tablet by mouth 3 (three) times daily as needed., Disp: 80 tablet, Rfl: 0    losartan-hydrochlorothiazide 100-12.5 mg (HYZAAR) 100-12.5 mg Tab, Take 1 tablet by mouth once daily., Disp: 90 tablet, Rfl: 3    mupirocin (BACTROBAN) 2 % ointment, Apply to affected area 3 times daily, Disp: 22 g, Rfl: 1    omeprazole (PRILOSEC) 20 MG capsule, Take 1 capsule (20 mg total) by mouth once daily., Disp: 90 capsule, Rfl: 3  Does patient have record of home blood pressure readings no. Readings have been averaging n/a.   Last dose of blood pressure medication was taken at  am.  Patient is asymptomatic.   Complains of n/a.    BP: (!) 142/90 ,   .    Blood pressure reading after 15 minutes was 148/100, Pulse n/a.  Dr. Bahena notified.

## 2018-10-17 DIAGNOSIS — F41.1 GAD (GENERALIZED ANXIETY DISORDER): ICD-10-CM

## 2018-10-17 NOTE — TELEPHONE ENCOUNTER
----- Message from Natali Chawla sent at 10/17/2018  1:32 PM CDT -----  Contact: Patient  Type:  RX Refill Request    Who Called:  Patient  Refill or New Rx:  Refill  RX Name and Strength:     ALPRAZolam (XANAX) 1 MG tablet   How is the patient currently taking it? (ex. 1XDay):    Is this a 30 day or 90 day RX:    Preferred Pharmacy with phone number:    [facility] - [place], [place] - [address] [place] [address]  Phone: [phone] Fax: [phone]  Local or Mail Order:  Local  Ordering Provider:  Josef Spear Call Back Number:  [phone]  Additional Information:

## 2018-10-18 RX ORDER — ALPRAZOLAM 1 MG/1
TABLET ORAL
Qty: 90 TABLET | Refills: 0 | Status: SHIPPED | OUTPATIENT
Start: 2018-10-18 | End: 2018-11-15 | Stop reason: SDUPTHER

## 2018-10-30 DIAGNOSIS — M51.36 DEGENERATIVE DISC DISEASE, LUMBAR: ICD-10-CM

## 2018-10-30 RX ORDER — HYDROCODONE BITARTRATE AND ACETAMINOPHEN 10; 325 MG/1; MG/1
1 TABLET ORAL 3 TIMES DAILY PRN
Qty: 80 TABLET | Refills: 0 | Status: SHIPPED | OUTPATIENT
Start: 2018-10-30 | End: 2018-11-26 | Stop reason: SDUPTHER

## 2018-10-30 RX ORDER — CITALOPRAM 10 MG/1
10 TABLET ORAL DAILY
Qty: 90 TABLET | Refills: 3 | Status: SHIPPED | OUTPATIENT
Start: 2018-10-30 | End: 2020-04-20 | Stop reason: SDUPTHER

## 2018-10-30 NOTE — TELEPHONE ENCOUNTER
----- Message from Vania Mart sent at 10/30/2018  1:05 PM CDT -----  Contact: pt  Pt calling states needs refill on HYDROcodone-acetaminophen (NORCO)  mg per tablet and citalopram (CELEXA) 10 MG tablet ..[phone]        .      [facility] - [address] [address]  Phone: [phone] Fax: [phone]

## 2018-11-02 DIAGNOSIS — E11.9 TYPE 2 DIABETES MELLITUS WITHOUT COMPLICATION: ICD-10-CM

## 2018-11-09 ENCOUNTER — PATIENT OUTREACH (OUTPATIENT)
Dept: ADMINISTRATIVE | Facility: HOSPITAL | Age: 63
End: 2018-11-09

## 2018-11-09 NOTE — LETTER
November 9, 2018    Fred Santos Rishi  [address]  [place] [address]             [facility]  [address]  [facility] [address]  Phone: [phone]   Dear Fred Santos    [facility] is committed to your overall health and would like to ensure that you are up to date on your recommended test and/or procedures.   W Nelson Bahena MD has found that your chart shows you may be due for the following:    Influenza and Shingles Immunizations  Colon cancer screening  Annual Dilated Eye Exam    Fasting cholesterol labs (Lipid Panel),  Order has been placed    Also,   After reviewing your chart, it has been documented that a FIT KIT (colorectal cancer screening kit) was dispensed to you on 8/21/18,  but the lab has yet to receive the kit so that we may update your health maintenance.   This is a friendly reminder to complete this test (the instructions will inform you how to complete this test) and then return your sample in the postage-paid return envelope within 24 hours of collection.      If you have had any of the above done at another facility, please let us know so that we may obtain copies from that facility.  If you have a copy of these records, please provide a copy for us to scan into your chart.  You are welcome to request that the report be faxed to us at  ([phone]).      Otherwise, please schedule these appointments at your earliest convenience by calling [phone] or going to [email or web address].    Sincerely,    Kimberly Ferreira  Clinical Care Coordinator  [facility] [address] [facility] [address]  [place] [address]  Phone: [phone]   Fax: [phone]

## 2018-11-09 NOTE — LETTER
November 9, 2018    Fred Blackwell  [address]  [place] [address]             [facility]  [address]  [facility] [address]  Phone: [phone] Dear Fred Blackwell    [facility] is committed to your overall health and would like to ensure that you are up to date on your recommended test and/or procedures.   W Nelson Bahena MD has found that your chart shows you may be due for the following:    ***    If you have had any of the above done at another facility, please let us know so that we may obtain copies from that facility.  If you have a copy of these records, please provide a copy for us to scan into your chart.  You are welcome to request that the report be faxed to us at  ([phone]).      Otherwise, please schedule these appointments at your earliest convenience by calling [phone] or going to [email or web address].    If you have any questions or concerns, please don't hesitate to call.    Sincerely,        Kimberly Ferreira LPN

## 2018-11-09 NOTE — PROGRESS NOTES
Health Maintenance Due   Topic Date Due    Sign Pain Contract  08/02/1973    Complete Opioid Risk Tool  08/02/1973    Zoster Vaccine  08/02/2015    Colonoscopy  01/10/2018    Influenza Vaccine  08/01/2018    Lipid Panel  10/28/2018    Eye Exam  11/08/2018    Foot Exam  11/29/2018     Letter mailed

## 2018-11-15 DIAGNOSIS — F41.1 GAD (GENERALIZED ANXIETY DISORDER): ICD-10-CM

## 2018-11-15 NOTE — TELEPHONE ENCOUNTER
----- Message from RT Ang sent at 11/15/2018 10:24 AM CST -----  Contact: pt   Pt , requesting medication refill: Xanax, please call to confirm, thanks.

## 2018-11-16 RX ORDER — ALPRAZOLAM 1 MG/1
TABLET ORAL
Qty: 90 TABLET | Refills: 1 | Status: SHIPPED | OUTPATIENT
Start: 2018-11-16 | End: 2019-01-14 | Stop reason: SDUPTHER

## 2018-11-20 DIAGNOSIS — M51.36 DEGENERATIVE DISC DISEASE, LUMBAR: ICD-10-CM

## 2018-11-20 RX ORDER — HYDROCODONE BITARTRATE AND ACETAMINOPHEN 10; 325 MG/1; MG/1
1 TABLET ORAL 3 TIMES DAILY PRN
Qty: 80 TABLET | Refills: 0 | OUTPATIENT
Start: 2018-11-20

## 2018-11-20 NOTE — TELEPHONE ENCOUNTER
----- Message from Moses Brown sent at 11/20/2018 11:25 AM CST -----  Type:  RX Refill Request    Who Called:  Patient  Refill or New Rx:  Refill  RX Name and Strength:  HYDROcodone-acetaminophen (NORCO)  mg per tablet  How is the patient currently taking it? (ex. 1XDay):  3XDay  Is this a 30 day or 90 day RX:  30  Preferred Pharmacy with phone number:    [facility] [place] [address] [address]  [address] [place] [address]  Phone: [phone] Fax: [phone]    [facility] - [place] [address] [address]  [address]  [facility] [address]  Phone: [phone] Fax: [phone]          Local or Mail Order:  Local  Ordering Provider:  Josef Spear Call Back Number:  [phone]

## 2018-11-22 RX ORDER — OMEPRAZOLE 20 MG/1
20 CAPSULE, DELAYED RELEASE ORAL DAILY
Qty: 90 CAPSULE | Refills: 3 | Status: SHIPPED | OUTPATIENT
Start: 2018-11-22 | End: 2019-07-30 | Stop reason: SDUPTHER

## 2018-11-26 DIAGNOSIS — M51.36 DEGENERATIVE DISC DISEASE, LUMBAR: ICD-10-CM

## 2018-11-26 NOTE — TELEPHONE ENCOUNTER
----- Message from Librado Aguilar sent at 11/26/2018  1:24 PM CST -----  Type:  RX Refill Request    Who Called:  Patient  Refill or New Rx:  Refill  RX Name and Strength:  Hydrocodone  Preferred Pharmacy with phone number:    [facility] - [place], [place] - [address] [place] [address]  [address] [address]  Phone: [phone] Fax: [phone]  Local or Mail Order:  Local  Ordering Provider:  Same  Best Call Back Number: [phone]

## 2018-11-27 RX ORDER — HYDROCODONE BITARTRATE AND ACETAMINOPHEN 10; 325 MG/1; MG/1
1 TABLET ORAL 3 TIMES DAILY PRN
Qty: 80 TABLET | Refills: 0 | Status: SHIPPED | OUTPATIENT
Start: 2018-11-27 | End: 2018-12-27 | Stop reason: SDUPTHER

## 2018-11-27 RX ORDER — HYDROCODONE BITARTRATE AND ACETAMINOPHEN 10; 325 MG/1; MG/1
TABLET ORAL
Qty: 80 TABLET | Refills: 0 | OUTPATIENT
Start: 2018-11-27

## 2018-12-03 DIAGNOSIS — E11.9 TYPE 2 DIABETES MELLITUS WITHOUT COMPLICATION, UNSPECIFIED WHETHER LONG TERM INSULIN USE: ICD-10-CM

## 2018-12-04 ENCOUNTER — HOSPITAL ENCOUNTER (OUTPATIENT)
Dept: RADIOLOGY | Facility: HOSPITAL | Age: 63
Discharge: HOME | End: 2018-12-04
Attending: NURSE PRACTITIONER
Payer: MEDICARE

## 2018-12-04 ENCOUNTER — OFFICE VISIT (OUTPATIENT)
Dept: FAMILY MEDICINE | Facility: CLINIC | Age: 63
End: 2018-12-04
Payer: MEDICARE

## 2018-12-04 VITALS
HEIGHT: 72 IN | RESPIRATION RATE: 20 BRPM | DIASTOLIC BLOOD PRESSURE: 90 MMHG | WEIGHT: 270.31 LBS | TEMPERATURE: 98 F | OXYGEN SATURATION: 95 % | HEART RATE: 70 BPM | BODY MASS INDEX: 36.61 KG/M2 | SYSTOLIC BLOOD PRESSURE: 140 MMHG

## 2018-12-04 DIAGNOSIS — E66.01 SEVERE OBESITY (BMI 35.0-35.9 WITH COMORBIDITY): ICD-10-CM

## 2018-12-04 DIAGNOSIS — J20.8 ACUTE BACTERIAL BRONCHITIS: Primary | ICD-10-CM

## 2018-12-04 DIAGNOSIS — R06.2 WHEEZING: ICD-10-CM

## 2018-12-04 DIAGNOSIS — B96.89 BACTERIAL SINUSITIS: ICD-10-CM

## 2018-12-04 DIAGNOSIS — F17.200 CURRENT EVERY DAY SMOKER: ICD-10-CM

## 2018-12-04 DIAGNOSIS — B96.89 ACUTE BACTERIAL BRONCHITIS: Primary | ICD-10-CM

## 2018-12-04 DIAGNOSIS — R05.9 COUGH: ICD-10-CM

## 2018-12-04 DIAGNOSIS — J32.9 BACTERIAL SINUSITIS: ICD-10-CM

## 2018-12-04 DIAGNOSIS — B96.89 ACUTE BACTERIAL BRONCHITIS: ICD-10-CM

## 2018-12-04 DIAGNOSIS — J20.8 ACUTE BACTERIAL BRONCHITIS: ICD-10-CM

## 2018-12-04 PROCEDURE — 3077F SYST BP >= 140 MM HG: CPT | Mod: CPTII,HCNC,S$GLB, | Performed by: NURSE PRACTITIONER

## 2018-12-04 PROCEDURE — 99999 PR PBB SHADOW E&M-EST. PATIENT-LVL V: CPT | Mod: PBBFAC,HCNC,, | Performed by: NURSE PRACTITIONER

## 2018-12-04 PROCEDURE — 3080F DIAST BP >= 90 MM HG: CPT | Mod: CPTII,HCNC,S$GLB, | Performed by: NURSE PRACTITIONER

## 2018-12-04 PROCEDURE — 99214 OFFICE O/P EST MOD 30 MIN: CPT | Mod: 25,HCNC,S$GLB, | Performed by: NURSE PRACTITIONER

## 2018-12-04 PROCEDURE — 3008F BODY MASS INDEX DOCD: CPT | Mod: CPTII,HCNC,S$GLB, | Performed by: NURSE PRACTITIONER

## 2018-12-04 PROCEDURE — 71046 X-RAY EXAM CHEST 2 VIEWS: CPT | Mod: TC,FY,HCNC,PO

## 2018-12-04 PROCEDURE — 71046 X-RAY EXAM CHEST 2 VIEWS: CPT | Mod: 26,HCNC,, | Performed by: RADIOLOGY

## 2018-12-04 PROCEDURE — 94640 AIRWAY INHALATION TREATMENT: CPT | Mod: HCNC,S$GLB,, | Performed by: NURSE PRACTITIONER

## 2018-12-04 RX ORDER — ALBUTEROL SULFATE 0.83 MG/ML
2.5 SOLUTION RESPIRATORY (INHALATION)
Status: COMPLETED | OUTPATIENT
Start: 2018-12-04 | End: 2018-12-04

## 2018-12-04 RX ORDER — PREDNISONE 20 MG/1
40 TABLET ORAL DAILY
Qty: 10 TABLET | Refills: 0 | Status: SHIPPED | OUTPATIENT
Start: 2018-12-04 | End: 2018-12-09

## 2018-12-04 RX ORDER — BENZONATATE 100 MG/1
100 CAPSULE ORAL 3 TIMES DAILY PRN
Qty: 20 CAPSULE | Refills: 0 | Status: SHIPPED | OUTPATIENT
Start: 2018-12-04 | End: 2018-12-14

## 2018-12-04 RX ORDER — DOXYCYCLINE 100 MG/1
100 CAPSULE ORAL 2 TIMES DAILY
Qty: 14 CAPSULE | Refills: 0 | Status: SHIPPED | OUTPATIENT
Start: 2018-12-04 | End: 2018-12-11

## 2018-12-04 RX ORDER — ALBUTEROL SULFATE 0.83 MG/ML
2.5 SOLUTION RESPIRATORY (INHALATION) EVERY 6 HOURS PRN
Qty: 90 ML | Refills: 0 | Status: SHIPPED | OUTPATIENT
Start: 2018-12-04 | End: 2021-06-09 | Stop reason: SDUPTHER

## 2018-12-04 RX ADMIN — ALBUTEROL SULFATE 2.5 MG: 0.83 SOLUTION RESPIRATORY (INHALATION) at 09:12

## 2018-12-04 NOTE — PATIENT INSTRUCTIONS
Bronchitis, Antibiotic Treatment (Adult)    Bronchitis is an infection of the air passages (bronchial tubes) in your lungs. It often occurs when you have a cold. This illness is contagious during the first few days and is spread through the air by coughing and sneezing, or by direct contact (touching the sick person and then touching your own eyes, nose, or mouth).  Symptoms of bronchitis include cough with mucus (phlegm) and low-grade fever. Bronchitis usually lasts 7 to 14 days. Mild cases can be treated with simple home remedies. More severe infection is treated with an antibiotic.  Home care  Follow these guidelines when caring for yourself at home:  · If your symptoms are severe, rest at home for the first 2 to 3 days. When you go back to your usual activities, don't let yourself get too tired.  · Do not smoke. Also avoid being exposed to secondhand smoke.  · You may use over-the-counter medicines to control fever or pain, unless another medicine was prescribed. (Note: If you have chronic liver or kidney disease or have ever had a stomach ulcer or gastrointestinal bleeding, talk with your healthcare provider before using these medicines. Also talk to your provider if you are taking medicine to prevent blood clots.) Aspirin should never be given to anyone younger than 18 years of age who is ill with a viral infection or fever. It may cause severe liver or brain damage.  · Your appetite may be poor, so a light diet is fine. Avoid dehydration by drinking 6 to 8 glasses of fluids per day (such as water, soft drinks, sports drinks, juices, tea, or soup). Extra fluids will help loosen secretions in the nose and lungs.  · Over-the-counter cough, cold, and sore-throat medicines will not shorten the length of the illness, but they may be helpful to reduce symptoms. (Note: Do not use decongestants if you have high blood pressure.)  · Finish all antibiotic medicine. Do this even if you are feeling better after only a  few days.  Follow-up care  Follow up with your healthcare provider, or as advised. If you had an X-ray or ECG (electrocardiogram), a specialist will review it. You will be notified of any new findings that may affect your care.  Note: If you are age 65 or older, or if you have a chronic lung disease or condition that affects your immune system, or you smoke, talk to your healthcare provider about having pneumococcal vaccinations and a yearly influenza vaccination (flu shot).  When to seek medical advice  Call your healthcare provider right away if any of these occur:  · Fever of 100.4°F (38°C) or higher  · Coughing up increased amounts of colored sputum  · Weakness, drowsiness, headache, facial pain, ear pain, or a stiff neck  Call 911, or get immediate medical care  Contact emergency services right away if any of these occur.  · Coughing up blood  · Worsening weakness, drowsiness, headache, or stiff neck  · Trouble breathing, wheezing, or pain with breathing  Date Last Reviewed: 9/13/2015 © 2000-2017 [employer]. [address] [place] [address]. All rights reserved. This information is not intended as a substitute for professional medical care. Always follow your healthcare professional's instructions.        Chronic Lung Disease: Preventing Lung Infections  Chronic lung diseases include chronic obstructive pulmonary disease (COPD), which includes chronic bronchitis and emphysema. Other chronic lung diseases include pulmonary fibrosis, sarcoidosis, and other conditions. When you have chronic lung diseases, it's very important to protect yourself from respiratory infections, like colds, the flu, and lung infections. Infections may cause your lung condition to worsen. Although you can't completely avoid them, there are things you can do to lessen the chance of infections.    Take precautions  Taking the following precautions can help you avoid illness:  · Remember to keep your hands away from  your nose and mouth. Germs on your hands get into your respiratory system this way.  · Wash your hands often. When you wash them:  ¨ Use soap and warm water.  ¨ Rub your hands together well for at least 20 seconds.  ¨ Make sure to rinse them well.  ¨ Dry your hands on clean towels or air-dry them.  · Use hand  containing alcohol, if you are unable to wash your hands. Use the  after touching doorknobs, handles, and supermarket carts, for example, since lots of people touch them. Then wash your hands as soon as you can.  · To help prevent the flu, get a flu vaccination every year. This may be given at your healthcare provider's office, a drugstore, or pharmacy, or at work. Get your flu shot as soon as the vaccines are available in your area. This is usually around September each year.  · To help prevent pneumococcal pneumonia, get pneumonia vaccinations. Talk with your healthcare provider about which pneumococcal vaccinations you need.  · Try to stay away from people with respiratory infections, such as colds or the flu. Stay away from crowded places, like shopping centers or movie theatres during cold and flu season.  · If you smoke, think about quitting. In addition to causing or worsening many lung conditions, the lung damage from smoking increases your risk of infections. Stay away from others who smoke, too. This is also harmful and increases your chance of infections.  Date Last Reviewed: 4/14/2016  © 2526-4284 [employer]. [address] [place] [address], [place] [address]. All rights reserved. This information is not intended as a substitute for professional medical care. Always follow your healthcare professional's instructions.

## 2018-12-04 NOTE — PROGRESS NOTES
Subjective:       Patient ID: Fred Blackwell is a 63 y.o. male.    Chief Complaint: Bronchitis (pt states he gets every year); Cough (constant ); and Wheezing    Patient who is new to me presents with cough and congestion. He is a current everyday smoker. He has not been able to smoke because of this cough.       Cough   This is a recurrent problem. The current episode started 1 to 4 weeks ago. The problem has been unchanged. The cough is productive of sputum (color change in sputum). Associated symptoms include a fever (mild ), myalgias (rib pain), nasal congestion, postnasal drip, rhinorrhea and wheezing. Pertinent negatives include no chest pain, chills, ear pain, headaches, sore throat, shortness of breath, sweats or weight loss. Nothing aggravates the symptoms. Risk factors for lung disease include smoking/tobacco exposure. Treatments tried: nebulizer breathing treatment, nightquil, dayquil.     Review of Systems   Constitutional: Positive for fever (mild ). Negative for chills, fatigue and weight loss.   HENT: Positive for congestion, postnasal drip, rhinorrhea, sinus pressure and sinus pain. Negative for ear pain and sore throat.    Respiratory: Positive for cough and wheezing. Negative for shortness of breath.    Cardiovascular: Negative for chest pain and leg swelling.   Gastrointestinal: Negative for abdominal distention and abdominal pain.   Genitourinary: Negative for dysuria and flank pain.   Musculoskeletal: Positive for myalgias (rib pain).   Skin: Negative for color change and pallor.   Neurological: Negative for light-headedness, numbness and headaches.       Objective:      Physical Exam   Constitutional: He is oriented to person, place, and time. He appears well-developed and well-nourished.   HENT:   Head: Normocephalic and atraumatic.   Right Ear: Hearing, tympanic membrane, external ear and ear canal normal.   Left Ear: Hearing, tympanic membrane, external ear and ear canal normal.    Nose: Mucosal edema and rhinorrhea present. Right sinus exhibits maxillary sinus tenderness. Right sinus exhibits no frontal sinus tenderness. Left sinus exhibits maxillary sinus tenderness. Left sinus exhibits no frontal sinus tenderness.   Eyes: Conjunctivae and EOM are normal. Pupils are equal, round, and reactive to light.   Neck: Normal range of motion. Neck supple.   Cardiovascular: Normal rate and regular rhythm.   Pulmonary/Chest: Effort normal. He has wheezes.   Wheezing ausculted throughout lungs s/p breathing treatment breath sounds improved   Abdominal: Soft. Bowel sounds are normal.   Musculoskeletal: Normal range of motion.   Neurological: He is alert and oriented to person, place, and time.   Skin: Skin is warm and dry.   Nursing note and vitals reviewed.      Assessment:       1. Acute bacterial bronchitis    2. Current every day smoker    3. Cough    4. Bacterial sinusitis    5. Severe obesity (BMI 35.0-35.9 with comorbidity)    6. Wheezing        Plan:       Acute bacterial bronchitis  -     X-Ray Chest PA And Lateral; Future; Expected date: 12/04/2018  -     doxycycline (MONODOX) 100 MG capsule; Take 1 capsule (100 mg total) by mouth 2 (two) times daily. for 7 days  Dispense: 14 capsule; Refill: 0  -     predniSONE (DELTASONE) 20 MG tablet; Take 2 tablets (40 mg total) by mouth once daily. for 5 days  Dispense: 10 tablet; Refill: 0    Current every day smoker  Advised on the benefits of smoking cessation.   Cough  -     X-Ray Chest PA And Lateral; Future; Expected date: 12/04/2018  -     benzonatate (TESSALON) 100 MG capsule; Take 1 capsule (100 mg total) by mouth 3 (three) times daily as needed.  Dispense: 20 capsule; Refill: 0  -     predniSONE (DELTASONE) 20 MG tablet; Take 2 tablets (40 mg total) by mouth once daily. for 5 days  Dispense: 10 tablet; Refill: 0    Bacterial sinusitis  -     doxycycline (MONODOX) 100 MG capsule; Take 1 capsule (100 mg total) by mouth 2 (two) times daily. for 7  days  Dispense: 14 capsule; Refill: 0    Severe obesity (BMI 35.0-35.9 with comorbidity)  Weight loss recommended with well balanced diet and portion controlled meals and increased activity.     Wheezing  -     albuterol (PROVENTIL) 2.5 mg /3 mL (0.083 %) nebulizer solution; Take 3 mLs (2.5 mg total) by nebulization every 6 (six) hours as needed for Wheezing. Rescue  Dispense: 90 mL; Refill: 0  -     X-Ray Chest PA And Lateral; Future; Expected date: 12/04/2018  -     albuterol nebulizer solution 2.5 mg  -     predniSONE (DELTASONE) 20 MG tablet; Take 2 tablets (40 mg total) by mouth once daily. for 5 days  Dispense: 10 tablet; Refill: 0      Chest xray today. Advised on s/s that warrant follow up. Discussed OTCs. Patient to follow up with any new or concerning symptoms.

## 2018-12-05 ENCOUNTER — TELEPHONE (OUTPATIENT)
Dept: FAMILY MEDICINE | Facility: CLINIC | Age: 63
End: 2018-12-05

## 2018-12-05 RX ORDER — FLUTICASONE PROPIONATE 50 MCG
1 SPRAY, SUSPENSION (ML) NASAL DAILY
Qty: 1 BOTTLE | Refills: 0 | Status: SHIPPED | OUTPATIENT
Start: 2018-12-05 | End: 2019-04-01 | Stop reason: SDUPTHER

## 2018-12-05 NOTE — TELEPHONE ENCOUNTER
----- Message from Joie Evangelista NP sent at 12/4/2018 11:54 AM CST -----  Please call the patient and let him know the chest xray is normal. No pneumonia. If he has any questions he can call us at anytime. Thanks.

## 2018-12-17 ENCOUNTER — TELEPHONE (OUTPATIENT)
Dept: FAMILY MEDICINE | Facility: CLINIC | Age: 63
End: 2018-12-17

## 2018-12-17 NOTE — TELEPHONE ENCOUNTER
----- Message from Lexy James sent at 12/17/2018  1:47 PM CST -----  Contact: self  Patient [phone] is returning call to Nurse Winters from earlier today/please call

## 2018-12-17 NOTE — TELEPHONE ENCOUNTER
----- Message from Valerio Rojas sent at 12/17/2018  1:10 PM CST -----  Contact: pt  Type:  RX Refill Request    Who Called:  pt  Refill or New Rx:  refill  RX Name and Strength:  ALPRAZolam (XANAX) 1 MG tablet  How is the patient currently taking it? (ex. 1XDay):  3 x day  Is this a 30 day or 90 day RX:  30  Preferred Pharmacy with phone number:    [facility] [place] [address]  [address]  [facility] [address]  Phone: [phone] Fax: [phone]    [facility] - [place] [address]  [address]  [facility] [address]  Phone: [phone] Fax: [phone]    Local or Mail Order:    Ordering Provider:    Best Call Back Number:  [phone]  Additional Information:

## 2018-12-27 ENCOUNTER — OFFICE VISIT (OUTPATIENT)
Dept: FAMILY MEDICINE | Facility: CLINIC | Age: 63
End: 2018-12-27
Payer: MEDICARE

## 2018-12-27 VITALS
WEIGHT: 270.5 LBS | DIASTOLIC BLOOD PRESSURE: 82 MMHG | SYSTOLIC BLOOD PRESSURE: 136 MMHG | HEART RATE: 75 BPM | BODY MASS INDEX: 36.69 KG/M2 | OXYGEN SATURATION: 97 %

## 2018-12-27 DIAGNOSIS — E11.69 TYPE 2 DIABETES MELLITUS WITH OTHER SPECIFIED COMPLICATION, WITHOUT LONG-TERM CURRENT USE OF INSULIN: Primary | ICD-10-CM

## 2018-12-27 DIAGNOSIS — E78.5 HYPERLIPIDEMIA, UNSPECIFIED HYPERLIPIDEMIA TYPE: ICD-10-CM

## 2018-12-27 DIAGNOSIS — I10 ESSENTIAL HYPERTENSION: ICD-10-CM

## 2018-12-27 DIAGNOSIS — M51.36 DEGENERATIVE DISC DISEASE, LUMBAR: ICD-10-CM

## 2018-12-27 DIAGNOSIS — Z12.5 SCREENING PSA (PROSTATE SPECIFIC ANTIGEN): ICD-10-CM

## 2018-12-27 PROCEDURE — 3075F SYST BP GE 130 - 139MM HG: CPT | Mod: CPTII,HCNC,S$GLB, | Performed by: FAMILY MEDICINE

## 2018-12-27 PROCEDURE — G0008 ADMIN INFLUENZA VIRUS VAC: HCPCS | Mod: HCNC,S$GLB,, | Performed by: FAMILY MEDICINE

## 2018-12-27 PROCEDURE — 3008F BODY MASS INDEX DOCD: CPT | Mod: CPTII,HCNC,S$GLB, | Performed by: FAMILY MEDICINE

## 2018-12-27 PROCEDURE — 99999 PR PBB SHADOW E&M-EST. PATIENT-LVL III: CPT | Mod: PBBFAC,HCNC,, | Performed by: FAMILY MEDICINE

## 2018-12-27 PROCEDURE — 99214 OFFICE O/P EST MOD 30 MIN: CPT | Mod: HCNC,25,S$GLB, | Performed by: FAMILY MEDICINE

## 2018-12-27 PROCEDURE — 99499 UNLISTED E&M SERVICE: CPT | Mod: HCNC,S$GLB,, | Performed by: FAMILY MEDICINE

## 2018-12-27 PROCEDURE — 3079F DIAST BP 80-89 MM HG: CPT | Mod: CPTII,HCNC,S$GLB, | Performed by: FAMILY MEDICINE

## 2018-12-27 PROCEDURE — 90686 IIV4 VACC NO PRSV 0.5 ML IM: CPT | Mod: HCNC,S$GLB,, | Performed by: FAMILY MEDICINE

## 2018-12-27 PROCEDURE — 3044F HG A1C LEVEL LT 7.0%: CPT | Mod: CPTII,HCNC,S$GLB, | Performed by: FAMILY MEDICINE

## 2018-12-27 RX ORDER — HYDROCODONE BITARTRATE AND ACETAMINOPHEN 10; 325 MG/1; MG/1
1 TABLET ORAL 3 TIMES DAILY PRN
Qty: 80 TABLET | Refills: 0 | Status: SHIPPED | OUTPATIENT
Start: 2018-12-27 | End: 2019-01-29 | Stop reason: SDUPTHER

## 2018-12-27 NOTE — PROGRESS NOTES
"Subjective:       Patient ID: Fred Blackwell is a 63 y.o. male.    Chief Complaint: Anxiety; Hypertension; and Depression    Here for f/u htn and diabetes. Still dealing with chronic back pain and prn norco helps. Ok in state  database.  Home bp running 130s/80s. First [place] since his wife  this summer. His daughter and grand kids now live with him and have given him "new life."      Hypertension   This is a chronic problem. The current episode started more than 1 year ago. The problem is controlled. Pertinent negatives include no chest pain, palpitations or shortness of breath.   Diabetes   He presents for his follow-up diabetic visit. He has type 2 diabetes mellitus. His disease course has been stable. Pertinent negatives for hypoglycemia include no nervousness/anxiousness. Pertinent negatives for diabetes include no chest pain and no fatigue.     Review of Systems   Constitutional: Negative for chills, fatigue and fever.   Respiratory: Negative for cough, chest tightness and shortness of breath.    Cardiovascular: Negative for chest pain, palpitations and leg swelling.   Endocrine: Negative for cold intolerance and heat intolerance.   Skin: Negative for rash.   Psychiatric/Behavioral: Negative for dysphoric mood. The patient is not nervous/anxious.        Objective:      Physical Exam   Constitutional: He appears well-developed and well-nourished.   HENT:   Head: Normocephalic and atraumatic.   Cardiovascular: Normal rate, regular rhythm and normal heart sounds.   Pulmonary/Chest: Effort normal and breath sounds normal.   Psychiatric: He has a normal mood and affect.   Nursing note and vitals reviewed.      Assessment:       1. Type 2 diabetes mellitus with other specified complication, without long-term current use of insulin    2. Degenerative disc disease, lumbar    3. Essential hypertension    4. Hyperlipidemia, unspecified hyperlipidemia type    5. Screening PSA (prostate specific " antigen)        Plan:       Type 2 diabetes mellitus with other specified complication, without long-term current use of insulin  -     Diabetes Digital Medicine (DDMP) Enrollment Order  -     Diabetes Digital Medicine (DDMP): Assign Onboarding Questionnaires  -     NURSING COMMUNICATION: Create [facility] Account  -     NURSING COMMUNICATION: Create [facility] Account  -     Hypertension Digital Medicine (HDMP) Enrollment Order  -     Hypertension Digital Medicine (HDMP): Assign Onboarding Questionnaires  -     CBC auto differential; Future; Expected date: 12/27/2018  -     Comprehensive metabolic panel; Future; Expected date: 12/27/2018  -     Lipid panel; Future; Expected date: 12/27/2018  -     TSH; Future; Expected date: 12/27/2018  -     Microalbumin/creatinine urine ratio; Future; Expected date: 12/27/2018  -     Hemoglobin A1c; Future; Expected date: 12/27/2018  -     Ambulatory referral to Optometry  -     Ambulatory referral to Podiatry    Degenerative disc disease, lumbar  -     HYDROcodone-acetaminophen (NORCO)  mg per tablet; Take 1 tablet by mouth 3 (three) times daily as needed.  Dispense: 80 tablet; Refill: 0    Essential hypertension  -     Diabetes Digital Medicine (DDMP) Enrollment Order  -     Diabetes Digital Medicine (DDMP): Assign Onboarding Questionnaires  -     NURSING COMMUNICATION: Create [facility] Account  -     NURSING COMMUNICATION: Create [facility] Account  -     Hypertension Digital Medicine (HDMP) Enrollment Order  -     Hypertension Digital Medicine (HDMP): Assign Onboarding Questionnaires  -     CBC auto differential; Future; Expected date: 12/27/2018  -     Comprehensive metabolic panel; Future; Expected date: 12/27/2018  -     Lipid panel; Future; Expected date: 12/27/2018  -     TSH; Future; Expected date: 12/27/2018  -     Microalbumin/creatinine urine ratio; Future; Expected date: 12/27/2018  -     Hemoglobin A1c; Future; Expected date: 12/27/2018    Hyperlipidemia,  unspecified hyperlipidemia type  -     CBC auto differential; Future; Expected date: 12/27/2018  -     Comprehensive metabolic panel; Future; Expected date: 12/27/2018  -     Lipid panel; Future; Expected date: 12/27/2018  -     TSH; Future; Expected date: 12/27/2018  -     Microalbumin/creatinine urine ratio; Future; Expected date: 12/27/2018  -     Hemoglobin A1c; Future; Expected date: 12/27/2018    Screening PSA (prostate specific antigen)  -     PSA, Screening; Future; Expected date: 12/27/2018    Other orders  -     Cancel: Lipid panel; Future; Expected date: 12/27/2018  -     Influenza - Quadrivalent (3 years & older) (PF)      Mailed fitkit last week.  Will monitor chronic medical issues and continue current plan of care.      Follow-up in about 3 months (around 3/27/2019), or if symptoms worsen or fail to improve.

## 2019-01-14 DIAGNOSIS — F41.1 GAD (GENERALIZED ANXIETY DISORDER): ICD-10-CM

## 2019-01-14 NOTE — TELEPHONE ENCOUNTER
----- Message from Natali Chawla sent at 1/14/2019  9:57 AM CST -----  Contact: Patient  Type:  RX Refill Request    Who Called:  Patient   Refill or New Rx:  Refill  RX Name and Strength:    ALPRAZolam (XANAX) 1 MG tablet  How is the patient currently taking it? (ex. 1XDay):    Is this a 30 day or 90 day RX:    Preferred Pharmacy with phone number:    [facility] - [address] [address]  Phone: [phone] Fax: [phone]  Local or Mail Order:  Local  Ordering Provider:  Josef pSear Call Back Number:  [phone]  Additional Information:

## 2019-01-16 RX ORDER — ALPRAZOLAM 1 MG/1
TABLET ORAL
Qty: 90 TABLET | Refills: 1 | Status: SHIPPED | OUTPATIENT
Start: 2019-01-16 | End: 2019-04-11 | Stop reason: SDUPTHER

## 2019-01-28 ENCOUNTER — TELEPHONE (OUTPATIENT)
Dept: FAMILY MEDICINE | Facility: CLINIC | Age: 64
End: 2019-01-28

## 2019-01-28 DIAGNOSIS — M51.36 DEGENERATIVE DISC DISEASE, LUMBAR: ICD-10-CM

## 2019-01-28 NOTE — TELEPHONE ENCOUNTER
----- Message from Paulette Macias sent at 1/28/2019  9:58 AM CST -----  Type:  RX Refill Request    Who Called:  Self   Refill or New Rx:  Refill RX Name and Strength: hydrocodone  it? (ex. 1XDay):  3 x day  Is this a 30 day or 90 day RX:  30 day supply Preferred Pharmacy with phone number: [facility]   Local or Mail Order:  Local   Ordering Provider:  Dr Josef Spear Call Back Number:  [phone]  Additional Information:

## 2019-01-29 DIAGNOSIS — M51.36 DEGENERATIVE DISC DISEASE, LUMBAR: ICD-10-CM

## 2019-01-29 NOTE — TELEPHONE ENCOUNTER
----- Message from Shilpi Peter sent at 1/29/2019  2:56 PM CST -----  Type:  RX Refill Request    Who Called:  Patient  Refill or New Rx:  refill  RX Name and Strength: HYDROcodone-acetaminophen (NORCO)  mg per tablet  How is the patient currently taking it? (ex. 1XDay):  Na  Is this a 30 day or 90 day RX:  30  Preferred Pharmacy with phone number:    [facility] - [place], [place] - [address] [place] [address]  [address] [place] [address]  Phone: [phone] Fax: [phone]  Local or Mail Order:  local  Ordering Provider:  Josef Spear Call Back Number:  [phone] (home)     Additional Information:  Na

## 2019-01-30 RX ORDER — HYDROCODONE BITARTRATE AND ACETAMINOPHEN 10; 325 MG/1; MG/1
TABLET ORAL
Qty: 80 TABLET | Refills: 0 | Status: SHIPPED | OUTPATIENT
Start: 2019-01-30 | End: 2019-02-01 | Stop reason: SDUPTHER

## 2019-01-31 ENCOUNTER — TELEPHONE (OUTPATIENT)
Dept: FAMILY MEDICINE | Facility: CLINIC | Age: 64
End: 2019-01-31

## 2019-01-31 NOTE — TELEPHONE ENCOUNTER
----- Message from Paulette Macias sent at 1/31/2019 10:49 AM CST -----  Type:  RX Refill Request    Who Called:  Self   Refill or New Rx: refill   RX Name and Strength:  Hydrocodone   How is the patient currently taking it? (ex. 1XDay):  3 x day  Is this a 30 day or 90 day RX:  30 day supply  Preferred Pharmacy with phone number: [facility]   Local or Mail Order:  Local Ordering Provider:  CHANTAL  Best Call Back Number:  [phone] Additional Information:

## 2019-02-01 DIAGNOSIS — M51.36 DEGENERATIVE DISC DISEASE, LUMBAR: ICD-10-CM

## 2019-02-01 RX ORDER — HYDROCODONE BITARTRATE AND ACETAMINOPHEN 10; 325 MG/1; MG/1
1 TABLET ORAL 3 TIMES DAILY PRN
Qty: 80 TABLET | Refills: 0 | Status: SHIPPED | OUTPATIENT
Start: 2019-02-01 | End: 2019-04-01 | Stop reason: SDUPTHER

## 2019-02-01 NOTE — TELEPHONE ENCOUNTER
----- Message from Ankita Olson sent at 2/1/2019  1:46 PM CST -----  Contact: [phone]  Patient is requesting a call back from the nurse stated its an issue with the communication to the [facility], the pharmacy never received the prescription.  Stated she been waiting on this request since Monday.   Please call the patient upon request at phone number [phone].

## 2019-02-01 NOTE — TELEPHONE ENCOUNTER
Transmission failed to transmit to pharmacy. Could you please send in for us as Dr Bahena is out .

## 2019-02-03 RX ORDER — HYDROCODONE BITARTRATE AND ACETAMINOPHEN 10; 325 MG/1; MG/1
TABLET ORAL
Qty: 80 TABLET | Refills: 0 | OUTPATIENT
Start: 2019-02-03

## 2019-02-13 RX ORDER — AMLODIPINE BESYLATE 10 MG/1
10 TABLET ORAL DAILY
Qty: 90 TABLET | Refills: 1 | Status: SHIPPED | OUTPATIENT
Start: 2019-02-13 | End: 2019-08-16 | Stop reason: SDUPTHER

## 2019-02-28 ENCOUNTER — PATIENT OUTREACH (OUTPATIENT)
Dept: ADMINISTRATIVE | Facility: HOSPITAL | Age: 64
End: 2019-02-28

## 2019-02-28 NOTE — PROGRESS NOTES
Health Maintenance Due   Topic Date Due    Sign Pain Contract  08/02/1973    Complete Opioid Risk Tool  08/02/1973    Zoster Vaccine  08/02/2015    Colonoscopy  01/10/2018    Lipid Panel  10/28/2018    Eye Exam  11/08/2018    Foot Exam  11/29/2018

## 2019-02-28 NOTE — LETTER
February 28, 2019    Fred Santos Rishi  [address]  [place] [address]             [facility]  [address]  [facility] [address]  Phone: [phone] Dear Fred Santos    [facility] is committed to your overall health and would like to ensure that you are up to date on your recommended test and/or procedures.   ROBERT Bahena MD has found that your chart shows you may be due for the following:    Shingles Immunization  Annual Dilated Eye Exam  Diabetic Foot Exam  Diabetic lab test, orders placed    Also, Our records indicate that you are overdue for your colorectal cancer screening.  After reviewing your chart, it has been documented that a FIT KIT (colorectal cancer screening kit) was given at a clinic visit or  mailed to you,  but the lab has yet to receive the kit so that we may update your chart.   This is a friendly reminder to complete this test (the instructions will tell you how) and then return your sample in the postage-paid return envelope within 24 hours of collection.  Please remember to put the collection date on your sample.    If you have had any of the above done at another facility, please let us know so that we may obtain copies from that facility.  If you have a copy of these records, please provide a copy for us to scan into your chart.  You are welcome to request that the report be faxed to us at  ([phone]).      If you have an upcoming scheduled appointment for the above test and/or procedures, please disregard this letter. Otherwise, please schedule these appointments at your earliest convenience by calling [phone] or going to [email or web address].      Thank you for letting us care for you,    MD Kimberly Will [facility]  Clinical Care Coordinator  [facility]

## 2019-03-18 ENCOUNTER — PATIENT OUTREACH (OUTPATIENT)
Dept: ADMINISTRATIVE | Facility: HOSPITAL | Age: 64
End: 2019-03-18

## 2019-03-19 ENCOUNTER — PATIENT OUTREACH (OUTPATIENT)
Dept: ADMINISTRATIVE | Facility: HOSPITAL | Age: 64
End: 2019-03-19

## 2019-03-19 ENCOUNTER — TELEPHONE (OUTPATIENT)
Dept: FAMILY MEDICINE | Facility: CLINIC | Age: 64
End: 2019-03-19

## 2019-03-19 NOTE — TELEPHONE ENCOUNTER
----- Message from Vania Mart sent at 3/18/2019  4:55 PM CDT -----  Contact: pt  Pt states that he mailed off the kit for the colonscopy and he is wanting to know if it was received,if not can he do it 3/25 when he comes in for labs?...[phone]

## 2019-03-25 ENCOUNTER — LAB VISIT (OUTPATIENT)
Dept: LAB | Facility: HOSPITAL | Age: 64
End: 2019-03-25
Attending: FAMILY MEDICINE
Payer: MEDICARE

## 2019-03-25 DIAGNOSIS — E11.69 TYPE 2 DIABETES MELLITUS WITH OTHER SPECIFIED COMPLICATION, WITHOUT LONG-TERM CURRENT USE OF INSULIN: ICD-10-CM

## 2019-03-25 DIAGNOSIS — E78.5 HYPERLIPIDEMIA, UNSPECIFIED HYPERLIPIDEMIA TYPE: ICD-10-CM

## 2019-03-25 DIAGNOSIS — Z12.5 SCREENING PSA (PROSTATE SPECIFIC ANTIGEN): ICD-10-CM

## 2019-03-25 DIAGNOSIS — I10 ESSENTIAL HYPERTENSION: ICD-10-CM

## 2019-03-25 LAB
ALBUMIN SERPL BCP-MCNC: 3.9 G/DL (ref 3.5–5.2)
ALP SERPL-CCNC: 88 U/L (ref 55–135)
ALT SERPL W/O P-5'-P-CCNC: 19 U/L (ref 10–44)
ANION GAP SERPL CALC-SCNC: 8 MMOL/L (ref 8–16)
AST SERPL-CCNC: 12 U/L (ref 10–40)
BASOPHILS # BLD AUTO: 0.09 K/UL (ref 0–0.2)
BASOPHILS NFR BLD: 1.1 % (ref 0–1.9)
BILIRUB SERPL-MCNC: 0.4 MG/DL (ref 0.1–1)
BUN SERPL-MCNC: 16 MG/DL (ref 8–23)
CALCIUM SERPL-MCNC: 9.7 MG/DL (ref 8.7–10.5)
CHLORIDE SERPL-SCNC: 102 MMOL/L (ref 95–110)
CHOLEST SERPL-MCNC: 246 MG/DL (ref 120–199)
CHOLEST/HDLC SERPL: 5.5 {RATIO} (ref 2–5)
CO2 SERPL-SCNC: 30 MMOL/L (ref 23–29)
COMPLEXED PSA SERPL-MCNC: 0.3 NG/ML (ref 0–4)
CREAT SERPL-MCNC: 1 MG/DL (ref 0.5–1.4)
DIFFERENTIAL METHOD: NORMAL
EOSINOPHIL # BLD AUTO: 0.1 K/UL (ref 0–0.5)
EOSINOPHIL NFR BLD: 1.5 % (ref 0–8)
ERYTHROCYTE [DISTWIDTH] IN BLOOD BY AUTOMATED COUNT: 13.2 % (ref 11.5–14.5)
EST. GFR  (AFRICAN AMERICAN): >60 ML/MIN/1.73 M^2
EST. GFR  (NON AFRICAN AMERICAN): >60 ML/MIN/1.73 M^2
ESTIMATED AVG GLUCOSE: 220 MG/DL (ref 68–131)
GLUCOSE SERPL-MCNC: 264 MG/DL (ref 70–110)
HBA1C MFR BLD HPLC: 9.3 % (ref 4–5.6)
HCT VFR BLD AUTO: 45.7 % (ref 40–54)
HDLC SERPL-MCNC: 45 MG/DL (ref 40–75)
HDLC SERPL: 18.3 % (ref 20–50)
HGB BLD-MCNC: 15 G/DL (ref 14–18)
IMM GRANULOCYTES # BLD AUTO: 0.01 K/UL (ref 0–0.04)
IMM GRANULOCYTES NFR BLD AUTO: 0.1 % (ref 0–0.5)
LDLC SERPL CALC-MCNC: 180.6 MG/DL (ref 63–159)
LYMPHOCYTES # BLD AUTO: 3.5 K/UL (ref 1–4.8)
LYMPHOCYTES NFR BLD: 43.1 % (ref 18–48)
MCH RBC QN AUTO: 28.7 PG (ref 27–31)
MCHC RBC AUTO-ENTMCNC: 32.8 G/DL (ref 32–36)
MCV RBC AUTO: 87 FL (ref 82–98)
MONOCYTES # BLD AUTO: 0.6 K/UL (ref 0.3–1)
MONOCYTES NFR BLD: 7.7 % (ref 4–15)
NEUTROPHILS # BLD AUTO: 3.8 K/UL (ref 1.8–7.7)
NEUTROPHILS NFR BLD: 46.5 % (ref 38–73)
NONHDLC SERPL-MCNC: 201 MG/DL
NRBC BLD-RTO: 0 /100 WBC
PLATELET # BLD AUTO: 205 K/UL (ref 150–350)
PMV BLD AUTO: 11.2 FL (ref 9.2–12.9)
POTASSIUM SERPL-SCNC: 3.9 MMOL/L (ref 3.5–5.1)
PROT SERPL-MCNC: 7.6 G/DL (ref 6–8.4)
RBC # BLD AUTO: 5.23 M/UL (ref 4.6–6.2)
SODIUM SERPL-SCNC: 140 MMOL/L (ref 136–145)
TRIGL SERPL-MCNC: 102 MG/DL (ref 30–150)
TSH SERPL DL<=0.005 MIU/L-ACNC: 2.15 UIU/ML (ref 0.4–4)
WBC # BLD AUTO: 8.22 K/UL (ref 3.9–12.7)

## 2019-03-25 PROCEDURE — 83036 HEMOGLOBIN GLYCOSYLATED A1C: CPT | Mod: HCNC

## 2019-03-25 PROCEDURE — 84443 ASSAY THYROID STIM HORMONE: CPT | Mod: HCNC

## 2019-03-25 PROCEDURE — 84153 ASSAY OF PSA TOTAL: CPT | Mod: HCNC

## 2019-03-25 PROCEDURE — 85025 COMPLETE CBC W/AUTO DIFF WBC: CPT | Mod: HCNC

## 2019-03-25 PROCEDURE — 80053 COMPREHEN METABOLIC PANEL: CPT | Mod: HCNC

## 2019-03-25 PROCEDURE — 80061 LIPID PANEL: CPT | Mod: HCNC

## 2019-03-25 PROCEDURE — 36415 COLL VENOUS BLD VENIPUNCTURE: CPT | Mod: HCNC,PO

## 2019-03-29 DIAGNOSIS — E11.69 TYPE 2 DIABETES MELLITUS WITH OTHER SPECIFIED COMPLICATION, WITHOUT LONG-TERM CURRENT USE OF INSULIN: ICD-10-CM

## 2019-04-01 ENCOUNTER — OFFICE VISIT (OUTPATIENT)
Dept: FAMILY MEDICINE | Facility: CLINIC | Age: 64
End: 2019-04-01
Payer: MEDICARE

## 2019-04-01 VITALS
TEMPERATURE: 98 F | BODY MASS INDEX: 36.82 KG/M2 | HEIGHT: 72 IN | SYSTOLIC BLOOD PRESSURE: 138 MMHG | OXYGEN SATURATION: 97 % | HEART RATE: 75 BPM | DIASTOLIC BLOOD PRESSURE: 82 MMHG | WEIGHT: 271.81 LBS

## 2019-04-01 DIAGNOSIS — E78.5 HYPERLIPIDEMIA, UNSPECIFIED HYPERLIPIDEMIA TYPE: ICD-10-CM

## 2019-04-01 DIAGNOSIS — I70.0 AORTOILIAC STENOSIS: ICD-10-CM

## 2019-04-01 DIAGNOSIS — E11.69 TYPE 2 DIABETES MELLITUS WITH OTHER SPECIFIED COMPLICATION, WITHOUT LONG-TERM CURRENT USE OF INSULIN: Primary | ICD-10-CM

## 2019-04-01 DIAGNOSIS — F32.1 CURRENT MODERATE EPISODE OF MAJOR DEPRESSIVE DISORDER WITHOUT PRIOR EPISODE: ICD-10-CM

## 2019-04-01 DIAGNOSIS — I10 ESSENTIAL HYPERTENSION: ICD-10-CM

## 2019-04-01 DIAGNOSIS — M54.17 RADICULAR PAIN OF LUMBOSACRAL REGION: ICD-10-CM

## 2019-04-01 DIAGNOSIS — M51.36 DEGENERATIVE DISC DISEASE, LUMBAR: ICD-10-CM

## 2019-04-01 DIAGNOSIS — E66.01 MORBID OBESITY: ICD-10-CM

## 2019-04-01 DIAGNOSIS — F11.20 UNCOMPLICATED OPIOID DEPENDENCE: ICD-10-CM

## 2019-04-01 DIAGNOSIS — Z12.11 SCREEN FOR COLON CANCER: ICD-10-CM

## 2019-04-01 PROCEDURE — 99999 PR PBB SHADOW E&M-EST. PATIENT-LVL IV: ICD-10-PCS | Mod: PBBFAC,HCNC,, | Performed by: FAMILY MEDICINE

## 2019-04-01 PROCEDURE — 3008F BODY MASS INDEX DOCD: CPT | Mod: HCNC,CPTII,S$GLB, | Performed by: FAMILY MEDICINE

## 2019-04-01 PROCEDURE — 3075F PR MOST RECENT SYSTOLIC BLOOD PRESS GE 130-139MM HG: ICD-10-PCS | Mod: HCNC,CPTII,S$GLB, | Performed by: FAMILY MEDICINE

## 2019-04-01 PROCEDURE — 3046F PR MOST RECENT HEMOGLOBIN A1C LEVEL > 9.0%: ICD-10-PCS | Mod: HCNC,CPTII,S$GLB, | Performed by: FAMILY MEDICINE

## 2019-04-01 PROCEDURE — 3075F SYST BP GE 130 - 139MM HG: CPT | Mod: HCNC,CPTII,S$GLB, | Performed by: FAMILY MEDICINE

## 2019-04-01 PROCEDURE — 99214 OFFICE O/P EST MOD 30 MIN: CPT | Mod: HCNC,S$GLB,, | Performed by: FAMILY MEDICINE

## 2019-04-01 PROCEDURE — 3079F PR MOST RECENT DIASTOLIC BLOOD PRESSURE 80-89 MM HG: ICD-10-PCS | Mod: HCNC,CPTII,S$GLB, | Performed by: FAMILY MEDICINE

## 2019-04-01 PROCEDURE — 3079F DIAST BP 80-89 MM HG: CPT | Mod: HCNC,CPTII,S$GLB, | Performed by: FAMILY MEDICINE

## 2019-04-01 PROCEDURE — 3046F HEMOGLOBIN A1C LEVEL >9.0%: CPT | Mod: HCNC,CPTII,S$GLB, | Performed by: FAMILY MEDICINE

## 2019-04-01 PROCEDURE — 99214 PR OFFICE/OUTPT VISIT, EST, LEVL IV, 30-39 MIN: ICD-10-PCS | Mod: HCNC,S$GLB,, | Performed by: FAMILY MEDICINE

## 2019-04-01 PROCEDURE — 99499 RISK ADDL DX/OHS AUDIT: ICD-10-PCS | Mod: HCNC,S$GLB,, | Performed by: FAMILY MEDICINE

## 2019-04-01 PROCEDURE — 3008F PR BODY MASS INDEX (BMI) DOCUMENTED: ICD-10-PCS | Mod: HCNC,CPTII,S$GLB, | Performed by: FAMILY MEDICINE

## 2019-04-01 PROCEDURE — 99499 UNLISTED E&M SERVICE: CPT | Mod: HCNC,S$GLB,, | Performed by: FAMILY MEDICINE

## 2019-04-01 PROCEDURE — 99999 PR PBB SHADOW E&M-EST. PATIENT-LVL IV: CPT | Mod: PBBFAC,HCNC,, | Performed by: FAMILY MEDICINE

## 2019-04-01 RX ORDER — HYDROCODONE BITARTRATE AND ACETAMINOPHEN 10; 325 MG/1; MG/1
1 TABLET ORAL 3 TIMES DAILY PRN
Qty: 80 TABLET | Refills: 0 | Status: SHIPPED | OUTPATIENT
Start: 2019-04-01 | End: 2019-04-30 | Stop reason: SDUPTHER

## 2019-04-01 NOTE — PROGRESS NOTES
Subjective:       Patient ID: Fred Blackwell is a 63 y.o. male.    Chief Complaint: Diabetes (3mth fu); Diabetic Eye Photo; Diabetic Foot Exam; and FITKIT    Here for f/u htn and dm II. Doing well overall. Prn meds working still for him. Had recent labs.    Hypertension   This is a chronic problem. The current episode started more than 1 year ago. The problem is controlled. Pertinent negatives include no chest pain, palpitations or shortness of breath.   Hyperlipidemia   This is a chronic problem. The current episode started more than 1 year ago. The problem is uncontrolled. Pertinent negatives include no chest pain or shortness of breath.   Diabetes   He presents for his follow-up diabetic visit. He has type 2 diabetes mellitus. His disease course has been worsening. Pertinent negatives for hypoglycemia include no nervousness/anxiousness. Pertinent negatives for diabetes include no chest pain and no fatigue.     Review of Systems   Constitutional: Negative for chills, fatigue and fever.   Respiratory: Negative for cough, chest tightness and shortness of breath.    Cardiovascular: Negative for chest pain, palpitations and leg swelling.   Endocrine: Negative for cold intolerance and heat intolerance.   Skin: Negative for rash.   Psychiatric/Behavioral: Negative for dysphoric mood. The patient is not nervous/anxious.        Objective:      Physical Exam   Constitutional: He appears well-developed and well-nourished.   HENT:   Head: Normocephalic and atraumatic.   Cardiovascular: Normal rate, regular rhythm and normal heart sounds.   Pulmonary/Chest: Effort normal and breath sounds normal.   Psychiatric: He has a normal mood and affect.   Nursing note and vitals reviewed.      Assessment:       1. Type 2 diabetes mellitus with other specified complication, without long-term current use of insulin    2. Hyperlipidemia, unspecified hyperlipidemia type    3. Essential hypertension    4. Radicular pain of  lumbosacral region    5. Degenerative disc disease, lumbar    6. Morbid obesity    7. Uncomplicated opioid dependence    8. Current moderate episode of major depressive disorder without prior episode    9. Aortoiliac stenosis    10. Screen for colon cancer        Plan:       Type 2 diabetes mellitus with other specified complication, without long-term current use of insulin  -     CBC auto differential; Future; Expected date: 04/01/2019  -     Comprehensive metabolic panel; Future; Expected date: 04/01/2019  -     Hemoglobin A1c; Future; Expected date: 04/01/2019  -     Lipid panel; Future; Expected date: 04/01/2019  -     Microalbumin/creatinine urine ratio; Future; Expected date: 04/01/2019  -     TSH; Future; Expected date: 04/01/2019  -     Ambulatory consult to Diabetic Education    Hyperlipidemia, unspecified hyperlipidemia type  -     CBC auto differential; Future; Expected date: 04/01/2019  -     Comprehensive metabolic panel; Future; Expected date: 04/01/2019  -     Hemoglobin A1c; Future; Expected date: 04/01/2019  -     Lipid panel; Future; Expected date: 04/01/2019  -     Microalbumin/creatinine urine ratio; Future; Expected date: 04/01/2019  -     TSH; Future; Expected date: 04/01/2019    Essential hypertension  -     CBC auto differential; Future; Expected date: 04/01/2019  -     Comprehensive metabolic panel; Future; Expected date: 04/01/2019  -     Hemoglobin A1c; Future; Expected date: 04/01/2019  -     Lipid panel; Future; Expected date: 04/01/2019  -     Microalbumin/creatinine urine ratio; Future; Expected date: 04/01/2019  -     TSH; Future; Expected date: 04/01/2019    Radicular pain of lumbosacral region    Degenerative disc disease, lumbar  -     HYDROcodone-acetaminophen (NORCO)  mg per tablet; Take 1 tablet by mouth 3 (three) times daily as needed.  Dispense: 80 tablet; Refill: 0    Morbid obesity    Uncomplicated opioid dependence    Current moderate episode of major depressive  disorder without prior episode    Aortoiliac stenosis    Screen for colon cancer  -     Fecal Immunochemical Test (iFOBT); Future; Expected date: 04/01/2019        Refer to diabetes education as he agrees he has much room in his diet for improvement.  hga1c and lipids not at goal.  Will monitor chronic medical issues and continue current plan of care.  Fibro and mood stable.  Will monitor chronic medical issues and continue current plan of care.  FitKit was given to patient on 4/1/2019 11:30 AM   humana setting up eye exam.    Follow up in about 4 months (around 8/1/2019), or if symptoms worsen or fail to improve.

## 2019-04-04 RX ORDER — FLUTICASONE PROPIONATE 50 MCG
SPRAY, SUSPENSION (ML) NASAL
Qty: 16 G | Refills: 0 | Status: SHIPPED | OUTPATIENT
Start: 2019-04-04 | End: 2020-03-25

## 2019-04-11 DIAGNOSIS — F41.1 GAD (GENERALIZED ANXIETY DISORDER): ICD-10-CM

## 2019-04-11 RX ORDER — ALPRAZOLAM 1 MG/1
TABLET ORAL
Qty: 90 TABLET | Refills: 1 | Status: SHIPPED | OUTPATIENT
Start: 2019-04-11 | End: 2019-06-06 | Stop reason: SDUPTHER

## 2019-04-11 NOTE — TELEPHONE ENCOUNTER
Phoned pt in regards to message. Instructed pt that the refill is waiting to be reviewed by Dr Bahena. Pt voiced understanding and states that he is completely out of the medication. Please review and advise. Thank you. CLC

## 2019-04-11 NOTE — TELEPHONE ENCOUNTER
----- Message from Maricarmen Lombardi sent at 4/11/2019  9:39 AM CDT -----  Type:  RX Refill Request    Who Called:  Patient   Refill or New Rx:  refill  RX Name and Strength: ALPRAZolam (XANAX) 1 MG tablet  How is the patient currently taking it? (ex. 1XDay):  3 x day  Is this a 30 day or 90 day RX:  30  Preferred Pharmacy with phone number:    [facility] - [place], [place] - [facility] [address]  [address] [place] [address]  Phone: [phone] Fax: [phone]  Local or Mail Order:  local  Ordering Provider:  Jc Spear Call Back Number:  [phone]  Additional Information:  Please contact patient to advise when refill sent

## 2019-04-30 DIAGNOSIS — M51.36 DEGENERATIVE DISC DISEASE, LUMBAR: ICD-10-CM

## 2019-04-30 NOTE — TELEPHONE ENCOUNTER
----- Message from Miriam Gregory sent at 4/30/2019  9:12 AM CDT -----  Contact: Patient  Type:  RX Refill Request    Who Called:  Patient  Refill or New Rx:  Refill  RX Name and Strength:  HYDROcodone-acetaminophen (NORCO)  mg per tablet  How is the patient currently taking it? (ex. 1XDay):  Take 1 tablet by mouth 3 (three) times daily as needed. - Oral  Is this a 30 day or 90 day RX:  90 day  Preferred Pharmacy with phone number:    [facility] - [place], [place] - [address]  [address] [address]  Phone: [phone] Fax: [phone]  Local or Mail Order: Local  Ordering Provider:  Dr Jc Spear Call Back Number:    Additional Information: Calling to request a refill. Please advise.

## 2019-04-30 NOTE — TELEPHONE ENCOUNTER
----- Message from Miriam Gregory sent at 4/30/2019  9:12 AM CDT -----  Contact: Patient  Type:  RX Refill Request    Who Called:  Patient  Refill or New Rx:  Refill  RX Name and Strength:  HYDROcodone-acetaminophen (NORCO)  mg per tablet  How is the patient currently taking it? (ex. 1XDay):  Take 1 tablet by mouth 3 (three) times daily as needed. - Oral  Is this a 30 day or 90 day RX:  90 day  Preferred Pharmacy with phone number:    [facility] - [place], [place] - [address]  [address] [address]  Phone: [phone] Fax: [phone]  Local or Mail Order: Local  Ordering Provider:  Dr Jc Spear Call Back Number:    Additional Information: Calling to request a refill. Please advise.

## 2019-05-01 RX ORDER — HYDROCODONE BITARTRATE AND ACETAMINOPHEN 10; 325 MG/1; MG/1
1 TABLET ORAL 3 TIMES DAILY PRN
Qty: 80 TABLET | Refills: 0 | OUTPATIENT
Start: 2019-05-01

## 2019-05-01 RX ORDER — HYDROCODONE BITARTRATE AND ACETAMINOPHEN 10; 325 MG/1; MG/1
1 TABLET ORAL 3 TIMES DAILY PRN
Qty: 80 TABLET | Refills: 0 | Status: SHIPPED | OUTPATIENT
Start: 2019-05-01 | End: 2019-05-28 | Stop reason: SDUPTHER

## 2019-05-28 DIAGNOSIS — M51.36 DEGENERATIVE DISC DISEASE, LUMBAR: ICD-10-CM

## 2019-05-29 RX ORDER — HYDROCODONE BITARTRATE AND ACETAMINOPHEN 10; 325 MG/1; MG/1
TABLET ORAL
Qty: 80 TABLET | Refills: 0 | Status: SHIPPED | OUTPATIENT
Start: 2019-05-29 | End: 2019-06-27 | Stop reason: SDUPTHER

## 2019-06-06 DIAGNOSIS — F41.1 GAD (GENERALIZED ANXIETY DISORDER): ICD-10-CM

## 2019-06-06 RX ORDER — ALPRAZOLAM 1 MG/1
TABLET ORAL
Qty: 90 TABLET | Refills: 1 | Status: SHIPPED | OUTPATIENT
Start: 2019-06-06 | End: 2019-08-05 | Stop reason: SDUPTHER

## 2019-06-06 NOTE — TELEPHONE ENCOUNTER
----- Message from Noel Palma sent at 6/6/2019 11:36 AM CDT -----  Contact: Patient  [phone]  Type:  RX Refill Request    Who Called:  Patient  [phone]    Refill or New Rx: Refill    RX Name and Strength:  ALPRAZolam (XANAX) 1 MG tablet 90 tablet 1 4/11/2019    Sig: TAKE 1 TABLET (1 MG TOTAL) BY MOUTH THREE TIMES A DAY AS NEEDED FOR ANXIETY.   Sent to pharmacy as: ALPRAZolam (XANAX) 1 MG tablet       How is the patient currently taking it? (ex. 1XDay):  See above.  Is this a 30 day or 90 day RX:  30 day    Preferred Pharmacy with phone number:      [facility] - [place] [address] [address]  [address] [facility] [address] [place] [address]  Phone: [phone] Fax: [phone]    Local or Mail Order: Local  Ordering Provider:  Dr. Josef Spear Call Back Number:  Patient  [phone]    Additional Information:  Advised he will be out of the medication tomorrow. Please send in today.

## 2019-06-27 DIAGNOSIS — M51.36 DEGENERATIVE DISC DISEASE, LUMBAR: ICD-10-CM

## 2019-06-27 NOTE — TELEPHONE ENCOUNTER
----- Message from Maricarmen Kirkland sent at 6/27/2019  9:20 AM CDT -----  Type:  RX Refill Request    Who Called: Patient  RX Name and Strength:  HYDROcodone-acetaminophen (NORCO)  mg per tablet  Preferred Pharmacy with phone number: [facility]  Best Call Back Number:  [phone]  Additional Information:

## 2019-06-28 ENCOUNTER — TELEPHONE (OUTPATIENT)
Dept: FAMILY MEDICINE | Facility: CLINIC | Age: 64
End: 2019-06-28

## 2019-06-28 RX ORDER — HYDROCODONE BITARTRATE AND ACETAMINOPHEN 10; 325 MG/1; MG/1
1 TABLET ORAL 3 TIMES DAILY PRN
Qty: 80 TABLET | Refills: 0 | OUTPATIENT
Start: 2019-06-28

## 2019-06-28 RX ORDER — HYDROCODONE BITARTRATE AND ACETAMINOPHEN 10; 325 MG/1; MG/1
TABLET ORAL
Qty: 80 TABLET | Refills: 0 | Status: SHIPPED | OUTPATIENT
Start: 2019-06-28 | End: 2019-07-29 | Stop reason: SDUPTHER

## 2019-07-11 ENCOUNTER — TELEPHONE (OUTPATIENT)
Dept: ADMINISTRATIVE | Facility: HOSPITAL | Age: 64
End: 2019-07-11

## 2019-07-29 DIAGNOSIS — M51.36 DEGENERATIVE DISC DISEASE, LUMBAR: ICD-10-CM

## 2019-07-29 NOTE — TELEPHONE ENCOUNTER
----- Message from Sherwin Saravia sent at 7/29/2019 10:25 AM CDT -----  Contact: self   Patient need a refill on  Hydrocodone please send to [facility], any questions please call back at [phone] (home)     [facility] - [address] [address]  Phone: [phone] Fax: [phone]

## 2019-07-30 ENCOUNTER — LAB VISIT (OUTPATIENT)
Dept: LAB | Facility: HOSPITAL | Age: 64
End: 2019-07-30
Attending: FAMILY MEDICINE
Payer: MEDICARE

## 2019-07-30 DIAGNOSIS — E11.69 TYPE 2 DIABETES MELLITUS WITH OTHER SPECIFIED COMPLICATION, WITHOUT LONG-TERM CURRENT USE OF INSULIN: ICD-10-CM

## 2019-07-30 DIAGNOSIS — I10 ESSENTIAL HYPERTENSION: ICD-10-CM

## 2019-07-30 DIAGNOSIS — E78.5 HYPERLIPIDEMIA, UNSPECIFIED HYPERLIPIDEMIA TYPE: ICD-10-CM

## 2019-07-30 LAB
ALBUMIN/CREAT UR: 10 UG/MG (ref 0–30)
CREAT UR-MCNC: 270 MG/DL (ref 23–375)
MICROALBUMIN UR DL<=1MG/L-MCNC: 27 UG/ML

## 2019-07-30 PROCEDURE — 82043 UR ALBUMIN QUANTITATIVE: CPT | Mod: HCNC

## 2019-07-30 RX ORDER — OMEPRAZOLE 20 MG/1
20 CAPSULE, DELAYED RELEASE ORAL DAILY
Qty: 90 CAPSULE | Refills: 3 | Status: SHIPPED | OUTPATIENT
Start: 2019-07-30 | End: 2020-11-12

## 2019-07-30 RX ORDER — HYDROCODONE BITARTRATE AND ACETAMINOPHEN 10; 325 MG/1; MG/1
TABLET ORAL
Qty: 80 TABLET | Refills: 0 | Status: SHIPPED | OUTPATIENT
Start: 2019-07-30 | End: 2019-12-23 | Stop reason: SDUPTHER

## 2019-07-30 NOTE — TELEPHONE ENCOUNTER
----- Message from Sherwin Saravia sent at 7/30/2019  3:11 PM CDT -----  Contact: self   Patient need a refill on PRILOSEC 20 MG , HYDROcodone-acetaminophen please send to [facility], any  Questions please call back at [phone] (home)     [facility] - [place], [place] - [address] [place] [address] [address]  [address] [address]  Phone: [phone] Fax: [phone]

## 2019-07-31 DIAGNOSIS — F41.1 GAD (GENERALIZED ANXIETY DISORDER): ICD-10-CM

## 2019-07-31 RX ORDER — HYDROCODONE BITARTRATE AND ACETAMINOPHEN 10; 325 MG/1; MG/1
1 TABLET ORAL 3 TIMES DAILY PRN
Qty: 80 TABLET | Refills: 0 | Status: SHIPPED | OUTPATIENT
Start: 2019-07-31 | End: 2019-09-26 | Stop reason: SDUPTHER

## 2019-08-04 RX ORDER — ALPRAZOLAM 1 MG/1
TABLET ORAL
Qty: 90 TABLET | Refills: 1 | OUTPATIENT
Start: 2019-08-04

## 2019-08-05 ENCOUNTER — OFFICE VISIT (OUTPATIENT)
Dept: FAMILY MEDICINE | Facility: CLINIC | Age: 64
End: 2019-08-05
Payer: MEDICARE

## 2019-08-05 ENCOUNTER — TELEPHONE (OUTPATIENT)
Dept: FAMILY MEDICINE | Facility: CLINIC | Age: 64
End: 2019-08-05

## 2019-08-05 VITALS
HEART RATE: 64 BPM | SYSTOLIC BLOOD PRESSURE: 138 MMHG | RESPIRATION RATE: 16 BRPM | BODY MASS INDEX: 34.37 KG/M2 | WEIGHT: 253.75 LBS | HEIGHT: 72 IN | DIASTOLIC BLOOD PRESSURE: 86 MMHG

## 2019-08-05 DIAGNOSIS — E78.5 HYPERLIPIDEMIA, UNSPECIFIED HYPERLIPIDEMIA TYPE: ICD-10-CM

## 2019-08-05 DIAGNOSIS — K21.9 GASTROESOPHAGEAL REFLUX DISEASE WITHOUT ESOPHAGITIS: ICD-10-CM

## 2019-08-05 DIAGNOSIS — F41.1 GAD (GENERALIZED ANXIETY DISORDER): ICD-10-CM

## 2019-08-05 DIAGNOSIS — Z12.11 SCREEN FOR COLON CANCER: ICD-10-CM

## 2019-08-05 DIAGNOSIS — E11.69 TYPE 2 DIABETES MELLITUS WITH OTHER SPECIFIED COMPLICATION, WITHOUT LONG-TERM CURRENT USE OF INSULIN: Primary | ICD-10-CM

## 2019-08-05 DIAGNOSIS — I10 ESSENTIAL HYPERTENSION: ICD-10-CM

## 2019-08-05 PROCEDURE — 99214 PR OFFICE/OUTPT VISIT, EST, LEVL IV, 30-39 MIN: ICD-10-PCS | Mod: HCNC,S$GLB,, | Performed by: FAMILY MEDICINE

## 2019-08-05 PROCEDURE — 99999 PR PBB SHADOW E&M-EST. PATIENT-LVL III: ICD-10-PCS | Mod: PBBFAC,HCNC,, | Performed by: FAMILY MEDICINE

## 2019-08-05 PROCEDURE — 3079F DIAST BP 80-89 MM HG: CPT | Mod: HCNC,CPTII,S$GLB, | Performed by: FAMILY MEDICINE

## 2019-08-05 PROCEDURE — 3075F PR MOST RECENT SYSTOLIC BLOOD PRESS GE 130-139MM HG: ICD-10-PCS | Mod: HCNC,CPTII,S$GLB, | Performed by: FAMILY MEDICINE

## 2019-08-05 PROCEDURE — 3079F PR MOST RECENT DIASTOLIC BLOOD PRESSURE 80-89 MM HG: ICD-10-PCS | Mod: HCNC,CPTII,S$GLB, | Performed by: FAMILY MEDICINE

## 2019-08-05 PROCEDURE — 3008F PR BODY MASS INDEX (BMI) DOCUMENTED: ICD-10-PCS | Mod: HCNC,CPTII,S$GLB, | Performed by: FAMILY MEDICINE

## 2019-08-05 PROCEDURE — 3046F PR MOST RECENT HEMOGLOBIN A1C LEVEL > 9.0%: ICD-10-PCS | Mod: HCNC,CPTII,S$GLB, | Performed by: FAMILY MEDICINE

## 2019-08-05 PROCEDURE — 3046F HEMOGLOBIN A1C LEVEL >9.0%: CPT | Mod: HCNC,CPTII,S$GLB, | Performed by: FAMILY MEDICINE

## 2019-08-05 PROCEDURE — 3008F BODY MASS INDEX DOCD: CPT | Mod: HCNC,CPTII,S$GLB, | Performed by: FAMILY MEDICINE

## 2019-08-05 PROCEDURE — 99999 PR PBB SHADOW E&M-EST. PATIENT-LVL III: CPT | Mod: PBBFAC,HCNC,, | Performed by: FAMILY MEDICINE

## 2019-08-05 PROCEDURE — 3075F SYST BP GE 130 - 139MM HG: CPT | Mod: HCNC,CPTII,S$GLB, | Performed by: FAMILY MEDICINE

## 2019-08-05 PROCEDURE — 99214 OFFICE O/P EST MOD 30 MIN: CPT | Mod: HCNC,S$GLB,, | Performed by: FAMILY MEDICINE

## 2019-08-05 RX ORDER — ALPRAZOLAM 1 MG/1
1 TABLET ORAL 3 TIMES DAILY PRN
Qty: 90 TABLET | Refills: 2 | Status: SHIPPED | OUTPATIENT
Start: 2019-08-05 | End: 2019-10-28 | Stop reason: SDUPTHER

## 2019-08-05 NOTE — PROGRESS NOTES
Subjective:       Patient ID: Fred Blackwell is a 64 y.o. male.    Chief Complaint: Diabetes and Medication Refill    Here for f/u htn and dm II. Had recent labs.    Hypertension   This is a chronic problem. The current episode started more than 1 year ago. The problem is controlled. Pertinent negatives include no chest pain, palpitations or shortness of breath.   Diabetes   He presents for his follow-up diabetic visit. He has type 2 diabetes mellitus. His disease course has been worsening. Pertinent negatives for hypoglycemia include no nervousness/anxiousness. Pertinent negatives for diabetes include no chest pain and no fatigue.     Review of Systems   Constitutional: Negative for chills, fatigue and fever.   Respiratory: Negative for cough, chest tightness and shortness of breath.    Cardiovascular: Negative for chest pain, palpitations and leg swelling.   Endocrine: Negative for cold intolerance and heat intolerance.   Skin: Negative for rash.   Psychiatric/Behavioral: Negative for dysphoric mood. The patient is not nervous/anxious.        Objective:      Physical Exam   Constitutional: He appears well-developed and well-nourished.   HENT:   Head: Normocephalic and atraumatic.   Cardiovascular: Normal rate, regular rhythm and normal heart sounds.   Pulmonary/Chest: Effort normal and breath sounds normal.   Psychiatric: He has a normal mood and affect.   Nursing note and vitals reviewed.      Assessment:       1. Type 2 diabetes mellitus with other specified complication, without long-term current use of insulin    2. Hyperlipidemia, unspecified hyperlipidemia type    3. Essential hypertension    4. Gastroesophageal reflux disease without esophagitis    5. Screen for colon cancer    6. SHANNAN (generalized anxiety disorder)        Plan:       Type 2 diabetes mellitus with other specified complication, without long-term current use of insulin  -     Ambulatory consult to Diabetic Education    Hyperlipidemia,  unspecified hyperlipidemia type    Essential hypertension    Gastroesophageal reflux disease without esophagitis    Screen for colon cancer  -     Case request GI: COLONOSCOPY    SHANNAN (generalized anxiety disorder)  -     ALPRAZolam (XANAX) 1 MG tablet; Take 1 tablet (1 mg total) by mouth 3 (three) times daily as needed for Anxiety.  Dispense: 90 tablet; Refill: 2      Mood stable  Due for cscope  Get eye and foot exam  htn stable  Refer to diabetes management  Follow up in about 3 months (around 11/5/2019), or if symptoms worsen or fail to improve.

## 2019-08-05 NOTE — TELEPHONE ENCOUNTER
----- Message from Jazmyne Cantrell sent at 8/5/2019 10:17 AM CDT -----  Contact: Patient  Type:  Sooner Apoointment Request    Caller is requesting a sooner appointment.  Caller declined first available appointment listed below.  Caller will not accept being placed on the waitlist and is requesting a message be sent to doctor.    Name of Caller:  Patient  When is the first available appointment?  12/24  Symptoms:  Medication refills-Xanax  Best Call Back Number: [phone]    Additional Information:  Patient is in need of medication refills, states that he has requested his medication on 7/31 and was told today 8/5 that he is ineligible for a refill until he has an office visit

## 2019-08-05 NOTE — TELEPHONE ENCOUNTER
----- Message from Jazmyne Cantrell sent at 8/5/2019 10:17 AM CDT -----  Contact: Patient  Type:  Sooner Apoointment Request    Caller is requesting a sooner appointment.  Caller declined first available appointment listed below.  Caller will not accept being placed on the waitlist and is requesting a message be sent to doctor.    Name of Caller:  Patient  When is the first available appointment?  12/24  Symptoms:  Medication refills-Xanax  Best Call Back Number: [phone]    Additional Information:  Patient is in need of medication refills, states that he has requested his medication on 7/31 and was told today 8/5 that he is ineligible for a refill until he has an office visit

## 2019-08-07 ENCOUNTER — TELEPHONE (OUTPATIENT)
Dept: FAMILY MEDICINE | Facility: CLINIC | Age: 64
End: 2019-08-07

## 2019-08-07 NOTE — TELEPHONE ENCOUNTER
I attempted to contact Pt to schedule his 3 month follow. A voicemail message was left for him to return the call to the office.

## 2019-08-07 NOTE — TELEPHONE ENCOUNTER
----- Message from Raquel Shah sent at 8/7/2019 11:18 AM CDT -----  Contact: self at check out  08/05/19  This patient needs a 3 month follow up with Dr. Bahena. Please call patient to schedule.

## 2019-08-14 ENCOUNTER — CLINICAL SUPPORT (OUTPATIENT)
Dept: DIABETES | Facility: CLINIC | Age: 64
End: 2019-08-14
Payer: MEDICARE

## 2019-08-14 VITALS — WEIGHT: 250.88 LBS | HEIGHT: 72 IN | BODY MASS INDEX: 33.98 KG/M2

## 2019-08-14 DIAGNOSIS — E11.69 TYPE 2 DIABETES MELLITUS WITH OTHER SPECIFIED COMPLICATION, WITHOUT LONG-TERM CURRENT USE OF INSULIN: ICD-10-CM

## 2019-08-14 DIAGNOSIS — E11.69 TYPE 2 DIABETES MELLITUS WITH OTHER SPECIFIED COMPLICATION, WITHOUT LONG-TERM CURRENT USE OF INSULIN: Primary | ICD-10-CM

## 2019-08-14 PROCEDURE — 99999 PR PBB SHADOW E&M-EST. PATIENT-LVL I: ICD-10-PCS | Mod: PBBFAC,HCNC,,

## 2019-08-14 PROCEDURE — 99999 PR PBB SHADOW E&M-EST. PATIENT-LVL I: CPT | Mod: PBBFAC,HCNC,,

## 2019-08-14 PROCEDURE — G0108 DIAB MANAGE TRN  PER INDIV: HCPCS | Mod: HCNC,S$GLB,, | Performed by: DIETITIAN, REGISTERED

## 2019-08-14 PROCEDURE — G0108 PR DIAB MANAGE TRN  PER INDIV: ICD-10-PCS | Mod: HCNC,S$GLB,, | Performed by: DIETITIAN, REGISTERED

## 2019-08-14 RX ORDER — LANCETS 33 GAUGE
1 EACH MISCELLANEOUS 2 TIMES DAILY
Qty: 100 EACH | Refills: 6 | Status: CANCELLED | OUTPATIENT
Start: 2019-08-14

## 2019-08-14 NOTE — PROGRESS NOTES
"Diabetes Education  Author: Sammie Vigil RD, CDE  Date: 2019    Diabetes Care Management Summary  Diabetes Education Record Assessment/Progress: Initial-Patient reports that he was surprised to hear that his sugars were high.  His wife  last year at this time and his daughter has moved in with her family which is causing some stress and change in dietary habits.  Since seeing Dr. Bahena he has cut out all sugar in his drinks and sweet desserts and has lost 3 lbs.    Current Diabetes Risk Level: High     Last A1c:   Lab Results   Component Value Date    HGBA1C 11.0 (H) 2019     Last Visit with Diabetes Educator: : 2019  Last OPCM Referral: : Not Found   Enrolled in OPCM: No    Diabetes Type  Diabetes Type : Type II    Diabetes History  Diabetes Diagnosis: 0-1 year  Current Treatment: Has not been started on dm meds  Reviewed Problem List with Patient: Yes    Health Maintenance was reviewed today with patient. Discussed with patient importance of routine eye exams, foot exams/foot care, blood work (i.e.: A1c, microalbumin, and lipid), dental visits, yearly flu vaccine, and pneumonia vaccine as indicated by PCP. Patient verbalized understanding.     Health Maintenance Topics with due status: Not Due       Topic Last Completion Date    TETANUS VACCINE 2014    Lipid Panel 2019    Hemoglobin A1c 2019     Health Maintenance Due   Topic Date Due    Colonoscopy  01/10/2018    Eye Exam  2018    Foot Exam  2018     Nutrition  Meal Planning: skipping meals, snacks between meal, eats out seldom, artificial sweeteners-Patient reports that he had previously been consuming large amount of hawaiian punch and bowls of ice cream late at night.  Daughter cooks "creole" meals at night and he is generally eating only 1 meal daily.  Recently has changed his diet and stopped the regular sweetened drinks and desserts.      Monitoring   Monitoring: Provided One Touch Verio meter " since he has never checked sugars before- unsure if this meter will be covered on his plan  Self Monitoring : Will begin testing twice daily  Blood Glucose Logs: No-Sugar was checked while in clinic today and it was 292 fasting  Do you use a personal continuous glucose monitor: No  In the last month, how often have you had a low blood sugar reaction: never  Can you tell when your blood sugar is too high: yes  How do you treat high blood sugar: tired, thirsty, and frequent urination - states symptoms are improving    Exercise   Exercise Type: (No programmed activity)    Current Diabetes Treatment   Current Treatment: Has not been started on dm meds    Social History  Preferred Learning Method: Face to Face  Primary Support: Self, Daughterwife  a year ago  Occupation: (retired brick layer)  Smoking Status: Current Smoker    Barriers to Change  Barriers to Change: None  Learning Challenges : None    Readiness to Learn   Readiness to Learn : Acceptance    Cultural Influences  Cultural Influences: No    Diabetes Education Assessment/Progress  Diabetes Disease Process (diabetes disease process and treatment options): Discussion, Comprehends Key Points, Written Materials Provided-Reviewed goal blood sugars and A1c value.  Discussed steady climb with A1c value overe the past year    Nutrition (Incorporating nutritional management into one's lifestyle): Discussion-Reviewed carb sources and appropriate amounts per meal and snack.  Discussed label reading and foods to limit and avoid.  Encouraged him to try to eat on a more regular schedule throughout the day vs 1 big meal    Physical Activity (incorporating physical activity into one's lifestyle): Discussion    Medications (states correct name, dose, onset, peak, duration, side effects & timing of meds): Discussion-Patient is on no dm medications currently    Monitoring (monitoring blood glucose/other parameters & using results): Discussion, Demonstration, Comprehends Key  Points-Instructed patient to begin monitoring at least twice daily.  Demonstrated how to use meter he was given.  Will get Dr. Bahena to send in script for dm supplies.  Would like for him to keep logs to bring to Endo appt on 9/10    Acute Complications (preventing, detecting, and treating acute complications): Discussion-No recent hypoglycemia.  Reviewed symptoms, prevention and treatment    Chronic Complications (preventing, detecting, and treating chronic complications): Discussion-Discussed importance of lowing blood sugars to prevent complications.  Discussed yearly eye exam    Cognitive (knowledge of self-management skills, functional health literacy): Discussion-Patient seems open to making necessary changes to help lower his blood sugar.  Has already taken steps to improve his diet and plans on increasing activity    Goals  Patient has selected/evaluated goals during today's session: Yes, selected  Healthy Eating: Set-Will eat 3 meals daily    Diabetes Care Plan/Intervention  Education Plan/Intervention: Patient agrees to see Endo NP for medical managment.  Scheduled him with Anna Negron on 9/10.  Stressed that he should bring logs with him to that appointment for review.  No meds have been written up to this point despite A1c increasing from 9 to 11%.  He is doing well with lifestyle modifications and will continue to work on his diet and intake in interim.  Written materials provided and encouraged pt to call with any questions/concerns.      Diabetes Meal Plan  Calories: 2000  Carbohydrate Per Meal: 45-60g  Carbohydrate Per Snack : 15-20g    Today's Self-Management Care Plan was developed with the patient's input and is based on barriers identified during today's assessment.    The long and short-term goals in the care plan were written with the patient/caregiver's input. The patient has agreed to work toward these goals to improve his overall diabetes control.      The patient received a copy of  today's self-management plan and verbalized understanding of the care plan, goals, and all of today's instructions.      The patient was encouraged to communicate with his physician and care team regarding his condition(s) and treatment.  I provided the patient with my contact information today and encouraged him to contact me via phone or patient portal as needed.     Education Units of Time   Time Spent: 60 min

## 2019-08-15 NOTE — PROGRESS NOTES
Refill Authorization Note     is requesting a refill authorization.    Brief assessment and rationale for refill: APPROVE: prr  Name and strength of medication: Diabetic Testing Supplies meter test strips lancets        Medication Therapy Plan: DM- lco(LOV); A1C> 7; Approve 12 more months     Medication reconciliation completed: No              How patient will take medication: test bid           Comments:   Diabetic Supplies Testing Frequency   BID    Last A1C: 6 months   Lab Results   Component Value Date    HGBA1C 11.0 (H) 07/30/2019    HGBA1C 9.3 (H) 03/25/2019    HGBA1C 6.0 (H) 07/25/2018    No results found for: LABA1C       Digital Medicine Diabetes Data: (values will display if enrolled)   Last 6 Patient Entered Readings                                          Most Recent A1c:      There is no flowsheet data to display.        - No concurrent serious illness or elevated hypoglycemic risk               APPOINTMENTS(past 12m or future 3m authorizing provider)    Date Provider   LAST VISIT  8/5/2019 MARITZA Bahena MD   NEXT VISIT  Visit date not found MARITZA Bahena MD

## 2019-08-16 RX ORDER — LANCETS
EACH MISCELLANEOUS
Qty: 200 EACH | Refills: 3 | Status: SHIPPED | OUTPATIENT
Start: 2019-08-16

## 2019-08-16 RX ORDER — INSULIN PUMP SYRINGE, 3 ML
EACH MISCELLANEOUS
Qty: 1 EACH | Refills: 0 | Status: SHIPPED | OUTPATIENT
Start: 2019-08-16

## 2019-08-20 RX ORDER — AMLODIPINE BESYLATE 10 MG/1
10 TABLET ORAL DAILY
Qty: 90 TABLET | Refills: 1 | Status: SHIPPED | OUTPATIENT
Start: 2019-08-20 | End: 2020-02-18

## 2019-09-10 ENCOUNTER — OFFICE VISIT (OUTPATIENT)
Dept: ENDOCRINOLOGY | Facility: CLINIC | Age: 64
End: 2019-09-10
Payer: MEDICARE

## 2019-09-10 VITALS
HEIGHT: 72 IN | HEART RATE: 80 BPM | WEIGHT: 249 LBS | DIASTOLIC BLOOD PRESSURE: 78 MMHG | SYSTOLIC BLOOD PRESSURE: 132 MMHG | BODY MASS INDEX: 33.72 KG/M2

## 2019-09-10 DIAGNOSIS — F17.200 TOBACCO DEPENDENCE: ICD-10-CM

## 2019-09-10 DIAGNOSIS — E78.5 HYPERLIPIDEMIA, UNSPECIFIED HYPERLIPIDEMIA TYPE: ICD-10-CM

## 2019-09-10 DIAGNOSIS — E11.65 TYPE 2 DIABETES MELLITUS WITH HYPERGLYCEMIA, WITHOUT LONG-TERM CURRENT USE OF INSULIN: Primary | ICD-10-CM

## 2019-09-10 DIAGNOSIS — F32.1 CURRENT MODERATE EPISODE OF MAJOR DEPRESSIVE DISORDER WITHOUT PRIOR EPISODE: ICD-10-CM

## 2019-09-10 DIAGNOSIS — I10 ESSENTIAL HYPERTENSION: ICD-10-CM

## 2019-09-10 PROBLEM — E66.01 MORBID OBESITY: Status: RESOLVED | Noted: 2019-04-01 | Resolved: 2019-09-10

## 2019-09-10 PROBLEM — E66.01 SEVERE OBESITY (BMI 35.0-35.9 WITH COMORBIDITY): Status: RESOLVED | Noted: 2018-08-21 | Resolved: 2019-09-10

## 2019-09-10 PROCEDURE — 99214 PR OFFICE/OUTPT VISIT, EST, LEVL IV, 30-39 MIN: ICD-10-PCS | Mod: HCNC,S$GLB,, | Performed by: NURSE PRACTITIONER

## 2019-09-10 PROCEDURE — 99999 PR PBB SHADOW E&M-EST. PATIENT-LVL III: CPT | Mod: PBBFAC,HCNC,, | Performed by: NURSE PRACTITIONER

## 2019-09-10 PROCEDURE — 99214 OFFICE O/P EST MOD 30 MIN: CPT | Mod: HCNC,S$GLB,, | Performed by: NURSE PRACTITIONER

## 2019-09-10 PROCEDURE — 99999 PR PBB SHADOW E&M-EST. PATIENT-LVL III: ICD-10-PCS | Mod: PBBFAC,HCNC,, | Performed by: NURSE PRACTITIONER

## 2019-09-10 RX ORDER — METFORMIN HYDROCHLORIDE 500 MG/1
500 TABLET, EXTENDED RELEASE ORAL 2 TIMES DAILY WITH MEALS
Qty: 180 TABLET | Refills: 3 | Status: SHIPPED | OUTPATIENT
Start: 2019-09-10 | End: 2020-06-15

## 2019-09-10 NOTE — LETTER
September 10, 2019      MARITZA Bahena MD  [address] [facility] [address] [place] [address]           [facility]  [address] [facility] [address] [place] [address]  Phone: [phone]  Fax: [phone]          Patient: Fred Blackwell   MR Number: [id number]   YOB: 1955   Date of Visit: 9/10/2019       Dear Dr. MARITZA Bahena:    Thank you for referring Fred Blackwell to me for evaluation. Attached you will find relevant portions of my assessment and plan of care.    If you have questions, please do not hesitate to call me. I look forward to following Fred Blackwell along with you.    Sincerely,    Anna Negron, [facility], NP    Enclosure  CC:  No Recipients    If you would like to receive this communication electronically, please contact [email or web address] or [phone] to request more information on [employer] access.    For providers and/or their staff who would like to refer a patient to [facility], please contact us through our one-stop-shop provider referral line, [facility], at [phone].    If you feel you have received this communication in error or would no longer like to receive these types of communications, please e-mail [email or web address]

## 2019-09-10 NOTE — PROGRESS NOTES
CC: This 64 y.o. male presents for management of diabetes and chronic conditions pending review including HTN, HLP, obesity, depression, tobacco abuse     HPI: He was diagnosed with T2DM in 2019. Has never been hospitalized r/t DM.  Family hx of DM: none    Patient reports that he was surprised to hear that his sugars were high.   His wife  last year at this time and his daughter has moved in with her family which is causing some stress and change in dietary habits.    Since seeing Dr. Bahena he has cut out all sugar in his drinks and sweet desserts and has lost 4 lbs.      hypoglycemia at home- none   monitoring BG at home:   120-200s- has improved greatly with dietary changes     Diet: Eats 3  Meals a day, snacks  breakfast- egg and toast  Lunch: snack pack- nuts and cheese  Dinner: meat and vegetable   Exercise: walking 3 days a week for ~ 30 mins, doing his yard work  CURRENT DM MEDS: none  Glucometer type:  True Metrix     Standards of Care:  Eye exam: ?    Retired      ROS:   Gen: Appetite good, no weight gain 4 lbs, denies fatigue and weakness.  Skin: Skin is intact and heals well, no rashes, no hair changes  Eyes: Denies visual disturbances  Resp: no SOB or POZO, no cough  Cardiac: No palpitations, chest pain, no edema   GI: No nausea or vomiting, diarrhea, constipation, or abdominal pain.  /GYN: No nocturia, burning or pain.   MS/Neuro: Denies numbness/ tingling in BLE; Gait steady, speech clear  Psych: Denies drug/ETOH abuse, + depression.  Other systems: negative.    PE:  GENERAL: Well developed, well nourished.  PSYCH: AAOx3, appropriate mood and affect, pleasant expression, conversant, appears relaxed, well groomed.   EYES: Conjunctiva, corneas clear  NECK: Supple, trachea midline,no thyromegaly or nodules  CHEST: Resp even and unlabored, CTA bilateral.  CARDIAC: RRR, S1, S2 heard, no murmurs  ABDOMEN: Soft, non-tender, non-distended   VASCULAR: DP pulses +2/4 bilaterally, no  edema.  NEURO: Gait steady  SKIN: Skin warm and dry no acanthosis nigracans.  FOOT EXAMINATION: 9/10/19  No foot deformity, corns or callus formation, Onychomycosis, nails long, no interspace maceration or ulceration noted.  Decreased hair growth present over toes/feet. Protective sensation intact with 10 gram monofilament.  +2 dorsalis pedis and posterior pulses noted.      Lab Results   Component Value Date    MICALBCREAT 10.0 07/30/2019       Hemoglobin A1C   Date Value Ref Range Status   07/30/2019 11.0 (H) 4.0 - 5.6 % Final     Comment:     ADA Screening Guidelines:  5.7-6.4%  Consistent with prediabetes  >or=6.5%  Consistent with diabetes  High levels of fetal hemoglobin interfere with the HbA1C  assay. Heterozygous hemoglobin variants (HbS, HgC, etc)do  not significantly interfere with this assay.   However, presence of multiple variants may affect accuracy.     03/25/2019 9.3 (H) 4.0 - 5.6 % Final     Comment:     ADA Screening Guidelines:  5.7-6.4%  Consistent with prediabetes  >or=6.5%  Consistent with diabetes  High levels of fetal hemoglobin interfere with the HbA1C  assay. Heterozygous hemoglobin variants (HbS, HgC, etc)do  not significantly interfere with this assay.   However, presence of multiple variants may affect accuracy.     07/25/2018 6.0 (H) 4.0 - 5.6 % Final     Comment:     ADA Screening Guidelines:  5.7-6.4%  Consistent with prediabetes  >or=6.5%  Consistent with diabetes  High levels of fetal hemoglobin interfere with the HbA1C  assay. Heterozygous hemoglobin variants (HbS, HgC, etc)do  not significantly interfere with this assay.   However, presence of multiple variants may affect accuracy.          ASSESSMENT and PLAN:      1. T2DM with hyperglycemia- Start metformin xr 500 mg bid  Check bg once daily - stagger times   Aware goal bg 1001-80  discussed DM, progression of disease, long term complications, tx options.   Discussed A1c and BG goals.       2. HTN - controlled, continue meds as  previously prescribed and monitor.     3. HLP -  Has lipitor at home, has never taken- encouraged to start, recheck labs w RTC     4. Obesity- Body mass index is 33.77 kg/m².  Continue exercise, and weight loss    5. Depression- stable, chronic- managed by PCP    6. Tobacco abuse- smokes 1ppd, contemplating quitting        Follow-up: in 3 months with lab prior

## 2019-09-26 DIAGNOSIS — M51.36 DEGENERATIVE DISC DISEASE, LUMBAR: ICD-10-CM

## 2019-09-26 RX ORDER — HYDROCODONE BITARTRATE AND ACETAMINOPHEN 10; 325 MG/1; MG/1
TABLET ORAL
Qty: 80 TABLET | Refills: 0 | Status: SHIPPED | OUTPATIENT
Start: 2019-09-30 | End: 2019-10-28 | Stop reason: SDUPTHER

## 2019-09-26 RX ORDER — LOSARTAN POTASSIUM AND HYDROCHLOROTHIAZIDE 12.5; 1 MG/1; MG/1
1 TABLET ORAL DAILY
Qty: 90 TABLET | Refills: 3 | Status: SHIPPED | OUTPATIENT
Start: 2019-09-26 | End: 2019-11-19 | Stop reason: SDUPTHER

## 2019-09-26 NOTE — PROGRESS NOTES
Refill Authorization Note     is requesting a refill authorization.    Brief assessment and rationale for refill: ROUE: OP  Name and strength of medication: HYDROCODONE-APAP 10/325 MG  TAB            Medication reconciliation completed: No              How patient will take medication: t1t tid          Comments:     APPOINTMENTS with PCP (past 12m or future 3m authorizing provider)    Date Provider   LAST VISIT  8/5/2019 ROBERT Bahena MD   NEXT VISIT  Visit date not found ROBERT Bahena MD

## 2019-10-08 RX ORDER — LOSARTAN POTASSIUM AND HYDROCHLOROTHIAZIDE 12.5; 1 MG/1; MG/1
1 TABLET ORAL DAILY
Qty: 90 TABLET | Refills: 3 | Status: CANCELLED | OUTPATIENT
Start: 2019-10-08 | End: 2020-10-07

## 2019-10-08 NOTE — TELEPHONE ENCOUNTER
[facility] not able to get losartan-hct, provided patient with Rx. Please send Rx to [facility].

## 2019-10-08 NOTE — TELEPHONE ENCOUNTER
----- Message from Kavya Wang sent at 10/8/2019 12:54 PM CDT -----  Contact: Fred  Type: Needs Medical Advice    Who Called:  Patient  Best Call Back Number: [phone]  Additional Information: discussing medication with Vianey michele the call drop--please advise--thank you

## 2019-10-28 DIAGNOSIS — M51.36 DEGENERATIVE DISC DISEASE, LUMBAR: ICD-10-CM

## 2019-10-28 DIAGNOSIS — F41.1 GAD (GENERALIZED ANXIETY DISORDER): ICD-10-CM

## 2019-10-28 NOTE — TELEPHONE ENCOUNTER
----- Message from Ankita Olson sent at 10/28/2019  1:20 PM CDT -----  Contact: [phone]  Patient requesting a refill on xanax and hydrocodone.      Patient will be using   [facility] - [place], [place] - [address]  [address] [place] [address]  Phone: [phone] Fax: [phone]    Please call patient at [phone].     Thanks!

## 2019-10-29 RX ORDER — HYDROCODONE BITARTRATE AND ACETAMINOPHEN 10; 325 MG/1; MG/1
TABLET ORAL
Qty: 80 TABLET | Refills: 0 | Status: SHIPPED | OUTPATIENT
Start: 2019-10-29 | End: 2019-11-27 | Stop reason: SDUPTHER

## 2019-10-29 RX ORDER — ALPRAZOLAM 1 MG/1
1 TABLET ORAL 3 TIMES DAILY PRN
Qty: 90 TABLET | Refills: 2 | Status: SHIPPED | OUTPATIENT
Start: 2019-10-29 | End: 2019-12-23 | Stop reason: SDUPTHER

## 2019-10-29 NOTE — TELEPHONE ENCOUNTER
----- Message from Giovani Macias sent at 10/29/2019 10:59 AM CDT -----  Contact: same  Patient called in and is requesting refills on the following Rx's:    1.  HYDROcodone-acetaminophen (NORCO)  mg per tablet, TAKE 1 TABLET BY MOUTH THREE TIMES A DAY AS NEEDED., 80 tablet 0 9/30/2019     2, ALPRAZolam (XANAX) 1 MG tablet, Take 1 tablet (1 mg total) by mouth 3 (three) times daily as needed for Anxiety. - Oral, 90 tablet 2 8/5/2019       [facility] - [place], [place] - [address]  [address] [employer] [address] [place] [address]  Phone: [phone] Fax: [phone]    Patient call back number is [phone]

## 2019-11-02 ENCOUNTER — TELEPHONE (OUTPATIENT)
Dept: GASTROENTEROLOGY | Facility: CLINIC | Age: 64
End: 2019-11-02

## 2019-11-02 NOTE — TELEPHONE ENCOUNTER
Pt scheduled colonoscopy on 12/11/19. Reviewed medical hx, medications and mag citrate prep instructions with pt. Pt aware prep instructions and confirmation letter will be mailed to him. Pt verbalized understanding of date, time and location of procedure.

## 2019-11-07 ENCOUNTER — PATIENT OUTREACH (OUTPATIENT)
Dept: ADMINISTRATIVE | Facility: HOSPITAL | Age: 64
End: 2019-11-07

## 2019-11-15 ENCOUNTER — TELEPHONE (OUTPATIENT)
Dept: ADMINISTRATIVE | Facility: HOSPITAL | Age: 64
End: 2019-11-15

## 2019-11-19 DIAGNOSIS — I10 ESSENTIAL HYPERTENSION: Primary | ICD-10-CM

## 2019-11-19 NOTE — TELEPHONE ENCOUNTER
Please see refill request    Medication not available to order. Please split medications.    Losartan 100 mg  HCTZ 12.5 mg    LOV- 8/5/19

## 2019-11-20 RX ORDER — LOSARTAN POTASSIUM AND HYDROCHLOROTHIAZIDE 12.5; 1 MG/1; MG/1
1 TABLET ORAL DAILY
Qty: 90 TABLET | Refills: 2 | Status: SHIPPED | OUTPATIENT
Start: 2019-11-20 | End: 2019-11-21

## 2019-11-20 NOTE — TELEPHONE ENCOUNTER
Pharmacy sent fax stating pt losartan-hctz is not available to order. They would like a new rx sent in separate. Please advise. Order pending.

## 2019-11-20 NOTE — PROGRESS NOTES
Refill Authorization Note     is requesting a refill authorization.    Brief assessment and rationale for refill: APPROVE: prr   Name and strength of medication: losartan-hydrochlorothiazide 100-12.5 mg (HYZAAR) 100-12.5 mg Tab            Medication reconciliation completed: No              How patient will take medication: t1t po qd           Comments:   Requested Prescriptions   Pending Prescriptions Disp Refills    losartan-hydrochlorothiazide 100-12.5 mg (HYZAAR) 100-12.5 mg Tab 90 tablet 2     Sig: TAKE 1 TABLET BY MOUTH ONCE DAILY.       Cardiovascular: ARB + Diuretic Combos Passed - 11/20/2019  1:33 PM        Passed - Patient is at least 18 years old        Passed - Last BP in normal range within 360 days     BP Readings from Last 3 Encounters:   09/10/19 132/78   08/05/19 138/86   04/01/19 138/82              Passed - Office visit in past 12 months or future 90 days     Recent Outpatient Visits            2 months ago Type 2 diabetes mellitus with hyperglycemia, without long-term current use of insulin    [facility] Anna Negron DNP, NP    3 months ago Type 2 diabetes mellitus with other specified complication, without long-term current use of insulin    [facility] MARITZA Bahena MD    7 months ago Type 2 diabetes mellitus with other specified complication, without long-term current use of insulin    [facility] MARITZA Bahena MD    10 months ago Type 2 diabetes mellitus with other specified complication, without long-term current use of insulin    [facility] MARITZA Bahena MD    11 months ago Acute bacterial bronchitis    [facility] Joie Evangelista NP          Future Appointments              In 2 weeks SPECIMEN, [place] [facility] [place]    In 2 weeks LAB, [place] [facility] [place]    In 3 weeks Anna Negron DNP, NP [facility]  Endocrinology, [place]                Passed - K in normal range and within 180 days     Potassium   Date Value Ref Range Status   07/30/2019 3.9 3.5 - 5.1 mmol/L Final   03/25/2019 3.9 3.5 - 5.1 mmol/L Final   07/25/2018 4.9 3.5 - 5.1 mmol/L Final              Passed - Na in normal range and within 180 days     Sodium   Date Value Ref Range Status   07/30/2019 138 136 - 145 mmol/L Final   03/25/2019 140 136 - 145 mmol/L Final   07/25/2018 140 136 - 145 mmol/L Final              Passed - Cr is 1.3 or below and within 180 days     Creatinine   Date Value Ref Range Status   07/30/2019 0.9 0.5 - 1.4 mg/dL Final   03/25/2019 1.0 0.5 - 1.4 mg/dL Final   07/25/2018 1.0 0.5 - 1.4 mg/dL Final              Passed - Ca in normal range and within 360 days     Calcium   Date Value Ref Range Status   07/30/2019 10.0 8.7 - 10.5 mg/dL Final   03/25/2019 9.7 8.7 - 10.5 mg/dL Final   07/25/2018 9.5 8.7 - 10.5 mg/dL Final              Passed - eGFR within 180 days     eGFR if non    Date Value Ref Range Status   07/30/2019 >60.0 >60 mL/min/1.73 m^2 Final     Comment:     Calculation used to obtain the estimated glomerular filtration  rate (eGFR) is the CKD-EPI equation.      03/25/2019 >60.0 >60 mL/min/1.73 m^2 Final     Comment:     Calculation used to obtain the estimated glomerular filtration  rate (eGFR) is the CKD-EPI equation.      07/25/2018 >60.0 >60 mL/min/1.73 m^2 Final     Comment:     Calculation used to obtain the estimated glomerular filtration  rate (eGFR) is the CKD-EPI equation.

## 2019-11-20 NOTE — TELEPHONE ENCOUNTER
Call Documentation    Person Called: Patient Call Time: 8:25am / 3:00pm    Spoke with: left voicemail         Reason for call: Clarification- dosage and frequency of medications listed below      Patient stated: left voicemail       Clarification details and Actions Taken: called pt twice to verify pharmacy location, according to their other meds and pref pharmacy, [facility]         Current Medication Requested:   Requested Prescriptions     Pending Prescriptions Disp Refills    losartan-hydrochlorothiazide 100-12.5 mg (HYZAAR) 100-12.5 mg Tab 90 tablet 2     Sig: TAKE 1 TABLET BY MOUTH ONCE DAILY.

## 2019-11-21 RX ORDER — LOSARTAN POTASSIUM 100 MG/1
100 TABLET ORAL DAILY
Qty: 90 TABLET | Refills: 3 | Status: SHIPPED | OUTPATIENT
Start: 2019-11-21 | End: 2020-06-12 | Stop reason: SDUPTHER

## 2019-11-21 RX ORDER — HYDROCHLOROTHIAZIDE 12.5 MG/1
12.5 TABLET ORAL DAILY
Qty: 90 TABLET | Refills: 3 | Status: ON HOLD | OUTPATIENT
Start: 2019-11-21 | End: 2020-06-03 | Stop reason: HOSPADM

## 2019-11-27 DIAGNOSIS — M51.36 DEGENERATIVE DISC DISEASE, LUMBAR: ICD-10-CM

## 2019-11-27 NOTE — TELEPHONE ENCOUNTER
Refill Routing Note    Medication(s) are appropriate for refill Outside of protocol      Requested Prescriptions   Pending Prescriptions Disp Refills    HYDROcodone-acetaminophen (NORCO)  mg per tablet [Pharmacy Med Name: HYDROCODONE-APAP  TAB  TAB] 80 tablet 0     Sig: TAKE 1 TABLET BY MOUTH THREE TIMES A DAY AS NEEDED.       Narcotics Refill Protocol Failed - 11/27/2019  9:41 AM        Failed - Patient seen within 3 months     Last visit with MARITZA Bahena MD: 8/5/2019  Last visit in [facility]: 8/5/2019    Patient's next visit in [facility]: Visit date not found           Failed - Med not refilled within 4 weeks

## 2019-11-27 NOTE — TELEPHONE ENCOUNTER
----- Message from Paulette Macias sent at 11/27/2019  9:36 AM CST -----  Type:  RX Refill Request    Who Called:  Self Refill or New Rx:  Refill   RX Name and Strength: hydrocodone   How is the patient currently taking it? (ex. 1XDay):  3 x day   Is this a 30 day or 90 day RX: 30 day supply Preferred Pharmacy with phone number:  [facility] or Mail Order: local   Ordering Provider:  Dr Josef Spear Call Back Number:  [phone] Information:

## 2019-11-29 RX ORDER — HYDROCODONE BITARTRATE AND ACETAMINOPHEN 10; 325 MG/1; MG/1
TABLET ORAL
Qty: 80 TABLET | Refills: 0 | Status: SHIPPED | OUTPATIENT
Start: 2019-11-29 | End: 2020-01-23 | Stop reason: SDUPTHER

## 2019-11-29 NOTE — TELEPHONE ENCOUNTER
Pt requesting refill, enough to get him until his provider returns next week, please review and advise. LOV 08/05/19

## 2019-11-29 NOTE — TELEPHONE ENCOUNTER
----- Message from Micheline Casillas sent at 11/29/2019 11:50 AM CST -----  Contact: patient  Type:  RX Refill Request    Who Called:  patient  Refill or New Rx:  refill  RX Name and Strength:  HYDROcodone-acetaminophen (NORCO)  mg per tablet  How is the patient currently taking it? (ex. 1XDay):  Sig: TAKE 1 TABLET BY MOUTH THREE TIMES A DAY AS NEEDED.  Is this a 30 day or 90 day RX:  30  Preferred Pharmacy with phone number:    [email or web address] - [place], [place] - [address] [facility] [address]  [address] [address]  Phone: [phone] Fax: [phone]    Local or Mail Order:  local  Ordering Provider:  Josef Spear Call Back Number:  [phone]  Additional Information:  Patient called on 11/27/2019 for a refill request but has not received medication. Please give call back. Skyped/call to clinic no answer

## 2019-12-02 RX ORDER — HYDROCODONE BITARTRATE AND ACETAMINOPHEN 10; 325 MG/1; MG/1
TABLET ORAL
Qty: 80 TABLET | Refills: 0 | OUTPATIENT
Start: 2019-12-02

## 2019-12-09 ENCOUNTER — LAB VISIT (OUTPATIENT)
Dept: LAB | Facility: HOSPITAL | Age: 64
End: 2019-12-09
Payer: MEDICARE

## 2019-12-09 DIAGNOSIS — E11.65 TYPE 2 DIABETES MELLITUS WITH HYPERGLYCEMIA, WITHOUT LONG-TERM CURRENT USE OF INSULIN: ICD-10-CM

## 2019-12-09 LAB
ALBUMIN SERPL BCP-MCNC: 3.8 G/DL (ref 3.5–5.2)
ALP SERPL-CCNC: 56 U/L (ref 55–135)
ALT SERPL W/O P-5'-P-CCNC: 12 U/L (ref 10–44)
ANION GAP SERPL CALC-SCNC: 9 MMOL/L (ref 8–16)
AST SERPL-CCNC: 16 U/L (ref 10–40)
BILIRUB SERPL-MCNC: 0.7 MG/DL (ref 0.1–1)
BUN SERPL-MCNC: 15 MG/DL (ref 8–23)
CALCIUM SERPL-MCNC: 9 MG/DL (ref 8.7–10.5)
CHLORIDE SERPL-SCNC: 103 MMOL/L (ref 95–110)
CHOLEST SERPL-MCNC: 202 MG/DL (ref 120–199)
CHOLEST/HDLC SERPL: 4.5 {RATIO} (ref 2–5)
CO2 SERPL-SCNC: 33 MMOL/L (ref 23–29)
CREAT SERPL-MCNC: 0.8 MG/DL (ref 0.5–1.4)
EST. GFR  (AFRICAN AMERICAN): >60 ML/MIN/1.73 M^2
EST. GFR  (NON AFRICAN AMERICAN): >60 ML/MIN/1.73 M^2
ESTIMATED AVG GLUCOSE: 126 MG/DL (ref 68–131)
GLUCOSE SERPL-MCNC: 114 MG/DL (ref 70–110)
HBA1C MFR BLD HPLC: 6 % (ref 4–5.6)
HDLC SERPL-MCNC: 45 MG/DL (ref 40–75)
HDLC SERPL: 22.3 % (ref 20–50)
LDLC SERPL CALC-MCNC: 142.6 MG/DL (ref 63–159)
NONHDLC SERPL-MCNC: 157 MG/DL
POTASSIUM SERPL-SCNC: 3.4 MMOL/L (ref 3.5–5.1)
PROT SERPL-MCNC: 7.3 G/DL (ref 6–8.4)
SODIUM SERPL-SCNC: 145 MMOL/L (ref 136–145)
TRIGL SERPL-MCNC: 72 MG/DL (ref 30–150)
TSH SERPL DL<=0.005 MIU/L-ACNC: 2.46 UIU/ML (ref 0.4–4)

## 2019-12-09 PROCEDURE — 36415 COLL VENOUS BLD VENIPUNCTURE: CPT | Mod: HCNC,PO

## 2019-12-09 PROCEDURE — 80061 LIPID PANEL: CPT | Mod: HCNC

## 2019-12-09 PROCEDURE — 80053 COMPREHEN METABOLIC PANEL: CPT | Mod: HCNC

## 2019-12-09 PROCEDURE — 84443 ASSAY THYROID STIM HORMONE: CPT | Mod: HCNC

## 2019-12-09 PROCEDURE — 83036 HEMOGLOBIN GLYCOSYLATED A1C: CPT | Mod: HCNC

## 2019-12-10 ENCOUNTER — TELEPHONE (OUTPATIENT)
Dept: GASTROENTEROLOGY | Facility: CLINIC | Age: 64
End: 2019-12-10

## 2019-12-10 NOTE — TELEPHONE ENCOUNTER
----- Message from Vania Mart sent at 12/10/2019  8:14 AM CST -----  Contact: pt  Pt calling he wants to know what the name of the laxative he is suppose to take prior to his procedure tomorrow. He also wants to know what time he is to arrive. Advised him that the surgery nurse would be calling him today but he still wants to know from the nurse...[phone]

## 2019-12-11 ENCOUNTER — ANESTHESIA EVENT (OUTPATIENT)
Dept: ENDOSCOPY | Facility: HOSPITAL | Age: 64
End: 2019-12-11
Payer: MEDICARE

## 2019-12-11 ENCOUNTER — ANESTHESIA (OUTPATIENT)
Dept: ENDOSCOPY | Facility: HOSPITAL | Age: 64
End: 2019-12-11
Payer: MEDICARE

## 2019-12-11 ENCOUNTER — HOSPITAL ENCOUNTER (OUTPATIENT)
Facility: HOSPITAL | Age: 64
Discharge: HOME | End: 2019-12-11
Attending: INTERNAL MEDICINE | Admitting: INTERNAL MEDICINE
Payer: MEDICARE

## 2019-12-11 VITALS
BODY MASS INDEX: 32.51 KG/M2 | WEIGHT: 240 LBS | TEMPERATURE: 98 F | RESPIRATION RATE: 16 BRPM | SYSTOLIC BLOOD PRESSURE: 130 MMHG | HEIGHT: 72 IN | DIASTOLIC BLOOD PRESSURE: 83 MMHG | HEART RATE: 61 BPM | OXYGEN SATURATION: 98 %

## 2019-12-11 DIAGNOSIS — Z12.11 SCREEN FOR COLON CANCER: ICD-10-CM

## 2019-12-11 LAB — GLUCOSE SERPL-MCNC: 103 MG/DL (ref 70–110)

## 2019-12-11 PROCEDURE — 27201089 HC SNARE, DISP (ANY): Mod: HCNC,PO | Performed by: INTERNAL MEDICINE

## 2019-12-11 PROCEDURE — 63600175 PHARM REV CODE 636 W HCPCS: Mod: HCNC,PO | Performed by: INTERNAL MEDICINE

## 2019-12-11 PROCEDURE — D9220A PRA ANESTHESIA: ICD-10-PCS | Mod: HCNC,PT,ANES, | Performed by: ANESTHESIOLOGY

## 2019-12-11 PROCEDURE — 45381 COLONOSCOPY SUBMUCOUS NJX: CPT | Mod: HCNC,51,, | Performed by: INTERNAL MEDICINE

## 2019-12-11 PROCEDURE — 45385 COLONOSCOPY W/LESION REMOVAL: CPT | Mod: HCNC,PO | Performed by: INTERNAL MEDICINE

## 2019-12-11 PROCEDURE — 88305 TISSUE EXAM BY PATHOLOGIST: CPT | Mod: 26,HCNC,, | Performed by: PATHOLOGY

## 2019-12-11 PROCEDURE — 27201028 HC NEEDLE, SCLERO: Mod: HCNC,PO | Performed by: INTERNAL MEDICINE

## 2019-12-11 PROCEDURE — 45385 COLONOSCOPY W/LESION REMOVAL: CPT | Mod: HCNC,PT,, | Performed by: INTERNAL MEDICINE

## 2019-12-11 PROCEDURE — 63600175 PHARM REV CODE 636 W HCPCS: Mod: HCNC,PO | Performed by: NURSE ANESTHETIST, CERTIFIED REGISTERED

## 2019-12-11 PROCEDURE — D9220A PRA ANESTHESIA: Mod: HCNC,PT,CRNA, | Performed by: NURSE ANESTHETIST, CERTIFIED REGISTERED

## 2019-12-11 PROCEDURE — D9220A PRA ANESTHESIA: Mod: HCNC,PT,ANES, | Performed by: ANESTHESIOLOGY

## 2019-12-11 PROCEDURE — 45381 COLONOSCOPY SUBMUCOUS NJX: CPT | Mod: HCNC,PO | Performed by: INTERNAL MEDICINE

## 2019-12-11 PROCEDURE — 45385 PR COLONOSCOPY,REMV LESN,SNARE: ICD-10-PCS | Mod: HCNC,PT,, | Performed by: INTERNAL MEDICINE

## 2019-12-11 PROCEDURE — D9220A PRA ANESTHESIA: ICD-10-PCS | Mod: HCNC,PT,CRNA, | Performed by: NURSE ANESTHETIST, CERTIFIED REGISTERED

## 2019-12-11 PROCEDURE — 37000009 HC ANESTHESIA EA ADD 15 MINS: Mod: HCNC,PO | Performed by: INTERNAL MEDICINE

## 2019-12-11 PROCEDURE — 88305 TISSUE EXAM BY PATHOLOGIST: CPT | Mod: HCNC | Performed by: PATHOLOGY

## 2019-12-11 PROCEDURE — 37000008 HC ANESTHESIA 1ST 15 MINUTES: Mod: HCNC,PO | Performed by: INTERNAL MEDICINE

## 2019-12-11 PROCEDURE — 88305 TISSUE EXAM BY PATHOLOGIST: ICD-10-PCS | Mod: 26,HCNC,, | Performed by: PATHOLOGY

## 2019-12-11 PROCEDURE — 45381 PR COLONOSCPY,FLEX,W/DIR SUBMUC INJECT: ICD-10-PCS | Mod: HCNC,51,, | Performed by: INTERNAL MEDICINE

## 2019-12-11 RX ORDER — SODIUM CHLORIDE 0.9 % (FLUSH) 0.9 %
10 SYRINGE (ML) INJECTION
Status: DISCONTINUED | OUTPATIENT
Start: 2019-12-11 | End: 2019-12-11 | Stop reason: HOSPADM

## 2019-12-11 RX ORDER — PROPOFOL 10 MG/ML
VIAL (ML) INTRAVENOUS
Status: DISCONTINUED | OUTPATIENT
Start: 2019-12-11 | End: 2019-12-11

## 2019-12-11 RX ORDER — SODIUM CHLORIDE, SODIUM LACTATE, POTASSIUM CHLORIDE, CALCIUM CHLORIDE 600; 310; 30; 20 MG/100ML; MG/100ML; MG/100ML; MG/100ML
INJECTION, SOLUTION INTRAVENOUS CONTINUOUS
Status: DISCONTINUED | OUTPATIENT
Start: 2019-12-11 | End: 2019-12-11 | Stop reason: HOSPADM

## 2019-12-11 RX ORDER — LIDOCAINE HCL/PF 100 MG/5ML
SYRINGE (ML) INTRAVENOUS
Status: DISCONTINUED | OUTPATIENT
Start: 2019-12-11 | End: 2019-12-11

## 2019-12-11 RX ADMIN — PROPOFOL 50 MG: 10 INJECTION, EMULSION INTRAVENOUS at 11:12

## 2019-12-11 RX ADMIN — LIDOCAINE HYDROCHLORIDE 100 MG: 20 INJECTION, SOLUTION INTRAVENOUS at 11:12

## 2019-12-11 RX ADMIN — SODIUM CHLORIDE, SODIUM LACTATE, POTASSIUM CHLORIDE, AND CALCIUM CHLORIDE: .6; .31; .03; .02 INJECTION, SOLUTION INTRAVENOUS at 10:12

## 2019-12-11 RX ADMIN — PROPOFOL 100 MG: 10 INJECTION, EMULSION INTRAVENOUS at 11:12

## 2019-12-11 NOTE — [employer] PATIENT INSTRUCTIONS
Discharge Summary/Instructions after an Endoscopic Procedure  Patient Name: Fred Blackwell  Patient MRN: [id number]  Patient   YOB: 1955 Wednesday, December 11, 2019  Mich Mota MD  RESTRICTIONS:  During your procedure today, you received medications for sedation.  These   medications may affect your judgment, balance and coordination.  Therefore,   for 24 hours, you have the following restrictions:   - DO NOT drive a car, operate machinery, make legal/financial decisions,   sign important papers or drink alcohol.    ACTIVITY:  Today: no heavy lifting, straining or running due to procedural   sedation/anesthesia.  The following day: return to full activity including work.  DIET:  Eat and drink normally unless instructed otherwise.     TREATMENT FOR COMMON SIDE EFFECTS:  - Mild abdominal pain, nausea, belching, bloating or excessive gas:  rest,   eat lightly and use a heating pad.  - Sore Throat: treat with throat lozenges and/or gargle with warm salt   water.  - Because air was used during the procedure, expelling large amounts of air   from your rectum or belching is normal.  - If a bowel prep was taken, you may not have a bowel movement for 1-3 days.    This is normal.  SYMPTOMS TO WATCH FOR AND REPORT TO YOUR PHYSICIAN:  1. Abdominal pain or bloating, other than gas cramps.  2. Chest pain.  3. Back pain.  4. Signs of infection such as: chills or fever occurring within 24 hours   after the procedure.  5. Rectal bleeding, which would show as bright red, maroon, or black stools.   (A tablespoon of blood from the rectum is not serious, especially if   hemorrhoids are present.)  6. Vomiting.  7. Weakness or dizziness.  GO DIRECTLY TO THE NEAREST EMERGENCY ROOM IF YOU HAVE ANY OF THE FOLLOWING:      Difficulty breathing              Chills and/or fever over 101 F   Persistent vomiting and/or vomiting blood   Severe abdominal pain   Severe chest pain   Black, tarry stools   Bleeding- more than  one tablespoon   Any other symptom or condition that you feel may need urgent attention  Your doctor recommends these additional instructions:  If any biopsies were taken, your doctors clinic will contact you in 1 to 2   weeks with any results.  We are waiting for your pathology results.   Your physician has recommended a repeat colonoscopy in five years for   surveillance based on pathology results.   You are being discharged to home.  For questions, problems or results please call your physician - Mich Mota MD at Work:  [phone].  EMERGENCY PHONE NUMBER: [phone], LAB RESULTS: [phone]  IF A COMPLICATION OR EMERGENCY SITUATION ARISES AND YOU ARE UNABLE TO REACH   YOUR PHYSICIAN - GO DIRECTLY TO THE EMERGENCY ROOM.  ___________________________________________  Nurse Signature  ___________________________________________  Patient/Designated Responsible Party Signature  Mich Mota MD  12/11/2019 11:55:17 AM  This report has been verified and signed electronically.  [employer]

## 2019-12-11 NOTE — ANESTHESIA PREPROCEDURE EVALUATION
12/11/2019  Fred Blackwell is a 64 y.o., male.    Anesthesia Evaluation    I have reviewed the Patient Summary Reports.    I have reviewed the Nursing Notes.      Review of Systems  Anesthesia Hx:  No problems with previous Anesthesia    Cardiovascular:   Hypertension    Hepatic/GI:   GERD, well controlled    Musculoskeletal:   Arthritis     Neurological:   Neuromuscular Disease,    Endocrine:   Diabetes    Psych:   Psychiatric History          Physical Exam  General:  Well nourished, Obesity    Airway/Jaw/Neck:  Airway Findings: Mouth Opening: Normal Tongue: Normal  General Airway Assessment: Adult  Mallampati: II  TM Distance: Normal, at least 6 cm  Jaw/Neck Findings:  Neck ROM: Normal ROM     Eyes/Ears/Nose:  Eyes/Ears/Nose Findings:    Dental:  Dental Findings: In tact   Chest/Lungs:  Chest/Lungs Findings: Normal Respiratory Rate     Heart/Vascular:  Heart Findings: Rate: Normal  Rhythm: Regular Rhythm        Mental Status:  Mental Status Findings:  Cooperative, Alert and Oriented         Anesthesia Plan  Type of Anesthesia, risks & benefits discussed:  Anesthesia Type:  general  Patient's Preference: General  Intra-op Monitoring Plan: standard ASA monitors  Intra-op Monitoring Plan Comments:   Post Op Pain Control Plan:   Post Op Pain Control Plan Comments:   Induction:   IV  Beta Blocker:  Patient is not currently on a Beta-Blocker (No further documentation required).       Informed Consent: Patient understands risks and agrees with Anesthesia plan.  Questions answered. Anesthesia consent signed with patient.  ASA Score: 2     Day of Surgery Review of History & Physical:    H&P update referred to the surgeon.         Ready For Surgery From Anesthesia Perspective.

## 2019-12-11 NOTE — H&P
History & Physical - Short Stay  Gastroenterology      SUBJECTIVE:     Procedure: Colonoscopy    Chief Complaint/Indication for Procedure: Screening    PTA Medications   Medication Sig    ALPRAZolam (XANAX) 1 MG tablet TAKE 1 TABLET (1 MG TOTAL) BY MOUTH 3 (THREE) TIMES DAILY AS NEEDED FOR ANXIETY.    amLODIPine (NORVASC) 10 MG tablet TAKE 1 TABLET (10 MG TOTAL) BY MOUTH ONCE DAILY.    blood sugar diagnostic Strp To check BG 2 (two) times daily, to use with insurance preferred meter    blood-glucose meter kit To check BG 2 (two) times daily, to use with insurance preferred meter    citalopram (CELEXA) 10 MG tablet Take 1 tablet (10 mg total) by mouth once daily.    hydroCHLOROthiazide (HYDRODIURIL) 12.5 MG Tab Take 1 tablet (12.5 mg total) by mouth once daily.    HYDROcodone-acetaminophen (NORCO)  mg per tablet TAKE 1 TABLET BY MOUTH THREE TIMES A DAY AS NEEDED.    HYDROcodone-acetaminophen (NORCO)  mg per tablet TAKE 1 TABLET BY MOUTH THREE TIMES A DAY AS NEEDED.    lancets Misc To check BG 2 (two) times daily, to use with insurance preferred meter    losartan (COZAAR) 100 MG tablet Take 1 tablet (100 mg total) by mouth once daily.    metFORMIN (GLUCOPHAGE-XR) 500 MG 24 hr tablet Take 1 tablet (500 mg total) by mouth 2 (two) times daily with meals.    omeprazole (PRILOSEC) 20 MG capsule Take 1 capsule (20 mg total) by mouth once daily.    albuterol (PROVENTIL) 2.5 mg /3 mL (0.083 %) nebulizer solution Take 3 mLs (2.5 mg total) by nebulization every 6 (six) hours as needed for Wheezing. Rescue (Patient not taking: Reported on 12/10/2019)    fluticasone (FLONASE) 50 mcg/actuation nasal spray USE 1 SPRAY (50 MCG TOTAL) BY EACH NARE ROUTE ONCE DAILY. (Patient not taking: Reported on 12/10/2019)       Review of patient's allergies indicates:   Allergen Reactions    Cymbalta [duloxetine] Nausea And Vomiting    Penicillins      Other reaction(s): Hives        Past Medical History:   Diagnosis  Date    Anxiety     Bell's palsy     Current moderate episode of major depressive disorder without prior episode 4/1/2019    DDD (degenerative disc disease), lumbar     Diabetes mellitus     Fibromyalgia     GERD (gastroesophageal reflux disease)     Hyperlipidemia     Hypertension     Sciatica      Past Surgical History:   Procedure Laterality Date    SHOULDER SURGERY       Family History   Problem Relation Age of Onset    Arthritis Mother      Social History     Tobacco Use    Smoking status: Current Every Day Smoker     Types: Cigarettes    Smokeless tobacco: Never Used    Tobacco comment: 1/2 pk cigs a day   Substance Use Topics    Alcohol use: No    Drug use: No         OBJECTIVE:     Vital Signs (Most Recent)  Temp: 97.9 °F (36.6 °C) (12/11/19 1046)  Pulse: 64 (12/11/19 1046)  Resp: 18 (12/11/19 1046)  BP: (!) 148/87 (12/11/19 1046)  SpO2: 97 % (12/11/19 1046)    Physical Exam                                                        GENERAL:  Comfortable, in no acute distress.                                 HEENT EXAM:  Nonicteric.  No adenopathy.  Oropharynx is clear.               NECK:  Supple.                                                               LUNGS:  Clear.                                                               CARDIAC:  Regular rate and rhythm.  S1, S2.  No murmur.                      ABDOMEN:  Soft, positive bowel sounds, nontender.  No hepatosplenomegaly or masses.  No rebound or guarding.                                             EXTREMITIES:  No edema.     MENTAL STATUS:  Normal, alert and oriented.      ASSESSMENT/PLAN:     Assessment: Colorectal cancer screening    Plan: Colonoscopy    Anesthesia Plan: General    ASA Grade: ASA 2 - Patient with mild systemic disease with no functional limitations    MALLAMPATI SCORE:  I (soft palate, uvula, fauces, and tonsillar pillars visible)

## 2019-12-11 NOTE — TRANSFER OF CARE
Anesthesia Transfer of Care Note    Patient: Fred Blackwell    Procedure(s) Performed: Procedure(s) (LRB):  COLONOSCOPY (N/A)    Patient location: PACU    Anesthesia Type: general    Transport from OR: Transported from OR on room air with adequate spontaneous ventilation    Post pain: adequate analgesia    Post assessment: no apparent anesthetic complications and tolerated procedure well    Post vital signs: stable    Level of consciousness: sedated    Nausea/Vomiting: no nausea/vomiting    Complications: none    Transfer of care protocol was followed      Last vitals:   Visit Vitals  BP (!) 148/87 (BP Location: Right arm, Patient Position: Lying)   Pulse 64   Temp 36.6 °C (97.9 °F) (Skin)   Resp 18   Ht 6' (1.829 m)   Wt 108.9 kg (240 lb)   SpO2 97%   BMI 32.55 kg/m²

## 2019-12-11 NOTE — ANESTHESIA POSTPROCEDURE EVALUATION
Anesthesia Post Evaluation    Patient: Fred Blackwell    Procedure(s) Performed: Procedure(s) (LRB):  COLONOSCOPY (N/A)    Final Anesthesia Type: general    Patient location during evaluation: PACU  Patient participation: Yes- Able to Participate  Level of consciousness: awake and alert and oriented  Post-procedure vital signs: reviewed and stable  Pain management: adequate  Airway patency: patent    PONV status at discharge: No PONV  Anesthetic complications: no      Cardiovascular status: blood pressure returned to baseline and stable  Respiratory status: unassisted and spontaneous ventilation  Hydration status: euvolemic  Follow-up not needed.          Vitals Value Taken Time   /83 12/11/2019 12:17 PM   Temp 36.6 °C (97.9 °F) 12/11/2019 11:57 AM   Pulse 61 12/11/2019 12:17 PM   Resp 16 12/11/2019 12:17 PM   SpO2 98 % 12/11/2019 12:17 PM         No case tracking events are documented in the log.      Pain/Arabella Score: Arabella Score: 10 (12/11/2019 12:21 PM)

## 2019-12-11 NOTE — OP NOTE
Discharge Note  Short Stay      SUMMARY     Admit Date: 12/11/2019    Attending Physician: Mich Mota MD     Discharge Physician: Mich Mota MD    Discharge Date: 12/11/2019 11:56 AM    Final Diagnosis: Screen for colon cancer [Z12.11]    Disposition: HOME OR SELF CARE    Patient Instructions:   Current Discharge Medication List      CONTINUE these medications which have NOT CHANGED    Details   ALPRAZolam (XANAX) 1 MG tablet TAKE 1 TABLET (1 MG TOTAL) BY MOUTH 3 (THREE) TIMES DAILY AS NEEDED FOR ANXIETY.  Qty: 90 tablet, Refills: 2    Associated Diagnoses: SHANNAN (generalized anxiety disorder)      amLODIPine (NORVASC) 10 MG tablet TAKE 1 TABLET (10 MG TOTAL) BY MOUTH ONCE DAILY.  Qty: 90 tablet, Refills: 1      blood sugar diagnostic Strp To check BG 2 (two) times daily, to use with insurance preferred meter  Qty: 200 strip, Refills: 3    Associated Diagnoses: Type 2 diabetes mellitus with other specified complication, without long-term current use of insulin      blood-glucose meter kit To check BG 2 (two) times daily, to use with insurance preferred meter  Qty: 1 each, Refills: 0    Associated Diagnoses: Type 2 diabetes mellitus with other specified complication, without long-term current use of insulin      citalopram (CELEXA) 10 MG tablet Take 1 tablet (10 mg total) by mouth once daily.  Qty: 90 tablet, Refills: 3      hydroCHLOROthiazide (HYDRODIURIL) 12.5 MG Tab Take 1 tablet (12.5 mg total) by mouth once daily.  Qty: 90 tablet, Refills: 3      !! HYDROcodone-acetaminophen (NORCO)  mg per tablet TAKE 1 TABLET BY MOUTH THREE TIMES A DAY AS NEEDED.  Qty: 80 tablet, Refills: 0    Associated Diagnoses: Degenerative disc disease, lumbar      !! HYDROcodone-acetaminophen (NORCO)  mg per tablet TAKE 1 TABLET BY MOUTH THREE TIMES A DAY AS NEEDED.  Qty: 80 tablet, Refills: 0    Comments: Greater than 7 days necessary due to chronic pain  Associated Diagnoses: Degenerative disc disease,  lumbar      lancets Misc To check BG 2 (two) times daily, to use with insurance preferred meter  Qty: 200 each, Refills: 3    Associated Diagnoses: Type 2 diabetes mellitus with other specified complication, without long-term current use of insulin      losartan (COZAAR) 100 MG tablet Take 1 tablet (100 mg total) by mouth once daily.  Qty: 90 tablet, Refills: 3      metFORMIN (GLUCOPHAGE-XR) 500 MG 24 hr tablet Take 1 tablet (500 mg total) by mouth 2 (two) times daily with meals.  Qty: 180 tablet, Refills: 3      omeprazole (PRILOSEC) 20 MG capsule Take 1 capsule (20 mg total) by mouth once daily.  Qty: 90 capsule, Refills: 3      albuterol (PROVENTIL) 2.5 mg /3 mL (0.083 %) nebulizer solution Take 3 mLs (2.5 mg total) by nebulization every 6 (six) hours as needed for Wheezing. Rescue  Qty: 90 mL, Refills: 0    Associated Diagnoses: Wheezing      fluticasone (FLONASE) 50 mcg/actuation nasal spray USE 1 SPRAY (50 MCG TOTAL) BY EACH NARE ROUTE ONCE DAILY.  Qty: 16 g, Refills: 0       !! - Potential duplicate medications found. Please discuss with provider.          Discharge Procedure Orders (must include Diet, Follow-up, Activity)    Follow Up:  Follow up with PCP as previously scheduled  Resume routine diet.  Activity as tolerated.    No driving day of procedure.

## 2019-12-16 ENCOUNTER — OFFICE VISIT (OUTPATIENT)
Dept: ENDOCRINOLOGY | Facility: CLINIC | Age: 64
End: 2019-12-16
Payer: MEDICARE

## 2019-12-16 VITALS
BODY MASS INDEX: 31.89 KG/M2 | DIASTOLIC BLOOD PRESSURE: 84 MMHG | HEIGHT: 72 IN | WEIGHT: 235.44 LBS | RESPIRATION RATE: 18 BRPM | SYSTOLIC BLOOD PRESSURE: 136 MMHG | HEART RATE: 76 BPM

## 2019-12-16 DIAGNOSIS — E66.09 CLASS 1 OBESITY DUE TO EXCESS CALORIES WITHOUT SERIOUS COMORBIDITY WITH BODY MASS INDEX (BMI) OF 32.0 TO 32.9 IN ADULT: ICD-10-CM

## 2019-12-16 DIAGNOSIS — E78.5 HYPERLIPIDEMIA, UNSPECIFIED HYPERLIPIDEMIA TYPE: ICD-10-CM

## 2019-12-16 DIAGNOSIS — E11.9 TYPE 2 DIABETES MELLITUS WITHOUT COMPLICATION, WITHOUT LONG-TERM CURRENT USE OF INSULIN: Primary | ICD-10-CM

## 2019-12-16 DIAGNOSIS — F17.200 TOBACCO DEPENDENCE: ICD-10-CM

## 2019-12-16 DIAGNOSIS — I10 ESSENTIAL HYPERTENSION: ICD-10-CM

## 2019-12-16 PROBLEM — E66.811 CLASS 1 OBESITY DUE TO EXCESS CALORIES WITHOUT SERIOUS COMORBIDITY WITH BODY MASS INDEX (BMI) OF 32.0 TO 32.9 IN ADULT: Status: ACTIVE | Noted: 2019-12-16

## 2019-12-16 PROCEDURE — 99213 OFFICE O/P EST LOW 20 MIN: CPT | Mod: HCNC,S$GLB,, | Performed by: NURSE PRACTITIONER

## 2019-12-16 PROCEDURE — 99999 PR PBB SHADOW E&M-EST. PATIENT-LVL III: ICD-10-PCS | Mod: PBBFAC,HCNC,, | Performed by: NURSE PRACTITIONER

## 2019-12-16 PROCEDURE — 99213 PR OFFICE/OUTPT VISIT, EST, LEVL III, 20-29 MIN: ICD-10-PCS | Mod: HCNC,S$GLB,, | Performed by: NURSE PRACTITIONER

## 2019-12-16 PROCEDURE — 99999 PR PBB SHADOW E&M-EST. PATIENT-LVL III: CPT | Mod: PBBFAC,HCNC,, | Performed by: NURSE PRACTITIONER

## 2019-12-16 RX ORDER — ATORVASTATIN CALCIUM 20 MG/1
20 TABLET, FILM COATED ORAL DAILY
Qty: 90 TABLET | Refills: 3 | Status: ON HOLD | OUTPATIENT
Start: 2019-12-16 | End: 2020-06-03 | Stop reason: HOSPADM

## 2019-12-16 NOTE — PROGRESS NOTES
"CC: This 64 y.o. male presents for management of diabetes and chronic conditions pending review including HTN, HLP, obesity, depression, tobacco abuse     HPI: He was diagnosed with T2DM in 2019. Has never been hospitalized r/t DM.  Family hx of DM: none  His wife   2018 and his daughter has moved in with her family which is causing some stress and change in dietary habits.       Doing well since last visit, depression better controlled  Changed eating habits, increased exercise, 15 lbs weight loss  He states " I feel a lot better"   hypoglycemia at home- none     monitoring BG at home: 100-110  Diet: Eats 3  Meals a day, snacks  breakfast- egg and toast  Lunch: snack pack- nuts and cheese  Dinner: meat and vegetable   Exercise: walking 5 miles a day  CURRENT DM MEDS: metformin xr 500 mg bid  Glucometer type:  True Metrix     Standards of Care:  Eye exam: needs to schedule - he requests to schedule himslef    Retired brick layer     ROS:   Gen: Appetite good, +weight loss 15 lbs, denies fatigue and weakness.  Skin: Skin is intact and heals well, no rashes, no hair changes  Eyes: Denies visual disturbances  Resp: no SOB or POZO, no cough  Cardiac: No palpitations, chest pain, no edema   GI: No nausea or vomiting, diarrhea, constipation, or abdominal pain.  /GYN: No nocturia, burning or pain.   MS/Neuro: Denies numbness/ tingling in BLE; Gait steady, speech clear  Psych: Denies drug/ETOH abuse, + depression.  Other systems: negative.    PE:  GENERAL: Well developed, well nourished.  PSYCH: AAOx3, appropriate mood and affect, pleasant expression, conversant, appears relaxed, well groomed.   EYES: Conjunctiva, corneas clear  NECK: Supple, trachea midline  ABDOMEN: Soft, non-tender, non-distended   VASCULAR: DP pulses +2/4 bilaterally, no edema.  NEURO: Gait steady  SKIN: Skin warm and dry no acanthosis nigracans.  FOOT EXAMINATION: 9/10/19  No foot deformity, corns or callus formation, Onychomycosis, nails " long, no interspace maceration or ulceration noted.  Decreased hair growth present over toes/feet. Protective sensation intact with 10 gram monofilament.  +2 dorsalis pedis and posterior pulses noted.      Lab Results   Component Value Date    MICALBCREAT 10.0 07/30/2019       Hemoglobin A1C   Date Value Ref Range Status   12/09/2019 6.0 (H) 4.0 - 5.6 % Final     Comment:     ADA Screening Guidelines:  5.7-6.4%  Consistent with prediabetes  >or=6.5%  Consistent with diabetes  High levels of fetal hemoglobin interfere with the HbA1C  assay. Heterozygous hemoglobin variants (HbS, HgC, etc)do  not significantly interfere with this assay.   However, presence of multiple variants may affect accuracy.     07/30/2019 11.0 (H) 4.0 - 5.6 % Final     Comment:     ADA Screening Guidelines:  5.7-6.4%  Consistent with prediabetes  >or=6.5%  Consistent with diabetes  High levels of fetal hemoglobin interfere with the HbA1C  assay. Heterozygous hemoglobin variants (HbS, HgC, etc)do  not significantly interfere with this assay.   However, presence of multiple variants may affect accuracy.     03/25/2019 9.3 (H) 4.0 - 5.6 % Final     Comment:     ADA Screening Guidelines:  5.7-6.4%  Consistent with prediabetes  >or=6.5%  Consistent with diabetes  High levels of fetal hemoglobin interfere with the HbA1C  assay. Heterozygous hemoglobin variants (HbS, HgC, etc)do  not significantly interfere with this assay.   However, presence of multiple variants may affect accuracy.          ASSESSMENT and PLAN:      1. T2DM controlled  Continue metformin xr 500 mg bid    Check bg a few times a week - stagger times  Discussed A1c and BG goals.     2. HTN - controlled, continue meds as previously prescribed and monitor.      3. HLP -  Resume lipitor  recheck labs w RTC     4. Obesity-   Continue exercise, and weight loss  Body mass index is 31.93 kg/m².    5. Depression- stable, chronic- managed by PCP    6. Tobacco abuse- smokes 1 ppd, contemplating  quitting when he's ready        Follow-up: in 3 months with lab prior

## 2019-12-18 LAB
FINAL PATHOLOGIC DIAGNOSIS: NORMAL
GROSS: NORMAL

## 2019-12-23 DIAGNOSIS — F41.1 GAD (GENERALIZED ANXIETY DISORDER): ICD-10-CM

## 2019-12-23 DIAGNOSIS — M51.36 DEGENERATIVE DISC DISEASE, LUMBAR: ICD-10-CM

## 2019-12-23 NOTE — TELEPHONE ENCOUNTER
----- Message from Vianey Krunal sent at 12/23/2019 12:04 PM CST -----  Contact: pt  Type: Needs Medical Advice    Who Called:      Best Call Back Number:    Additional Information: Requesting a call back from Nurse, regarding refill for Rx ALPRAZolam (XANAX) 1 MG tablet 90 tablet and Rx HYDROcodone-acetaminophen (NORCO)  mg per tablet 80 tablet be called in Today ,please call

## 2019-12-26 DIAGNOSIS — M51.36 DEGENERATIVE DISC DISEASE, LUMBAR: ICD-10-CM

## 2019-12-26 RX ORDER — ALPRAZOLAM 1 MG/1
1 TABLET ORAL 3 TIMES DAILY PRN
Qty: 90 TABLET | Refills: 2 | Status: SHIPPED | OUTPATIENT
Start: 2019-12-26 | End: 2020-04-14

## 2019-12-26 RX ORDER — HYDROCODONE BITARTRATE AND ACETAMINOPHEN 10; 325 MG/1; MG/1
1 TABLET ORAL 3 TIMES DAILY PRN
Qty: 80 TABLET | Refills: 0 | Status: SHIPPED | OUTPATIENT
Start: 2019-12-26 | End: 2020-01-24

## 2019-12-26 RX ORDER — HYDROCODONE BITARTRATE AND ACETAMINOPHEN 10; 325 MG/1; MG/1
TABLET ORAL
Qty: 80 TABLET | Refills: 0 | OUTPATIENT
Start: 2019-12-26

## 2019-12-26 NOTE — TELEPHONE ENCOUNTER
----- Message from Svitlana SERGO Carmen sent at 12/26/2019 10:34 AM CST -----  Contact: self  Type:  RX Refill Request    Who Called:  self  Refill or New Rx:  refill  RX Name and Strength:  ALPRAZolam (XANAX) 1 MG tablet; HYDROcodone-acetaminophen (NORCO)  mg per tablet  How is the patient currently taking it? (ex. 1XDay):    Is this a 30 day or 90 day RX:  30  Preferred Pharmacy with phone number:    [facility] - [place], [place] - [address] [place] [id number]  Phone: [phone] Fax: [phone]  Local or Mail Order:  local  Ordering Provider:  Dr Waldemar Spear Call Back Number:  [phone]  Additional Information:  Patient left a message on Monday 12/23/19. Patient is due on 11/29/19. Please call patient to advise. Thanks!

## 2020-01-23 DIAGNOSIS — M51.36 DEGENERATIVE DISC DISEASE, LUMBAR: ICD-10-CM

## 2020-01-23 NOTE — TELEPHONE ENCOUNTER
No answer. Unable to leave a message. Pt has refills on Xanax. Will need refill on hydrocodone. Please advise.

## 2020-01-23 NOTE — PROGRESS NOTES
Refill Routing Note     Medication(s) are not appropriate for processing by [facility]:    Medication Outside of Protocol    Appointments  past 12m or future 3m with PCP    Date Provider   Last Visit   8/5/2019 MARITZA Bahena MD   Next Visit   3/2/2020 MARITZA Bahena MD           Automatic Epic Protocol Generated Data:    Requested Prescriptions   Pending Prescriptions Disp Refills    HYDROcodone-acetaminophen (NORCO)  mg per tablet [Pharmacy Med Name: HYDROCODONE-APAP  TAB  TAB] 80 tablet 0     Sig: TAKE 1 TABLET BY MOUTH 3 (THREE) TIMES DAILY AS NEEDED.       Narcotics Refill Protocol Failed - 1/23/2020  9:17 AM        Failed - Patient seen within 3 months     Last visit with MARITZA Bahena MD: 8/5/2019  Last visit in [facility]: 8/5/2019    Patient's next visit in [facility]: 3/2/2020           Failed - Med not refilled within 4 weeks

## 2020-01-23 NOTE — TELEPHONE ENCOUNTER
----- Message from Sherwin Saravia sent at 1/23/2020  8:21 AM CST -----  Contact: self   patient need a refill on xanex and pain meds please send to [facility], any questions please call back at [phone] (home)     Case number [id number]     [facility] - [place], [place] - [address] [facility] [address]  [address] [address]  Phone: [phone] Fax: [phone]

## 2020-01-24 RX ORDER — HYDROCODONE BITARTRATE AND ACETAMINOPHEN 10; 325 MG/1; MG/1
1 TABLET ORAL 3 TIMES DAILY PRN
Qty: 80 TABLET | Refills: 0 | OUTPATIENT
Start: 2020-01-24

## 2020-01-24 RX ORDER — HYDROCODONE BITARTRATE AND ACETAMINOPHEN 10; 325 MG/1; MG/1
1 TABLET ORAL 3 TIMES DAILY PRN
Qty: 80 TABLET | Refills: 0 | Status: SHIPPED | OUTPATIENT
Start: 2020-01-24 | End: 2020-02-26 | Stop reason: SDUPTHER

## 2020-01-24 NOTE — TELEPHONE ENCOUNTER
Needs to see care team member next week to document med is helping; keep f/u with me after that and will need to see me or care team member every 3 months or can not refill in future

## 2020-01-29 ENCOUNTER — OFFICE VISIT (OUTPATIENT)
Dept: FAMILY MEDICINE | Facility: CLINIC | Age: 65
End: 2020-01-29
Payer: MEDICARE

## 2020-01-29 ENCOUNTER — TELEPHONE (OUTPATIENT)
Dept: FAMILY MEDICINE | Facility: CLINIC | Age: 65
End: 2020-01-29

## 2020-01-29 VITALS
WEIGHT: 239.44 LBS | HEART RATE: 68 BPM | SYSTOLIC BLOOD PRESSURE: 136 MMHG | BODY MASS INDEX: 32.43 KG/M2 | HEIGHT: 72 IN | DIASTOLIC BLOOD PRESSURE: 82 MMHG

## 2020-01-29 DIAGNOSIS — M54.31 SCIATICA OF RIGHT SIDE: Primary | ICD-10-CM

## 2020-01-29 DIAGNOSIS — E11.69 TYPE 2 DIABETES MELLITUS WITH OTHER SPECIFIED COMPLICATION, WITHOUT LONG-TERM CURRENT USE OF INSULIN: ICD-10-CM

## 2020-01-29 PROCEDURE — 3044F PR MOST RECENT HEMOGLOBIN A1C LEVEL <7.0%: ICD-10-PCS | Mod: HCNC,CPTII,S$GLB, | Performed by: PHYSICIAN ASSISTANT

## 2020-01-29 PROCEDURE — 99499 UNLISTED E&M SERVICE: CPT | Mod: HCNC,S$GLB,, | Performed by: PHYSICIAN ASSISTANT

## 2020-01-29 PROCEDURE — 99214 PR OFFICE/OUTPT VISIT, EST, LEVL IV, 30-39 MIN: ICD-10-PCS | Mod: HCNC,S$GLB,, | Performed by: PHYSICIAN ASSISTANT

## 2020-01-29 PROCEDURE — 99499 RISK ADDL DX/OHS AUDIT: ICD-10-PCS | Mod: HCNC,S$GLB,, | Performed by: PHYSICIAN ASSISTANT

## 2020-01-29 PROCEDURE — 3075F PR MOST RECENT SYSTOLIC BLOOD PRESS GE 130-139MM HG: ICD-10-PCS | Mod: HCNC,CPTII,S$GLB, | Performed by: PHYSICIAN ASSISTANT

## 2020-01-29 PROCEDURE — 3079F DIAST BP 80-89 MM HG: CPT | Mod: HCNC,CPTII,S$GLB, | Performed by: PHYSICIAN ASSISTANT

## 2020-01-29 PROCEDURE — 3044F HG A1C LEVEL LT 7.0%: CPT | Mod: HCNC,CPTII,S$GLB, | Performed by: PHYSICIAN ASSISTANT

## 2020-01-29 PROCEDURE — 99999 PR PBB SHADOW E&M-EST. PATIENT-LVL III: ICD-10-PCS | Mod: PBBFAC,HCNC,, | Performed by: PHYSICIAN ASSISTANT

## 2020-01-29 PROCEDURE — 3075F SYST BP GE 130 - 139MM HG: CPT | Mod: HCNC,CPTII,S$GLB, | Performed by: PHYSICIAN ASSISTANT

## 2020-01-29 PROCEDURE — 3008F BODY MASS INDEX DOCD: CPT | Mod: HCNC,CPTII,S$GLB, | Performed by: PHYSICIAN ASSISTANT

## 2020-01-29 PROCEDURE — 99999 PR PBB SHADOW E&M-EST. PATIENT-LVL III: CPT | Mod: PBBFAC,HCNC,, | Performed by: PHYSICIAN ASSISTANT

## 2020-01-29 PROCEDURE — 3008F PR BODY MASS INDEX (BMI) DOCUMENTED: ICD-10-PCS | Mod: HCNC,CPTII,S$GLB, | Performed by: PHYSICIAN ASSISTANT

## 2020-01-29 PROCEDURE — 3079F PR MOST RECENT DIASTOLIC BLOOD PRESSURE 80-89 MM HG: ICD-10-PCS | Mod: HCNC,CPTII,S$GLB, | Performed by: PHYSICIAN ASSISTANT

## 2020-01-29 PROCEDURE — 99214 OFFICE O/P EST MOD 30 MIN: CPT | Mod: HCNC,S$GLB,, | Performed by: PHYSICIAN ASSISTANT

## 2020-01-29 NOTE — PROGRESS NOTES
Subjective:       Patient ID: Fred Blackwell is a 64 y.o. male.    Chief Complaint: Follow-up (refills for Josef )    Back Pain   This is a recurrent problem. The current episode started more than 1 year ago. The problem occurs intermittently. The problem has been waxing and waning since onset. The pain is present in the lumbar spine. The quality of the pain is described as shooting. The pain radiates to the right thigh. The pain is moderate. The symptoms are aggravated by position. Pertinent negatives include no abdominal pain, bladder incontinence, bowel incontinence, chest pain, fever, numbness, paresthesias or perianal numbness. He has tried analgesics for the symptoms.     Past Medical History:   Diagnosis Date    Anxiety     Bell's palsy     Current moderate episode of major depressive disorder without prior episode 4/1/2019    DDD (degenerative disc disease), lumbar     Diabetes mellitus     Fibromyalgia     GERD (gastroesophageal reflux disease)     Hyperlipidemia     Hypertension     Sciatica        Review of Systems   Constitutional: Negative for chills, fatigue and fever.   Respiratory: Negative for chest tightness and shortness of breath.    Cardiovascular: Negative for chest pain.   Gastrointestinal: Negative for abdominal pain and bowel incontinence.   Genitourinary: Negative for bladder incontinence.   Musculoskeletal: Positive for back pain.   Neurological: Negative for numbness and paresthesias.       Objective:      Physical Exam   Constitutional: He appears well-developed and well-nourished. No distress.   Neck: Neck supple.   Cardiovascular: Normal rate and regular rhythm.   Pulmonary/Chest: Effort normal and breath sounds normal.   Musculoskeletal:        Lumbar back: He exhibits decreased range of motion and tenderness.        Back:    Lymphadenopathy:     He has no cervical adenopathy.   Skin: He is not diaphoretic.   Nursing note and vitals reviewed.      Lab Results    Component Value Date    WBC 8.10 07/30/2019    HGB 15.9 07/30/2019    HCT 47.9 07/30/2019    MCV 86 07/30/2019     07/30/2019     CMP  Sodium   Date Value Ref Range Status   12/09/2019 145 136 - 145 mmol/L Final     Potassium   Date Value Ref Range Status   12/09/2019 3.4 (L) 3.5 - 5.1 mmol/L Final     Chloride   Date Value Ref Range Status   12/09/2019 103 95 - 110 mmol/L Final     CO2   Date Value Ref Range Status   12/09/2019 33 (H) 23 - 29 mmol/L Final     Glucose   Date Value Ref Range Status   12/09/2019 114 (H) 70 - 110 mg/dL Final     BUN, Bld   Date Value Ref Range Status   12/09/2019 15 8 - 23 mg/dL Final     Creatinine   Date Value Ref Range Status   12/09/2019 0.8 0.5 - 1.4 mg/dL Final     Calcium   Date Value Ref Range Status   12/09/2019 9.0 8.7 - 10.5 mg/dL Final     Total Protein   Date Value Ref Range Status   12/09/2019 7.3 6.0 - 8.4 g/dL Final     Albumin   Date Value Ref Range Status   12/09/2019 3.8 3.5 - 5.2 g/dL Final     Total Bilirubin   Date Value Ref Range Status   12/09/2019 0.7 0.1 - 1.0 mg/dL Final     Comment:     For infants and newborns, interpretation of results should be based  on gestational age, weight and in agreement with clinical  observations.  Premature Infant recommended reference ranges:  Up to 24 hours.............<8.0 mg/dL  Up to 48 hours............<12.0 mg/dL  3-5 days..................<15.0 mg/dL  6-29 days.................<15.0 mg/dL       Alkaline Phosphatase   Date Value Ref Range Status   12/09/2019 56 55 - 135 U/L Final     AST   Date Value Ref Range Status   12/09/2019 16 10 - 40 U/L Final     ALT   Date Value Ref Range Status   12/09/2019 12 10 - 44 U/L Final     Anion Gap   Date Value Ref Range Status   12/09/2019 9 8 - 16 mmol/L Final     eGFR if    Date Value Ref Range Status   12/09/2019 >60.0 >60 mL/min/1.73 m^2 Final     eGFR if non    Date Value Ref Range Status   12/09/2019 >60.0 >60 mL/min/1.73 m^2 Final      Comment:     Calculation used to obtain the estimated glomerular filtration  rate (eGFR) is the CKD-EPI equation.        Lab Results   Component Value Date    CHOL 202 (H) 12/09/2019     Lab Results   Component Value Date    HDL 45 12/09/2019     Lab Results   Component Value Date    LDLCALC 142.6 12/09/2019     Lab Results   Component Value Date    TRIG 72 12/09/2019     Lab Results   Component Value Date    CHOLHDL 22.3 12/09/2019     Lab Results   Component Value Date    HGBA1C 6.0 (H) 12/09/2019     Assessment:       1. Sciatica of right side    2. Type 2 diabetes mellitus with other specified complication, without long-term current use of insulin        Plan:       Sciatica of right side  -     Ambulatory Referral to [facility]    Type 2 diabetes mellitus with other specified complication, without long-term current use of insulin  -     Ambulatory referral to Optometry

## 2020-01-29 NOTE — TELEPHONE ENCOUNTER
----- Message from Candie Chatterjee MA sent at 1/29/2020 10:21 AM CST -----  Contact: patient   Patient will call back to schedule referral appointments       Please call patient to advice [phone] (home)

## 2020-02-07 DIAGNOSIS — E11.9 TYPE 2 DIABETES MELLITUS WITHOUT COMPLICATION, UNSPECIFIED WHETHER LONG TERM INSULIN USE: ICD-10-CM

## 2020-02-17 ENCOUNTER — PATIENT OUTREACH (OUTPATIENT)
Dept: ADMINISTRATIVE | Facility: HOSPITAL | Age: 65
End: 2020-02-17

## 2020-02-17 NOTE — PROGRESS NOTES
Chart review completed 02/17/2020.  Care Everywhere updates requested and reviewed.  Immunizations reconciled. Media reviewed.

## 2020-02-18 RX ORDER — AMLODIPINE BESYLATE 10 MG/1
TABLET ORAL
Qty: 90 TABLET | Refills: 3 | Status: ON HOLD | OUTPATIENT
Start: 2020-02-18 | End: 2020-06-03 | Stop reason: HOSPADM

## 2020-02-18 NOTE — PROGRESS NOTES
[facility]  Endoscopy Note    Patient: Shannon Hernandez  : 1951  Acct#: [de-identified]    Procedure: Esophagogastroduodenoscopy with biopsy    Date:  2018     Surgeon:  Tee Lerner MD    Referring Physician:  Chad Preciado    Preoperative Diagnosis:  cirrhosis    Postoperative Diagnosis:  Portal gastropathy and grade 1-2 esophageal varices. Anesthesia: see anesthesia note. Indications: This is a 77y.o. year old male who presents today with Screening for esophageal varices. Description of Procedure:  Informed consent was obtained from the patient after explanation of indications, benefits and possible risks and complications of the procedure. The patient was then taken to the endoscopy suite, placed in the left lateral decubitus position and the above IV sedation was administrered. The Olympus videoendoscope was placed in the patient's mouth and under direct visualization passed into the esophagus. Visualization of the esophagus demonstrated grade 1-2 esophageal varices without stigmata of hemorrhage. No banding was performed. .     The scope was then advanced into the stomach. Visualization of the gastric body and antrum demonstrated portal gastropathy. The antrum was biopsied. .  A retroflexed exam of the gastric cardia and fundus demonstrated gastritis. .  The pylorus was patent and the scope was advanced into the duodenum. Visualization of the duodenal bulb demonstrated normal..  The second portion of the duodenum demonstrated normal..    The scope was then withdrawn back into the stomach, it was decompressed, and the scope was completely withdrawn. The patient tolerated the procedure well and was taken to the post anesthesia care unit in good condition. Estimated Blood loss:  none    Impression: Grade 1-2 esophageal varices with portal gastropathy. Recommendations:Await pathology. Stop alcohol. See in office in 2 weeks.     Tee Lerner MD  [facility] Refill Authorization Note     is requesting a refill authorization.    Brief assessment and rationale for refill: APPROVE: prr               Medication reconciliation completed: No                         Comments:   Requested Prescriptions   Pending Prescriptions Disp Refills    amLODIPine (NORVASC) 10 MG tablet [Pharmacy Med Name: AMLODIPINE BESYLATE 10 MG T 10 TAB] 90 tablet 3     Sig: TAKE 1 TABLET (10 MG TOTAL) BY MOUTH ONCE A DAY.       Cardiovascular:  Calcium Channel Blockers Passed - 2/14/2020 10:22 AM        Passed - Patient is at least 18 years old        Passed - Last BP in normal range within 360 days.     BP Readings from Last 3 Encounters:   01/29/20 136/82   12/16/19 136/84   12/11/19 130/83              Passed - Office visit in past 12 months or future 90 days.     Recent Outpatient Visits            2 weeks ago Sciatica of right side    [facility] Chuy Loredo III, PA-C    2 months ago Type 2 diabetes mellitus without complication, without long-term current use of insulin    [facility] Anna Negron DNP, NP    5 months ago Type 2 diabetes mellitus with hyperglycemia, without long-term current use of insulin    [facility] Anna Negron DNP, NP    6 months ago Type 2 diabetes mellitus with other specified complication, without long-term current use of insulin    [facility] MARITZA Bahena MD    10 months ago Type 2 diabetes mellitus with other specified complication, without long-term current use of insulin    [facility] MARITZA Bahena MD          Future Appointments              In 1 week MARITZA Bahena MD [facility]    In 3 months URINE [facility] [place]    In 3 months LAB, [place] [facility] [place]    In 3 months Anna Negron DNP, NP [facility]                 Appointments   past 12m or future 3m with PCP    Date Provider   Last Visit   8/5/2019 MARITZA Bahena MD   Next Visit   3/2/2020 MARITZA Bahena MD   .  ED visits in past 90 days: 0       Note composed:11:44 AM 02/18/2020

## 2020-02-21 DIAGNOSIS — M51.36 DEGENERATIVE DISC DISEASE, LUMBAR: ICD-10-CM

## 2020-02-24 DIAGNOSIS — M51.36 DEGENERATIVE DISC DISEASE, LUMBAR: ICD-10-CM

## 2020-02-24 RX ORDER — HYDROCODONE BITARTRATE AND ACETAMINOPHEN 10; 325 MG/1; MG/1
1 TABLET ORAL 3 TIMES DAILY PRN
Qty: 80 TABLET | Refills: 0 | Status: CANCELLED | OUTPATIENT
Start: 2020-02-24

## 2020-02-24 NOTE — PROGRESS NOTES
Refill Routing Note     Medication(s) are not appropriate for processing by [facility]:    Medication Outside of Protocol    Appointments  past 12m or future 3m with PCP    Date Provider   Last Visit   8/5/2019 MARITZA Bahena MD   Next Visit   3/10/2020 MARITZA Bahena MD           Automatic Epic Protocol Generated Data:    Requested Prescriptions   Pending Prescriptions Disp Refills    HYDROcodone-acetaminophen (NORCO)  mg per tablet [Pharmacy Med Name: HYDROCODONE-APAP 10/325 MG  TAB] 80 tablet 0     Sig: TAKE 1 TABLET BY MOUTH THREE TIMES DAILY AS NEEDED.       Narcotics Refill Protocol Failed - 2/21/2020  5:46 PM        Failed - Med not refilled within 4 weeks        Passed - Patient seen within 3 months     Last visit with MARITZA Bahena MD: 8/5/2019  Last visit in [facility]: 1/29/2020    Patient's next visit in [facility]: 3/10/2020              Note composed:8:11 AM 02/24/2020

## 2020-02-26 DIAGNOSIS — M51.36 DEGENERATIVE DISC DISEASE, LUMBAR: ICD-10-CM

## 2020-02-27 RX ORDER — HYDROCODONE BITARTRATE AND ACETAMINOPHEN 10; 325 MG/1; MG/1
1 TABLET ORAL 3 TIMES DAILY PRN
Qty: 80 TABLET | Refills: 0 | Status: SHIPPED | OUTPATIENT
Start: 2020-02-27 | End: 2020-04-22 | Stop reason: SDUPTHER

## 2020-02-27 NOTE — TELEPHONE ENCOUNTER
----- Message from Vianey Krunal sent at 2/27/2020  4:46 PM CST -----  Contact: pt  Type: Needs Medical Advice    Who Called:      Best Call Back Number:       Additional Information: Requesting a call back from Nurse, regarding a refill for Rx HYDROcodone-acetaminophen (NORCO)  mg per tablet 80 tablet  Pt has been calling since last week and still no response ,please call in and call when send over per pt request

## 2020-02-28 RX ORDER — HYDROCODONE BITARTRATE AND ACETAMINOPHEN 10; 325 MG/1; MG/1
TABLET ORAL
Qty: 80 TABLET | Refills: 0 | Status: SHIPPED | OUTPATIENT
Start: 2020-02-28 | End: 2020-04-20 | Stop reason: SDUPTHER

## 2020-03-10 ENCOUNTER — OFFICE VISIT (OUTPATIENT)
Dept: FAMILY MEDICINE | Facility: CLINIC | Age: 65
End: 2020-03-10
Payer: MEDICARE

## 2020-03-10 VITALS
WEIGHT: 244.94 LBS | SYSTOLIC BLOOD PRESSURE: 120 MMHG | TEMPERATURE: 98 F | BODY MASS INDEX: 33.18 KG/M2 | OXYGEN SATURATION: 98 % | HEIGHT: 72 IN | HEART RATE: 67 BPM | DIASTOLIC BLOOD PRESSURE: 84 MMHG

## 2020-03-10 DIAGNOSIS — I70.0 AORTOILIAC STENOSIS: ICD-10-CM

## 2020-03-10 DIAGNOSIS — E78.5 HYPERLIPIDEMIA, UNSPECIFIED HYPERLIPIDEMIA TYPE: ICD-10-CM

## 2020-03-10 DIAGNOSIS — Z00.00 WELLNESS EXAMINATION: Primary | ICD-10-CM

## 2020-03-10 DIAGNOSIS — I10 ESSENTIAL HYPERTENSION: ICD-10-CM

## 2020-03-10 DIAGNOSIS — F32.1 CURRENT MODERATE EPISODE OF MAJOR DEPRESSIVE DISORDER WITHOUT PRIOR EPISODE: ICD-10-CM

## 2020-03-10 DIAGNOSIS — E11.69 TYPE 2 DIABETES MELLITUS WITH OTHER SPECIFIED COMPLICATION, WITHOUT LONG-TERM CURRENT USE OF INSULIN: ICD-10-CM

## 2020-03-10 PROBLEM — Z12.11 SCREEN FOR COLON CANCER: Status: RESOLVED | Noted: 2019-12-11 | Resolved: 2020-03-10

## 2020-03-10 PROBLEM — F13.20 ANXIOLYTIC DEPENDENCE: Status: RESOLVED | Noted: 2017-07-20 | Resolved: 2020-03-10

## 2020-03-10 PROBLEM — E11.9 TYPE 2 DIABETES MELLITUS, WITHOUT LONG-TERM CURRENT USE OF INSULIN: Status: RESOLVED | Noted: 2017-11-01 | Resolved: 2020-03-10

## 2020-03-10 PROBLEM — F11.20 UNCOMPLICATED OPIOID DEPENDENCE: Status: RESOLVED | Noted: 2017-07-20 | Resolved: 2020-03-10

## 2020-03-10 PROCEDURE — 3079F PR MOST RECENT DIASTOLIC BLOOD PRESSURE 80-89 MM HG: ICD-10-PCS | Mod: HCNC,CPTII,S$GLB, | Performed by: FAMILY MEDICINE

## 2020-03-10 PROCEDURE — 3074F SYST BP LT 130 MM HG: CPT | Mod: HCNC,CPTII,S$GLB, | Performed by: FAMILY MEDICINE

## 2020-03-10 PROCEDURE — 99499 UNLISTED E&M SERVICE: CPT | Mod: HCNC,S$GLB,, | Performed by: FAMILY MEDICINE

## 2020-03-10 PROCEDURE — 3044F PR MOST RECENT HEMOGLOBIN A1C LEVEL <7.0%: ICD-10-PCS | Mod: HCNC,CPTII,S$GLB, | Performed by: FAMILY MEDICINE

## 2020-03-10 PROCEDURE — 99999 PR PBB SHADOW E&M-EST. PATIENT-LVL IV: CPT | Mod: PBBFAC,HCNC,, | Performed by: FAMILY MEDICINE

## 2020-03-10 PROCEDURE — 3079F DIAST BP 80-89 MM HG: CPT | Mod: HCNC,CPTII,S$GLB, | Performed by: FAMILY MEDICINE

## 2020-03-10 PROCEDURE — 3044F HG A1C LEVEL LT 7.0%: CPT | Mod: HCNC,CPTII,S$GLB, | Performed by: FAMILY MEDICINE

## 2020-03-10 PROCEDURE — 99999 PR PBB SHADOW E&M-EST. PATIENT-LVL IV: ICD-10-PCS | Mod: PBBFAC,HCNC,, | Performed by: FAMILY MEDICINE

## 2020-03-10 PROCEDURE — 99396 PREV VISIT EST AGE 40-64: CPT | Mod: HCNC,S$GLB,, | Performed by: FAMILY MEDICINE

## 2020-03-10 PROCEDURE — 99396 PR PREVENTIVE VISIT,EST,40-64: ICD-10-PCS | Mod: HCNC,S$GLB,, | Performed by: FAMILY MEDICINE

## 2020-03-10 PROCEDURE — 3074F PR MOST RECENT SYSTOLIC BLOOD PRESSURE < 130 MM HG: ICD-10-PCS | Mod: HCNC,CPTII,S$GLB, | Performed by: FAMILY MEDICINE

## 2020-03-10 PROCEDURE — 99499 RISK ADDL DX/OHS AUDIT: ICD-10-PCS | Mod: HCNC,S$GLB,, | Performed by: FAMILY MEDICINE

## 2020-03-10 RX ORDER — LOSARTAN POTASSIUM AND HYDROCHLOROTHIAZIDE 12.5; 1 MG/1; MG/1
TABLET ORAL
COMMUNITY
Start: 2020-02-14 | End: 2020-06-01 | Stop reason: CLARIF

## 2020-03-10 NOTE — PROGRESS NOTES
Subjective:       Patient ID: Fred Blackwell is a 64 y.o. male.    Chief Complaint: Diabetes (follow up)    Here for wellness and f/u dm II. Doing well overall. Eating better and lost 40 lbs.    Hypertension   This is a chronic problem. The current episode started more than 1 year ago. The problem is controlled. Pertinent negatives include no chest pain, palpitations or shortness of breath.   Hyperlipidemia   This is a chronic problem. The current episode started more than 1 year ago. The problem is controlled. Pertinent negatives include no chest pain or shortness of breath.   Diabetes   He presents for his follow-up diabetic visit. He has type 2 diabetes mellitus. His disease course has been stable. Pertinent negatives for hypoglycemia include no nervousness/anxiousness. Pertinent negatives for diabetes include no chest pain.     Review of Systems   Constitutional: Negative for chills and fever.   Respiratory: Negative for cough, chest tightness and shortness of breath.    Cardiovascular: Negative for chest pain, palpitations and leg swelling.   Endocrine: Negative for cold intolerance and heat intolerance.   Psychiatric/Behavioral: Negative for decreased concentration. The patient is not nervous/anxious.        Objective:      Physical Exam   Constitutional: He appears well-developed and well-nourished.   HENT:   Head: Normocephalic and atraumatic.   Cardiovascular: Normal rate, regular rhythm and normal heart sounds.   Pulmonary/Chest: Effort normal and breath sounds normal.   Psychiatric: He has a normal mood and affect.   Nursing note and vitals reviewed.      Assessment:       1. Wellness examination    2. Current moderate episode of major depressive disorder without prior episode    3. Aortoiliac stenosis    4. Type 2 diabetes mellitus with other specified complication, without long-term current use of insulin    5. Essential hypertension    6. Hyperlipidemia, unspecified hyperlipidemia type         Plan:       Wellness examination    Current moderate episode of major depressive disorder without prior episode    Aortoiliac stenosis    Type 2 diabetes mellitus with other specified complication, without long-term current use of insulin    Essential hypertension    Hyperlipidemia, unspecified hyperlipidemia type        Great improvement from 11 to 6 with hga1c  Ok in  database; goal of weaning meds  htn stable  Lipid improved  Will monitor chronic medical issues and continue current plan of care.    Follow up in about 3 months (around 6/10/2020), or if symptoms worsen or fail to improve.

## 2020-03-25 RX ORDER — FLUTICASONE PROPIONATE 50 MCG
SPRAY, SUSPENSION (ML) NASAL
Qty: 16 G | Refills: 0 | Status: SHIPPED | OUTPATIENT
Start: 2020-03-25 | End: 2020-09-02 | Stop reason: SDUPTHER

## 2020-04-13 DIAGNOSIS — F41.1 GAD (GENERALIZED ANXIETY DISORDER): ICD-10-CM

## 2020-04-13 NOTE — PROGRESS NOTES
Refill Routing Note     Medication(s) are appropriate for refill:    Medication Outside of Protocol    Appointments  past 15m or future 3m with PCP    Date Provider   Last Visit   3/10/2020 MARITZA Bahena MD   Next Visit   Visit date not found MARITZA Bahena MD       Automatic Epic Protocol Generated Data:    Requested Prescriptions   Pending Prescriptions Disp Refills    ALPRAZolam (XANAX) 1 MG tablet [Pharmacy Med Name: ALPRAZOLAM 1 MG TABS 1 TAB] 90 tablet 2     Sig: TAKE 1 TABLET (1 MG TOTAL) BY MOUTH 3 (THREE) TIMES DAILY AS NEEDED FOR ANXIETY.       There is no refill protocol information for this order           Note created:3:17 PM 04/13/2020

## 2020-04-14 RX ORDER — ALPRAZOLAM 1 MG/1
1 TABLET ORAL 3 TIMES DAILY PRN
Qty: 90 TABLET | Refills: 2 | Status: SHIPPED | OUTPATIENT
Start: 2020-04-14 | End: 2020-06-05 | Stop reason: SDUPTHER

## 2020-04-20 DIAGNOSIS — M51.36 DEGENERATIVE DISC DISEASE, LUMBAR: ICD-10-CM

## 2020-04-22 DIAGNOSIS — M51.36 DEGENERATIVE DISC DISEASE, LUMBAR: ICD-10-CM

## 2020-04-22 RX ORDER — HYDROCODONE BITARTRATE AND ACETAMINOPHEN 10; 325 MG/1; MG/1
1 TABLET ORAL 3 TIMES DAILY PRN
Qty: 80 TABLET | Refills: 0 | Status: SHIPPED | OUTPATIENT
Start: 2020-04-22 | End: 2020-06-01 | Stop reason: CLARIF

## 2020-04-22 NOTE — TELEPHONE ENCOUNTER
----- Message from Gina Lambert sent at 4/22/2020  9:36 AM CDT -----  Type:  RX Refill Request    Who Called:  Patient  Refill or New Rx:  Refill  RX Name and Strength:  HYDROcodone-acetaminophen (NORCO)  mg per tablet   How is the patient currently taking it? (ex. 1XDay):  3xday  Is this a 30 day or 90 day RX:  80 pills  Preferred Pharmacy with phone number:    [facility] [address]  [place] [address]  Phone: [phone] Fax: [phone]    [facility] - [place], [place] - [address]  [place] [address]  Phone: [phone] Fax: [phone]  Local or Mail Order:  local  Ordering Provider:  same  Best Call Back Number:  [phone]  Additional Information:  Called 3 days ago for this refill, as well as the pharmacy. He is completely out of medication. Please call to advise.

## 2020-04-24 RX ORDER — CITALOPRAM 10 MG/1
10 TABLET ORAL DAILY
Qty: 90 TABLET | Refills: 3 | OUTPATIENT
Start: 2020-04-24

## 2020-04-24 RX ORDER — HYDROCODONE BITARTRATE AND ACETAMINOPHEN 10; 325 MG/1; MG/1
TABLET ORAL
Qty: 80 TABLET | Refills: 0 | OUTPATIENT
Start: 2020-04-24

## 2020-04-24 NOTE — PROGRESS NOTES
Quick DC. Duplicate Request   Refill Authorization Note    is requesting a refill authorization.    Brief assessment and rationale for refill: Quick DC: approved in future encounter                                          Comments:   Pended Medication(s)   Requested Prescriptions     Pending Prescriptions Disp Refills    citalopram (CELEXA) 10 MG tablet [Pharmacy Med Name: CITALOPRAM HBR 10 MG TABLET 10 TAB] 90 tablet 3     Sig: TAKE 1 TABLET (10 MG TOTAL) BY MOUTH ONCE DAILY.        Duplicate Pended Encounter(s)/ Last Prescribed Details:    citalopram (CELEXA) 10 MG tablet 90 tablet 3 4/24/2020     Sig - Route: Take 1 tablet (10 mg total) by mouth once daily. - Oral    Sent to pharmacy as: citalopram (CELEXA) 10 MG tablet    E-Prescribing Status: Receipt confirmed by pharmacy (4/24/2020  9:10 AM CDT)    Ordering Encounter Report     Associated Reports   View Encounter              Note composed:11:16 AM 04/24/2020

## 2020-05-05 ENCOUNTER — PATIENT MESSAGE (OUTPATIENT)
Dept: ADMINISTRATIVE | Facility: HOSPITAL | Age: 65
End: 2020-05-05

## 2020-05-18 DIAGNOSIS — M51.36 DEGENERATIVE DISC DISEASE, LUMBAR: ICD-10-CM

## 2020-05-18 RX ORDER — HYDROCODONE BITARTRATE AND ACETAMINOPHEN 10; 325 MG/1; MG/1
1 TABLET ORAL 3 TIMES DAILY PRN
Qty: 80 TABLET | Refills: 0 | Status: SHIPPED | OUTPATIENT
Start: 2020-05-18 | End: 2020-06-11 | Stop reason: SDUPTHER

## 2020-06-01 ENCOUNTER — NURSE TRIAGE (OUTPATIENT)
Dept: ADMINISTRATIVE | Facility: CLINIC | Age: 65
End: 2020-06-01

## 2020-06-01 PROBLEM — I21.4 NSTEMI (NON-ST ELEVATED MYOCARDIAL INFARCTION): Status: ACTIVE | Noted: 2020-06-01

## 2020-06-01 NOTE — TELEPHONE ENCOUNTER
Pt daughter states pt has been having CP x 8 days, pt c/o intermitted SOB x 3 days.  Pt advise per protocol and verbalized understanding. Care advise not given and dispo. Not complete due to connection issues.     Reason for Disposition   Pain also present in shoulder(s) or arm(s) or jaw   Unable to complete triage due to phone connection issues    Additional Information   Negative: Severe difficulty breathing (e.g., struggling for each breath, speaks in single words)   Negative: Passed out (i.e., fainted, collapsed and was not responding)   Negative: Chest pain lasting longer than 5 minutes and ANY of the following:* Over 50 years old* Over 30 years old and at least one cardiac risk factor (i.e., high blood pressure, diabetes, high cholesterol, obesity, smoker or strong family history of heart disease)* Pain is crushing, pressure-like, or heavy * Took nitroglycerin and chest pain was not relieved* History of heart disease (i.e., angina, heart attack, bypass surgery, angioplasty, CHF)   Negative: Visible sweat on face or sweat dripping down face   Negative: SEVERE chest pain   Negative: Caller has cancelled the call before the first contact   Negative: Patient already left for the hospital/clinic.   Negative: Pager number given.  Answering service notified.   Negative: Cell phone out of range.  Phone number verified.   Negative: Second attempt to contact family AND no contact made.  Phone number verified.   Negative: Wrong number reached.  Phone number verified.   Negative: Message left with person in household.   Negative: Message left on unidentified voice mail.  Phone number verified.   Negative: Message left on identified voice mail   Negative: No answer.  First attempt to contact caller.  Follow-up call scheduled within 15 minutes.   Negative: Caller has already spoken with the PCP and has no further questions.   Negative: Caller has already spoken with another triager and has no further  questions.   Negative: Caller has already spoken with another triager or PCP AND has further questions AND triager able to answer questions.   Negative: Caller is angry or rude (e.g., hangs up, verbally abusive, yelling)   Negative: Caller hangs up    Protocols used: CHEST PAIN-A-OH, NO CONTACT OR DUPLICATE CONTACT CALL-A-AH

## 2020-06-02 PROBLEM — G51.0 RIGHT-SIDED BELL'S PALSY: Status: RESOLVED | Noted: 2017-07-20 | Resolved: 2020-06-02

## 2020-06-03 ENCOUNTER — TELEPHONE (OUTPATIENT)
Dept: FAMILY MEDICINE | Facility: CLINIC | Age: 65
End: 2020-06-03

## 2020-06-03 NOTE — TELEPHONE ENCOUNTER
----- Message from Jayna Nance sent at 6/3/2020 12:39 PM CDT -----  Contact: PT   PT called to let the doctor know that he had a heart attack on Sunday and he went to [facility] on Monday and found out it was a heart attack due to the EKG and the blood work. They decided to put some stents in his heart Tuesday. They told him to follow up with them but he would like to follow up with the doctor since that's his PCP. He's concerned bc they want him to stop taking his blood pressure medication but he wanted to discuss that with the doctor. Please call back    Callback: [phone]

## 2020-06-04 ENCOUNTER — PATIENT OUTREACH (OUTPATIENT)
Dept: ADMINISTRATIVE | Facility: HOSPITAL | Age: 65
End: 2020-06-04

## 2020-06-04 NOTE — PROGRESS NOTES
Chart review completed 06/04/2020.  Care Everywhere updates requested and reviewed.  Immunizations reconciled. Media reviewed.

## 2020-06-05 ENCOUNTER — TELEPHONE (OUTPATIENT)
Dept: FAMILY MEDICINE | Facility: CLINIC | Age: 65
End: 2020-06-05

## 2020-06-05 ENCOUNTER — OFFICE VISIT (OUTPATIENT)
Dept: FAMILY MEDICINE | Facility: CLINIC | Age: 65
End: 2020-06-05
Payer: MEDICARE

## 2020-06-05 VITALS
WEIGHT: 235.69 LBS | HEIGHT: 72 IN | SYSTOLIC BLOOD PRESSURE: 130 MMHG | TEMPERATURE: 99 F | BODY MASS INDEX: 31.92 KG/M2 | DIASTOLIC BLOOD PRESSURE: 70 MMHG | HEART RATE: 53 BPM | OXYGEN SATURATION: 97 %

## 2020-06-05 DIAGNOSIS — F41.1 GAD (GENERALIZED ANXIETY DISORDER): ICD-10-CM

## 2020-06-05 DIAGNOSIS — I21.4 NSTEMI (NON-ST ELEVATED MYOCARDIAL INFARCTION): Primary | ICD-10-CM

## 2020-06-05 DIAGNOSIS — F17.200 TOBACCO DEPENDENCE: ICD-10-CM

## 2020-06-05 PROCEDURE — 3078F PR MOST RECENT DIASTOLIC BLOOD PRESSURE < 80 MM HG: ICD-10-PCS | Mod: HCNC,CPTII,S$GLB, | Performed by: PHYSICIAN ASSISTANT

## 2020-06-05 PROCEDURE — 99214 OFFICE O/P EST MOD 30 MIN: CPT | Mod: HCNC,S$GLB,, | Performed by: PHYSICIAN ASSISTANT

## 2020-06-05 PROCEDURE — 99499 UNLISTED E&M SERVICE: CPT | Mod: HCNC,S$GLB,, | Performed by: PHYSICIAN ASSISTANT

## 2020-06-05 PROCEDURE — 3078F DIAST BP <80 MM HG: CPT | Mod: HCNC,CPTII,S$GLB, | Performed by: PHYSICIAN ASSISTANT

## 2020-06-05 PROCEDURE — 3075F SYST BP GE 130 - 139MM HG: CPT | Mod: HCNC,CPTII,S$GLB, | Performed by: PHYSICIAN ASSISTANT

## 2020-06-05 PROCEDURE — 99999 PR PBB SHADOW E&M-EST. PATIENT-LVL V: ICD-10-PCS | Mod: PBBFAC,HCNC,, | Performed by: PHYSICIAN ASSISTANT

## 2020-06-05 PROCEDURE — 99214 PR OFFICE/OUTPT VISIT, EST, LEVL IV, 30-39 MIN: ICD-10-PCS | Mod: HCNC,S$GLB,, | Performed by: PHYSICIAN ASSISTANT

## 2020-06-05 PROCEDURE — 99499 RISK ADDL DX/OHS AUDIT: ICD-10-PCS | Mod: HCNC,S$GLB,, | Performed by: PHYSICIAN ASSISTANT

## 2020-06-05 PROCEDURE — 99999 PR PBB SHADOW E&M-EST. PATIENT-LVL V: CPT | Mod: PBBFAC,HCNC,, | Performed by: PHYSICIAN ASSISTANT

## 2020-06-05 PROCEDURE — 3008F BODY MASS INDEX DOCD: CPT | Mod: HCNC,CPTII,S$GLB, | Performed by: PHYSICIAN ASSISTANT

## 2020-06-05 PROCEDURE — 3075F PR MOST RECENT SYSTOLIC BLOOD PRESS GE 130-139MM HG: ICD-10-PCS | Mod: HCNC,CPTII,S$GLB, | Performed by: PHYSICIAN ASSISTANT

## 2020-06-05 PROCEDURE — 3008F PR BODY MASS INDEX (BMI) DOCUMENTED: ICD-10-PCS | Mod: HCNC,CPTII,S$GLB, | Performed by: PHYSICIAN ASSISTANT

## 2020-06-05 RX ORDER — ALPRAZOLAM 1 MG/1
1 TABLET ORAL 3 TIMES DAILY PRN
Qty: 90 TABLET | Refills: 0 | Status: SHIPPED | OUTPATIENT
Start: 2020-06-05 | End: 2020-06-28 | Stop reason: SDUPTHER

## 2020-06-05 NOTE — TELEPHONE ENCOUNTER
----- Message from Candie Chatterjee MA sent at 6/5/2020 10:48 AM CDT -----  Contact: self  Patient need earlier appt than what epic has available in 2 weeks please from date of 06/05      Epic earliest is 09/20      Patient will like also for  to call him at [phone]

## 2020-06-05 NOTE — PROGRESS NOTES
Subjective:      Patient ID: Fred Blackwell is a 64 y.o. male.    Chief Complaint: Hospital Follow Up (heart attack)  Patient is new to me.    HPI   Patient went to [facility] ER on 6/1/2020 with a myocardial infarction, admitted, and discharged on 6/3/2020.  He has since started aspirin and brilinta daily.  His hemoglobin A1C was 6.0 in December-discontinued Metformin in the hospital.    Taking Xanax nightly for sleep.  Has two days worth currently.  He was taking extra for heart attack symptoms thinking it was anxiety.    He stopped smoking Monday.    Review of Systems   Constitutional: Negative for appetite change, chills and fever.   HENT: Negative for ear pain.    Eyes: Negative for pain.   Respiratory: Negative for shortness of breath.    Cardiovascular: Negative for chest pain.   Gastrointestinal: Negative for abdominal pain, blood in stool, constipation, diarrhea, nausea and vomiting.   Endocrine: Negative for polydipsia and polyuria.   Genitourinary: Negative for dysuria, frequency and hematuria.   Musculoskeletal: Positive for back pain and neck pain.        Numbness in fingers.   Skin: Negative for rash.   Neurological: Negative for dizziness, syncope, light-headedness and headaches.   Psychiatric/Behavioral: Positive for sleep disturbance. Negative for suicidal ideas. The patient is nervous/anxious.        Objective:   /70 (BP Location: Right arm, Patient Position: Sitting, BP Method: Medium (Manual))   Pulse (!) 53   Temp 98.5 °F (36.9 °C)   Ht 6' (1.829 m)   Wt 106.9 kg (235 lb 10.8 oz)   SpO2 97%   BMI 31.96 kg/m²      Physical Exam   Constitutional: He is oriented to person, place, and time. Vital signs are normal. He appears well-developed and well-nourished. He is active and cooperative. No distress.   HENT:   Head: Normocephalic and atraumatic.   Right Ear: Hearing and external ear normal.   Left Ear: Hearing and external ear normal.   Nose: Nose normal.   Eyes: Conjunctivae, EOM  and lids are normal.   Neck: Normal range of motion and phonation normal.   Cardiovascular: Normal rate, regular rhythm and normal heart sounds. Exam reveals no gallop and no friction rub.   No murmur heard.  Pulmonary/Chest: Effort normal and breath sounds normal. No stridor. No respiratory distress. He has no decreased breath sounds. He has no wheezes. He has no rhonchi. He has no rales.   Abdominal: Soft. Bowel sounds are normal. There is no tenderness.   Musculoskeletal: Normal range of motion.   Lymphadenopathy:        Head (right side): No submental, no submandibular, no tonsillar, no preauricular, no posterior auricular and no occipital adenopathy present.        Head (left side): No submental, no submandibular, no tonsillar, no preauricular, no posterior auricular and no occipital adenopathy present.     He has no cervical adenopathy.   Neurological: He is alert and oriented to person, place, and time.   Skin: Skin is warm, dry and intact. No rash noted.   Psychiatric: He has a normal mood and affect. His speech is normal and behavior is normal. Judgment and thought content normal.   Vitals reviewed.     Assessment:      1. NSTEMI (non-ST elevated myocardial infarction)    2. SHANNAN (generalized anxiety disorder)    3. Tobacco dependence       Plan:   1. NSTEMI (non-ST elevated myocardial infarction)  Scheduled today.  - Ambulatory referral/consult to Cardiology; Future    2. SHANNAN (generalized anxiety disorder)  Dr. Bahena manages this medication.  Will discuss with Dr. Bahena his prescription of Xanax.    3. Tobacco dependence  Stopped cigarette smoking abruptly.    Patient will need updated Hemoglobin A1C in the future.    Follow up with Dr. Bahena in the next two weeks.   Patient agreed with plan and expressed understanding.    Thank you for allowing me to serve you,

## 2020-06-05 NOTE — TELEPHONE ENCOUNTER
SHALA Valentine states patient needs his prescription for xanax filled early d/t patient taking more of his xanax medication than usual because he thought he were having a panic attack, patient was actually having a heart attack over the weekend. Medication pended below please advise on behalf of pcp

## 2020-06-05 NOTE — PATIENT INSTRUCTIONS
Take losartan 50mg daily.  Take blood pressure once a day and record.    Hemoglobin A1C-needs update in the next month.    Thanks for seeing me,  Julia Tesfaye PA-C

## 2020-06-05 NOTE — Clinical Note
Please consider sending a refill of Xanax early for patient.  He was using his Xanax more because he thought it was anxiety, but was a heart attack.Thanks,Julia Tesfaye PA-C

## 2020-06-08 ENCOUNTER — OFFICE VISIT (OUTPATIENT)
Dept: CARDIOLOGY | Facility: CLINIC | Age: 65
End: 2020-06-08
Payer: MEDICARE

## 2020-06-08 ENCOUNTER — PATIENT OUTREACH (OUTPATIENT)
Dept: ADMINISTRATIVE | Facility: OTHER | Age: 65
End: 2020-06-08

## 2020-06-08 VITALS
BODY MASS INDEX: 33.02 KG/M2 | HEART RATE: 58 BPM | DIASTOLIC BLOOD PRESSURE: 84 MMHG | HEIGHT: 72 IN | WEIGHT: 243.81 LBS | SYSTOLIC BLOOD PRESSURE: 138 MMHG

## 2020-06-08 DIAGNOSIS — E66.9 OBESITY, CLASS I, BMI 30-34.9: ICD-10-CM

## 2020-06-08 DIAGNOSIS — I10 ESSENTIAL HYPERTENSION: ICD-10-CM

## 2020-06-08 DIAGNOSIS — R09.89 BRUIT OF RIGHT CAROTID ARTERY: ICD-10-CM

## 2020-06-08 DIAGNOSIS — I22.2 SUBSEQUENT NON-ST ELEVATION (NSTEMI) MYOCARDIAL INFARCTION: Primary | ICD-10-CM

## 2020-06-08 DIAGNOSIS — Z79.02 LONG TERM (CURRENT) USE OF ANTITHROMBOTICS/ANTIPLATELETS: ICD-10-CM

## 2020-06-08 DIAGNOSIS — E78.00 PURE HYPERCHOLESTEROLEMIA: ICD-10-CM

## 2020-06-08 PROBLEM — E66.811 OBESITY, CLASS I, BMI 30-34.9: Status: ACTIVE | Noted: 2020-06-08

## 2020-06-08 PROCEDURE — 3079F PR MOST RECENT DIASTOLIC BLOOD PRESSURE 80-89 MM HG: ICD-10-PCS | Mod: HCNC,CPTII,S$GLB, | Performed by: INTERNAL MEDICINE

## 2020-06-08 PROCEDURE — 3079F DIAST BP 80-89 MM HG: CPT | Mod: HCNC,CPTII,S$GLB, | Performed by: INTERNAL MEDICINE

## 2020-06-08 PROCEDURE — 93000 EKG 12-LEAD: ICD-10-PCS | Mod: HCNC,S$GLB,, | Performed by: INTERNAL MEDICINE

## 2020-06-08 PROCEDURE — 99999 PR PBB SHADOW E&M-EST. PATIENT-LVL III: CPT | Mod: PBBFAC,HCNC,, | Performed by: INTERNAL MEDICINE

## 2020-06-08 PROCEDURE — 3075F SYST BP GE 130 - 139MM HG: CPT | Mod: HCNC,CPTII,S$GLB, | Performed by: INTERNAL MEDICINE

## 2020-06-08 PROCEDURE — 3008F PR BODY MASS INDEX (BMI) DOCUMENTED: ICD-10-PCS | Mod: HCNC,CPTII,S$GLB, | Performed by: INTERNAL MEDICINE

## 2020-06-08 PROCEDURE — 3075F PR MOST RECENT SYSTOLIC BLOOD PRESS GE 130-139MM HG: ICD-10-PCS | Mod: HCNC,CPTII,S$GLB, | Performed by: INTERNAL MEDICINE

## 2020-06-08 PROCEDURE — 3008F BODY MASS INDEX DOCD: CPT | Mod: HCNC,CPTII,S$GLB, | Performed by: INTERNAL MEDICINE

## 2020-06-08 PROCEDURE — 99214 PR OFFICE/OUTPT VISIT, EST, LEVL IV, 30-39 MIN: ICD-10-PCS | Mod: HCNC,S$GLB,, | Performed by: INTERNAL MEDICINE

## 2020-06-08 PROCEDURE — 99214 OFFICE O/P EST MOD 30 MIN: CPT | Mod: HCNC,S$GLB,, | Performed by: INTERNAL MEDICINE

## 2020-06-08 PROCEDURE — 99999 PR PBB SHADOW E&M-EST. PATIENT-LVL III: ICD-10-PCS | Mod: PBBFAC,HCNC,, | Performed by: INTERNAL MEDICINE

## 2020-06-08 PROCEDURE — 93000 ELECTROCARDIOGRAM COMPLETE: CPT | Mod: HCNC,S$GLB,, | Performed by: INTERNAL MEDICINE

## 2020-06-08 RX ORDER — AMLODIPINE BESYLATE 2.5 MG/1
2.5 TABLET ORAL NIGHTLY
Qty: 30 TABLET | Refills: 2 | Status: SHIPPED | OUTPATIENT
Start: 2020-06-08 | End: 2020-08-06

## 2020-06-08 NOTE — PROGRESS NOTES
Subjective:    Patient ID:  Fred Blackwell is a 64 y.o. male who presents for Coronary Artery Disease (C W/ STENT); Hypertension; and Hyperlipidemia        HPI    STATUS POST ST PH WAS NON-STEMI, DISCUSSED ANGIOGRAM, PCI LAD, MOD RESIDUAL CAD, NO SMOKING FOR 1 W,, BG BETTER, OVERALL FEELS BETTER, BREATHING BETTER, SEE ROS  Past Medical History:   Diagnosis Date    Anxiety     Bell's palsy     Current moderate episode of major depressive disorder without prior episode 4/1/2019    DDD (degenerative disc disease), lumbar     Diabetes mellitus     Fibromyalgia     GERD (gastroesophageal reflux disease)     Hyperlipidemia     Hypertension     Sciatica      Past Surgical History:   Procedure Laterality Date    COLONOSCOPY N/A 12/11/2019    Procedure: COLONOSCOPY;  Surgeon: Mich Mota MD;  Location: [facility] ENDO;  Service: Endoscopy;  Laterality: N/A;    CORONARY ANGIOGRAPHY N/A 6/2/2020    Procedure: ANGIOGRAM, CORONARY ARTERY;  Surgeon: Romario Flaherty MD;  Location: [facility] CATH;  Service: Cardiology;  Laterality: N/A;    LEFT HEART CATHETERIZATION Right 6/2/2020    Procedure: Left heart cath;  Surgeon: Romario Flaherty MD;  Location: [facility] CATH;  Service: Cardiology;  Laterality: Right;    SHOULDER SURGERY       Family History   Problem Relation Age of Onset    Arthritis Mother      Social History     Socioeconomic History    Marital status:      Spouse name: Not on file    Number of children: Not on file    Years of education: Not on file    Highest education level: Not on file   Occupational History    Not on file   Social Needs    Financial resource strain: Not on file    Food insecurity:     Worry: Not on file     Inability: Not on file    Transportation needs:     Medical: Not on file     Non-medical: Not on file   Tobacco Use    Smoking status: Former Smoker     Packs/day: 2.00     Types: Cigarettes    Smokeless tobacco: Never Used    Tobacco comment: 2 packs/day    Substance and Sexual Activity    Alcohol use: No    Drug use: No    Sexual activity: Not on file   Lifestyle    Physical activity:     Days per week: Not on file     Minutes per session: Not on file    Stress: Not on file   Relationships    Social connections:     Talks on phone: Not on file     Gets together: Not on file     Attends Latter day service: Not on file     Active member of club or organization: Not on file     Attends meetings of clubs or organizations: Not on file     Relationship status: Not on file   Other Topics Concern    Not on file   Social History Narrative    Not on file       Review of patient's allergies indicates:   Allergen Reactions    Cymbalta [duloxetine] Nausea And Vomiting    Penicillins      Other reaction(s): Hives       Current Outpatient Medications:     ALPRAZolam (XANAX) 1 MG tablet, Take 1 tablet (1 mg total) by mouth 3 (three) times daily as needed for Anxiety., Disp: 90 tablet, Rfl: 0    aspirin (ECOTRIN) 81 MG EC tablet, Take 1 tablet (81 mg total) by mouth once daily., Disp: , Rfl: 0    atorvastatin (LIPITOR) 80 MG tablet, Take 1 tablet (80 mg total) by mouth once daily., Disp: 90 tablet, Rfl: 0    blood sugar diagnostic Strp, To check BG 2 (two) times daily, to use with insurance preferred meter, Disp: 200 strip, Rfl: 3    blood-glucose meter kit, To check BG 2 (two) times daily, to use with insurance preferred meter, Disp: 1 each, Rfl: 0    HYDROcodone-acetaminophen (NORCO)  mg per tablet, Take 1 tablet by mouth 3 (three) times daily as needed., Disp: 80 tablet, Rfl: 0    lancets Misc, To check BG 2 (two) times daily, to use with insurance preferred meter, Disp: 200 each, Rfl: 3    losartan (COZAAR) 100 MG tablet, Take 1 tablet (100 mg total) by mouth once daily., Disp: 90 tablet, Rfl: 3    omeprazole (PRILOSEC) 20 MG capsule, Take 1 capsule (20 mg total) by mouth once daily., Disp: 90 capsule, Rfl: 3    ticagrelor (BRILINTA) 90 mg tablet, Take 1  tablet (90 mg total) by mouth 2 (two) times a day., Disp: 60 tablet, Rfl: 2    albuterol (PROVENTIL) 2.5 mg /3 mL (0.083 %) nebulizer solution, Take 3 mLs (2.5 mg total) by nebulization every 6 (six) hours as needed for Wheezing. Rescue, Disp: 90 mL, Rfl: 0    amLODIPine (NORVASC) 2.5 MG tablet, Take 1 tablet (2.5 mg total) by mouth every evening., Disp: 30 tablet, Rfl: 2    fluticasone propionate (FLONASE) 50 mcg/actuation nasal spray, USE 1 SPRAY (50 MCG TOTAL) BY EACH NARE ROUTE ONCE DAILY. (Patient not taking: Reported on 6/8/2020), Disp: 16 g, Rfl: 0    metFORMIN (GLUCOPHAGE-XR) 500 MG 24 hr tablet, Take 1 tablet (500 mg total) by mouth 2 (two) times daily with meals. (Patient not taking: Reported on 6/8/2020), Disp: 180 tablet, Rfl: 3    Review of Systems   Constitution: Negative for chills, diaphoresis, fever, malaise/fatigue and night sweats.   HENT: Negative for congestion, hearing loss and nosebleeds.    Eyes: Negative for blurred vision and visual disturbance.   Cardiovascular: Negative for chest pain, claudication, cyanosis, dyspnea on exertion, irregular heartbeat, leg swelling, near-syncope, orthopnea, palpitations, paroxysmal nocturnal dyspnea and syncope.   Respiratory: Negative for cough, hemoptysis, shortness of breath and wheezing.    Endocrine: Negative for cold intolerance, heat intolerance and polyuria.   Hematologic/Lymphatic: Negative for adenopathy. Does not bruise/bleed easily.   Skin: Negative for color change, itching and nail changes.   Musculoskeletal: Negative for back pain, falls, joint pain and stiffness.   Gastrointestinal: Negative for abdominal pain, change in bowel habit, dysphagia, heartburn, hematemesis, jaundice, melena and vomiting.   Genitourinary: Negative for dysuria, flank pain and frequency.   Neurological: Negative for brief paralysis, dizziness, focal weakness, light-headedness, loss of balance, numbness (FINGERS), paresthesias, seizures, sensory change, tremors  and weakness.   Psychiatric/Behavioral: Negative for altered mental status, depression, memory loss and substance abuse. The patient is not nervous/anxious.    Allergic/Immunologic: Negative for hives and persistent infections.        Objective:      Vitals:    06/08/20 0845   BP: 138/84   Pulse: (!) 58   Weight: 110.6 kg (243 lb 13.3 oz)   Height: 6' (1.829 m)   PainSc: 0-No pain     Body mass index is 33.07 kg/m².    Physical Exam   Constitutional: He is oriented to person, place, and time. He appears well-developed and well-nourished. He is active.   HENT:   Head: Normocephalic and atraumatic.   Mouth/Throat: Oropharynx is clear and moist and mucous membranes are normal.   Eyes: Pupils are equal, round, and reactive to light. Conjunctivae and EOM are normal.   Neck: Normal range of motion. Neck supple. Normal carotid pulses, no hepatojugular reflux and no JVD present. Carotid bruit is present. No edema and no erythema present. No thyromegaly present.   Cardiovascular: Normal rate, regular rhythm and normal heart sounds.  No extrasystoles are present. PMI is not displaced. Exam reveals no gallop, no distant heart sounds, no friction rub and no midsystolic click.   No murmur heard.  Pulses:       Carotid pulses are 2+ on the right side with bruit, and 2+ on the left side.       Radial pulses are 2+ on the right side, and 2+ on the left side.        Femoral pulses are 2+ on the right side, and 2+ on the left side.       Dorsalis pedis pulses are 2+ on the right side, and 2+ on the left side.        Posterior tibial pulses are 2+ on the right side, and 2+ on the left side.   Pulmonary/Chest: Effort normal and breath sounds normal. No accessory muscle usage. No tachypnea and no bradypnea. No respiratory distress.   Abdominal: Soft. Bowel sounds are normal. He exhibits no distension and no mass. There is no hepatosplenomegaly. There is no tenderness. There is no CVA tenderness.   Musculoskeletal: Normal range of  motion. He exhibits no edema or deformity.   Lymphadenopathy:     He has no cervical adenopathy.   Neurological: He is alert and oriented to person, place, and time. He has normal strength. He displays no tremor. No cranial nerve deficit.   Skin: Skin is warm and dry. No cyanosis or erythema. No pallor.   Psychiatric: He has a normal mood and affect. His speech is normal and behavior is normal.               ..    Chemistry        Component Value Date/Time     (L) 06/03/2020 0453    K 3.3 (L) 06/03/2020 0453    CL 99 06/03/2020 0453    CO2 27 06/03/2020 0453    BUN 23 (H) 06/03/2020 0453    CREATININE 0.86 06/03/2020 0453     (H) 06/03/2020 0453        Component Value Date/Time    CALCIUM 9.0 06/03/2020 0453    ALKPHOS 68 06/02/2020 0444    AST 63 (H) 06/02/2020 0444    ALT 26 06/02/2020 0444    BILITOT 1.0 06/02/2020 0444    ESTGFRAFRICA >60 06/03/2020 0453    EGFRNONAA >60 06/03/2020 0453            ..  Lab Results   Component Value Date    CHOL 151 06/02/2020    CHOL 202 (H) 12/09/2019    CHOL 241 (H) 07/30/2019     Lab Results   Component Value Date    HDL 51 06/02/2020    HDL 45 12/09/2019    HDL 39 (L) 07/30/2019     Lab Results   Component Value Date    LDLCALC 86.4 06/02/2020    LDLCALC 142.6 12/09/2019    LDLCALC 181.6 (H) 07/30/2019     Lab Results   Component Value Date    TRIG 68 06/02/2020    TRIG 72 12/09/2019    TRIG 102 07/30/2019     Lab Results   Component Value Date    CHOLHDL 33.8 06/02/2020    CHOLHDL 22.3 12/09/2019    CHOLHDL 16.2 (L) 07/30/2019     ..  Lab Results   Component Value Date    WBC 9.13 06/03/2020    HGB 14.4 06/03/2020    HCT 42.7 06/03/2020    MCV 86 06/03/2020     06/03/2020       Test(s) Reviewed  I have reviewed the following in detail:  [] Stress test   [x] Angiography   [] Echocardiogram   [x] Labs   [x] Other:       Assessment:         ICD-10-CM ICD-9-CM   1. Subsequent non-ST elevation (NSTEMI) myocardial infarction I22.2 410.70   2. Essential  hypertension I10 401.9   3. Bruit of right carotid artery R09.89 785.9   4. Long term (current) use of antithrombotics/antiplatelets Z79.02 V58.63   5. Pure hypercholesterolemia E78.00 272.0   6. Obesity, Class I, BMI 30-34.9 E66.9 278.00     Problem List Items Addressed This Visit        Cardiac/Vascular    Essential hypertension    Overview     RF uniretic.           Relevant Orders    CV US Ankle Brachial [employer]ices Ext [employer] WO Stress    Pure hypercholesterolemia    Relevant Orders    CV US Ankle Brachial [employer]ices Ext [employer] WO Stress    NSTEMI (non-ST elevated myocardial infarction) - Primary    Bruit of right carotid artery    Relevant Orders    CV Ultrasound Bilateral Doppler Carotid    CV US Ankle Brachial [employer] WO Stress       Hematology    Long term (current) use of antithrombotics/antiplatelets    Relevant Orders    CV US Ankle Brachial [employer] WO Stress       Endocrine    Obesity, Class I, BMI 30-34.9           Plan:         EKG SB, T CHANGES, BLOOD PRESSURE NEEDS TO BE BETTER CONTROLLED, WILL ADD NORVASC, CHECK BOBO, CAROTID ULTRASOUND, INCREASED CV RISK WITH DIABETES, WAS NOT WELL CONTROLLED BEFORE,ALL OTHER CV CLINICALLY STABLE, NO ANGINA, NO HF, NO TIA, NO CLINICAL ARRHYTHMIA,CONTINUE CURRENT MEDS, EDUCATION, DIET, EXERCISE, WEIGHT LOSS, RETURN TO CLINIC IN 2 MO WITH LABS, DISCUSSED PLAN WITH THE PATIENT AND HIS DAUGHTER  Subsequent non-ST elevation (NSTEMI) myocardial infarction  -     EKG 12-lead; Future  -     CV US Ankle Brachial [employer] WO Stress; Future    Essential hypertension  -     CV US Ankle Brachial [employer] WO Stress; Future    Bruit of right carotid artery  -     CV Ultrasound Bilateral Doppler Carotid; Future  -     CV US Ankle Br[employer]ial [employer] WO Stress; Future    Long term (current) use of antithrombotics/antiplatelets  -     CV US Ankle Brachial [employer] WO Stress; Future    Pure hypercholesterolemia  -     CV US Ankle Br[employer]ial [employer]  WO Stress; Future    Obesity, Class I, BMI 30-34.9    Other orders  -     amLODIPine (NORVASC) 2.5 MG tablet; Take 1 tablet (2.5 mg total) by mouth every evening.  Dispense: 30 tablet; Refill: 2  -     ticagrelor (BRILINTA) 90 mg tablet; Take 1 tablet (90 mg total) by mouth 2 (two) times a day.  Dispense: 60 tablet; Refill: 2    RTC Low level/low impact aerobic exercise 5x's/wk. Heart healthy diet and risk factor modification.    See labs and med orders.    Aerobic exercise 5x's/wk. Heart healthy diet and risk factor modification.    See labs and med orders.

## 2020-06-09 ENCOUNTER — LAB VISIT (OUTPATIENT)
Dept: LAB | Facility: HOSPITAL | Age: 65
End: 2020-06-09
Attending: NURSE PRACTITIONER
Payer: MEDICARE

## 2020-06-09 DIAGNOSIS — E78.5 HYPERLIPIDEMIA, UNSPECIFIED HYPERLIPIDEMIA TYPE: ICD-10-CM

## 2020-06-09 DIAGNOSIS — E11.9 TYPE 2 DIABETES MELLITUS WITHOUT COMPLICATION, WITHOUT LONG-TERM CURRENT USE OF INSULIN: ICD-10-CM

## 2020-06-09 LAB
ALBUMIN SERPL BCP-MCNC: 3.8 G/DL (ref 3.5–5.2)
ALP SERPL-CCNC: 66 U/L (ref 55–135)
ALT SERPL W/O P-5'-P-CCNC: 33 U/L (ref 10–44)
ANION GAP SERPL CALC-SCNC: 6 MMOL/L (ref 8–16)
AST SERPL-CCNC: 22 U/L (ref 10–40)
BILIRUB SERPL-MCNC: 0.4 MG/DL (ref 0.1–1)
BUN SERPL-MCNC: 13 MG/DL (ref 8–23)
CALCIUM SERPL-MCNC: 9 MG/DL (ref 8.7–10.5)
CHLORIDE SERPL-SCNC: 104 MMOL/L (ref 95–110)
CHOLEST SERPL-MCNC: 119 MG/DL (ref 120–199)
CHOLEST/HDLC SERPL: 2.5 {RATIO} (ref 2–5)
CO2 SERPL-SCNC: 30 MMOL/L (ref 23–29)
CREAT SERPL-MCNC: 0.8 MG/DL (ref 0.5–1.4)
EST. GFR  (AFRICAN AMERICAN): >60 ML/MIN/1.73 M^2
EST. GFR  (NON AFRICAN AMERICAN): >60 ML/MIN/1.73 M^2
GLUCOSE SERPL-MCNC: 117 MG/DL (ref 70–110)
HDLC SERPL-MCNC: 48 MG/DL (ref 40–75)
HDLC SERPL: 40.3 % (ref 20–50)
LDLC SERPL CALC-MCNC: 63 MG/DL (ref 63–159)
NONHDLC SERPL-MCNC: 71 MG/DL
POTASSIUM SERPL-SCNC: 4.1 MMOL/L (ref 3.5–5.1)
PROT SERPL-MCNC: 6.9 G/DL (ref 6–8.4)
SODIUM SERPL-SCNC: 140 MMOL/L (ref 136–145)
TRIGL SERPL-MCNC: 40 MG/DL (ref 30–150)

## 2020-06-09 PROCEDURE — 80053 COMPREHEN METABOLIC PANEL: CPT | Mod: HCNC

## 2020-06-09 PROCEDURE — 36415 COLL VENOUS BLD VENIPUNCTURE: CPT | Mod: HCNC,PO

## 2020-06-09 PROCEDURE — 83036 HEMOGLOBIN GLYCOSYLATED A1C: CPT | Mod: HCNC

## 2020-06-09 PROCEDURE — 80061 LIPID PANEL: CPT | Mod: HCNC

## 2020-06-10 LAB
ESTIMATED AVG GLUCOSE: 134 MG/DL (ref 68–131)
HBA1C MFR BLD HPLC: 6.3 % (ref 4–5.6)

## 2020-06-11 DIAGNOSIS — M51.36 DEGENERATIVE DISC DISEASE, LUMBAR: ICD-10-CM

## 2020-06-12 RX ORDER — HYDROCODONE BITARTRATE AND ACETAMINOPHEN 10; 325 MG/1; MG/1
1 TABLET ORAL 3 TIMES DAILY PRN
Qty: 80 TABLET | Refills: 0 | Status: SHIPPED | OUTPATIENT
Start: 2020-06-12 | End: 2020-08-02 | Stop reason: SDUPTHER

## 2020-06-12 RX ORDER — LOSARTAN POTASSIUM 50 MG/1
50 TABLET ORAL DAILY
Qty: 90 TABLET | Refills: 2 | Status: SHIPPED | OUTPATIENT
Start: 2020-06-12 | End: 2020-09-28 | Stop reason: SDUPTHER

## 2020-06-12 NOTE — PROGRESS NOTES
Refill Routing Note    Medication(s) are not appropriate for processing by [facility]:       RECENT DOSE ADJUSTMENT and Medication not active on medication list     Medication-related problems identified: Dose adjustment  Medication Therapy Plan: MEDICATION IS NOT ACTIVE ON MED LIST; RECENT DOSE ADJUSTMENT(6/5/20-RENY TESFAYE), DEFER TO YOU  Medication reconciliation completed: No      Automatic Epic Protocol Generated Data:    Requested Prescriptions   Pending Prescriptions Disp Refills    losartan (COZAAR) 50 MG tablet 90 tablet 2     Sig: Take 1 tablet (50 mg total) by mouth once daily.       Cardiovascular:  Angiotensin Receptor Blockers Passed - 6/12/2020 11:32 AM        Passed - Patient is at least 18 years old        Passed - Last BP in normal range within 360 days.     BP Readings from Last 3 Encounters:   06/08/20 138/84   06/05/20 130/70   06/03/20 127/75              Passed - Office visit in past 12 months or future 90 days.     Recent Outpatient Visits            4 days ago Subsequent non-ST elevation (NSTEMI) myocardial infarction    [place] - Cardiology Romario Flaherty MD    1 week ago NSTEMI (non-ST elevated myocardial infarction)    [facility] Julia Tesfaye PA-C    3 months ago Wellness examination    [facility] MARITZA Bahena MD    4 months ago Sciatica of right side    [facility] Chuy Loredo III, PA-C    5 months ago Type 2 diabetes mellitus without complication, without long-term current use of insulin    [facility] - Endocrinology Anna Negron DNP, NP          Future Appointments              In 3 days Anna Negron DNP, NP [facility], [place]    In 3 months VASCULAR, JONE [facility] [place]    In 3 months VASCULAR, JONE [facility] [place]    In 3 months MD [facility] - [facility]iology, [place]                Passed - Cr is 1.3 or below and within 360  days     Creatinine   Date Value Ref Range Status   06/09/2020 0.8 0.5 - 1.4 mg/dL Final   06/03/2020 0.86 0.50 - 1.40 mg/dL Final   06/02/2020 0.65 0.50 - 1.40 mg/dL Final              Passed - K in normal range and within 360 days     Potassium   Date Value Ref Range Status   06/09/2020 4.1 3.5 - 5.1 mmol/L Final   06/03/2020 3.3 (L) 3.5 - 5.1 mmol/L Final   06/02/2020 3.4 (L) 3.5 - 5.1 mmol/L Final              Passed - eGFR within 360 days     eGFR if non    Date Value Ref Range Status   06/09/2020 >60.0 >60 mL/min/1.73 m^2 Final     Comment:     Calculation used to obtain the estimated glomerular filtration  rate (eGFR) is the CKD-EPI equation.      06/03/2020 >60 >60 mL/min/1.73 m^2 Final     Comment:     Calculation used to obtain the estimated glomerular filtration  rate (eGFR) is the CKD-EPI equation.      06/02/2020 >60 >60 mL/min/1.73 m^2 Final     Comment:     Calculation used to obtain the estimated glomerular filtration  rate (eGFR) is the CKD-EPI equation.        eGFR if    Date Value Ref Range Status   06/09/2020 >60.0 >60 mL/min/1.73 m^2 Final   06/03/2020 >60 >60 mL/min/1.73 m^2 Final   06/02/2020 >60 >60 mL/min/1.73 m^2 Final              Powered by [employer] - 6/12/2020 11:32 AM        The requested medication is not on the active medication list.           Appointments  past 12m or future 3m with PCP    Date Provider   Last Visit   3/10/2020 MARITZA Bahena MD   Next Visit   Visit date not found MARITZA Bahena MD   ED visits in past 90 days: 0     Note composed:12:31 PM 06/12/2020

## 2020-06-14 ENCOUNTER — NURSE TRIAGE (OUTPATIENT)
Dept: ADMINISTRATIVE | Facility: CLINIC | Age: 65
End: 2020-06-14

## 2020-06-14 NOTE — TELEPHONE ENCOUNTER
"Patient escalated in triage que this morning   Patient status post heart cath with stents after having NSTEMI on June 1   Patient denies any Covid 19 symptoms since procedure but patient complaining of "sorethroat or lump in throat"   Patient stated he would like to see his family MD but closed today   Patient asked if I could get him an appointment tomorrow    "

## 2020-06-14 NOTE — TELEPHONE ENCOUNTER
Reason for Disposition   SEVERE (e.g., excruciating) throat pain    Additional Information   Negative: Severe difficulty breathing (e.g., struggling for each breath, speaks in single words, stridor)   Negative: Sounds like a life-threatening emergency to the triager   Negative: [1] Diagnosed strep throat AND [2] taking antibiotic AND [3] symptoms continue   Negative: Throat culture results, call about   Negative: Productive cough is main symptom   Negative: Non-productive cough is main symptom   Negative: Hoarseness is main symptom   Negative: Runny nose is main symptom   Negative: [1] Drooling or spitting out saliva (because can't swallow) AND [2] normal breathing   Negative: Unable to open mouth completely   Negative: [1] Difficulty breathing AND [2] not severe   Negative: Fever > 104 F (40 C)   Negative: [1] Refuses to drink anything AND [2] for > 12 hours   Negative: [1] Drinking very little AND [2] dehydration suspected (e.g., no urine > 12 hours, very dry mouth, very lightheaded)   Negative: Patient sounds very sick or weak to the triager    Protocols used: SORE THROAT-A-

## 2020-06-14 NOTE — TELEPHONE ENCOUNTER
Attempted to make appointment for patient tomorrow for sore throat bu patient's physician Nelson Bahena on vacation  Informed patient via telephone and he has appointment tomorrow with Anna Negron NP  DNP anyway and he will call his MD's office in morning to see if he can see another MD

## 2020-06-15 ENCOUNTER — OFFICE VISIT (OUTPATIENT)
Dept: ENDOCRINOLOGY | Facility: CLINIC | Age: 65
End: 2020-06-15
Payer: MEDICARE

## 2020-06-15 ENCOUNTER — PATIENT OUTREACH (OUTPATIENT)
Dept: ADMINISTRATIVE | Facility: OTHER | Age: 65
End: 2020-06-15

## 2020-06-15 VITALS
SYSTOLIC BLOOD PRESSURE: 128 MMHG | DIASTOLIC BLOOD PRESSURE: 78 MMHG | WEIGHT: 244.25 LBS | HEIGHT: 72 IN | HEART RATE: 71 BPM | BODY MASS INDEX: 33.08 KG/M2

## 2020-06-15 DIAGNOSIS — E78.00 PURE HYPERCHOLESTEROLEMIA: ICD-10-CM

## 2020-06-15 DIAGNOSIS — I25.10 CORONARY ARTERY DISEASE INVOLVING NATIVE CORONARY ARTERY OF NATIVE HEART WITHOUT ANGINA PECTORIS: ICD-10-CM

## 2020-06-15 DIAGNOSIS — E11.9 TYPE 2 DIABETES MELLITUS WITHOUT COMPLICATION, WITHOUT LONG-TERM CURRENT USE OF INSULIN: Primary | ICD-10-CM

## 2020-06-15 DIAGNOSIS — F32.1 CURRENT MODERATE EPISODE OF MAJOR DEPRESSIVE DISORDER WITHOUT PRIOR EPISODE: ICD-10-CM

## 2020-06-15 DIAGNOSIS — I10 ESSENTIAL HYPERTENSION: ICD-10-CM

## 2020-06-15 DIAGNOSIS — E66.09 CLASS 1 OBESITY DUE TO EXCESS CALORIES WITHOUT SERIOUS COMORBIDITY WITH BODY MASS INDEX (BMI) OF 32.0 TO 32.9 IN ADULT: ICD-10-CM

## 2020-06-15 PROCEDURE — 99999 PR PBB SHADOW E&M-EST. PATIENT-LVL IV: CPT | Mod: PBBFAC,HCNC,, | Performed by: NURSE PRACTITIONER

## 2020-06-15 PROCEDURE — 99214 PR OFFICE/OUTPT VISIT, EST, LEVL IV, 30-39 MIN: ICD-10-PCS | Mod: HCNC,S$GLB,, | Performed by: NURSE PRACTITIONER

## 2020-06-15 PROCEDURE — 99999 PR PBB SHADOW E&M-EST. PATIENT-LVL IV: ICD-10-PCS | Mod: PBBFAC,HCNC,, | Performed by: NURSE PRACTITIONER

## 2020-06-15 PROCEDURE — 99214 OFFICE O/P EST MOD 30 MIN: CPT | Mod: HCNC,S$GLB,, | Performed by: NURSE PRACTITIONER

## 2020-06-15 NOTE — PROGRESS NOTES
CC: This 64 y.o. male presents for management of diabetes and chronic conditions pending review including HTN, HLP, obesity, depression, tobacco abuse     HPI: He was diagnosed with T2DM in 2019. Has never been hospitalized r/t DM.  Family hx of DM: none  His wife   2018 and his daughter has moved in with her family which is causing some stress and change in dietary habits.       Since last visit,He had a heart attack ~ 2 weeks ago, He was having crushing chest pain for 3 days before he went to the ER.  NSTEMI- 2 stents placed  Stopped smoking ~ 1 week ago    hypoglycemia at home- none   No logs no meter to clinic  Reports his metformin was stopped after his hospital admission   Reports bg as below  monitoring BG at home: 103  Checks after he eats 106-120   Diet: Eats 3  Meals a day, snacks  breakfast- egg and toast or oatmeal  Lunch: snack pack- nuts and cheese  Dinner: meat and vegetable   Exercise: none- not cleared yet   CURRENT DM MEDS: none  Glucometer type:  True Metrix     Standards of Care:  Eye exam: needs to schedule - he requests to schedule himslef    Retired brick layer     ROS:   Gen: Appetite good, +weight gain 9 lbs, denies fatigue and weakness.  Skin: Skin is intact and heals well, no rashes, no hair changes  Eyes: Denies visual disturbances  Resp: no SOB or POZO, no cough  Cardiac: No palpitations, chest pain, no edema   GI: No nausea or vomiting, diarrhea, constipation, or abdominal pain.  /GYN: No nocturia, burning or pain.   MS/Neuro: Denies numbness/ tingling in BLE; Gait steady, speech clear  Psych: Denies drug/ETOH abuse, + depression.  Other systems: negative.    PE:  GENERAL: Well developed, well nourished.  PSYCH: AAOx3, appropriate mood and affect, pleasant expression, conversant, appears relaxed, well groomed.   EYES: Conjunctiva, corneas clear  NECK: Supple, trachea midline, +lymphadenopathy x 1 noted on right neck  ABDOMEN: Soft, non-tender, non-distended   S1S2: no  murmur, RRR  PULMONARY: clear bilaterally, even and unlabored  VASCULAR: DP pulses +2/4 bilaterally, no edema.  NEURO: Gait steady  SKIN: Skin warm and dry no acanthosis nigracans.  FOOT EXAMINATION: 6/15/2019  No foot deformity, corns or callus formation, Onychomycosis, nails long, no interspace maceration or ulceration noted.  Decreased hair growth present over toes/feet. Protective sensation intact with 10 gram monofilament.  +2 dorsalis pedis and posterior pulses noted.      Lab Results   Component Value Date    MICALBCREAT 4.0 06/09/2020       Hemoglobin A1C   Date Value Ref Range Status   06/09/2020 6.3 (H) 4.0 - 5.6 % Final     Comment:     ADA Screening Guidelines:  5.7-6.4%  Consistent with prediabetes  >or=6.5%  Consistent with diabetes  High levels of fetal hemoglobin interfere with the HbA1C  assay. Heterozygous hemoglobin variants (HbS, HgC, etc)do  not significantly interfere with this assay.   However, presence of multiple variants may affect accuracy.     12/09/2019 6.0 (H) 4.0 - 5.6 % Final     Comment:     ADA Screening Guidelines:  5.7-6.4%  Consistent with prediabetes  >or=6.5%  Consistent with diabetes  High levels of fetal hemoglobin interfere with the HbA1C  assay. Heterozygous hemoglobin variants (HbS, HgC, etc)do  not significantly interfere with this assay.   However, presence of multiple variants may affect accuracy.     07/30/2019 11.0 (H) 4.0 - 5.6 % Final     Comment:     ADA Screening Guidelines:  5.7-6.4%  Consistent with prediabetes  >or=6.5%  Consistent with diabetes  High levels of fetal hemoglobin interfere with the HbA1C  assay. Heterozygous hemoglobin variants (HbS, HgC, etc)do  not significantly interfere with this assay.   However, presence of multiple variants may affect accuracy.          ASSESSMENT and PLAN:      1. T2DM controlled  Ok to stop metformin, repeat A1C in 3 month  Check bg daily- stagger  Notify me for continuous BG > 150  Discussed A1c and BG goals.     2. HTN  - controlled, continue meds as previously prescribed and monitor.      3. HLP -  On statin     4. Obesity-   Resume exercise once cleaer and resume weight loss  Body mass index is 33.13 kg/m².    5. Depression- stable, chronic- managed by PCP    6. Tobacco abuse in remission - stopped ~ 1 week ago    7. Lymphadenopathy- has bad tooth that needs to be pulled, call card for clearance and dentist today for appt       Follow-up: in 3 months with lab prior

## 2020-08-02 DIAGNOSIS — F41.1 GAD (GENERALIZED ANXIETY DISORDER): ICD-10-CM

## 2020-08-02 DIAGNOSIS — M51.36 DEGENERATIVE DISC DISEASE, LUMBAR: ICD-10-CM

## 2020-08-06 DIAGNOSIS — M51.36 DEGENERATIVE DISC DISEASE, LUMBAR: ICD-10-CM

## 2020-08-06 RX ORDER — HYDROCODONE BITARTRATE AND ACETAMINOPHEN 10; 325 MG/1; MG/1
1 TABLET ORAL 3 TIMES DAILY PRN
Qty: 80 TABLET | Refills: 0 | Status: SHIPPED | OUTPATIENT
Start: 2020-08-06 | End: 2020-09-21 | Stop reason: SDUPTHER

## 2020-08-06 RX ORDER — ALPRAZOLAM 1 MG/1
1 TABLET ORAL 3 TIMES DAILY PRN
Qty: 90 TABLET | Refills: 0 | Status: SHIPPED | OUTPATIENT
Start: 2020-08-06 | End: 2020-10-02 | Stop reason: SDUPTHER

## 2020-08-06 NOTE — PROGRESS NOTES
Refill Routing Note   Medication(s) are not appropriate for processing by [facility]:       - Outside of protocol           Medication reconciliation completed: No      Automatic Epic Generated Protocol Data:        Requested Prescriptions   Pending Prescriptions Disp Refills    HYDROcodone-acetaminophen (NORCO)  mg per tablet [Pharmacy Med Name: HYDROCODON-APAP   TAB] 80 tablet 0     Sig: TAKE 1 TABLET BY MOUTH THREE TIMES DAILY AS NEEDED.       There is no refill protocol information for this order           Appointments  past 12m or future 3m with PCP    Date Provider   Last Visit   3/10/2020 MARITZA Bahena MD   Next Visit   Visit date not found MARITZA Bahena MD   ED visits in past 90 days: 0     Note composed:12:59 PM 08/06/2020

## 2020-08-07 RX ORDER — HYDROCODONE BITARTRATE AND ACETAMINOPHEN 10; 325 MG/1; MG/1
TABLET ORAL
Qty: 80 TABLET | Refills: 0 | OUTPATIENT
Start: 2020-08-07

## 2020-09-01 ENCOUNTER — PATIENT MESSAGE (OUTPATIENT)
Dept: FAMILY MEDICINE | Facility: CLINIC | Age: 65
End: 2020-09-01

## 2020-09-01 DIAGNOSIS — M51.36 DEGENERATIVE DISC DISEASE, LUMBAR: ICD-10-CM

## 2020-09-02 ENCOUNTER — OFFICE VISIT (OUTPATIENT)
Dept: FAMILY MEDICINE | Facility: CLINIC | Age: 65
End: 2020-09-02
Payer: MEDICARE

## 2020-09-02 DIAGNOSIS — R09.82 POST-NASAL DRIP: ICD-10-CM

## 2020-09-02 DIAGNOSIS — R09.89 SINUS COMPLAINT: ICD-10-CM

## 2020-09-02 DIAGNOSIS — J01.80 OTHER ACUTE SINUSITIS, RECURRENCE NOT SPECIFIED: Primary | ICD-10-CM

## 2020-09-02 DIAGNOSIS — R19.7 DIARRHEA, UNSPECIFIED TYPE: ICD-10-CM

## 2020-09-02 PROCEDURE — 1101F PR PT FALLS ASSESS DOC 0-1 FALLS W/OUT INJ PAST YR: ICD-10-PCS | Mod: HCNC,CPTII,95, | Performed by: NURSE PRACTITIONER

## 2020-09-02 PROCEDURE — 1101F PT FALLS ASSESS-DOCD LE1/YR: CPT | Mod: HCNC,CPTII,95, | Performed by: NURSE PRACTITIONER

## 2020-09-02 PROCEDURE — 99213 PR OFFICE/OUTPT VISIT, EST, LEVL III, 20-29 MIN: ICD-10-PCS | Mod: HCNC,95,, | Performed by: NURSE PRACTITIONER

## 2020-09-02 PROCEDURE — 99213 OFFICE O/P EST LOW 20 MIN: CPT | Mod: HCNC,95,, | Performed by: NURSE PRACTITIONER

## 2020-09-02 RX ORDER — FLUTICASONE PROPIONATE 50 MCG
SPRAY, SUSPENSION (ML) NASAL
Qty: 16 G | Refills: 2 | Status: SHIPPED | OUTPATIENT
Start: 2020-09-02 | End: 2021-02-11

## 2020-09-02 RX ORDER — DOXYCYCLINE 100 MG/1
100 CAPSULE ORAL 2 TIMES DAILY
Qty: 14 CAPSULE | Refills: 0 | Status: SHIPPED | OUTPATIENT
Start: 2020-09-02 | End: 2020-09-09

## 2020-09-02 NOTE — PROGRESS NOTES
Subjective:       Patient ID: Fred Blackwell is a 65 y.o. male.    Chief Complaint: No chief complaint on file.  This is his first time seeing me within primary care  Last saw PCP Josef on 03/10/2020.  HPI   Started with sinus issues  About one and half week ago and fel like sand in ey on left side and left side felt like sinus issues. Started claritin and the eye symptoms improved.  Then he had pressure to frontal regions and feels like water in left ear now and feels clogged up.  Diarrhea started one week ago. Took imodium once and then stopped it. The diarrhea returned and yesterday he had 1 episode of diarrhea yesterday and none today.   There were no vitals filed for this visit.  Review of Systems   Constitutional: Negative for fever.   HENT: Positive for congestion, postnasal drip and sinus pressure.    Gastrointestinal: Positive for diarrhea. Negative for abdominal pain, constipation, nausea and vomiting.   Genitourinary: Negative for dysuria, frequency and hematuria.   Musculoskeletal: Negative for arthralgias and myalgias.   Neurological: Negative for headaches.       The patient location is: [place]  The chief complaint leading to consultation is: sinus complaints and diarrhea    Visit type: audiovisual    Face to Face time with patient: 11:44- 12:00  16  minutes of total time spent on the encounter, which includes face to face time and non-face to face time preparing to see the patient (eg, review of tests), Obtaining and/or reviewing separately obtained history, Documenting clinical information in the electronic or other health record, Independently interpreting results (not separately reported) and communicating results to the patient/family/caregiver, or Care coordination (not separately reported).       Each patient to whom he or she provides medical services by telemedicine is:  (1) informed of the relationship between the physician and patient and the respective role of any other  health care provider with respect to management of the patient; and (2) notified that he or she may decline to receive medical services by telemedicine and may withdraw from such care at any time.    Notes:   Sinus symptoms have improved but diarrhea started a week again  States has had these symptoms prior and bad sinus attack. He states sinus issues comes and goes.  Currently sinus dripping in throat and ear feel plugged up  Negative Covid in June   Objective:      Physical Exam  Constitutional:       General: He is awake.      Appearance: Normal appearance. He is well-developed and well-groomed.   HENT:      Head: Normocephalic and atraumatic.      Nose:      Right Sinus: Maxillary sinus tenderness and frontal sinus tenderness present.      Left Sinus: Maxillary sinus tenderness and frontal sinus tenderness present.   Neurological:      Mental Status: He is alert and oriented to person, place, and time.   Psychiatric:         Attention and Perception: Attention and perception normal.         Mood and Affect: Mood and affect normal.         Behavior: Behavior is uncooperative.         Thought Content: Thought content normal.         Cognition and Memory: Cognition and memory normal.         Judgment: Judgment normal.         Assessment & Plan:       Other acute sinusitis, recurrence not specified  -     fluticasone propionate (FLONASE) 50 mcg/actuation nasal spray; USE 1 SPRAY (50 MCG TOTAL) BY EACH NARE ROUTE ONCE DAILY.  Dispense: 16 g; Refill: 2  -     doxycycline (VIBRAMYCIN) 100 MG Cap; Take 1 capsule (100 mg total) by mouth 2 (two) times daily. for 7 days  Dispense: 14 capsule; Refill: 0    Diarrhea, unspecified type    Post-nasal drip  -     fluticasone propionate (FLONASE) 50 mcg/actuation nasal spray; USE 1 SPRAY (50 MCG TOTAL) BY EACH NARE ROUTE ONCE DAILY.  Dispense: 16 g; Refill: 2    Sinus complaint  -     fluticasone propionate (FLONASE) 50 mcg/actuation nasal spray; USE 1 SPRAY (50 MCG TOTAL) BY  EACH NARE ROUTE ONCE DAILY.  Dispense: 16 g; Refill: 2    claritin daily  Saline nasal in shower every night  Flonase daily  If not improved in 3 days start antibiotic.  He has been advised to take one imodium with each loose stool and follow directions outlined on the box. He has not had an episode today.  He is requesting refill of pain medication as well and I have explained that the request will be sent to his PCP.   Advised to contact clinic as needed if symptoms are not relieved.  Medication List with Changes/Refills   New Medications    DOXYCYCLINE (VIBRAMYCIN) 100 MG CAP    Take 1 capsule (100 mg total) by mouth 2 (two) times daily. for 7 days   Current Medications    ALBUTEROL (PROVENTIL) 2.5 MG /3 ML (0.083 %) NEBULIZER SOLUTION    Take 3 mLs (2.5 mg total) by nebulization every 6 (six) hours as needed for Wheezing. Rescue    ALPRAZOLAM (XANAX) 1 MG TABLET    Take 1 tablet (1 mg total) by mouth 3 (three) times daily as needed for Anxiety.    AMLODIPINE (NORVASC) 2.5 MG TABLET    TAKE 1 TABLET (2.5 MG TOTAL) BY MOUTH EVERY EVENING.    ASPIRIN (ECOTRIN) 81 MG EC TABLET    Take 1 tablet (81 mg total) by mouth once daily.    ATORVASTATIN (LIPITOR) 80 MG TABLET    Take 1 tablet (80 mg total) by mouth once daily.    BLOOD SUGAR DIAGNOSTIC STRP    To check BG 2 (two) times daily, to use with insurance preferred meter    BLOOD-GLUCOSE METER KIT    To check BG 2 (two) times daily, to use with insurance preferred meter    BRILINTA 90 MG TABLET    TAKE 1 TABLET (90 MG TOTAL) BY MOUTH TWO TIMES A DAY.    HYDROCODONE-ACETAMINOPHEN (NORCO)  MG PER TABLET    Take 1 tablet by mouth 3 (three) times daily as needed.    LANCETS MISC    To check BG 2 (two) times daily, to use with insurance preferred meter    LOSARTAN (COZAAR) 50 MG TABLET    Take 1 tablet (50 mg total) by mouth once daily.    OMEPRAZOLE (PRILOSEC) 20 MG CAPSULE    Take 1 capsule (20 mg total) by mouth once daily.   Changed and/or Refilled Medications     Modified Medication Previous Medication    FLUTICASONE PROPIONATE (FLONASE) 50 MCG/ACTUATION NASAL SPRAY fluticasone propionate (FLONASE) 50 mcg/actuation nasal spray       USE 1 SPRAY (50 MCG TOTAL) BY EACH NARE ROUTE ONCE DAILY.    USE 1 SPRAY (50 MCG TOTAL) BY EACH NARE ROUTE ONCE DAILY.         Follow up in about 2 months (around 11/2/2020).

## 2020-09-08 DIAGNOSIS — M51.36 DEGENERATIVE DISC DISEASE, LUMBAR: ICD-10-CM

## 2020-09-08 DIAGNOSIS — R09.89 SINUS COMPLAINT: ICD-10-CM

## 2020-09-08 DIAGNOSIS — J01.80 OTHER ACUTE SINUSITIS, RECURRENCE NOT SPECIFIED: ICD-10-CM

## 2020-09-08 DIAGNOSIS — R09.82 POST-NASAL DRIP: ICD-10-CM

## 2020-09-08 RX ORDER — HYDROCODONE BITARTRATE AND ACETAMINOPHEN 10; 325 MG/1; MG/1
1 TABLET ORAL 3 TIMES DAILY PRN
Qty: 80 TABLET | Refills: 0 | OUTPATIENT
Start: 2020-09-08

## 2020-09-08 NOTE — TELEPHONE ENCOUNTER
----- Message from Jaz Shankar sent at 9/8/2020  9:42 AM CDT -----  Contact: Leticia with [facility]  Type:  RX Refill Request    Who Called:  Leticia with [facility]  Refill or New Rx:  Refill  RX Name and Strength:  HYDROcodone-acetaminophen (NORCO)  mg per tablet  How is the patient currently taking it? (ex. 1XDay):  Take 1 tablet by mouth 3 (three) times daily as needed. - Oral  Is this a 30 day or 90 day RX:  80 tablet  Preferred Pharmacy with phone number:    [facility] - [place] [address]  [address] [address]  Phone: [phone] Fax: [phone]  Local or Mail Order:  local  Ordering Provider:  MARITZA Bahena MD  Best Call Back Number:  [phone]  Additional Information:  Caller says they sent over refill requests last week but have not heard back.

## 2020-09-09 RX ORDER — HYDROCODONE BITARTRATE AND ACETAMINOPHEN 10; 325 MG/1; MG/1
TABLET ORAL
Qty: 80 TABLET | Refills: 0 | OUTPATIENT
Start: 2020-09-09

## 2020-09-10 ENCOUNTER — TELEPHONE (OUTPATIENT)
Dept: FAMILY MEDICINE | Facility: CLINIC | Age: 65
End: 2020-09-10

## 2020-09-14 DIAGNOSIS — M51.36 DEGENERATIVE DISC DISEASE, LUMBAR: ICD-10-CM

## 2020-09-14 RX ORDER — HYDROCODONE BITARTRATE AND ACETAMINOPHEN 10; 325 MG/1; MG/1
1 TABLET ORAL 3 TIMES DAILY PRN
Qty: 80 TABLET | Refills: 0 | OUTPATIENT
Start: 2020-09-14

## 2020-09-14 RX ORDER — HYDROCODONE BITARTRATE AND ACETAMINOPHEN 10; 325 MG/1; MG/1
TABLET ORAL
Qty: 80 TABLET | Refills: 0 | OUTPATIENT
Start: 2020-09-14

## 2020-09-14 NOTE — PROGRESS NOTES
Refill Routing Note   Medication(s) are not appropriate for processing by [facility]:       - Outside of protocol           Medication reconciliation completed: No      Automatic Epic Generated Protocol Data:        Requested Prescriptions   Pending Prescriptions Disp Refills    HYDROcodone-acetaminophen (NORCO)  mg per tablet [Pharmacy Med Name: HYDROCODON-APAP   TAB] 80 tablet 0     Sig: TAKE 1 TABLET BY MOUTH THREE TIMES DAILY AS NEEDED.       Opiods Refill Protocol Failed - 9/14/2020 11:02 AM        Failed - Patient has signed pain contract        Passed - Recent visit with authorizing provider in the past 3 months        Passed - No positive pregnancy test in past 12 months        Narcotics Refill Protocol Passed - 9/14/2020 11:02 AM        Passed - Patient seen within 3 months     Last visit with MARITZA Bahena MD: 3/10/2020  Last visit in [facility]: 9/2/2020    Patient's next visit in [facility]: 9/14/2020           Passed - Med not refilled within 4 weeks              Appointments  past 12m or future 3m with PCP    Date Provider   Last Visit   3/10/2020 MARITZA Bahena MD   Next Visit   Visit date not found MARITZA Bahena MD   ED visits in past 90 days: 0     Note composed:3:01 PM 09/14/2020

## 2020-09-15 ENCOUNTER — LAB VISIT (OUTPATIENT)
Dept: LAB | Facility: HOSPITAL | Age: 65
End: 2020-09-15
Attending: NURSE PRACTITIONER
Payer: MEDICARE

## 2020-09-15 DIAGNOSIS — E11.9 TYPE 2 DIABETES MELLITUS WITHOUT COMPLICATION, WITHOUT LONG-TERM CURRENT USE OF INSULIN: ICD-10-CM

## 2020-09-15 LAB
ESTIMATED AVG GLUCOSE: 154 MG/DL (ref 68–131)
HBA1C MFR BLD HPLC: 7 % (ref 4–5.6)

## 2020-09-15 PROCEDURE — 36415 COLL VENOUS BLD VENIPUNCTURE: CPT | Mod: HCNC,PO

## 2020-09-15 PROCEDURE — 83036 HEMOGLOBIN GLYCOSYLATED A1C: CPT | Mod: HCNC

## 2020-09-16 ENCOUNTER — TELEPHONE (OUTPATIENT)
Dept: FAMILY MEDICINE | Facility: CLINIC | Age: 65
End: 2020-09-16

## 2020-09-16 NOTE — TELEPHONE ENCOUNTER
----- Message from Vania Mart sent at 9/16/2020  1:36 PM CDT -----  Contact: pt  Pt states on 9/2/20 the pt saw NP Gilberto on a virtual visit and he informed her that he was running out of his HYDROcodone-acetaminophen (NORCO)  mg per tablet and she states she was going to forward to the Dr a message. Pt states nurse Aixa 9/8 on the portal advised him his medication had been forwarded to the Dr and that the Dr has up to 72 hours to call in Rx. And this morning a message was sent on the protal by nurse Aguilera who advised him to call and schedule an appointment to see the Dr so he is calling to speak with the office to get a better understanding  cause he had saw a Dr/NP. Advised the pt that the Dr is not in today but will be in tomorrow....[phone]        .  [facility] - [place] [address]  [address] [address]  Phone: [phone] Fax: [phone]

## 2020-09-16 NOTE — TELEPHONE ENCOUNTER
----- Message from Vania Mart sent at 9/16/2020  1:36 PM CDT -----  Contact: pt  Pt states on 9/2/20 the pt saw NP Gilberto on a virtual visit and he informed her that he was running out of his HYDROcodone-acetaminophen (NORCO)  mg per tablet and she states she was going to forward to the Dr a message. Pt states nurse Aixa 9/8 on the portal advised him his medication had been forwarded to the Dr and that the Dr has up to 72 hours to call in Rx. And this morning a message was sent on the protal by nurse Aguilera who advised him to call and schedule an appointment to see the Dr so he is calling to speak with the office to get a better understanding  cause he had saw a Dr/NP. Advised the pt that the Dr is not in today but will be in tomorrow....[phone]        .  [facility] - [place] [address]  [address] [address]  Phone: [phone] Fax: [phone]

## 2020-09-21 ENCOUNTER — CLINICAL SUPPORT (OUTPATIENT)
Dept: CARDIOLOGY | Facility: CLINIC | Age: 65
End: 2020-09-21
Attending: INTERNAL MEDICINE
Payer: MEDICARE

## 2020-09-21 DIAGNOSIS — I10 ESSENTIAL HYPERTENSION: ICD-10-CM

## 2020-09-21 DIAGNOSIS — E78.00 PURE HYPERCHOLESTEROLEMIA: ICD-10-CM

## 2020-09-21 DIAGNOSIS — Z79.02 LONG TERM (CURRENT) USE OF ANTITHROMBOTICS/ANTIPLATELETS: ICD-10-CM

## 2020-09-21 DIAGNOSIS — M51.36 DEGENERATIVE DISC DISEASE, LUMBAR: ICD-10-CM

## 2020-09-21 DIAGNOSIS — I22.2 SUBSEQUENT NON-ST ELEVATION (NSTEMI) MYOCARDIAL INFARCTION: ICD-10-CM

## 2020-09-21 DIAGNOSIS — R09.89 BRUIT OF RIGHT CAROTID ARTERY: ICD-10-CM

## 2020-09-21 LAB
LEFT ABI: 1.2
LEFT ARM BP: 166 MMHG
LEFT CBA DIAS: 11 CM/S
LEFT CBA SYS: 50 CM/S
LEFT CCA DIST DIAS: 15 CM/S
LEFT CCA DIST SYS: 65 CM/S
LEFT CCA MID DIAS: 16 CM/S
LEFT CCA MID SYS: 88 CM/S
LEFT CCA PROX DIAS: 22 CM/S
LEFT CCA PROX SYS: 111 CM/S
LEFT DORSALIS PEDIS: 201 MMHG
LEFT ECA DIAS: 25 CM/S
LEFT ECA SYS: 113 CM/S
LEFT ICA DIST DIAS: 24 CM/S
LEFT ICA DIST SYS: 68 CM/S
LEFT ICA MID DIAS: 26 CM/S
LEFT ICA MID SYS: 66 CM/S
LEFT ICA PROX DIAS: 14 CM/S
LEFT ICA PROX SYS: 35 CM/S
LEFT POSTERIOR TIBIAL: 198 MMHG
LEFT VERTEBRAL DIAS: 14 CM/S
LEFT VERTEBRAL SYS: 44 CM/S
OHS CV CAROTID RIGHT ICA EDV HIGHEST: 20
OHS CV CAROTID ULTRASOUND LEFT ICA/CCA RATIO: 1.05
OHS CV CAROTID ULTRASOUND RIGHT ICA/CCA RATIO: 0.89
OHS CV PV CAROTID LEFT HIGHEST CCA: 111
OHS CV PV CAROTID LEFT HIGHEST ICA: 68
OHS CV PV CAROTID RIGHT HIGHEST CCA: 136
OHS CV PV CAROTID RIGHT HIGHEST ICA: 50
OHS CV US CAROTID LEFT HIGHEST EDV: 26
RIGHT ABI: 1.12
RIGHT ARM BP: 168 MMHG
RIGHT CBA DIAS: 7 CM/S
RIGHT CBA SYS: 36 CM/S
RIGHT CCA DIST DIAS: 8 CM/S
RIGHT CCA DIST SYS: 56 CM/S
RIGHT CCA MID DIAS: 17 CM/S
RIGHT CCA MID SYS: 89 CM/S
RIGHT CCA PROX DIAS: 15 CM/S
RIGHT CCA PROX SYS: 136 CM/S
RIGHT DORSALIS PEDIS: 166 MMHG
RIGHT ECA DIAS: 27 CM/S
RIGHT ECA SYS: 155 CM/S
RIGHT ICA DIST DIAS: 20 CM/S
RIGHT ICA DIST SYS: 50 CM/S
RIGHT ICA MID DIAS: 19 CM/S
RIGHT ICA MID SYS: 44 CM/S
RIGHT ICA PROX DIAS: 8 CM/S
RIGHT ICA PROX SYS: 36 CM/S
RIGHT POSTERIOR TIBIAL: 188 MMHG
RIGHT VERTEBRAL DIAS: 28 CM/S
RIGHT VERTEBRAL SYS: 80 CM/S

## 2020-09-21 PROCEDURE — 93922 UPR/L XTREMITY ART 2 LEVELS: CPT | Mod: HCNC,S$GLB,, | Performed by: INTERNAL MEDICINE

## 2020-09-21 PROCEDURE — 93922 CV US ANKLE BRACHIAL INDICES EXT LTD WO STRESS (CUPID ONLY): ICD-10-PCS | Mod: HCNC,S$GLB,, | Performed by: INTERNAL MEDICINE

## 2020-09-21 PROCEDURE — 93880 EXTRACRANIAL BILAT STUDY: CPT | Mod: HCNC,S$GLB,, | Performed by: INTERNAL MEDICINE

## 2020-09-21 PROCEDURE — 93880 CV US DOPPLER CAROTID (CUPID ONLY): ICD-10-PCS | Mod: HCNC,S$GLB,, | Performed by: INTERNAL MEDICINE

## 2020-09-21 RX ORDER — HYDROCODONE BITARTRATE AND ACETAMINOPHEN 10; 325 MG/1; MG/1
1 TABLET ORAL 3 TIMES DAILY PRN
Qty: 80 TABLET | Refills: 0 | Status: SHIPPED | OUTPATIENT
Start: 2020-09-21 | End: 2020-10-22 | Stop reason: SDUPTHER

## 2020-09-21 NOTE — TELEPHONE ENCOUNTER
----- Message from Maricarmen Lombardi sent at 9/18/2020  3:36 PM CDT -----  Contact: self  Type: Needs Medical Advice  Who Called:  Patient   Best Call Back Number: [phone] (home)     Additional Information: patient requesting a return call concerning the refills on his pain medication, he called to inform he was able to move his appointment to October 20, please contact to advise.

## 2020-09-25 ENCOUNTER — PATIENT OUTREACH (OUTPATIENT)
Dept: ADMINISTRATIVE | Facility: OTHER | Age: 65
End: 2020-09-25

## 2020-09-25 NOTE — PROGRESS NOTES
LINKS immunization registry updated  Care Everywhere updated  Health Maintenance updated  Chart reviewed for overdue Proactive [facility] (MICHELINE) health maintenance testing (CRS, Breast Ca, Diabetic Eye Exam)   Orders entered:N/A

## 2020-09-28 ENCOUNTER — OFFICE VISIT (OUTPATIENT)
Dept: CARDIOLOGY | Facility: CLINIC | Age: 65
End: 2020-09-28
Payer: MEDICARE

## 2020-09-28 ENCOUNTER — TELEPHONE (OUTPATIENT)
Dept: FAMILY MEDICINE | Facility: CLINIC | Age: 65
End: 2020-09-28

## 2020-09-28 VITALS
BODY MASS INDEX: 34.76 KG/M2 | DIASTOLIC BLOOD PRESSURE: 86 MMHG | WEIGHT: 256.63 LBS | HEIGHT: 72 IN | HEART RATE: 68 BPM | SYSTOLIC BLOOD PRESSURE: 149 MMHG

## 2020-09-28 DIAGNOSIS — E78.00 PURE HYPERCHOLESTEROLEMIA: ICD-10-CM

## 2020-09-28 DIAGNOSIS — E66.9 OBESITY, CLASS I, BMI 30-34.9: ICD-10-CM

## 2020-09-28 DIAGNOSIS — Z79.02 LONG TERM (CURRENT) USE OF ANTITHROMBOTICS/ANTIPLATELETS: ICD-10-CM

## 2020-09-28 DIAGNOSIS — I77.9 MILD CAROTID ARTERY DISEASE: ICD-10-CM

## 2020-09-28 DIAGNOSIS — I25.10 CORONARY ARTERY DISEASE INVOLVING NATIVE CORONARY ARTERY OF NATIVE HEART WITHOUT ANGINA PECTORIS: Primary | ICD-10-CM

## 2020-09-28 DIAGNOSIS — I10 ESSENTIAL HYPERTENSION: ICD-10-CM

## 2020-09-28 PROCEDURE — 3079F DIAST BP 80-89 MM HG: CPT | Mod: HCNC,CPTII,S$GLB, | Performed by: INTERNAL MEDICINE

## 2020-09-28 PROCEDURE — 3077F SYST BP >= 140 MM HG: CPT | Mod: HCNC,CPTII,S$GLB, | Performed by: INTERNAL MEDICINE

## 2020-09-28 PROCEDURE — 3077F PR MOST RECENT SYSTOLIC BLOOD PRESSURE >= 140 MM HG: ICD-10-PCS | Mod: HCNC,CPTII,S$GLB, | Performed by: INTERNAL MEDICINE

## 2020-09-28 PROCEDURE — 99214 PR OFFICE/OUTPT VISIT, EST, LEVL IV, 30-39 MIN: ICD-10-PCS | Mod: HCNC,S$GLB,, | Performed by: INTERNAL MEDICINE

## 2020-09-28 PROCEDURE — 3008F BODY MASS INDEX DOCD: CPT | Mod: HCNC,CPTII,S$GLB, | Performed by: INTERNAL MEDICINE

## 2020-09-28 PROCEDURE — 99499 RISK ADDL DX/OHS AUDIT: ICD-10-PCS | Mod: HCNC,S$GLB,, | Performed by: INTERNAL MEDICINE

## 2020-09-28 PROCEDURE — 99499 UNLISTED E&M SERVICE: CPT | Mod: HCNC,S$GLB,, | Performed by: INTERNAL MEDICINE

## 2020-09-28 PROCEDURE — 3008F PR BODY MASS INDEX (BMI) DOCUMENTED: ICD-10-PCS | Mod: HCNC,CPTII,S$GLB, | Performed by: INTERNAL MEDICINE

## 2020-09-28 PROCEDURE — 1101F PT FALLS ASSESS-DOCD LE1/YR: CPT | Mod: HCNC,CPTII,S$GLB, | Performed by: INTERNAL MEDICINE

## 2020-09-28 PROCEDURE — 99999 PR PBB SHADOW E&M-EST. PATIENT-LVL III: CPT | Mod: PBBFAC,HCNC,, | Performed by: INTERNAL MEDICINE

## 2020-09-28 PROCEDURE — 99999 PR PBB SHADOW E&M-EST. PATIENT-LVL III: ICD-10-PCS | Mod: PBBFAC,HCNC,, | Performed by: INTERNAL MEDICINE

## 2020-09-28 PROCEDURE — 1101F PR PT FALLS ASSESS DOC 0-1 FALLS W/OUT INJ PAST YR: ICD-10-PCS | Mod: HCNC,CPTII,S$GLB, | Performed by: INTERNAL MEDICINE

## 2020-09-28 PROCEDURE — 3079F PR MOST RECENT DIASTOLIC BLOOD PRESSURE 80-89 MM HG: ICD-10-PCS | Mod: HCNC,CPTII,S$GLB, | Performed by: INTERNAL MEDICINE

## 2020-09-28 PROCEDURE — 99214 OFFICE O/P EST MOD 30 MIN: CPT | Mod: HCNC,S$GLB,, | Performed by: INTERNAL MEDICINE

## 2020-09-28 RX ORDER — ATORVASTATIN CALCIUM 80 MG/1
80 TABLET, FILM COATED ORAL DAILY
Qty: 90 TABLET | Refills: 1 | Status: SHIPPED | OUTPATIENT
Start: 2020-09-28 | End: 2020-12-30 | Stop reason: SDUPTHER

## 2020-09-28 RX ORDER — LOSARTAN POTASSIUM 50 MG/1
50 TABLET ORAL DAILY
Qty: 90 TABLET | Refills: 1 | Status: SHIPPED | OUTPATIENT
Start: 2020-09-28 | End: 2020-12-30 | Stop reason: SDUPTHER

## 2020-09-28 NOTE — PROGRESS NOTES
Subjective:    Patient ID:  Fred Blackwell is a 65 y.o. male who presents for Coronary Artery Disease and Medication Management        HPI  DISCUSSED TESTS AND GOALS, MILD CAROTID DISEASE, NORMAL RESTING BOBO, RECENT HBA1C 7%, LAST LDL 63, HAS BEEN OUT OF LOSARTAN FOR 2 WEEKS ??, NOT TAKING LIPITOR REGULARLY, STOPPED TOBACCO, SEE ROS    Past Medical History:   Diagnosis Date    Anxiety     Bell's palsy     Current moderate episode of major depressive disorder without prior episode 4/1/2019    DDD (degenerative disc disease), lumbar     Diabetes mellitus     Fibromyalgia     GERD (gastroesophageal reflux disease)     Hyperlipidemia     Hypertension     Sciatica      Past Surgical History:   Procedure Laterality Date    COLONOSCOPY N/A 12/11/2019    Procedure: COLONOSCOPY;  Surgeon: Mich Mota MD;  Location: [facility] ENDO;  Service: Endoscopy;  Laterality: N/A;    CORONARY ANGIOGRAPHY N/A 6/2/2020    Procedure: ANGIOGRAM, CORONARY ARTERY;  Surgeon: oRmario Flaherty MD;  Location: [facility] CATH;  Service: Cardiology;  Laterality: N/A;    LEFT HEART CATHETERIZATION Right 6/2/2020    Procedure: Left heart cath;  Surgeon: Romario Flaherty MD;  Location: [facility] CATH;  Service: Cardiology;  Laterality: Right;    SHOULDER SURGERY       Family History   Problem Relation Age of Onset    Arthritis Mother      Social History     Socioeconomic History    Marital status:      Spouse name: Not on file    Number of children: Not on file    Years of education: Not on file    Highest education level: Not on file   Occupational History    Not on file   Social Needs    Financial resource strain: Not on file    Food insecurity     Worry: Not on file     Inability: Not on file    Transportation needs     Medical: Not on file     Non-medical: Not on file   Tobacco Use    Smoking status: Former Smoker     Packs/day: 2.00     Types: Cigarettes    Smokeless tobacco: Never Used    Tobacco comment: 2  packs/day   Substance and Sexual Activity    Alcohol use: No    Drug use: No    Sexual activity: Not on file   Lifestyle    Physical activity     Days per week: Not on file     Minutes per session: Not on file    Stress: Not on file   Relationships    Social connections     Talks on phone: Not on file     Gets together: Not on file     Attends Buddhist service: Not on file     Active member of club or organization: Not on file     Attends meetings of clubs or organizations: Not on file     Relationship status: Not on file   Other Topics Concern    Not on file   Social History Narrative    Not on file       Review of patient's allergies indicates:   Allergen Reactions    Cymbalta [duloxetine] Nausea And Vomiting    Penicillins      Other reaction(s): Hives       Current Outpatient Medications:     ALPRAZolam (XANAX) 1 MG tablet, Take 1 tablet (1 mg total) by mouth 3 (three) times daily as needed for Anxiety., Disp: 90 tablet, Rfl: 0    amLODIPine (NORVASC) 2.5 MG tablet, Take 1 tablet (2.5 mg total) by mouth every evening., Disp: 90 tablet, Rfl: 0    aspirin (ECOTRIN) 81 MG EC tablet, Take 1 tablet (81 mg total) by mouth once daily., Disp: , Rfl: 0    atorvastatin (LIPITOR) 80 MG tablet, Take 1 tablet (80 mg total) by mouth once daily., Disp: 90 tablet, Rfl: 1    blood sugar diagnostic Strp, To check BG 2 (two) times daily, to use with insurance preferred meter, Disp: 200 strip, Rfl: 3    blood-glucose meter kit, To check BG 2 (two) times daily, to use with insurance preferred meter, Disp: 1 each, Rfl: 0    fluticasone propionate (FLONASE) 50 mcg/actuation nasal spray, USE 1 SPRAY (50 MCG TOTAL) BY EACH NARE ROUTE ONCE DAILY., Disp: 16 g, Rfl: 2    lancets Misc, To check BG 2 (two) times daily, to use with insurance preferred meter, Disp: 200 each, Rfl: 3    losartan (COZAAR) 50 MG tablet, Take 1 tablet (50 mg total) by mouth once daily., Disp: 90 tablet, Rfl: 1    omeprazole (PRILOSEC) 20 MG  capsule, Take 1 capsule (20 mg total) by mouth once daily., Disp: 90 capsule, Rfl: 3    albuterol (PROVENTIL) 2.5 mg /3 mL (0.083 %) nebulizer solution, Take 3 mLs (2.5 mg total) by nebulization every 6 (six) hours as needed for Wheezing. Rescue, Disp: 90 mL, Rfl: 0    BRILINTA 90 mg tablet, TAKE 1 TABLET (90 MG TOTAL) BY MOUTH TWO TIMES A DAY., Disp: 60 tablet, Rfl: 2    HYDROcodone-acetaminophen (NORCO)  mg per tablet, Take 1 tablet by mouth 3 (three) times daily as needed. (Patient not taking: Reported on 9/28/2020), Disp: 80 tablet, Rfl: 0    Review of Systems   Constitution: Negative for chills, diaphoresis, fever, malaise/fatigue and night sweats.   HENT: Negative for congestion and nosebleeds.    Eyes: Negative for blurred vision and visual disturbance.   Cardiovascular: Negative for chest pain, claudication, cyanosis, dyspnea on exertion, irregular heartbeat, leg swelling, near-syncope, orthopnea, palpitations, paroxysmal nocturnal dyspnea and syncope.   Respiratory: Negative for cough, hemoptysis, shortness of breath and wheezing.    Endocrine: Negative for cold intolerance, heat intolerance and polyuria.   Hematologic/Lymphatic: Negative for adenopathy and bleeding problem.   Skin: Negative for color change, itching and nail changes.   Musculoskeletal: Negative for back pain, falls and joint pain (CHRONIC PAIN).   Gastrointestinal: Negative for abdominal pain, change in bowel habit, dysphagia, hematemesis, jaundice, melena and nausea.   Genitourinary: Negative for dysuria, flank pain and frequency.   Neurological: Negative for brief paralysis, dizziness, focal weakness, light-headedness, loss of balance, numbness (FINGERS), paresthesias, tremors and weakness.   Psychiatric/Behavioral: Negative for altered mental status, depression, memory loss and substance abuse.        Objective:      Vitals:    09/28/20 1006   BP: (!) 149/86   Pulse: 68   Weight: 116.4 kg (256 lb 9.9 oz)   Height: 6' (1.829 m)      Body mass index is 34.8 kg/m².    Physical Exam   Constitutional: He is oriented to person, place, and time. He appears well-developed and well-nourished. He is active.   OVERWEIGHT   HENT:   Head: Normocephalic and atraumatic.   Mouth/Throat: Oropharynx is clear and moist and mucous membranes are normal.   Eyes: Pupils are equal, round, and reactive to light. Conjunctivae and EOM are normal.   Neck: Normal carotid pulses, no hepatojugular reflux and no JVD present.   Cardiovascular: Normal rate, regular rhythm and normal heart sounds.  No extrasystoles are present. PMI is not displaced. Exam reveals no gallop, no distant heart sounds, no friction rub and no midsystolic click.   No murmur heard.  Pulses:       Carotid pulses are 2+ on the right side with bruit and 2+ on the left side.       Radial pulses are 2+ on the right side and 2+ on the left side.        Dorsalis pedis pulses are 2+ on the right side and 2+ on the left side.        Posterior tibial pulses are 2+ on the right side and 2+ on the left side.   Pulmonary/Chest: Effort normal and breath sounds normal. No accessory muscle usage. No tachypnea and no bradypnea. No respiratory distress.   Abdominal: Soft. Bowel sounds are normal. There is no hepatosplenomegaly. There is no abdominal tenderness. There is no CVA tenderness.   Musculoskeletal: Normal range of motion.         General: No deformity or edema.      Comments: NO ANKLE EDEMA   Neurological: He is alert and oriented to person, place, and time. He has normal strength. He displays no tremor. No cranial nerve deficit.   Skin: Skin is warm and dry. No rash noted. No cyanosis.   Psychiatric: He has a normal mood and affect. His speech is normal and behavior is normal.               ..    Chemistry        Component Value Date/Time     06/09/2020 0748    K 4.1 06/09/2020 0748     06/09/2020 0748    CO2 30 (H) 06/09/2020 0748    BUN 13 06/09/2020 0748    CREATININE 0.8 06/09/2020 0748      (H) 06/09/2020 0748        Component Value Date/Time    CALCIUM 9.0 06/09/2020 0748    ALKPHOS 66 06/09/2020 0748    AST 22 06/09/2020 0748    ALT 33 06/09/2020 0748    BILITOT 0.4 06/09/2020 0748    ESTGFRAFRICA >60.0 06/09/2020 0748    EGFRNONAA >60.0 06/09/2020 0748            ..  Lab Results   Component Value Date    CHOL 119 (L) 06/09/2020    CHOL 151 06/02/2020    CHOL 202 (H) 12/09/2019     Lab Results   Component Value Date    HDL 48 06/09/2020    HDL 51 06/02/2020    HDL 45 12/09/2019     Lab Results   Component Value Date    LDLCALC 63.0 06/09/2020    LDLCALC 86.4 06/02/2020    LDLCALC 142.6 12/09/2019     Lab Results   Component Value Date    TRIG 40 06/09/2020    TRIG 68 06/02/2020    TRIG 72 12/09/2019     Lab Results   Component Value Date    CHOLHDL 40.3 06/09/2020    CHOLHDL 33.8 06/02/2020    CHOLHDL 22.3 12/09/2019     ..  Lab Results   Component Value Date    WBC 9.13 06/03/2020    HGB 14.4 06/03/2020    HCT 42.7 06/03/2020    MCV 86 06/03/2020     06/03/2020       Test(s) Reviewed  I have reviewed the following in detail:  [] Stress test   [] Angiography   [] Echocardiogram   [x] Labs   [x] Other:       Assessment:         ICD-10-CM ICD-9-CM   1. Coronary artery disease involving native coronary artery of native heart without angina pectoris  I25.10 414.01   2. Essential hypertension  I10 401.9   3. Long term (current) use of antithrombotics/antiplatelets  Z79.02 V58.63   4. Obesity, Class I, BMI 30-34.9  E66.9 278.00   5. Pure hypercholesterolemia  E78.00 272.0   6. Mild carotid artery disease  I73.9 447.9     Problem List Items Addressed This Visit        Cardiac/Vascular    Essential hypertension    Overview     RF uniretic.           Relevant Orders    Comprehensive metabolic panel    Pure hypercholesterolemia    Relevant Orders    Comprehensive metabolic panel    Lipid Panel    Coronary artery disease involving native coronary artery of native heart without angina pectoris -  Primary    Relevant Orders    Comprehensive metabolic panel    Mild carotid artery disease       Hematology    Long term (current) use of antithrombotics/antiplatelets    Relevant Orders    Comprehensive metabolic panel    Hemoglobin       Endocrine    Obesity, Class I, BMI 30-34.9           Plan:     COMPLIANCE WITH MEDS, DAILY LIPITOR, RE-START LOSARTAN, BLOOD PRESSURE ELEVATED WITHOUT, CALL ME WITH BP,ALL OTHER CV CLINICALLY STABLE, NO ANGINA, NO HF, NO TIA, NO CLINICAL ARRHYTHMIA,CONTINUE CURRENT MEDS, EDUCATION, DIET, EXERCISE, WEIGHT LOSS,, CONTINUE TO STAY ABSTINENT FROM TOBACCO, RETURN TO CLINIC IN 6 MONTHS WITH LABS 6      Coronary artery disease involving native coronary artery of native heart without angina pectoris  -     Comprehensive metabolic panel; Future; Expected date: 03/28/2021    Essential hypertension  -     Comprehensive metabolic panel; Future; Expected date: 03/28/2021    Long term (current) use of antithrombotics/antiplatelets  -     Comprehensive metabolic panel; Future; Expected date: 03/28/2021  -     Hemoglobin; Future; Expected date: 03/28/2021    Obesity, Class I, BMI 30-34.9    Pure hypercholesterolemia  -     Comprehensive metabolic panel; Future; Expected date: 03/28/2021  -     Lipid Panel; Future; Expected date: 03/28/2021    Mild carotid artery disease    Other orders  -     losartan (COZAAR) 50 MG tablet; Take 1 tablet (50 mg total) by mouth once daily.  Dispense: 90 tablet; Refill: 1  -     atorvastatin (LIPITOR) 80 MG tablet; Take 1 tablet (80 mg total) by mouth once daily.  Dispense: 90 tablet; Refill: 1    RTC Low level/low impact aerobic exercise 5x's/wk. Heart healthy diet and risk factor modification.    See labs and med orders.    Aerobic exercise 5x's/wk. Heart healthy diet and risk factor modification.    See labs and med orders.

## 2020-09-28 NOTE — TELEPHONE ENCOUNTER
----- Message from Moses Brown sent at 9/28/2020 11:07 AM CDT -----  Type: Needs Medical Advice  Who Called:  Patient    Pharmacy name and phone #:   [facility] - [place], [place] - [address]  [facility] [address]  Phone: [phone] Fax: [phone]    Best Call Back Number: [phone] or Portal  Additional Information: Patient states that he is having difficulty in refilling his medication:  HYDROcodone-acetaminophen (NORCO)  mg per tablet    Please call to advise

## 2020-09-29 ENCOUNTER — PATIENT MESSAGE (OUTPATIENT)
Dept: OTHER | Facility: OTHER | Age: 65
End: 2020-09-29

## 2020-10-05 DIAGNOSIS — F41.1 GAD (GENERALIZED ANXIETY DISORDER): ICD-10-CM

## 2020-10-06 NOTE — PROGRESS NOTES
Refill Routing Note   Medication(s) are not appropriate for processing by [facility] for the following reason(s)   - Outside of Protocol    Medication reconciliation completed: No   Automatic Epic Protocol Generated Data:    Requested Prescriptions   Pending Prescriptions Disp Refills    ALPRAZolam (XANAX) 1 MG tablet [Pharmacy Med Name: ALPRAZOLAM 1 MG TABS 1 Tablet] 90 tablet 0     Sig: TAKE 1 TABLET (1 MG TOTAL) BY MOUTH THREE TIMES DAILY AS NEEDED FOR ANXIETY.       There is no refill protocol information for this order           Appointments     Date Provider   Last Visit   3/10/2020 MARITZA Bahena MD   Next Visit   10/12/2020 MARITZA Bahena MD   ED visits in past 90 days: 0    Note composed:8:27 AM 10/06/2020

## 2020-10-07 RX ORDER — ALPRAZOLAM 1 MG/1
TABLET ORAL
Qty: 30 TABLET | Refills: 0 | Status: SHIPPED | OUTPATIENT
Start: 2020-10-07 | End: 2020-10-19 | Stop reason: SDUPTHER

## 2020-10-12 ENCOUNTER — OFFICE VISIT (OUTPATIENT)
Dept: FAMILY MEDICINE | Facility: CLINIC | Age: 65
End: 2020-10-12
Payer: MEDICARE

## 2020-10-12 VITALS
HEIGHT: 72 IN | HEART RATE: 75 BPM | TEMPERATURE: 97 F | OXYGEN SATURATION: 96 % | BODY MASS INDEX: 34.67 KG/M2 | WEIGHT: 255.94 LBS | SYSTOLIC BLOOD PRESSURE: 136 MMHG | DIASTOLIC BLOOD PRESSURE: 84 MMHG

## 2020-10-12 DIAGNOSIS — Z12.5 SCREENING PSA (PROSTATE SPECIFIC ANTIGEN): ICD-10-CM

## 2020-10-12 DIAGNOSIS — F41.1 GAD (GENERALIZED ANXIETY DISORDER): ICD-10-CM

## 2020-10-12 DIAGNOSIS — I10 ESSENTIAL HYPERTENSION: Primary | ICD-10-CM

## 2020-10-12 DIAGNOSIS — E11.69 TYPE 2 DIABETES MELLITUS WITH OTHER SPECIFIED COMPLICATION, WITHOUT LONG-TERM CURRENT USE OF INSULIN: ICD-10-CM

## 2020-10-12 DIAGNOSIS — M54.17 RADICULAR PAIN OF LUMBOSACRAL REGION: ICD-10-CM

## 2020-10-12 PROCEDURE — 3051F PR MOST RECENT HEMOGLOBIN A1C LEVEL 7.0 - < 8.0%: ICD-10-PCS | Mod: HCNC,CPTII,S$GLB, | Performed by: FAMILY MEDICINE

## 2020-10-12 PROCEDURE — 3008F BODY MASS INDEX DOCD: CPT | Mod: HCNC,CPTII,S$GLB, | Performed by: FAMILY MEDICINE

## 2020-10-12 PROCEDURE — 99999 PR PBB SHADOW E&M-EST. PATIENT-LVL V: ICD-10-PCS | Mod: PBBFAC,HCNC,, | Performed by: FAMILY MEDICINE

## 2020-10-12 PROCEDURE — 1101F PT FALLS ASSESS-DOCD LE1/YR: CPT | Mod: HCNC,CPTII,S$GLB, | Performed by: FAMILY MEDICINE

## 2020-10-12 PROCEDURE — 99999 PR PBB SHADOW E&M-EST. PATIENT-LVL V: CPT | Mod: PBBFAC,HCNC,, | Performed by: FAMILY MEDICINE

## 2020-10-12 PROCEDURE — 3079F PR MOST RECENT DIASTOLIC BLOOD PRESSURE 80-89 MM HG: ICD-10-PCS | Mod: HCNC,CPTII,S$GLB, | Performed by: FAMILY MEDICINE

## 2020-10-12 PROCEDURE — 1101F PR PT FALLS ASSESS DOC 0-1 FALLS W/OUT INJ PAST YR: ICD-10-PCS | Mod: HCNC,CPTII,S$GLB, | Performed by: FAMILY MEDICINE

## 2020-10-12 PROCEDURE — 3008F PR BODY MASS INDEX (BMI) DOCUMENTED: ICD-10-PCS | Mod: HCNC,CPTII,S$GLB, | Performed by: FAMILY MEDICINE

## 2020-10-12 PROCEDURE — 99214 OFFICE O/P EST MOD 30 MIN: CPT | Mod: HCNC,S$GLB,, | Performed by: FAMILY MEDICINE

## 2020-10-12 PROCEDURE — 3079F DIAST BP 80-89 MM HG: CPT | Mod: HCNC,CPTII,S$GLB, | Performed by: FAMILY MEDICINE

## 2020-10-12 PROCEDURE — 99214 PR OFFICE/OUTPT VISIT, EST, LEVL IV, 30-39 MIN: ICD-10-PCS | Mod: HCNC,S$GLB,, | Performed by: FAMILY MEDICINE

## 2020-10-12 PROCEDURE — 3075F SYST BP GE 130 - 139MM HG: CPT | Mod: HCNC,CPTII,S$GLB, | Performed by: FAMILY MEDICINE

## 2020-10-12 PROCEDURE — 3075F PR MOST RECENT SYSTOLIC BLOOD PRESS GE 130-139MM HG: ICD-10-PCS | Mod: HCNC,CPTII,S$GLB, | Performed by: FAMILY MEDICINE

## 2020-10-12 PROCEDURE — 3051F HG A1C>EQUAL 7.0%<8.0%: CPT | Mod: HCNC,CPTII,S$GLB, | Performed by: FAMILY MEDICINE

## 2020-10-12 NOTE — PROGRESS NOTES
Subjective:       Patient ID: Fred Blackwell is a 65 y.o. male.    Chief Complaint: Follow-up    Here for f/u htn as well as chronic pain and anxiety. Mood stable      Hypertension  This is a chronic problem. The current episode started more than 1 year ago. The problem is controlled. Pertinent negatives include no palpitations or shortness of breath.   Diabetes  He presents for his follow-up diabetic visit. He has type 2 diabetes mellitus. His disease course has been stable. Pertinent negatives for hypoglycemia include no nervousness/anxiousness.     Review of Systems   Respiratory: Negative for chest tightness and shortness of breath.    Cardiovascular: Negative for palpitations and leg swelling.   Endocrine: Negative for cold intolerance and heat intolerance.   Psychiatric/Behavioral: Negative for decreased concentration. The patient is not nervous/anxious.        Objective:      Physical Exam  Vitals signs and nursing note reviewed.   Constitutional:       Appearance: He is well-developed.   HENT:      Head: Normocephalic and atraumatic.   Cardiovascular:      Rate and Rhythm: Normal rate and regular rhythm.      Heart sounds: Normal heart sounds.   Pulmonary:      Effort: Pulmonary effort is normal.      Breath sounds: Normal breath sounds.         Assessment:       1. Essential hypertension    2. Screening PSA (prostate specific antigen)    3. Type 2 diabetes mellitus with other specified complication, without long-term current use of insulin    4. SHANNAN (generalized anxiety disorder)    5. Radicular pain of lumbosacral region        Plan:       Essential hypertension    Screening PSA (prostate specific antigen)  -     PSA, Screening; Future; Expected date: 10/12/2020    Type 2 diabetes mellitus with other specified complication, without long-term current use of insulin    SHANNAN (generalized anxiety disorder)    Radicular pain of lumbosacral region      htn stable  Diabetes stable  Update labs and health  maintenance  Will monitor chronic medical issues and continue current plan of care.  Ok in  database; refill meds as needed    Follow up in about 3 months (around 1/12/2021), or if symptoms worsen or fail to improve.

## 2020-10-19 DIAGNOSIS — F41.1 GAD (GENERALIZED ANXIETY DISORDER): ICD-10-CM

## 2020-10-23 ENCOUNTER — TELEPHONE (OUTPATIENT)
Dept: FAMILY MEDICINE | Facility: CLINIC | Age: 65
End: 2020-10-23

## 2020-10-23 DIAGNOSIS — M51.36 DEGENERATIVE DISC DISEASE, LUMBAR: ICD-10-CM

## 2020-10-23 RX ORDER — HYDROCODONE BITARTRATE AND ACETAMINOPHEN 10; 325 MG/1; MG/1
1 TABLET ORAL 3 TIMES DAILY PRN
Qty: 80 TABLET | Refills: 0 | OUTPATIENT
Start: 2020-10-23

## 2020-10-23 NOTE — PROGRESS NOTES
Refill Routing Note   Medication(s) are not appropriate for processing by [facility] for the following reason(s):     - Outside of protocol    [facility] actions taken in this encounter: Route          Medication reconciliation completed: No   Automatic Epic Generated Protocol Data:        Requested Prescriptions   Pending Prescriptions Disp Refills    HYDROcodone-acetaminophen (NORCO)  mg per tablet [Pharmacy Med Name: HYDROCODON-APAP   Tablet] 80 tablet 0     Sig: TAKE 1 TABLET BY MOUTH 3 (THREE) TIMES DAILY AS NEEDED.       Narcotics Refill Protocol Failed - 10/23/2020  1:34 PM        Failed - Med not refilled within 4 weeks        Passed - Patient seen within 3 months     Last visit with MARITZA Bahena MD: 10/12/2020  Last visit in [facility]: 10/12/2020    Patient's next visit in [facility]: 12/14/2020          Opiods Refill Protocol Failed - 10/23/2020  1:34 PM        Failed - Patient has signed pain contract        Passed - Recent visit with authorizing provider in the past 3 months        Passed - No positive pregnancy test in past 12 months               Appointments  past 12m or future 3m with PCP    Date Provider   Last Visit   10/12/2020 MARITZA Bahena MD   Next Visit   12/14/2020 MARITZA Bahena MD   ED visits in past 90 days: 0        Note composed:3:51 PM 10/23/2020

## 2020-10-23 NOTE — TELEPHONE ENCOUNTER
----- Message from Johann Macias sent at 10/23/2020  1:12 PM CDT -----  Regarding: Refill status  Type:  Needs Medical Advice    Who Called:  patient  Pharmacy name and phone #:   [facility] - [place] [address]  Would the patient rather a call back or a response via [facility]?  Call back  Best Call Back Number: [phone]  Additional Information:  patient called to check the status of his multiple refills request for HYDROcodone-acetaminophen (NORCO)  mg per tablet

## 2020-11-02 ENCOUNTER — TELEPHONE (OUTPATIENT)
Dept: FAMILY MEDICINE | Facility: CLINIC | Age: 65
End: 2020-11-02

## 2020-11-02 NOTE — TELEPHONE ENCOUNTER
Spoke with pt and he states you usually send 90 tablets but this time he only got 30 tablets. Please advise.

## 2020-11-02 NOTE — TELEPHONE ENCOUNTER
----- Message from Kaela Serrano sent at 11/2/2020  2:18 PM CST -----  Patient is calling regarding the prescription for xanax.  He said he usually gets a quantity of 90 because he takes one 3 times daily.  Please call him at [phone].  Thank you!

## 2020-11-04 ENCOUNTER — TELEPHONE (OUTPATIENT)
Dept: FAMILY MEDICINE | Facility: CLINIC | Age: 65
End: 2020-11-04

## 2020-11-04 NOTE — TELEPHONE ENCOUNTER
Pt said he did not want a 90 day supply of the medication. He was taking 1 tablet (1 mg) by mouth 3 times daily. Only 30 pills were dispense not 90. Please advise.

## 2020-11-04 NOTE — TELEPHONE ENCOUNTER
Patient usually gets 90 day supply, but only got a 30 day supply. He is requesting the rest for his rx. Please advise.

## 2020-11-04 NOTE — TELEPHONE ENCOUNTER
----- Message from Lexii Watters sent at 11/4/2020 11:22 AM CST -----  Regarding: Pt self Mobile/Home [phone]  Requesting an RX refill or new RX.  Is this a refill or new RX: Refill  RX name and strength:ALPRAZolam (XANAX) 1 MG tablet  Is this a 30 day or 90 day RX: 30  Pharmacy name and phone #  [facility] - [address]  [phone] Fax# [phone]   Comments: Patient is calling in regards to him saying that a thirty day supply of her Xanax script was sent to the [facility] [address] on Friday, but he was suppose to get a ninety day supply. Patient would like for you to send the rest of his script over to the [facility] please.   Patient would like for you to give him a call please, patient said that he's been calling since Friday and he have never received a phone call.

## 2020-11-10 NOTE — TELEPHONE ENCOUNTER
"Please see [employer] message.     Patient is requesting the "other 60 Xanax" to complete his 30 day refill, of which he reports he usually receives #90.     Are we working on dosage reduction?  How should I advise the patient?  "

## 2020-11-11 RX ORDER — ALPRAZOLAM 1 MG/1
1 TABLET ORAL 3 TIMES DAILY PRN
Qty: 90 TABLET | Refills: 0 | Status: SHIPPED | OUTPATIENT
Start: 2020-11-11 | End: 2020-12-09 | Stop reason: SDUPTHER

## 2020-11-12 RX ORDER — OMEPRAZOLE 20 MG/1
20 CAPSULE, DELAYED RELEASE ORAL DAILY
Qty: 90 CAPSULE | Refills: 3 | Status: SHIPPED | OUTPATIENT
Start: 2020-11-12 | End: 2021-08-23

## 2020-11-12 NOTE — TELEPHONE ENCOUNTER
No new care gaps identified.  Powered by [employer]. Reference number: [id number]. 11/12/2020 1:50:35 PM   CST

## 2020-11-12 NOTE — TELEPHONE ENCOUNTER
Encounter details (provider/department) have been updated by [facility] staff  As of this time Protocols and CDM: Does populate or display   Updated: Provider  Of note. CDM should display. medication Is delegated and encounter details have been updated  Will resend refill request encounter to P Centralized Refill Staff Pool.   [facility]   Note composed:3:02 PM 11/12/2020

## 2020-11-13 NOTE — PROGRESS NOTES
Refill Authorization Note   Fred Blackwell  is requesting a refill authorization.  Brief assessment and rationale for refill: Approve  Medication Therapy Plan: CDMR,      Medication reconciliation completed: No    Comments:   Orders Placed This Encounter    omeprazole (PRILOSEC) 20 MG capsule      Requested Prescriptions   Signed Prescriptions Disp Refills    omeprazole (PRILOSEC) 20 MG capsule 90 capsule 3     Sig: TAKE 1 CAPSULE (20 MG TOTAL) BY MOUTH ONCE DAILY.       Gastroenterology: Proton Pump Inhibitors Passed - 11/12/2020  3:02 PM        Passed - Patient is at least 18 years old        Passed - Osteoporosis is not on problem list        Passed - Plavix is not on active medication list        Passed - Office visit in past 12 months or future 90 days     Recent Outpatient Visits            1 month ago Essential hypertension    [facility] Medicine MARITZA Bahena MD    1 month ago Coronary artery disease involving native coronary artery of native heart without angina pectoris    [facility] Cardiology Romario Flaherty MD    2 months ago Other acute sinusitis, recurrence not specified    [facility] Lainey Macias DNP, APRN    5 months ago Type 2 diabetes mellitus without complication, without long-term current use of insulin    [facility] Endocrinology Anna Negron DNP, NP    5 months ago Subsequent non-ST elevation (NSTEMI) myocardial infarction    [facility] Cardiology Romario Flaherty MD          Future Appointments              [place] 3 [place] [facility] [place]    In 1 month MAR[facility]ZA [facility]hena MD [facility]    In 1 month [facility]    In 2 months [place]on Bahena MD [facility]    In 4 months [place] [facility] [place]    In 4 months [facility]    In 5  months ROBERT. Nelson Bahena MD [facility] [place]                Passed - GERD is on problem list            Appointments  past 12m or future 3m with PCP    Date Provider   Last Visit   10/12/2020 MARITZA Bahena MD   Next Visit   12/14/2020 MARITZA Bahena MD   ED visits in past 90 days: 0     Note composed:7:57 PM 11/12/2020

## 2020-11-19 DIAGNOSIS — M51.36 DEGENERATIVE DISC DISEASE, LUMBAR: ICD-10-CM

## 2020-11-19 NOTE — TELEPHONE ENCOUNTER
No new care gaps identified.  Powered by [employer]. Reference number: [id number]. 11/19/2020 1:17:32 PM   CST

## 2020-11-23 ENCOUNTER — PATIENT MESSAGE (OUTPATIENT)
Dept: ADMINISTRATIVE | Facility: HOSPITAL | Age: 65
End: 2020-11-23

## 2020-11-23 DIAGNOSIS — M51.36 DEGENERATIVE DISC DISEASE, LUMBAR: ICD-10-CM

## 2020-11-24 RX ORDER — HYDROCODONE BITARTRATE AND ACETAMINOPHEN 10; 325 MG/1; MG/1
1 TABLET ORAL 3 TIMES DAILY PRN
Qty: 80 TABLET | Refills: 0 | Status: SHIPPED | OUTPATIENT
Start: 2020-11-24 | End: 2020-12-14 | Stop reason: SDUPTHER

## 2020-11-24 RX ORDER — HYDROCODONE BITARTRATE AND ACETAMINOPHEN 10; 325 MG/1; MG/1
TABLET ORAL
Qty: 80 TABLET | Refills: 0 | Status: SHIPPED | OUTPATIENT
Start: 2020-11-24 | End: 2020-11-24 | Stop reason: SDUPTHER

## 2020-11-24 NOTE — TELEPHONE ENCOUNTER
----- Message from Kali Zarate sent at 11/23/2020  1:53 PM CST -----  Type: Needs Medical Advice    Who Called:  Patient  Best Call Back Number: [phone]  Additional Information: Patient would like to discuss previous medication refill request as they still yet to hear anything. Please call to advise. Thanks!

## 2020-11-24 NOTE — TELEPHONE ENCOUNTER
----- Message from Jessica Bose sent at 11/24/2020  3:16 PM CST -----  Contact: Jania  Type: Needs Medical Advice  Who Called:  Jania with [facility]  Symptoms (please be specific):    How long has patient had these symptoms:    Pharmacy name and phone #:  [facility]   Best Call Back Number:   Additional Information:received Rx for norco didn't included medically necessity greater 7 days so Rx can't be filled. Resend new Rx with information

## 2020-11-24 NOTE — TELEPHONE ENCOUNTER
----- Message from Zoey Coello sent at 11/24/2020  3:21 PM CST -----  Regarding: Rx for [place]  Type: Needs Medical Advice  Who Called:  Patient  Pharmacy name and phone #: [facility]  Best Call Back Number: [phone] (home)   Additional Information: The patient said that the Pharm will not let him get his Rx it needs to state medically necessary and he needs his medications please send that message to pharm today asap please advise.

## 2020-11-25 RX ORDER — HYDROCODONE BITARTRATE AND ACETAMINOPHEN 10; 325 MG/1; MG/1
1 TABLET ORAL 3 TIMES DAILY PRN
Qty: 80 TABLET | Refills: 0 | OUTPATIENT
Start: 2020-11-25

## 2020-11-25 RX ORDER — OMEPRAZOLE 20 MG/1
20 CAPSULE, DELAYED RELEASE ORAL DAILY
Qty: 90 CAPSULE | Refills: 3 | OUTPATIENT
Start: 2020-11-25

## 2020-12-03 NOTE — TELEPHONE ENCOUNTER
----- Message from Kaela Serrano sent at 6/28/2019 11:51 AM CDT -----  Contact: self [phone]  Please call him regarding the refill for the pain medication.  Thank you!  
Handled in another encounter.    
Patient/Caregiver provided printed discharge information.

## 2020-12-08 ENCOUNTER — LAB VISIT (OUTPATIENT)
Dept: LAB | Facility: HOSPITAL | Age: 65
End: 2020-12-08
Attending: FAMILY MEDICINE
Payer: MEDICARE

## 2020-12-08 DIAGNOSIS — Z12.5 SCREENING PSA (PROSTATE SPECIFIC ANTIGEN): ICD-10-CM

## 2020-12-08 DIAGNOSIS — E11.9 TYPE 2 DIABETES MELLITUS WITHOUT COMPLICATION, WITHOUT LONG-TERM CURRENT USE OF INSULIN: ICD-10-CM

## 2020-12-08 LAB
ALBUMIN SERPL BCP-MCNC: 4 G/DL (ref 3.5–5.2)
ALP SERPL-CCNC: 64 U/L (ref 55–135)
ALT SERPL W/O P-5'-P-CCNC: 12 U/L (ref 10–44)
ANION GAP SERPL CALC-SCNC: 8 MMOL/L (ref 8–16)
AST SERPL-CCNC: 16 U/L (ref 10–40)
BILIRUB SERPL-MCNC: 0.6 MG/DL (ref 0.1–1)
BUN SERPL-MCNC: 12 MG/DL (ref 8–23)
CALCIUM SERPL-MCNC: 9.2 MG/DL (ref 8.7–10.5)
CHLORIDE SERPL-SCNC: 102 MMOL/L (ref 95–110)
CO2 SERPL-SCNC: 28 MMOL/L (ref 23–29)
CREAT SERPL-MCNC: 0.9 MG/DL (ref 0.5–1.4)
EST. GFR  (AFRICAN AMERICAN): >60 ML/MIN/1.73 M^2
EST. GFR  (NON AFRICAN AMERICAN): >60 ML/MIN/1.73 M^2
GLUCOSE SERPL-MCNC: 111 MG/DL (ref 70–110)
POTASSIUM SERPL-SCNC: 4.2 MMOL/L (ref 3.5–5.1)
PROT SERPL-MCNC: 7.5 G/DL (ref 6–8.4)
SODIUM SERPL-SCNC: 138 MMOL/L (ref 136–145)

## 2020-12-08 PROCEDURE — 82306 VITAMIN D 25 HYDROXY: CPT | Mod: HCNC

## 2020-12-08 PROCEDURE — 36415 COLL VENOUS BLD VENIPUNCTURE: CPT | Mod: HCNC,PO

## 2020-12-08 PROCEDURE — 80053 COMPREHEN METABOLIC PANEL: CPT | Mod: HCNC

## 2020-12-08 PROCEDURE — 83036 HEMOGLOBIN GLYCOSYLATED A1C: CPT | Mod: HCNC

## 2020-12-08 PROCEDURE — 84153 ASSAY OF PSA TOTAL: CPT | Mod: HCNC

## 2020-12-09 DIAGNOSIS — F41.1 GAD (GENERALIZED ANXIETY DISORDER): ICD-10-CM

## 2020-12-09 LAB
25(OH)D3+25(OH)D2 SERPL-MCNC: 11 NG/ML (ref 30–96)
COMPLEXED PSA SERPL-MCNC: 0.29 NG/ML (ref 0–4)
ESTIMATED AVG GLUCOSE: 134 MG/DL (ref 68–131)
HBA1C MFR BLD HPLC: 6.3 % (ref 4–5.6)

## 2020-12-09 NOTE — TELEPHONE ENCOUNTER
----- Message from Karyna Sauceda sent at 12/9/2020  3:49 PM CST -----  Contact: [facility]  [facility] called from the [facility] they are calling about the authorization for the  Alprazolam it was sent  to your office 12/7    Call back [phone]

## 2020-12-11 ENCOUNTER — PATIENT MESSAGE (OUTPATIENT)
Dept: OTHER | Facility: OTHER | Age: 65
End: 2020-12-11

## 2020-12-11 RX ORDER — ALPRAZOLAM 1 MG/1
1 TABLET ORAL 3 TIMES DAILY PRN
Qty: 90 TABLET | Refills: 0 | Status: SHIPPED | OUTPATIENT
Start: 2020-12-11 | End: 2021-01-11

## 2020-12-13 ENCOUNTER — PATIENT OUTREACH (OUTPATIENT)
Dept: ADMINISTRATIVE | Facility: OTHER | Age: 65
End: 2020-12-13

## 2020-12-13 NOTE — PROGRESS NOTES
Health Maintenance Due   Topic Date Due    Shingles Vaccine (1 of 2) 08/02/2005    Eye Exam  11/08/2018    Influenza Vaccine (1) 08/01/2020    Pneumococcal Vaccine (65+ Low/Medium Risk) (1 of 2 - PCV13) 08/02/2020     Updates were requested from care everywhere.  Chart was reviewed for overdue Proactive [facility] Encounters (MICHELINE) topics (CRS, Breast Cancer Screening, Eye exam)  Health Maintenance has been updated.  LINKS immunization registry triggered.  Immunizations were reconciled.

## 2020-12-14 ENCOUNTER — OFFICE VISIT (OUTPATIENT)
Dept: FAMILY MEDICINE | Facility: CLINIC | Age: 65
End: 2020-12-14
Payer: MEDICARE

## 2020-12-14 VITALS
DIASTOLIC BLOOD PRESSURE: 92 MMHG | OXYGEN SATURATION: 97 % | HEIGHT: 72 IN | TEMPERATURE: 97 F | BODY MASS INDEX: 35.03 KG/M2 | WEIGHT: 258.63 LBS | HEART RATE: 60 BPM | SYSTOLIC BLOOD PRESSURE: 140 MMHG

## 2020-12-14 DIAGNOSIS — M54.17 RADICULAR PAIN OF LUMBOSACRAL REGION: ICD-10-CM

## 2020-12-14 DIAGNOSIS — F41.1 GAD (GENERALIZED ANXIETY DISORDER): Primary | ICD-10-CM

## 2020-12-14 DIAGNOSIS — M51.36 DEGENERATIVE DISC DISEASE, LUMBAR: ICD-10-CM

## 2020-12-14 PROCEDURE — 1101F PT FALLS ASSESS-DOCD LE1/YR: CPT | Mod: HCNC,CPTII,S$GLB, | Performed by: FAMILY MEDICINE

## 2020-12-14 PROCEDURE — 1126F PR PAIN SEVERITY QUANTIFIED, NO PAIN PRESENT: ICD-10-PCS | Mod: HCNC,S$GLB,, | Performed by: FAMILY MEDICINE

## 2020-12-14 PROCEDURE — 99213 PR OFFICE/OUTPT VISIT, EST, LEVL III, 20-29 MIN: ICD-10-PCS | Mod: HCNC,S$GLB,, | Performed by: FAMILY MEDICINE

## 2020-12-14 PROCEDURE — 99999 PR PBB SHADOW E&M-EST. PATIENT-LVL IV: CPT | Mod: PBBFAC,HCNC,, | Performed by: FAMILY MEDICINE

## 2020-12-14 PROCEDURE — 3288F FALL RISK ASSESSMENT DOCD: CPT | Mod: HCNC,CPTII,S$GLB, | Performed by: FAMILY MEDICINE

## 2020-12-14 PROCEDURE — 3077F PR MOST RECENT SYSTOLIC BLOOD PRESSURE >= 140 MM HG: ICD-10-PCS | Mod: HCNC,CPTII,S$GLB, | Performed by: FAMILY MEDICINE

## 2020-12-14 PROCEDURE — 3008F PR BODY MASS INDEX (BMI) DOCUMENTED: ICD-10-PCS | Mod: HCNC,CPTII,S$GLB, | Performed by: FAMILY MEDICINE

## 2020-12-14 PROCEDURE — 1101F PR PT FALLS ASSESS DOC 0-1 FALLS W/OUT INJ PAST YR: ICD-10-PCS | Mod: HCNC,CPTII,S$GLB, | Performed by: FAMILY MEDICINE

## 2020-12-14 PROCEDURE — 99999 PR PBB SHADOW E&M-EST. PATIENT-LVL IV: ICD-10-PCS | Mod: PBBFAC,HCNC,, | Performed by: FAMILY MEDICINE

## 2020-12-14 PROCEDURE — 1126F AMNT PAIN NOTED NONE PRSNT: CPT | Mod: HCNC,S$GLB,, | Performed by: FAMILY MEDICINE

## 2020-12-14 PROCEDURE — 3288F PR FALLS RISK ASSESSMENT DOCUMENTED: ICD-10-PCS | Mod: HCNC,CPTII,S$GLB, | Performed by: FAMILY MEDICINE

## 2020-12-14 PROCEDURE — 3008F BODY MASS INDEX DOCD: CPT | Mod: HCNC,CPTII,S$GLB, | Performed by: FAMILY MEDICINE

## 2020-12-14 PROCEDURE — 3077F SYST BP >= 140 MM HG: CPT | Mod: HCNC,CPTII,S$GLB, | Performed by: FAMILY MEDICINE

## 2020-12-14 PROCEDURE — 99213 OFFICE O/P EST LOW 20 MIN: CPT | Mod: HCNC,S$GLB,, | Performed by: FAMILY MEDICINE

## 2020-12-14 PROCEDURE — 3078F PR MOST RECENT DIASTOLIC BLOOD PRESSURE < 80 MM HG: ICD-10-PCS | Mod: HCNC,CPTII,S$GLB, | Performed by: FAMILY MEDICINE

## 2020-12-14 PROCEDURE — 3078F DIAST BP <80 MM HG: CPT | Mod: HCNC,CPTII,S$GLB, | Performed by: FAMILY MEDICINE

## 2020-12-14 RX ORDER — CITALOPRAM 10 MG/1
10 TABLET ORAL DAILY
COMMUNITY
Start: 2020-11-12 | End: 2021-02-09

## 2020-12-14 RX ORDER — HYDROCODONE BITARTRATE AND ACETAMINOPHEN 10; 325 MG/1; MG/1
1 TABLET ORAL 3 TIMES DAILY PRN
Qty: 80 TABLET | Refills: 0 | Status: SHIPPED | OUTPATIENT
Start: 2020-12-23 | End: 2021-01-11 | Stop reason: SDUPTHER

## 2020-12-14 NOTE — PROGRESS NOTES
Subjective:       Patient ID: Fred Blackwell is a 65 y.o. male.    Chief Complaint: Annual Exam    Here for f/u chronic pain and anxiety. Doing well overall. Diabetes still controlled.    Review of Systems   Constitutional: Negative for chills and fever.   Respiratory: Negative for cough, chest tightness and shortness of breath.    Cardiovascular: Negative for chest pain, palpitations and leg swelling.   Endocrine: Negative for cold intolerance and heat intolerance.   Psychiatric/Behavioral: Negative for decreased concentration. The patient is not nervous/anxious.        Objective:      Physical Exam  Vitals signs and nursing note reviewed.   Constitutional:       Appearance: He is well-developed.   HENT:      Head: Normocephalic and atraumatic.   Cardiovascular:      Rate and Rhythm: Normal rate and regular rhythm.      Heart sounds: Normal heart sounds.   Pulmonary:      Effort: Pulmonary effort is normal.      Breath sounds: Normal breath sounds.         Assessment:       1. SHANNAN (generalized anxiety disorder)    2. Radicular pain of lumbosacral region    3. Degenerative disc disease, lumbar        Plan:       SHANNAN (generalized anxiety disorder)    Radicular pain of lumbosacral region    Degenerative disc disease, lumbar  -     HYDROcodone-acetaminophen (NORCO)  mg per tablet; Take 1 tablet by mouth 3 (three) times daily as needed.  Dispense: 80 tablet; Refill: 0      Shannan stable  Pain stable on prn med with plan to wean ; ok in  database    Follow up in about 3 months (around 3/14/2021), or if symptoms worsen or fail to improve.

## 2020-12-30 ENCOUNTER — PATIENT OUTREACH (OUTPATIENT)
Dept: ADMINISTRATIVE | Facility: HOSPITAL | Age: 65
End: 2020-12-30

## 2021-01-04 ENCOUNTER — PATIENT MESSAGE (OUTPATIENT)
Dept: ADMINISTRATIVE | Facility: HOSPITAL | Age: 66
End: 2021-01-04

## 2021-01-11 ENCOUNTER — TELEPHONE (OUTPATIENT)
Dept: FAMILY MEDICINE | Facility: CLINIC | Age: 66
End: 2021-01-11

## 2021-01-11 DIAGNOSIS — F41.1 GAD (GENERALIZED ANXIETY DISORDER): ICD-10-CM

## 2021-01-11 DIAGNOSIS — M51.36 DEGENERATIVE DISC DISEASE, LUMBAR: ICD-10-CM

## 2021-01-11 RX ORDER — ALPRAZOLAM 1 MG/1
TABLET ORAL
Qty: 90 TABLET | Refills: 0 | Status: SHIPPED | OUTPATIENT
Start: 2021-01-11 | End: 2021-02-01 | Stop reason: SDUPTHER

## 2021-01-15 DIAGNOSIS — M51.36 DEGENERATIVE DISC DISEASE, LUMBAR: ICD-10-CM

## 2021-01-15 RX ORDER — HYDROCODONE BITARTRATE AND ACETAMINOPHEN 10; 325 MG/1; MG/1
1 TABLET ORAL 3 TIMES DAILY PRN
Qty: 80 TABLET | Refills: 0 | Status: SHIPPED | OUTPATIENT
Start: 2021-01-15 | End: 2021-01-20

## 2021-01-20 RX ORDER — HYDROCODONE BITARTRATE AND ACETAMINOPHEN 10; 325 MG/1; MG/1
1 TABLET ORAL 3 TIMES DAILY PRN
Qty: 80 TABLET | Refills: 0 | Status: SHIPPED | OUTPATIENT
Start: 2021-01-20 | End: 2021-02-12 | Stop reason: SDUPTHER

## 2021-02-01 DIAGNOSIS — F41.1 GAD (GENERALIZED ANXIETY DISORDER): ICD-10-CM

## 2021-02-02 DIAGNOSIS — F41.1 GAD (GENERALIZED ANXIETY DISORDER): ICD-10-CM

## 2021-02-02 RX ORDER — ALPRAZOLAM 1 MG/1
TABLET ORAL
Qty: 90 TABLET | Refills: 3 | Status: SHIPPED | OUTPATIENT
Start: 2021-02-02 | End: 2021-03-04 | Stop reason: SDUPTHER

## 2021-02-02 RX ORDER — ALPRAZOLAM 1 MG/1
TABLET ORAL
Qty: 90 TABLET | Refills: 0 | OUTPATIENT
Start: 2021-02-02

## 2021-02-09 ENCOUNTER — IMMUNIZATION (OUTPATIENT)
Dept: FAMILY MEDICINE | Facility: CLINIC | Age: 66
End: 2021-02-09
Payer: MEDICARE

## 2021-02-09 DIAGNOSIS — Z23 NEED FOR VACCINATION: Primary | ICD-10-CM

## 2021-02-09 PROCEDURE — 91300 COVID-19, MRNA, LNP-S, PF, 30 MCG/0.3 ML DOSE VACCINE: CPT | Mod: PBBFAC | Performed by: FAMILY MEDICINE

## 2021-02-10 RX ORDER — CITALOPRAM 10 MG/1
TABLET ORAL
Qty: 90 TABLET | Refills: 3 | Status: SHIPPED | OUTPATIENT
Start: 2021-02-10 | End: 2022-03-16

## 2021-02-15 ENCOUNTER — TELEPHONE (OUTPATIENT)
Dept: FAMILY MEDICINE | Facility: CLINIC | Age: 66
End: 2021-02-15

## 2021-02-17 DIAGNOSIS — E11.9 TYPE 2 DIABETES MELLITUS WITHOUT COMPLICATION, UNSPECIFIED WHETHER LONG TERM INSULIN USE: ICD-10-CM

## 2021-03-02 ENCOUNTER — IMMUNIZATION (OUTPATIENT)
Dept: FAMILY MEDICINE | Facility: CLINIC | Age: 66
End: 2021-03-02
Payer: MEDICARE

## 2021-03-02 DIAGNOSIS — Z23 NEED FOR VACCINATION: Primary | ICD-10-CM

## 2021-03-02 PROCEDURE — 0002A COVID-19, MRNA, LNP-S, PF, 30 MCG/0.3 ML DOSE VACCINE: CPT | Mod: HCNC,PBBFAC | Performed by: INTERNAL MEDICINE

## 2021-03-02 PROCEDURE — 91300 COVID-19, MRNA, LNP-S, PF, 30 MCG/0.3 ML DOSE VACCINE: CPT | Mod: HCNC,PBBFAC | Performed by: INTERNAL MEDICINE

## 2021-03-04 DIAGNOSIS — F41.1 GAD (GENERALIZED ANXIETY DISORDER): ICD-10-CM

## 2021-03-04 RX ORDER — ALPRAZOLAM 1 MG/1
TABLET ORAL
Qty: 90 TABLET | Refills: 3 | Status: SHIPPED | OUTPATIENT
Start: 2021-03-04 | End: 2021-07-19 | Stop reason: SDUPTHER

## 2021-03-09 DIAGNOSIS — M51.36 DEGENERATIVE DISC DISEASE, LUMBAR: ICD-10-CM

## 2021-03-10 RX ORDER — HYDROCODONE BITARTRATE AND ACETAMINOPHEN 10; 325 MG/1; MG/1
1 TABLET ORAL 3 TIMES DAILY PRN
Qty: 80 TABLET | Refills: 0 | Status: SHIPPED | OUTPATIENT
Start: 2021-03-10 | End: 2021-04-02 | Stop reason: SDUPTHER

## 2021-03-12 ENCOUNTER — LAB VISIT (OUTPATIENT)
Dept: LAB | Facility: HOSPITAL | Age: 66
End: 2021-03-12
Attending: INTERNAL MEDICINE
Payer: MEDICARE

## 2021-03-12 DIAGNOSIS — I10 ESSENTIAL HYPERTENSION: ICD-10-CM

## 2021-03-12 DIAGNOSIS — I25.10 CORONARY ARTERY DISEASE INVOLVING NATIVE CORONARY ARTERY OF NATIVE HEART WITHOUT ANGINA PECTORIS: ICD-10-CM

## 2021-03-12 DIAGNOSIS — Z79.02 LONG TERM (CURRENT) USE OF ANTITHROMBOTICS/ANTIPLATELETS: ICD-10-CM

## 2021-03-12 DIAGNOSIS — E78.00 PURE HYPERCHOLESTEROLEMIA: ICD-10-CM

## 2021-03-12 LAB
ALBUMIN SERPL BCP-MCNC: 3.8 G/DL (ref 3.5–5.2)
ALP SERPL-CCNC: 71 U/L (ref 55–135)
ALT SERPL W/O P-5'-P-CCNC: 28 U/L (ref 10–44)
ANION GAP SERPL CALC-SCNC: 7 MMOL/L (ref 8–16)
AST SERPL-CCNC: 19 U/L (ref 10–40)
BILIRUB SERPL-MCNC: 0.9 MG/DL (ref 0.1–1)
BUN SERPL-MCNC: 12 MG/DL (ref 8–23)
CALCIUM SERPL-MCNC: 9.3 MG/DL (ref 8.7–10.5)
CHLORIDE SERPL-SCNC: 103 MMOL/L (ref 95–110)
CHOLEST SERPL-MCNC: 152 MG/DL (ref 120–199)
CHOLEST/HDLC SERPL: 3.3 {RATIO} (ref 2–5)
CO2 SERPL-SCNC: 30 MMOL/L (ref 23–29)
CREAT SERPL-MCNC: 0.9 MG/DL (ref 0.5–1.4)
EST. GFR  (AFRICAN AMERICAN): >60 ML/MIN/1.73 M^2
EST. GFR  (NON AFRICAN AMERICAN): >60 ML/MIN/1.73 M^2
GLUCOSE SERPL-MCNC: 130 MG/DL (ref 70–110)
HDLC SERPL-MCNC: 46 MG/DL (ref 40–75)
HDLC SERPL: 30.3 % (ref 20–50)
HGB BLD-MCNC: 15.4 G/DL (ref 14–18)
LDLC SERPL CALC-MCNC: 91.2 MG/DL (ref 63–159)
NONHDLC SERPL-MCNC: 106 MG/DL
POTASSIUM SERPL-SCNC: 4.5 MMOL/L (ref 3.5–5.1)
PROT SERPL-MCNC: 7.1 G/DL (ref 6–8.4)
SODIUM SERPL-SCNC: 140 MMOL/L (ref 136–145)
TRIGL SERPL-MCNC: 74 MG/DL (ref 30–150)

## 2021-03-12 PROCEDURE — 80053 COMPREHEN METABOLIC PANEL: CPT | Performed by: INTERNAL MEDICINE

## 2021-03-12 PROCEDURE — 85018 HEMOGLOBIN: CPT | Performed by: INTERNAL MEDICINE

## 2021-03-12 PROCEDURE — 80061 LIPID PANEL: CPT | Performed by: INTERNAL MEDICINE

## 2021-03-12 PROCEDURE — 36415 COLL VENOUS BLD VENIPUNCTURE: CPT | Mod: PO | Performed by: INTERNAL MEDICINE

## 2021-03-22 ENCOUNTER — OFFICE VISIT (OUTPATIENT)
Dept: CARDIOLOGY | Facility: CLINIC | Age: 66
End: 2021-03-22
Payer: MEDICARE

## 2021-03-22 VITALS
HEIGHT: 72 IN | SYSTOLIC BLOOD PRESSURE: 139 MMHG | DIASTOLIC BLOOD PRESSURE: 88 MMHG | BODY MASS INDEX: 35.15 KG/M2 | HEART RATE: 83 BPM | WEIGHT: 259.5 LBS

## 2021-03-22 DIAGNOSIS — E78.00 PURE HYPERCHOLESTEROLEMIA: ICD-10-CM

## 2021-03-22 DIAGNOSIS — I25.10 CORONARY ARTERY DISEASE INVOLVING NATIVE CORONARY ARTERY OF NATIVE HEART WITHOUT ANGINA PECTORIS: Primary | ICD-10-CM

## 2021-03-22 DIAGNOSIS — I10 ESSENTIAL HYPERTENSION: ICD-10-CM

## 2021-03-22 DIAGNOSIS — E66.01 SEVERE OBESITY (BMI 35.0-35.9 WITH COMORBIDITY): ICD-10-CM

## 2021-03-22 DIAGNOSIS — Z79.02 LONG TERM (CURRENT) USE OF ANTITHROMBOTICS/ANTIPLATELETS: ICD-10-CM

## 2021-03-22 DIAGNOSIS — I77.9 MILD CAROTID ARTERY DISEASE: ICD-10-CM

## 2021-03-22 PROCEDURE — 3075F PR MOST RECENT SYSTOLIC BLOOD PRESS GE 130-139MM HG: ICD-10-PCS | Mod: CPTII,S$GLB,, | Performed by: INTERNAL MEDICINE

## 2021-03-22 PROCEDURE — 1126F AMNT PAIN NOTED NONE PRSNT: CPT | Mod: S$GLB,,, | Performed by: INTERNAL MEDICINE

## 2021-03-22 PROCEDURE — 1126F PR PAIN SEVERITY QUANTIFIED, NO PAIN PRESENT: ICD-10-PCS | Mod: S$GLB,,, | Performed by: INTERNAL MEDICINE

## 2021-03-22 PROCEDURE — 99999 PR PBB SHADOW E&M-EST. PATIENT-LVL III: CPT | Mod: PBBFAC,,, | Performed by: INTERNAL MEDICINE

## 2021-03-22 PROCEDURE — 1101F PR PT FALLS ASSESS DOC 0-1 FALLS W/OUT INJ PAST YR: ICD-10-PCS | Mod: CPTII,S$GLB,, | Performed by: INTERNAL MEDICINE

## 2021-03-22 PROCEDURE — 99214 OFFICE O/P EST MOD 30 MIN: CPT | Mod: S$GLB,,, | Performed by: INTERNAL MEDICINE

## 2021-03-22 PROCEDURE — 3075F SYST BP GE 130 - 139MM HG: CPT | Mod: CPTII,S$GLB,, | Performed by: INTERNAL MEDICINE

## 2021-03-22 PROCEDURE — 99214 PR OFFICE/OUTPT VISIT, EST, LEVL IV, 30-39 MIN: ICD-10-PCS | Mod: S$GLB,,, | Performed by: INTERNAL MEDICINE

## 2021-03-22 PROCEDURE — 3288F PR FALLS RISK ASSESSMENT DOCUMENTED: ICD-10-PCS | Mod: CPTII,S$GLB,, | Performed by: INTERNAL MEDICINE

## 2021-03-22 PROCEDURE — 3079F DIAST BP 80-89 MM HG: CPT | Mod: CPTII,S$GLB,, | Performed by: INTERNAL MEDICINE

## 2021-03-22 PROCEDURE — 3288F FALL RISK ASSESSMENT DOCD: CPT | Mod: CPTII,S$GLB,, | Performed by: INTERNAL MEDICINE

## 2021-03-22 PROCEDURE — 1101F PT FALLS ASSESS-DOCD LE1/YR: CPT | Mod: CPTII,S$GLB,, | Performed by: INTERNAL MEDICINE

## 2021-03-22 PROCEDURE — 99499 RISK ADDL DX/OHS AUDIT: ICD-10-PCS | Mod: S$GLB,,, | Performed by: INTERNAL MEDICINE

## 2021-03-22 PROCEDURE — 3008F PR BODY MASS INDEX (BMI) DOCUMENTED: ICD-10-PCS | Mod: CPTII,S$GLB,, | Performed by: INTERNAL MEDICINE

## 2021-03-22 PROCEDURE — 99499 UNLISTED E&M SERVICE: CPT | Mod: S$GLB,,, | Performed by: INTERNAL MEDICINE

## 2021-03-22 PROCEDURE — 3079F PR MOST RECENT DIASTOLIC BLOOD PRESSURE 80-89 MM HG: ICD-10-PCS | Mod: CPTII,S$GLB,, | Performed by: INTERNAL MEDICINE

## 2021-03-22 PROCEDURE — 3008F BODY MASS INDEX DOCD: CPT | Mod: CPTII,S$GLB,, | Performed by: INTERNAL MEDICINE

## 2021-03-22 PROCEDURE — 99999 PR PBB SHADOW E&M-EST. PATIENT-LVL III: ICD-10-PCS | Mod: PBBFAC,,, | Performed by: INTERNAL MEDICINE

## 2021-03-22 RX ORDER — AMLODIPINE BESYLATE 5 MG/1
5 TABLET ORAL DAILY
Qty: 90 TABLET | Refills: 1 | Status: SHIPPED | OUTPATIENT
Start: 2021-03-22 | End: 2021-06-21

## 2021-03-22 RX ORDER — EZETIMIBE 10 MG/1
10 TABLET ORAL DAILY
Qty: 90 TABLET | Refills: 1 | Status: SHIPPED | OUTPATIENT
Start: 2021-03-22 | End: 2021-06-21

## 2021-03-30 ENCOUNTER — PATIENT OUTREACH (OUTPATIENT)
Dept: ADMINISTRATIVE | Facility: HOSPITAL | Age: 66
End: 2021-03-30

## 2021-04-02 DIAGNOSIS — M51.36 DEGENERATIVE DISC DISEASE, LUMBAR: ICD-10-CM

## 2021-04-06 RX ORDER — HYDROCODONE BITARTRATE AND ACETAMINOPHEN 10; 325 MG/1; MG/1
1 TABLET ORAL 3 TIMES DAILY PRN
Qty: 80 TABLET | Refills: 0 | Status: SHIPPED | OUTPATIENT
Start: 2021-04-06 | End: 2021-04-13 | Stop reason: SDUPTHER

## 2021-04-13 ENCOUNTER — OFFICE VISIT (OUTPATIENT)
Dept: FAMILY MEDICINE | Facility: CLINIC | Age: 66
End: 2021-04-13
Payer: MEDICARE

## 2021-04-13 VITALS
BODY MASS INDEX: 35.38 KG/M2 | HEIGHT: 72 IN | DIASTOLIC BLOOD PRESSURE: 86 MMHG | HEART RATE: 72 BPM | RESPIRATION RATE: 16 BRPM | SYSTOLIC BLOOD PRESSURE: 150 MMHG | OXYGEN SATURATION: 95 % | WEIGHT: 261.25 LBS

## 2021-04-13 DIAGNOSIS — M51.36 DEGENERATIVE DISC DISEASE, LUMBAR: ICD-10-CM

## 2021-04-13 DIAGNOSIS — Z00.00 WELLNESS EXAMINATION: Primary | ICD-10-CM

## 2021-04-13 DIAGNOSIS — E78.00 PURE HYPERCHOLESTEROLEMIA: ICD-10-CM

## 2021-04-13 DIAGNOSIS — I10 ESSENTIAL HYPERTENSION: ICD-10-CM

## 2021-04-13 DIAGNOSIS — F41.0 PANIC ATTACK: ICD-10-CM

## 2021-04-13 DIAGNOSIS — E11.69 TYPE 2 DIABETES MELLITUS WITH OTHER SPECIFIED COMPLICATION, WITHOUT LONG-TERM CURRENT USE OF INSULIN: ICD-10-CM

## 2021-04-13 DIAGNOSIS — F32.1 CURRENT MODERATE EPISODE OF MAJOR DEPRESSIVE DISORDER WITHOUT PRIOR EPISODE: ICD-10-CM

## 2021-04-13 PROCEDURE — 1101F PT FALLS ASSESS-DOCD LE1/YR: CPT | Mod: CPTII,S$GLB,, | Performed by: FAMILY MEDICINE

## 2021-04-13 PROCEDURE — 1101F PR PT FALLS ASSESS DOC 0-1 FALLS W/OUT INJ PAST YR: ICD-10-PCS | Mod: CPTII,S$GLB,, | Performed by: FAMILY MEDICINE

## 2021-04-13 PROCEDURE — 99397 PR PREVENTIVE VISIT,EST,65 & OVER: ICD-10-PCS | Mod: S$GLB,,, | Performed by: FAMILY MEDICINE

## 2021-04-13 PROCEDURE — 3288F PR FALLS RISK ASSESSMENT DOCUMENTED: ICD-10-PCS | Mod: CPTII,S$GLB,, | Performed by: FAMILY MEDICINE

## 2021-04-13 PROCEDURE — 99397 PER PM REEVAL EST PAT 65+ YR: CPT | Mod: S$GLB,,, | Performed by: FAMILY MEDICINE

## 2021-04-13 PROCEDURE — 3008F BODY MASS INDEX DOCD: CPT | Mod: CPTII,S$GLB,, | Performed by: FAMILY MEDICINE

## 2021-04-13 PROCEDURE — 3008F PR BODY MASS INDEX (BMI) DOCUMENTED: ICD-10-PCS | Mod: CPTII,S$GLB,, | Performed by: FAMILY MEDICINE

## 2021-04-13 PROCEDURE — 1126F PR PAIN SEVERITY QUANTIFIED, NO PAIN PRESENT: ICD-10-PCS | Mod: S$GLB,,, | Performed by: FAMILY MEDICINE

## 2021-04-13 PROCEDURE — 3288F FALL RISK ASSESSMENT DOCD: CPT | Mod: CPTII,S$GLB,, | Performed by: FAMILY MEDICINE

## 2021-04-13 PROCEDURE — 99499 UNLISTED E&M SERVICE: CPT | Mod: HCNC,S$GLB,, | Performed by: FAMILY MEDICINE

## 2021-04-13 PROCEDURE — 99999 PR PBB SHADOW E&M-EST. PATIENT-LVL IV: ICD-10-PCS | Mod: PBBFAC,,, | Performed by: FAMILY MEDICINE

## 2021-04-13 PROCEDURE — 1126F AMNT PAIN NOTED NONE PRSNT: CPT | Mod: S$GLB,,, | Performed by: FAMILY MEDICINE

## 2021-04-13 PROCEDURE — 99999 PR PBB SHADOW E&M-EST. PATIENT-LVL IV: CPT | Mod: PBBFAC,,, | Performed by: FAMILY MEDICINE

## 2021-04-13 PROCEDURE — 99499 RISK ADDL DX/OHS AUDIT: ICD-10-PCS | Mod: HCNC,S$GLB,, | Performed by: FAMILY MEDICINE

## 2021-04-13 RX ORDER — HYDROCODONE BITARTRATE AND ACETAMINOPHEN 10; 325 MG/1; MG/1
1 TABLET ORAL 3 TIMES DAILY PRN
Qty: 80 TABLET | Refills: 0 | Status: SHIPPED | OUTPATIENT
Start: 2021-05-05 | End: 2021-06-22

## 2021-04-21 ENCOUNTER — PES CALL (OUTPATIENT)
Dept: ADMINISTRATIVE | Facility: CLINIC | Age: 66
End: 2021-04-21

## 2021-06-08 ENCOUNTER — TELEPHONE (OUTPATIENT)
Dept: FAMILY MEDICINE | Facility: CLINIC | Age: 66
End: 2021-06-08

## 2021-06-08 ENCOUNTER — PATIENT MESSAGE (OUTPATIENT)
Dept: FAMILY MEDICINE | Facility: CLINIC | Age: 66
End: 2021-06-08

## 2021-06-09 ENCOUNTER — HOSPITAL ENCOUNTER (OUTPATIENT)
Dept: RADIOLOGY | Facility: HOSPITAL | Age: 66
Discharge: HOME | End: 2021-06-09
Attending: PHYSICIAN ASSISTANT
Payer: MEDICARE

## 2021-06-09 ENCOUNTER — OFFICE VISIT (OUTPATIENT)
Dept: FAMILY MEDICINE | Facility: CLINIC | Age: 66
End: 2021-06-09
Payer: MEDICARE

## 2021-06-09 VITALS
SYSTOLIC BLOOD PRESSURE: 139 MMHG | WEIGHT: 257.25 LBS | DIASTOLIC BLOOD PRESSURE: 86 MMHG | BODY MASS INDEX: 34.89 KG/M2 | OXYGEN SATURATION: 97 % | HEART RATE: 85 BPM

## 2021-06-09 DIAGNOSIS — J22 LOWER RESP. TRACT INFECTION: ICD-10-CM

## 2021-06-09 DIAGNOSIS — R06.2 WHEEZING: ICD-10-CM

## 2021-06-09 DIAGNOSIS — J30.2 SEASONAL ALLERGIC RHINITIS, UNSPECIFIED TRIGGER: ICD-10-CM

## 2021-06-09 DIAGNOSIS — J22 LOWER RESP. TRACT INFECTION: Primary | ICD-10-CM

## 2021-06-09 PROCEDURE — 3008F PR BODY MASS INDEX (BMI) DOCUMENTED: ICD-10-PCS | Mod: CPTII,S$GLB,, | Performed by: PHYSICIAN ASSISTANT

## 2021-06-09 PROCEDURE — 1126F PR PAIN SEVERITY QUANTIFIED, NO PAIN PRESENT: ICD-10-PCS | Mod: S$GLB,,, | Performed by: PHYSICIAN ASSISTANT

## 2021-06-09 PROCEDURE — 3288F FALL RISK ASSESSMENT DOCD: CPT | Mod: CPTII,S$GLB,, | Performed by: PHYSICIAN ASSISTANT

## 2021-06-09 PROCEDURE — 1101F PR PT FALLS ASSESS DOC 0-1 FALLS W/OUT INJ PAST YR: ICD-10-PCS | Mod: CPTII,S$GLB,, | Performed by: PHYSICIAN ASSISTANT

## 2021-06-09 PROCEDURE — 99214 PR OFFICE/OUTPT VISIT, EST, LEVL IV, 30-39 MIN: ICD-10-PCS | Mod: S$GLB,,, | Performed by: PHYSICIAN ASSISTANT

## 2021-06-09 PROCEDURE — 99999 PR PBB SHADOW E&M-EST. PATIENT-LVL IV: ICD-10-PCS | Mod: PBBFAC,,, | Performed by: PHYSICIAN ASSISTANT

## 2021-06-09 PROCEDURE — 99214 OFFICE O/P EST MOD 30 MIN: CPT | Mod: S$GLB,,, | Performed by: PHYSICIAN ASSISTANT

## 2021-06-09 PROCEDURE — 3288F PR FALLS RISK ASSESSMENT DOCUMENTED: ICD-10-PCS | Mod: CPTII,S$GLB,, | Performed by: PHYSICIAN ASSISTANT

## 2021-06-09 PROCEDURE — 3008F BODY MASS INDEX DOCD: CPT | Mod: CPTII,S$GLB,, | Performed by: PHYSICIAN ASSISTANT

## 2021-06-09 PROCEDURE — 1126F AMNT PAIN NOTED NONE PRSNT: CPT | Mod: S$GLB,,, | Performed by: PHYSICIAN ASSISTANT

## 2021-06-09 PROCEDURE — 71046 X-RAY EXAM CHEST 2 VIEWS: CPT | Mod: 26,,, | Performed by: RADIOLOGY

## 2021-06-09 PROCEDURE — 71046 X-RAY EXAM CHEST 2 VIEWS: CPT | Mod: TC,FY,PO

## 2021-06-09 PROCEDURE — 71046 XR CHEST PA AND LATERAL: ICD-10-PCS | Mod: 26,,, | Performed by: RADIOLOGY

## 2021-06-09 PROCEDURE — 1101F PT FALLS ASSESS-DOCD LE1/YR: CPT | Mod: CPTII,S$GLB,, | Performed by: PHYSICIAN ASSISTANT

## 2021-06-09 PROCEDURE — 99999 PR PBB SHADOW E&M-EST. PATIENT-LVL IV: CPT | Mod: PBBFAC,,, | Performed by: PHYSICIAN ASSISTANT

## 2021-06-09 RX ORDER — PREDNISONE 20 MG/1
TABLET ORAL
Qty: 18 TABLET | Refills: 0 | Status: SHIPPED | OUTPATIENT
Start: 2021-06-09 | End: 2021-11-08

## 2021-06-09 RX ORDER — ALBUTEROL SULFATE 0.83 MG/ML
2.5 SOLUTION RESPIRATORY (INHALATION) EVERY 6 HOURS PRN
Qty: 90 ML | Refills: 0 | Status: SHIPPED | OUTPATIENT
Start: 2021-06-09 | End: 2023-07-24

## 2021-06-09 RX ORDER — DOXYCYCLINE 100 MG/1
100 CAPSULE ORAL 2 TIMES DAILY
Qty: 20 CAPSULE | Refills: 0 | Status: SHIPPED | OUTPATIENT
Start: 2021-06-09 | End: 2021-06-19

## 2021-06-20 DIAGNOSIS — F41.1 GAD (GENERALIZED ANXIETY DISORDER): ICD-10-CM

## 2021-06-21 RX ORDER — ALPRAZOLAM 1 MG/1
TABLET ORAL
Qty: 90 TABLET | Refills: 3 | OUTPATIENT
Start: 2021-06-21

## 2021-06-22 DIAGNOSIS — M51.36 DEGENERATIVE DISC DISEASE, LUMBAR: ICD-10-CM

## 2021-06-22 RX ORDER — HYDROCODONE BITARTRATE AND ACETAMINOPHEN 10; 325 MG/1; MG/1
1 TABLET ORAL 3 TIMES DAILY PRN
Qty: 80 TABLET | Refills: 0 | Status: CANCELLED | OUTPATIENT
Start: 2021-06-22

## 2021-06-22 RX ORDER — HYDROCODONE BITARTRATE AND ACETAMINOPHEN 10; 325 MG/1; MG/1
1 TABLET ORAL 3 TIMES DAILY PRN
Qty: 80 TABLET | Refills: 0 | Status: SHIPPED | OUTPATIENT
Start: 2021-06-22 | End: 2021-07-19 | Stop reason: SDUPTHER

## 2021-06-24 ENCOUNTER — TELEPHONE (OUTPATIENT)
Dept: FAMILY MEDICINE | Facility: CLINIC | Age: 66
End: 2021-06-24

## 2021-07-01 ENCOUNTER — PES CALL (OUTPATIENT)
Dept: ADMINISTRATIVE | Facility: CLINIC | Age: 66
End: 2021-07-01

## 2021-07-07 ENCOUNTER — PATIENT MESSAGE (OUTPATIENT)
Dept: ADMINISTRATIVE | Facility: HOSPITAL | Age: 66
End: 2021-07-07

## 2021-07-07 DIAGNOSIS — E11.9 DIABETES MELLITUS WITHOUT COMPLICATION: Primary | ICD-10-CM

## 2021-07-09 ENCOUNTER — LAB VISIT (OUTPATIENT)
Dept: LAB | Facility: HOSPITAL | Age: 66
End: 2021-07-09
Attending: FAMILY MEDICINE
Payer: MEDICARE

## 2021-07-09 DIAGNOSIS — E11.69 TYPE 2 DIABETES MELLITUS WITH OTHER SPECIFIED COMPLICATION, WITHOUT LONG-TERM CURRENT USE OF INSULIN: ICD-10-CM

## 2021-07-09 LAB
ALBUMIN SERPL BCP-MCNC: 4 G/DL (ref 3.5–5.2)
ALP SERPL-CCNC: 81 U/L (ref 55–135)
ALT SERPL W/O P-5'-P-CCNC: 29 U/L (ref 10–44)
ANION GAP SERPL CALC-SCNC: 8 MMOL/L (ref 8–16)
AST SERPL-CCNC: 20 U/L (ref 10–40)
BASOPHILS # BLD AUTO: 0.08 K/UL (ref 0–0.2)
BASOPHILS NFR BLD: 1 % (ref 0–1.9)
BILIRUB SERPL-MCNC: 0.8 MG/DL (ref 0.1–1)
BUN SERPL-MCNC: 13 MG/DL (ref 8–23)
CALCIUM SERPL-MCNC: 10.2 MG/DL (ref 8.7–10.5)
CHLORIDE SERPL-SCNC: 105 MMOL/L (ref 95–110)
CHOLEST SERPL-MCNC: 118 MG/DL (ref 120–199)
CHOLEST/HDLC SERPL: 2.7 {RATIO} (ref 2–5)
CO2 SERPL-SCNC: 29 MMOL/L (ref 23–29)
CREAT SERPL-MCNC: 0.9 MG/DL (ref 0.5–1.4)
DIFFERENTIAL METHOD: ABNORMAL
EOSINOPHIL # BLD AUTO: 0.2 K/UL (ref 0–0.5)
EOSINOPHIL NFR BLD: 1.8 % (ref 0–8)
ERYTHROCYTE [DISTWIDTH] IN BLOOD BY AUTOMATED COUNT: 13.5 % (ref 11.5–14.5)
EST. GFR  (AFRICAN AMERICAN): >60 ML/MIN/1.73 M^2
EST. GFR  (NON AFRICAN AMERICAN): >60 ML/MIN/1.73 M^2
ESTIMATED AVG GLUCOSE: 240 MG/DL (ref 68–131)
GLUCOSE SERPL-MCNC: 178 MG/DL (ref 70–110)
HBA1C MFR BLD: 10 % (ref 4–5.6)
HCT VFR BLD AUTO: 47.6 % (ref 40–54)
HDLC SERPL-MCNC: 44 MG/DL (ref 40–75)
HDLC SERPL: 37.3 % (ref 20–50)
HGB BLD-MCNC: 15.5 G/DL (ref 14–18)
IMM GRANULOCYTES # BLD AUTO: 0.02 K/UL (ref 0–0.04)
IMM GRANULOCYTES NFR BLD AUTO: 0.2 % (ref 0–0.5)
LDLC SERPL CALC-MCNC: 60 MG/DL (ref 63–159)
LYMPHOCYTES # BLD AUTO: 4.3 K/UL (ref 1–4.8)
LYMPHOCYTES NFR BLD: 51.6 % (ref 18–48)
MCH RBC QN AUTO: 28.5 PG (ref 27–31)
MCHC RBC AUTO-ENTMCNC: 32.6 G/DL (ref 32–36)
MCV RBC AUTO: 88 FL (ref 82–98)
MONOCYTES # BLD AUTO: 0.7 K/UL (ref 0.3–1)
MONOCYTES NFR BLD: 8.1 % (ref 4–15)
NEUTROPHILS # BLD AUTO: 3.1 K/UL (ref 1.8–7.7)
NEUTROPHILS NFR BLD: 37.3 % (ref 38–73)
NONHDLC SERPL-MCNC: 74 MG/DL
NRBC BLD-RTO: 0 /100 WBC
PLATELET # BLD AUTO: 205 K/UL (ref 150–450)
PMV BLD AUTO: 10.7 FL (ref 9.2–12.9)
POTASSIUM SERPL-SCNC: 4.4 MMOL/L (ref 3.5–5.1)
PROT SERPL-MCNC: 7.5 G/DL (ref 6–8.4)
RBC # BLD AUTO: 5.44 M/UL (ref 4.6–6.2)
SODIUM SERPL-SCNC: 142 MMOL/L (ref 136–145)
TRIGL SERPL-MCNC: 70 MG/DL (ref 30–150)
TSH SERPL DL<=0.005 MIU/L-ACNC: 2.75 UIU/ML (ref 0.4–4)
WBC # BLD AUTO: 8.38 K/UL (ref 3.9–12.7)

## 2021-07-09 PROCEDURE — 80061 LIPID PANEL: CPT | Performed by: FAMILY MEDICINE

## 2021-07-09 PROCEDURE — 83036 HEMOGLOBIN GLYCOSYLATED A1C: CPT | Performed by: FAMILY MEDICINE

## 2021-07-09 PROCEDURE — 36415 COLL VENOUS BLD VENIPUNCTURE: CPT | Mod: PO | Performed by: FAMILY MEDICINE

## 2021-07-09 PROCEDURE — 85025 COMPLETE CBC W/AUTO DIFF WBC: CPT | Performed by: FAMILY MEDICINE

## 2021-07-09 PROCEDURE — 80053 COMPREHEN METABOLIC PANEL: CPT | Performed by: FAMILY MEDICINE

## 2021-07-09 PROCEDURE — 84443 ASSAY THYROID STIM HORMONE: CPT | Performed by: FAMILY MEDICINE

## 2021-07-19 DIAGNOSIS — M51.36 DEGENERATIVE DISC DISEASE, LUMBAR: ICD-10-CM

## 2021-07-19 DIAGNOSIS — F41.1 GAD (GENERALIZED ANXIETY DISORDER): ICD-10-CM

## 2021-07-20 DIAGNOSIS — M51.36 DEGENERATIVE DISC DISEASE, LUMBAR: ICD-10-CM

## 2021-07-23 DIAGNOSIS — M51.36 DEGENERATIVE DISC DISEASE, LUMBAR: ICD-10-CM

## 2021-07-23 RX ORDER — HYDROCODONE BITARTRATE AND ACETAMINOPHEN 10; 325 MG/1; MG/1
1 TABLET ORAL 3 TIMES DAILY PRN
Qty: 80 TABLET | Refills: 0 | Status: SHIPPED | OUTPATIENT
Start: 2021-07-23 | End: 2021-08-26 | Stop reason: SDUPTHER

## 2021-07-23 RX ORDER — HYDROCODONE BITARTRATE AND ACETAMINOPHEN 10; 325 MG/1; MG/1
1 TABLET ORAL 3 TIMES DAILY PRN
Qty: 80 TABLET | Refills: 0 | Status: CANCELLED | OUTPATIENT
Start: 2021-07-23

## 2021-07-23 RX ORDER — METFORMIN HYDROCHLORIDE 500 MG/1
TABLET, EXTENDED RELEASE ORAL
Qty: 180 TABLET | Refills: 0 | OUTPATIENT
Start: 2021-07-23

## 2021-07-23 RX ORDER — ALPRAZOLAM 1 MG/1
TABLET ORAL
Qty: 90 TABLET | Refills: 0 | Status: SHIPPED | OUTPATIENT
Start: 2021-07-23 | End: 2021-08-18 | Stop reason: SDUPTHER

## 2021-07-23 RX ORDER — HYDROCODONE BITARTRATE AND ACETAMINOPHEN 10; 325 MG/1; MG/1
1 TABLET ORAL 3 TIMES DAILY PRN
Qty: 80 TABLET | Refills: 0 | OUTPATIENT
Start: 2021-07-23

## 2021-07-28 DIAGNOSIS — E11.9 DIABETES MELLITUS WITHOUT COMPLICATION: ICD-10-CM

## 2021-08-18 DIAGNOSIS — F41.1 GAD (GENERALIZED ANXIETY DISORDER): ICD-10-CM

## 2021-08-20 DIAGNOSIS — F41.1 GAD (GENERALIZED ANXIETY DISORDER): ICD-10-CM

## 2021-08-23 RX ORDER — ALPRAZOLAM 1 MG/1
TABLET ORAL
Qty: 90 TABLET | Refills: 0 | Status: SHIPPED | OUTPATIENT
Start: 2021-08-23 | End: 2021-10-13 | Stop reason: SDUPTHER

## 2021-08-23 RX ORDER — ALPRAZOLAM 1 MG/1
TABLET ORAL
Qty: 90 TABLET | Refills: 2 | OUTPATIENT
Start: 2021-08-23

## 2021-08-23 RX ORDER — OMEPRAZOLE 20 MG/1
20 CAPSULE, DELAYED RELEASE ORAL DAILY
Qty: 90 CAPSULE | Refills: 2 | Status: SHIPPED | OUTPATIENT
Start: 2021-08-23 | End: 2021-11-08 | Stop reason: SDUPTHER

## 2021-08-26 ENCOUNTER — OFFICE VISIT (OUTPATIENT)
Dept: FAMILY MEDICINE | Facility: CLINIC | Age: 66
End: 2021-08-26
Payer: MEDICARE

## 2021-08-26 VITALS
HEART RATE: 82 BPM | SYSTOLIC BLOOD PRESSURE: 136 MMHG | DIASTOLIC BLOOD PRESSURE: 84 MMHG | HEIGHT: 72 IN | OXYGEN SATURATION: 96 % | WEIGHT: 248.44 LBS | TEMPERATURE: 99 F | BODY MASS INDEX: 33.65 KG/M2

## 2021-08-26 DIAGNOSIS — E11.69 TYPE 2 DIABETES MELLITUS WITH OTHER SPECIFIED COMPLICATION, WITHOUT LONG-TERM CURRENT USE OF INSULIN: Primary | ICD-10-CM

## 2021-08-26 DIAGNOSIS — M51.36 DEGENERATIVE DISC DISEASE, LUMBAR: ICD-10-CM

## 2021-08-26 DIAGNOSIS — I10 ESSENTIAL HYPERTENSION: ICD-10-CM

## 2021-08-26 PROCEDURE — 3075F SYST BP GE 130 - 139MM HG: CPT | Mod: CPTII,S$GLB,, | Performed by: FAMILY MEDICINE

## 2021-08-26 PROCEDURE — 3008F BODY MASS INDEX DOCD: CPT | Mod: CPTII,S$GLB,, | Performed by: FAMILY MEDICINE

## 2021-08-26 PROCEDURE — 99999 PR PBB SHADOW E&M-EST. PATIENT-LVL V: ICD-10-PCS | Mod: PBBFAC,,, | Performed by: FAMILY MEDICINE

## 2021-08-26 PROCEDURE — 3079F DIAST BP 80-89 MM HG: CPT | Mod: CPTII,S$GLB,, | Performed by: FAMILY MEDICINE

## 2021-08-26 PROCEDURE — 1159F PR MEDICATION LIST DOCUMENTED IN MEDICAL RECORD: ICD-10-PCS | Mod: CPTII,S$GLB,, | Performed by: FAMILY MEDICINE

## 2021-08-26 PROCEDURE — 3008F PR BODY MASS INDEX (BMI) DOCUMENTED: ICD-10-PCS | Mod: CPTII,S$GLB,, | Performed by: FAMILY MEDICINE

## 2021-08-26 PROCEDURE — 99499 RISK ADDL DX/OHS AUDIT: ICD-10-PCS | Mod: HCNC,S$GLB,, | Performed by: FAMILY MEDICINE

## 2021-08-26 PROCEDURE — 3079F PR MOST RECENT DIASTOLIC BLOOD PRESSURE 80-89 MM HG: ICD-10-PCS | Mod: CPTII,S$GLB,, | Performed by: FAMILY MEDICINE

## 2021-08-26 PROCEDURE — 99214 OFFICE O/P EST MOD 30 MIN: CPT | Mod: S$GLB,,, | Performed by: FAMILY MEDICINE

## 2021-08-26 PROCEDURE — 1101F PT FALLS ASSESS-DOCD LE1/YR: CPT | Mod: CPTII,S$GLB,, | Performed by: FAMILY MEDICINE

## 2021-08-26 PROCEDURE — 3046F PR MOST RECENT HEMOGLOBIN A1C LEVEL > 9.0%: ICD-10-PCS | Mod: CPTII,S$GLB,, | Performed by: FAMILY MEDICINE

## 2021-08-26 PROCEDURE — 3288F FALL RISK ASSESSMENT DOCD: CPT | Mod: CPTII,S$GLB,, | Performed by: FAMILY MEDICINE

## 2021-08-26 PROCEDURE — 3288F PR FALLS RISK ASSESSMENT DOCUMENTED: ICD-10-PCS | Mod: CPTII,S$GLB,, | Performed by: FAMILY MEDICINE

## 2021-08-26 PROCEDURE — 99499 UNLISTED E&M SERVICE: CPT | Mod: HCNC,S$GLB,, | Performed by: FAMILY MEDICINE

## 2021-08-26 PROCEDURE — 1160F RVW MEDS BY RX/DR IN RCRD: CPT | Mod: CPTII,S$GLB,, | Performed by: FAMILY MEDICINE

## 2021-08-26 PROCEDURE — 3046F HEMOGLOBIN A1C LEVEL >9.0%: CPT | Mod: CPTII,S$GLB,, | Performed by: FAMILY MEDICINE

## 2021-08-26 PROCEDURE — 3075F PR MOST RECENT SYSTOLIC BLOOD PRESS GE 130-139MM HG: ICD-10-PCS | Mod: CPTII,S$GLB,, | Performed by: FAMILY MEDICINE

## 2021-08-26 PROCEDURE — 99214 PR OFFICE/OUTPT VISIT, EST, LEVL IV, 30-39 MIN: ICD-10-PCS | Mod: S$GLB,,, | Performed by: FAMILY MEDICINE

## 2021-08-26 PROCEDURE — 1160F PR REVIEW ALL MEDS BY PRESCRIBER/CLIN PHARMACIST DOCUMENTED: ICD-10-PCS | Mod: CPTII,S$GLB,, | Performed by: FAMILY MEDICINE

## 2021-08-26 PROCEDURE — 1101F PR PT FALLS ASSESS DOC 0-1 FALLS W/OUT INJ PAST YR: ICD-10-PCS | Mod: CPTII,S$GLB,, | Performed by: FAMILY MEDICINE

## 2021-08-26 PROCEDURE — 99999 PR PBB SHADOW E&M-EST. PATIENT-LVL V: CPT | Mod: PBBFAC,,, | Performed by: FAMILY MEDICINE

## 2021-08-26 PROCEDURE — 1159F MED LIST DOCD IN RCRD: CPT | Mod: CPTII,S$GLB,, | Performed by: FAMILY MEDICINE

## 2021-08-26 RX ORDER — HYDROCODONE BITARTRATE AND ACETAMINOPHEN 10; 325 MG/1; MG/1
1 TABLET ORAL 3 TIMES DAILY PRN
Qty: 80 TABLET | Refills: 0 | Status: SHIPPED | OUTPATIENT
Start: 2021-08-26 | End: 2021-09-21 | Stop reason: SDUPTHER

## 2021-10-18 ENCOUNTER — PATIENT MESSAGE (OUTPATIENT)
Dept: ENDOCRINOLOGY | Facility: CLINIC | Age: 66
End: 2021-10-18

## 2021-10-18 DIAGNOSIS — M51.36 DEGENERATIVE DISC DISEASE, LUMBAR: ICD-10-CM

## 2021-10-20 RX ORDER — HYDROCODONE BITARTRATE AND ACETAMINOPHEN 10; 325 MG/1; MG/1
1 TABLET ORAL 3 TIMES DAILY PRN
Qty: 80 TABLET | Refills: 0 | Status: SHIPPED | OUTPATIENT
Start: 2021-10-20 | End: 2021-11-17 | Stop reason: SDUPTHER

## 2021-10-27 ENCOUNTER — TELEPHONE (OUTPATIENT)
Dept: FAMILY MEDICINE | Facility: CLINIC | Age: 66
End: 2021-10-27
Payer: MEDICARE

## 2021-11-08 ENCOUNTER — OFFICE VISIT (OUTPATIENT)
Dept: FAMILY MEDICINE | Facility: CLINIC | Age: 66
End: 2021-11-08
Payer: MEDICARE

## 2021-11-08 VITALS
HEART RATE: 81 BPM | DIASTOLIC BLOOD PRESSURE: 102 MMHG | OXYGEN SATURATION: 97 % | WEIGHT: 247.38 LBS | RESPIRATION RATE: 16 BRPM | SYSTOLIC BLOOD PRESSURE: 172 MMHG | TEMPERATURE: 98 F | BODY MASS INDEX: 33.55 KG/M2

## 2021-11-08 DIAGNOSIS — E11.59 HYPERTENSION ASSOCIATED WITH DIABETES: ICD-10-CM

## 2021-11-08 DIAGNOSIS — R10.11 RUQ PAIN: ICD-10-CM

## 2021-11-08 DIAGNOSIS — R53.83 FATIGUE, UNSPECIFIED TYPE: Primary | ICD-10-CM

## 2021-11-08 DIAGNOSIS — E11.8 DIABETES MELLITUS TYPE 2 WITH COMPLICATIONS: ICD-10-CM

## 2021-11-08 DIAGNOSIS — K21.9 GASTROESOPHAGEAL REFLUX DISEASE, UNSPECIFIED WHETHER ESOPHAGITIS PRESENT: Primary | ICD-10-CM

## 2021-11-08 DIAGNOSIS — I15.2 HYPERTENSION ASSOCIATED WITH DIABETES: ICD-10-CM

## 2021-11-08 DIAGNOSIS — R10.9 FLANK PAIN: ICD-10-CM

## 2021-11-08 PROCEDURE — 4010F PR ACE/ARB THEARPY RXD/TAKEN: ICD-10-PCS | Mod: CPTII,S$GLB,, | Performed by: FAMILY MEDICINE

## 2021-11-08 PROCEDURE — 36415 PR COLLECTION VENOUS BLOOD,VENIPUNCTURE: ICD-10-PCS | Mod: S$GLB,,, | Performed by: FAMILY MEDICINE

## 2021-11-08 PROCEDURE — 3288F PR FALLS RISK ASSESSMENT DOCUMENTED: ICD-10-PCS | Mod: CPTII,S$GLB,, | Performed by: FAMILY MEDICINE

## 2021-11-08 PROCEDURE — 83036 HEMOGLOBIN GLYCOSYLATED A1C: CPT | Mod: HCNC | Performed by: FAMILY MEDICINE

## 2021-11-08 PROCEDURE — 3080F PR MOST RECENT DIASTOLIC BLOOD PRESSURE >= 90 MM HG: ICD-10-PCS | Mod: CPTII,S$GLB,, | Performed by: FAMILY MEDICINE

## 2021-11-08 PROCEDURE — 3288F FALL RISK ASSESSMENT DOCD: CPT | Mod: CPTII,S$GLB,, | Performed by: FAMILY MEDICINE

## 2021-11-08 PROCEDURE — 3061F NEG MICROALBUMINURIA REV: CPT | Mod: CPTII,S$GLB,, | Performed by: FAMILY MEDICINE

## 2021-11-08 PROCEDURE — 99214 OFFICE O/P EST MOD 30 MIN: CPT | Mod: S$GLB,,, | Performed by: FAMILY MEDICINE

## 2021-11-08 PROCEDURE — 1125F AMNT PAIN NOTED PAIN PRSNT: CPT | Mod: CPTII,S$GLB,, | Performed by: FAMILY MEDICINE

## 2021-11-08 PROCEDURE — 3061F PR NEG MICROALBUMINURIA RESULT DOCUMENTED/REVIEW: ICD-10-PCS | Mod: CPTII,S$GLB,, | Performed by: FAMILY MEDICINE

## 2021-11-08 PROCEDURE — 1101F PR PT FALLS ASSESS DOC 0-1 FALLS W/OUT INJ PAST YR: ICD-10-PCS | Mod: CPTII,S$GLB,, | Performed by: FAMILY MEDICINE

## 2021-11-08 PROCEDURE — 3080F DIAST BP >= 90 MM HG: CPT | Mod: CPTII,S$GLB,, | Performed by: FAMILY MEDICINE

## 2021-11-08 PROCEDURE — 3046F PR MOST RECENT HEMOGLOBIN A1C LEVEL > 9.0%: ICD-10-PCS | Mod: CPTII,S$GLB,, | Performed by: FAMILY MEDICINE

## 2021-11-08 PROCEDURE — 3066F NEPHROPATHY DOC TX: CPT | Mod: CPTII,S$GLB,, | Performed by: FAMILY MEDICINE

## 2021-11-08 PROCEDURE — 85025 COMPLETE CBC W/AUTO DIFF WBC: CPT | Mod: HCNC | Performed by: FAMILY MEDICINE

## 2021-11-08 PROCEDURE — 99499 RISK ADDL DX/OHS AUDIT: ICD-10-PCS | Mod: HCNC,S$GLB,, | Performed by: FAMILY MEDICINE

## 2021-11-08 PROCEDURE — 80053 COMPREHEN METABOLIC PANEL: CPT | Mod: HCNC | Performed by: FAMILY MEDICINE

## 2021-11-08 PROCEDURE — 1159F PR MEDICATION LIST DOCUMENTED IN MEDICAL RECORD: ICD-10-PCS | Mod: CPTII,S$GLB,, | Performed by: FAMILY MEDICINE

## 2021-11-08 PROCEDURE — 3066F PR DOCUMENTATION OF TREATMENT FOR NEPHROPATHY: ICD-10-PCS | Mod: CPTII,S$GLB,, | Performed by: FAMILY MEDICINE

## 2021-11-08 PROCEDURE — 1125F PR PAIN SEVERITY QUANTIFIED, PAIN PRESENT: ICD-10-PCS | Mod: CPTII,S$GLB,, | Performed by: FAMILY MEDICINE

## 2021-11-08 PROCEDURE — 99214 PR OFFICE/OUTPT VISIT, EST, LEVL IV, 30-39 MIN: ICD-10-PCS | Mod: S$GLB,,, | Performed by: FAMILY MEDICINE

## 2021-11-08 PROCEDURE — 4010F ACE/ARB THERAPY RXD/TAKEN: CPT | Mod: CPTII,S$GLB,, | Performed by: FAMILY MEDICINE

## 2021-11-08 PROCEDURE — 1101F PT FALLS ASSESS-DOCD LE1/YR: CPT | Mod: CPTII,S$GLB,, | Performed by: FAMILY MEDICINE

## 2021-11-08 PROCEDURE — 1159F MED LIST DOCD IN RCRD: CPT | Mod: CPTII,S$GLB,, | Performed by: FAMILY MEDICINE

## 2021-11-08 PROCEDURE — 84443 ASSAY THYROID STIM HORMONE: CPT | Mod: HCNC | Performed by: FAMILY MEDICINE

## 2021-11-08 PROCEDURE — 3077F PR MOST RECENT SYSTOLIC BLOOD PRESSURE >= 140 MM HG: ICD-10-PCS | Mod: CPTII,S$GLB,, | Performed by: FAMILY MEDICINE

## 2021-11-08 PROCEDURE — 1160F PR REVIEW ALL MEDS BY PRESCRIBER/CLIN PHARMACIST DOCUMENTED: ICD-10-PCS | Mod: CPTII,S$GLB,, | Performed by: FAMILY MEDICINE

## 2021-11-08 PROCEDURE — 3008F BODY MASS INDEX DOCD: CPT | Mod: CPTII,S$GLB,, | Performed by: FAMILY MEDICINE

## 2021-11-08 PROCEDURE — 1160F RVW MEDS BY RX/DR IN RCRD: CPT | Mod: CPTII,S$GLB,, | Performed by: FAMILY MEDICINE

## 2021-11-08 PROCEDURE — 3008F PR BODY MASS INDEX (BMI) DOCUMENTED: ICD-10-PCS | Mod: CPTII,S$GLB,, | Performed by: FAMILY MEDICINE

## 2021-11-08 PROCEDURE — 81001 URINALYSIS AUTO W/SCOPE: CPT | Mod: HCNC | Performed by: FAMILY MEDICINE

## 2021-11-08 PROCEDURE — 36415 COLL VENOUS BLD VENIPUNCTURE: CPT | Mod: S$GLB,,, | Performed by: FAMILY MEDICINE

## 2021-11-08 PROCEDURE — 3046F HEMOGLOBIN A1C LEVEL >9.0%: CPT | Mod: CPTII,S$GLB,, | Performed by: FAMILY MEDICINE

## 2021-11-08 PROCEDURE — 99499 UNLISTED E&M SERVICE: CPT | Mod: HCNC,S$GLB,, | Performed by: FAMILY MEDICINE

## 2021-11-08 PROCEDURE — 3077F SYST BP >= 140 MM HG: CPT | Mod: CPTII,S$GLB,, | Performed by: FAMILY MEDICINE

## 2021-11-08 RX ORDER — LOSARTAN POTASSIUM 100 MG/1
100 TABLET ORAL DAILY
Qty: 90 TABLET | Refills: 0 | Status: SHIPPED | OUTPATIENT
Start: 2021-11-08 | End: 2022-02-11

## 2021-11-08 RX ORDER — TIZANIDINE 4 MG/1
4 TABLET ORAL EVERY 6 HOURS PRN
Qty: 40 TABLET | Refills: 1 | Status: SHIPPED | OUTPATIENT
Start: 2021-11-08 | End: 2021-11-18

## 2021-11-08 RX ORDER — METFORMIN HYDROCHLORIDE 500 MG/1
500 TABLET, EXTENDED RELEASE ORAL 2 TIMES DAILY WITH MEALS
Qty: 180 TABLET | Refills: 0 | Status: SHIPPED | OUTPATIENT
Start: 2021-11-08 | End: 2022-02-11

## 2021-11-09 LAB
ALBUMIN SERPL BCP-MCNC: 4 G/DL (ref 3.5–5.2)
ALP SERPL-CCNC: 76 U/L (ref 55–135)
ALT SERPL W/O P-5'-P-CCNC: 13 U/L (ref 10–44)
ANION GAP SERPL CALC-SCNC: 10 MMOL/L (ref 8–16)
AST SERPL-CCNC: 12 U/L (ref 10–40)
BACTERIA #/AREA URNS AUTO: NORMAL /HPF
BASOPHILS # BLD AUTO: 0.1 K/UL (ref 0–0.2)
BASOPHILS NFR BLD: 1 % (ref 0–1.9)
BILIRUB SERPL-MCNC: 0.8 MG/DL (ref 0.1–1)
BILIRUB UR QL STRIP: NEGATIVE
BUN SERPL-MCNC: 10 MG/DL (ref 8–23)
CALCIUM SERPL-MCNC: 10.2 MG/DL (ref 8.7–10.5)
CHLORIDE SERPL-SCNC: 103 MMOL/L (ref 95–110)
CLARITY UR REFRACT.AUTO: CLEAR
CO2 SERPL-SCNC: 26 MMOL/L (ref 23–29)
COLOR UR AUTO: YELLOW
CREAT SERPL-MCNC: 0.8 MG/DL (ref 0.5–1.4)
DIFFERENTIAL METHOD: NORMAL
EOSINOPHIL # BLD AUTO: 0.1 K/UL (ref 0–0.5)
EOSINOPHIL NFR BLD: 1.2 % (ref 0–8)
ERYTHROCYTE [DISTWIDTH] IN BLOOD BY AUTOMATED COUNT: 12.9 % (ref 11.5–14.5)
EST. GFR  (AFRICAN AMERICAN): >60 ML/MIN/1.73 M^2
EST. GFR  (NON AFRICAN AMERICAN): >60 ML/MIN/1.73 M^2
ESTIMATED AVG GLUCOSE: 160 MG/DL (ref 68–131)
GLUCOSE SERPL-MCNC: 140 MG/DL (ref 70–110)
GLUCOSE UR QL STRIP: NEGATIVE
HBA1C MFR BLD: 7.2 % (ref 4–5.6)
HCT VFR BLD AUTO: 47 % (ref 40–54)
HGB BLD-MCNC: 15.6 G/DL (ref 14–18)
HGB UR QL STRIP: ABNORMAL
IMM GRANULOCYTES # BLD AUTO: 0.03 K/UL (ref 0–0.04)
IMM GRANULOCYTES NFR BLD AUTO: 0.3 % (ref 0–0.5)
KETONES UR QL STRIP: NEGATIVE
LEUKOCYTE ESTERASE UR QL STRIP: NEGATIVE
LYMPHOCYTES # BLD AUTO: 3.9 K/UL (ref 1–4.8)
LYMPHOCYTES NFR BLD: 40.5 % (ref 18–48)
MCH RBC QN AUTO: 28.5 PG (ref 27–31)
MCHC RBC AUTO-ENTMCNC: 33.2 G/DL (ref 32–36)
MCV RBC AUTO: 86 FL (ref 82–98)
MICROSCOPIC COMMENT: NORMAL
MONOCYTES # BLD AUTO: 1 K/UL (ref 0.3–1)
MONOCYTES NFR BLD: 10.1 % (ref 4–15)
NEUTROPHILS # BLD AUTO: 4.5 K/UL (ref 1.8–7.7)
NEUTROPHILS NFR BLD: 46.9 % (ref 38–73)
NITRITE UR QL STRIP: NEGATIVE
NRBC BLD-RTO: 0 /100 WBC
PH UR STRIP: 6 [PH] (ref 5–8)
PLATELET # BLD AUTO: 256 K/UL (ref 150–450)
PMV BLD AUTO: 10.7 FL (ref 9.2–12.9)
POTASSIUM SERPL-SCNC: 3.8 MMOL/L (ref 3.5–5.1)
PROT SERPL-MCNC: 7.9 G/DL (ref 6–8.4)
PROT UR QL STRIP: NEGATIVE
RBC # BLD AUTO: 5.48 M/UL (ref 4.6–6.2)
RBC #/AREA URNS AUTO: 2 /HPF (ref 0–4)
SODIUM SERPL-SCNC: 139 MMOL/L (ref 136–145)
SP GR UR STRIP: 1.01 (ref 1–1.03)
SQUAMOUS #/AREA URNS AUTO: 0 /HPF
TSH SERPL DL<=0.005 MIU/L-ACNC: 2.04 UIU/ML (ref 0.4–4)
URN SPEC COLLECT METH UR: ABNORMAL
WBC # BLD AUTO: 9.63 K/UL (ref 3.9–12.7)
WBC #/AREA URNS AUTO: 0 /HPF (ref 0–5)

## 2021-11-09 RX ORDER — OMEPRAZOLE 20 MG/1
20 CAPSULE, DELAYED RELEASE ORAL DAILY
Qty: 90 CAPSULE | Refills: 3 | Status: SHIPPED | OUTPATIENT
Start: 2021-11-09 | End: 2022-08-02

## 2021-11-11 ENCOUNTER — TELEPHONE (OUTPATIENT)
Dept: CARDIOLOGY | Facility: CLINIC | Age: 66
End: 2021-11-11
Payer: MEDICARE

## 2021-11-13 ENCOUNTER — PATIENT MESSAGE (OUTPATIENT)
Dept: FAMILY MEDICINE | Facility: CLINIC | Age: 66
End: 2021-11-13
Payer: MEDICARE

## 2021-11-13 ENCOUNTER — PATIENT OUTREACH (OUTPATIENT)
Dept: ADMINISTRATIVE | Facility: OTHER | Age: 66
End: 2021-11-13
Payer: MEDICARE

## 2021-11-19 ENCOUNTER — TELEPHONE (OUTPATIENT)
Dept: ADMINISTRATIVE | Facility: HOSPITAL | Age: 66
End: 2021-11-19
Payer: MEDICARE

## 2021-11-22 ENCOUNTER — PATIENT MESSAGE (OUTPATIENT)
Dept: ADMINISTRATIVE | Facility: HOSPITAL | Age: 66
End: 2021-11-22
Payer: MEDICARE

## 2021-11-22 ENCOUNTER — HOSPITAL ENCOUNTER (OUTPATIENT)
Dept: RADIOLOGY | Facility: HOSPITAL | Age: 66
Discharge: HOME | End: 2021-11-22
Attending: FAMILY MEDICINE
Payer: MEDICARE

## 2021-11-22 ENCOUNTER — PATIENT OUTREACH (OUTPATIENT)
Dept: ADMINISTRATIVE | Facility: HOSPITAL | Age: 66
End: 2021-11-22
Payer: MEDICARE

## 2021-11-22 DIAGNOSIS — R10.11 RUQ PAIN: ICD-10-CM

## 2021-11-22 DIAGNOSIS — R53.83 FATIGUE, UNSPECIFIED TYPE: ICD-10-CM

## 2021-11-22 DIAGNOSIS — R10.9 FLANK PAIN: ICD-10-CM

## 2021-11-22 PROCEDURE — 76700 US EXAM ABDOM COMPLETE: CPT | Mod: 26,HCNC,, | Performed by: RADIOLOGY

## 2021-11-22 PROCEDURE — 76700 US EXAM ABDOM COMPLETE: CPT | Mod: TC,HCNC,PO

## 2021-11-22 PROCEDURE — 76700 US ABDOMEN COMPLETE: ICD-10-PCS | Mod: 26,HCNC,, | Performed by: RADIOLOGY

## 2021-11-29 ENCOUNTER — PATIENT MESSAGE (OUTPATIENT)
Dept: FAMILY MEDICINE | Facility: CLINIC | Age: 66
End: 2021-11-29
Payer: MEDICARE

## 2021-11-29 DIAGNOSIS — R31.29 MICROSCOPIC HEMATURIA: Primary | ICD-10-CM

## 2021-12-02 ENCOUNTER — PATIENT OUTREACH (OUTPATIENT)
Dept: ADMINISTRATIVE | Facility: HOSPITAL | Age: 66
End: 2021-12-02
Payer: MEDICARE

## 2021-12-06 ENCOUNTER — OFFICE VISIT (OUTPATIENT)
Dept: FAMILY MEDICINE | Facility: CLINIC | Age: 66
End: 2021-12-06
Payer: MEDICARE

## 2021-12-06 VITALS
BODY MASS INDEX: 33.95 KG/M2 | WEIGHT: 250.69 LBS | OXYGEN SATURATION: 97 % | HEIGHT: 72 IN | HEART RATE: 60 BPM | DIASTOLIC BLOOD PRESSURE: 86 MMHG | SYSTOLIC BLOOD PRESSURE: 134 MMHG | TEMPERATURE: 98 F

## 2021-12-06 DIAGNOSIS — Z12.5 SCREENING PSA (PROSTATE SPECIFIC ANTIGEN): ICD-10-CM

## 2021-12-06 DIAGNOSIS — F41.1 GAD (GENERALIZED ANXIETY DISORDER): ICD-10-CM

## 2021-12-06 DIAGNOSIS — E78.00 PURE HYPERCHOLESTEROLEMIA: ICD-10-CM

## 2021-12-06 DIAGNOSIS — E11.69 TYPE 2 DIABETES MELLITUS WITH OTHER SPECIFIED COMPLICATION, WITHOUT LONG-TERM CURRENT USE OF INSULIN: Primary | ICD-10-CM

## 2021-12-06 DIAGNOSIS — M51.36 DEGENERATIVE DISC DISEASE, LUMBAR: ICD-10-CM

## 2021-12-06 PROCEDURE — 99214 PR OFFICE/OUTPT VISIT, EST, LEVL IV, 30-39 MIN: ICD-10-PCS | Mod: HCNC,S$GLB,, | Performed by: FAMILY MEDICINE

## 2021-12-06 PROCEDURE — 4010F PR ACE/ARB THEARPY RXD/TAKEN: ICD-10-PCS | Mod: HCNC,CPTII,S$GLB, | Performed by: FAMILY MEDICINE

## 2021-12-06 PROCEDURE — 99499 UNLISTED E&M SERVICE: CPT | Mod: HCNC,S$GLB,, | Performed by: FAMILY MEDICINE

## 2021-12-06 PROCEDURE — 3066F NEPHROPATHY DOC TX: CPT | Mod: HCNC,CPTII,S$GLB, | Performed by: FAMILY MEDICINE

## 2021-12-06 PROCEDURE — 99499 RISK ADDL DX/OHS AUDIT: ICD-10-PCS | Mod: HCNC,S$GLB,, | Performed by: FAMILY MEDICINE

## 2021-12-06 PROCEDURE — 99214 OFFICE O/P EST MOD 30 MIN: CPT | Mod: HCNC,S$GLB,, | Performed by: FAMILY MEDICINE

## 2021-12-06 PROCEDURE — 3061F NEG MICROALBUMINURIA REV: CPT | Mod: HCNC,CPTII,S$GLB, | Performed by: FAMILY MEDICINE

## 2021-12-06 PROCEDURE — 4010F ACE/ARB THERAPY RXD/TAKEN: CPT | Mod: HCNC,CPTII,S$GLB, | Performed by: FAMILY MEDICINE

## 2021-12-06 PROCEDURE — 99999 PR PBB SHADOW E&M-EST. PATIENT-LVL IV: CPT | Mod: PBBFAC,HCNC,, | Performed by: FAMILY MEDICINE

## 2021-12-06 PROCEDURE — 3061F PR NEG MICROALBUMINURIA RESULT DOCUMENTED/REVIEW: ICD-10-PCS | Mod: HCNC,CPTII,S$GLB, | Performed by: FAMILY MEDICINE

## 2021-12-06 PROCEDURE — 3066F PR DOCUMENTATION OF TREATMENT FOR NEPHROPATHY: ICD-10-PCS | Mod: HCNC,CPTII,S$GLB, | Performed by: FAMILY MEDICINE

## 2021-12-06 PROCEDURE — 99999 PR PBB SHADOW E&M-EST. PATIENT-LVL IV: ICD-10-PCS | Mod: PBBFAC,HCNC,, | Performed by: FAMILY MEDICINE

## 2021-12-06 RX ORDER — ALPRAZOLAM 1 MG/1
TABLET ORAL
Qty: 90 TABLET | Refills: 2 | Status: SHIPPED | OUTPATIENT
Start: 2021-12-17 | End: 2022-02-08

## 2021-12-15 DIAGNOSIS — M51.36 DEGENERATIVE DISC DISEASE, LUMBAR: ICD-10-CM

## 2021-12-17 RX ORDER — HYDROCODONE BITARTRATE AND ACETAMINOPHEN 10; 325 MG/1; MG/1
1 TABLET ORAL 3 TIMES DAILY PRN
Qty: 80 TABLET | Refills: 0 | Status: SHIPPED | OUTPATIENT
Start: 2021-12-17 | End: 2022-01-11 | Stop reason: SDUPTHER

## 2021-12-20 ENCOUNTER — PATIENT OUTREACH (OUTPATIENT)
Dept: ADMINISTRATIVE | Facility: OTHER | Age: 66
End: 2021-12-20
Payer: MEDICARE

## 2021-12-23 ENCOUNTER — OFFICE VISIT (OUTPATIENT)
Dept: CARDIOLOGY | Facility: CLINIC | Age: 66
End: 2021-12-23
Payer: MEDICARE

## 2021-12-23 ENCOUNTER — IMMUNIZATION (OUTPATIENT)
Dept: FAMILY MEDICINE | Facility: CLINIC | Age: 66
End: 2021-12-23
Payer: MEDICARE

## 2021-12-23 VITALS
DIASTOLIC BLOOD PRESSURE: 86 MMHG | HEART RATE: 66 BPM | BODY MASS INDEX: 34.82 KG/M2 | WEIGHT: 257.06 LBS | HEIGHT: 72 IN | SYSTOLIC BLOOD PRESSURE: 138 MMHG

## 2021-12-23 DIAGNOSIS — Z71.6 TOBACCO ABUSE COUNSELING: Chronic | ICD-10-CM

## 2021-12-23 DIAGNOSIS — I25.10 CORONARY ARTERY DISEASE INVOLVING NATIVE CORONARY ARTERY OF NATIVE HEART WITHOUT ANGINA PECTORIS: Primary | Chronic | ICD-10-CM

## 2021-12-23 DIAGNOSIS — I10 ESSENTIAL HYPERTENSION: Chronic | ICD-10-CM

## 2021-12-23 DIAGNOSIS — E78.00 PURE HYPERCHOLESTEROLEMIA: Chronic | ICD-10-CM

## 2021-12-23 DIAGNOSIS — E11.69 TYPE 2 DIABETES MELLITUS WITH OTHER SPECIFIED COMPLICATION, WITHOUT LONG-TERM CURRENT USE OF INSULIN: ICD-10-CM

## 2021-12-23 DIAGNOSIS — Z23 NEED FOR VACCINATION: Primary | ICD-10-CM

## 2021-12-23 DIAGNOSIS — E66.9 OBESITY, CLASS I, BMI 30-34.9: Chronic | ICD-10-CM

## 2021-12-23 DIAGNOSIS — Z79.02 LONG TERM (CURRENT) USE OF ANTITHROMBOTICS/ANTIPLATELETS: Chronic | ICD-10-CM

## 2021-12-23 PROCEDURE — 99214 OFFICE O/P EST MOD 30 MIN: CPT | Mod: HCNC,S$GLB,, | Performed by: INTERNAL MEDICINE

## 2021-12-23 PROCEDURE — 1101F PR PT FALLS ASSESS DOC 0-1 FALLS W/OUT INJ PAST YR: ICD-10-PCS | Mod: HCNC,CPTII,S$GLB, | Performed by: INTERNAL MEDICINE

## 2021-12-23 PROCEDURE — 3066F PR DOCUMENTATION OF TREATMENT FOR NEPHROPATHY: ICD-10-PCS | Mod: HCNC,CPTII,S$GLB, | Performed by: INTERNAL MEDICINE

## 2021-12-23 PROCEDURE — 1159F PR MEDICATION LIST DOCUMENTED IN MEDICAL RECORD: ICD-10-PCS | Mod: HCNC,CPTII,S$GLB, | Performed by: INTERNAL MEDICINE

## 2021-12-23 PROCEDURE — 3288F PR FALLS RISK ASSESSMENT DOCUMENTED: ICD-10-PCS | Mod: HCNC,CPTII,S$GLB, | Performed by: INTERNAL MEDICINE

## 2021-12-23 PROCEDURE — 3079F DIAST BP 80-89 MM HG: CPT | Mod: HCNC,CPTII,S$GLB, | Performed by: INTERNAL MEDICINE

## 2021-12-23 PROCEDURE — 3051F HG A1C>EQUAL 7.0%<8.0%: CPT | Mod: HCNC,CPTII,S$GLB, | Performed by: INTERNAL MEDICINE

## 2021-12-23 PROCEDURE — 1126F PR PAIN SEVERITY QUANTIFIED, NO PAIN PRESENT: ICD-10-PCS | Mod: HCNC,CPTII,S$GLB, | Performed by: INTERNAL MEDICINE

## 2021-12-23 PROCEDURE — 99999 PR PBB SHADOW E&M-EST. PATIENT-LVL III: ICD-10-PCS | Mod: PBBFAC,HCNC,, | Performed by: INTERNAL MEDICINE

## 2021-12-23 PROCEDURE — 1159F MED LIST DOCD IN RCRD: CPT | Mod: HCNC,CPTII,S$GLB, | Performed by: INTERNAL MEDICINE

## 2021-12-23 PROCEDURE — 3061F NEG MICROALBUMINURIA REV: CPT | Mod: HCNC,CPTII,S$GLB, | Performed by: INTERNAL MEDICINE

## 2021-12-23 PROCEDURE — 1126F AMNT PAIN NOTED NONE PRSNT: CPT | Mod: HCNC,CPTII,S$GLB, | Performed by: INTERNAL MEDICINE

## 2021-12-23 PROCEDURE — 3079F PR MOST RECENT DIASTOLIC BLOOD PRESSURE 80-89 MM HG: ICD-10-PCS | Mod: HCNC,CPTII,S$GLB, | Performed by: INTERNAL MEDICINE

## 2021-12-23 PROCEDURE — 3075F SYST BP GE 130 - 139MM HG: CPT | Mod: HCNC,CPTII,S$GLB, | Performed by: INTERNAL MEDICINE

## 2021-12-23 PROCEDURE — 99999 PR PBB SHADOW E&M-EST. PATIENT-LVL III: CPT | Mod: PBBFAC,HCNC,, | Performed by: INTERNAL MEDICINE

## 2021-12-23 PROCEDURE — 3051F PR MOST RECENT HEMOGLOBIN A1C LEVEL 7.0 - < 8.0%: ICD-10-PCS | Mod: HCNC,CPTII,S$GLB, | Performed by: INTERNAL MEDICINE

## 2021-12-23 PROCEDURE — 1101F PT FALLS ASSESS-DOCD LE1/YR: CPT | Mod: HCNC,CPTII,S$GLB, | Performed by: INTERNAL MEDICINE

## 2021-12-23 PROCEDURE — 0004A COVID-19, MRNA, LNP-S, PF, 30 MCG/0.3 ML DOSE VACCINE: CPT | Mod: HCNC,PBBFAC | Performed by: RADIOLOGY

## 2021-12-23 PROCEDURE — 3061F PR NEG MICROALBUMINURIA RESULT DOCUMENTED/REVIEW: ICD-10-PCS | Mod: HCNC,CPTII,S$GLB, | Performed by: INTERNAL MEDICINE

## 2021-12-23 PROCEDURE — 3008F PR BODY MASS INDEX (BMI) DOCUMENTED: ICD-10-PCS | Mod: HCNC,CPTII,S$GLB, | Performed by: INTERNAL MEDICINE

## 2021-12-23 PROCEDURE — 3066F NEPHROPATHY DOC TX: CPT | Mod: HCNC,CPTII,S$GLB, | Performed by: INTERNAL MEDICINE

## 2021-12-23 PROCEDURE — 3008F BODY MASS INDEX DOCD: CPT | Mod: HCNC,CPTII,S$GLB, | Performed by: INTERNAL MEDICINE

## 2021-12-23 PROCEDURE — 99214 PR OFFICE/OUTPT VISIT, EST, LEVL IV, 30-39 MIN: ICD-10-PCS | Mod: HCNC,S$GLB,, | Performed by: INTERNAL MEDICINE

## 2021-12-23 PROCEDURE — 4010F ACE/ARB THERAPY RXD/TAKEN: CPT | Mod: HCNC,CPTII,S$GLB, | Performed by: INTERNAL MEDICINE

## 2021-12-23 PROCEDURE — 3288F FALL RISK ASSESSMENT DOCD: CPT | Mod: HCNC,CPTII,S$GLB, | Performed by: INTERNAL MEDICINE

## 2021-12-23 PROCEDURE — 3075F PR MOST RECENT SYSTOLIC BLOOD PRESS GE 130-139MM HG: ICD-10-PCS | Mod: HCNC,CPTII,S$GLB, | Performed by: INTERNAL MEDICINE

## 2021-12-23 PROCEDURE — 4010F PR ACE/ARB THEARPY RXD/TAKEN: ICD-10-PCS | Mod: HCNC,CPTII,S$GLB, | Performed by: INTERNAL MEDICINE

## 2021-12-23 RX ORDER — AMLODIPINE BESYLATE 5 MG/1
7.5 TABLET ORAL DAILY
Qty: 135 TABLET | Refills: 1 | Status: SHIPPED | OUTPATIENT
Start: 2021-12-23 | End: 2022-06-30 | Stop reason: SDUPTHER

## 2021-12-29 DIAGNOSIS — E11.69 TYPE 2 DIABETES MELLITUS WITH OTHER SPECIFIED COMPLICATION, WITHOUT LONG-TERM CURRENT USE OF INSULIN: ICD-10-CM

## 2022-01-05 DIAGNOSIS — E11.69 TYPE 2 DIABETES MELLITUS WITH OTHER SPECIFIED COMPLICATION, WITHOUT LONG-TERM CURRENT USE OF INSULIN: ICD-10-CM

## 2022-01-11 DIAGNOSIS — M51.36 DEGENERATIVE DISC DISEASE, LUMBAR: ICD-10-CM

## 2022-01-11 NOTE — TELEPHONE ENCOUNTER
No new care gaps identified.  Powered by [employer]. Reference number: [id number].   1/11/2022 2:58:44 PM CST

## 2022-01-12 DIAGNOSIS — E11.69 TYPE 2 DIABETES MELLITUS WITH OTHER SPECIFIED COMPLICATION, WITHOUT LONG-TERM CURRENT USE OF INSULIN: ICD-10-CM

## 2022-01-14 RX ORDER — HYDROCODONE BITARTRATE AND ACETAMINOPHEN 10; 325 MG/1; MG/1
1 TABLET ORAL 3 TIMES DAILY PRN
Qty: 80 TABLET | Refills: 0 | Status: SHIPPED | OUTPATIENT
Start: 2022-01-14 | End: 2022-02-10 | Stop reason: SDUPTHER

## 2022-02-10 DIAGNOSIS — M51.36 DEGENERATIVE DISC DISEASE, LUMBAR: ICD-10-CM

## 2022-02-10 NOTE — TELEPHONE ENCOUNTER
Care Due:                  Date            Visit Type   Department     Provider  --------------------------------------------------------------------------------                                EP -                              PRIMARY      [facility]  Last Visit: 12-      CARE ([facility])   MEDICINE       MARITZA BIRCH  Next Visit: None Scheduled  None         None Found                                                            Last  Test          Frequency    Reason                     Performed    Due Date  --------------------------------------------------------------------------------    HBA1C.......  6 months...  metFORMIN................  11- 05-    Powered by [employer]. Reference number: [id number].   2/10/2022 12:52:21 PM CST

## 2022-02-11 RX ORDER — HYDROCODONE BITARTRATE AND ACETAMINOPHEN 10; 325 MG/1; MG/1
1 TABLET ORAL 3 TIMES DAILY PRN
Qty: 80 TABLET | Refills: 0 | Status: SHIPPED | OUTPATIENT
Start: 2022-02-11 | End: 2022-03-11

## 2022-02-14 ENCOUNTER — PATIENT OUTREACH (OUTPATIENT)
Dept: ADMINISTRATIVE | Facility: OTHER | Age: 67
End: 2022-02-14
Payer: MEDICARE

## 2022-02-14 NOTE — PROGRESS NOTES
Health Maintenance Due   Topic Date Due    Sign Pain Contract  Never done    Complete Opioid Risk Tool  Never done    Aspirin/Antiplatelet Therapy  Never done    Shingles Vaccine (1 of 2) Never done    Eye Exam  11/08/2018    Foot Exam  06/15/2021     Updates were requested from care everywhere.  Chart was reviewed for overdue [facility] Encounters (MICHELINE) topics (CRS, Breast Cancer Screening, Eye exam)  Health Maintenance has been updated.  LINKS immunization registry triggered.  Immunizations were reconciled.

## 2022-03-07 DIAGNOSIS — F41.1 GAD (GENERALIZED ANXIETY DISORDER): ICD-10-CM

## 2022-03-07 RX ORDER — ALPRAZOLAM 1 MG/1
TABLET ORAL
Qty: 90 TABLET | Refills: 2 | Status: CANCELLED | OUTPATIENT
Start: 2022-03-07

## 2022-03-07 NOTE — TELEPHONE ENCOUNTER
No new care gaps identified.  Powered by [employer]. Reference number: [id number].   3/07/2022 7:07:01 AM CST

## 2022-03-09 NOTE — TELEPHONE ENCOUNTER
No new care gaps identified.  Powered by [employer]. Reference number: [id number].   3/09/2022 1:01:21 PM CST

## 2022-03-16 RX ORDER — CITALOPRAM 10 MG/1
10 TABLET ORAL DAILY
Qty: 90 TABLET | Refills: 2 | Status: SHIPPED | OUTPATIENT
Start: 2022-03-16 | End: 2022-03-25 | Stop reason: SDUPTHER

## 2022-03-16 NOTE — TELEPHONE ENCOUNTER
Refill Routing Note   Medication(s) are not appropriate for processing by [facility] for the following reason(s):      - Drug-Disease Interaction (citalopram and NSTEMI (non-ST elevated myocardial infarction))    [facility] action(s):  Defer          --->Care Gap information included in message below if applicable.   Medication reconciliation completed: No   Automatic Epic Generated Protocol Data:        Requested Prescriptions   Pending Prescriptions Disp Refills    citalopram (CELEXA) 10 MG tablet [Pharmacy Med Name: CITALOPRAM HBR 10 MG TABLET 10 Tablet] 90 tablet 2     Sig: Take 1 tablet (10 mg total) by mouth once daily.       Psychiatry:  Antidepressants - SSRI Passed - 3/9/2022  1:00 PM        Passed - Patient is at least 18 years old        Passed - Valid encounter within last 15 months     Recent Visits  Date Type Provider Dept   12/06/21 Office Visit MARITZA Bahena MD [facility]   08/26/21 Office Visit MARITZA Bahena MD [facility]   04/13/21 Office Visit MARITZA Bahena MD [facility]   12/14/20 Office Visit MARITZA Bahena MD [facility]   10/12/20 Office Visit MARITZA Bahena MD [facility]   Showing recent visits within past 720 days and meeting all other requirements  Future Appointments  No visits were found meeting these conditions.  Showing future appointments within next 150 days and meeting all other requirements      Future Appointments              In 3 months [facility]    In 3 months Romario Flaherty MD [facility] Cardiology, [place]                      Appointments  past 12m or future 3m with PCP    Date Provider   Last Visit   12/6/2021 MARITZA Bahena MD   Next Visit   3/11/2022 MARITZA Bahena MD   ED visits in past 90 days: 0        Note composed:1:17 PM 03/16/2022

## 2022-04-05 ENCOUNTER — OFFICE VISIT (OUTPATIENT)
Dept: FAMILY MEDICINE | Facility: CLINIC | Age: 67
End: 2022-04-05
Payer: MEDICARE

## 2022-04-05 VITALS
HEIGHT: 72 IN | RESPIRATION RATE: 18 BRPM | HEART RATE: 65 BPM | WEIGHT: 249.81 LBS | DIASTOLIC BLOOD PRESSURE: 84 MMHG | OXYGEN SATURATION: 96 % | SYSTOLIC BLOOD PRESSURE: 138 MMHG | BODY MASS INDEX: 33.83 KG/M2

## 2022-04-05 DIAGNOSIS — F32.1 CURRENT MODERATE EPISODE OF MAJOR DEPRESSIVE DISORDER WITHOUT PRIOR EPISODE: ICD-10-CM

## 2022-04-05 DIAGNOSIS — M51.36 DEGENERATIVE DISC DISEASE, LUMBAR: ICD-10-CM

## 2022-04-05 DIAGNOSIS — F41.1 GAD (GENERALIZED ANXIETY DISORDER): ICD-10-CM

## 2022-04-05 DIAGNOSIS — F41.0 PANIC ATTACK: ICD-10-CM

## 2022-04-05 DIAGNOSIS — I77.9 MILD CAROTID ARTERY DISEASE: ICD-10-CM

## 2022-04-05 DIAGNOSIS — R09.82 POST-NASAL DRIP: ICD-10-CM

## 2022-04-05 DIAGNOSIS — R09.89 SINUS COMPLAINT: ICD-10-CM

## 2022-04-05 DIAGNOSIS — I10 ESSENTIAL HYPERTENSION: ICD-10-CM

## 2022-04-05 DIAGNOSIS — M79.7 FIBROMYALGIA: ICD-10-CM

## 2022-04-05 DIAGNOSIS — E11.69 TYPE 2 DIABETES MELLITUS WITH OTHER SPECIFIED COMPLICATION, WITHOUT LONG-TERM CURRENT USE OF INSULIN: ICD-10-CM

## 2022-04-05 DIAGNOSIS — Z00.00 WELLNESS EXAMINATION: Primary | ICD-10-CM

## 2022-04-05 DIAGNOSIS — J01.80 OTHER ACUTE SINUSITIS, RECURRENCE NOT SPECIFIED: ICD-10-CM

## 2022-04-05 PROBLEM — Z71.6 TOBACCO ABUSE COUNSELING: Status: RESOLVED | Noted: 2021-12-23 | Resolved: 2022-04-05

## 2022-04-05 PROCEDURE — 3051F PR MOST RECENT HEMOGLOBIN A1C LEVEL 7.0 - < 8.0%: ICD-10-PCS | Mod: CPTII,S$GLB,, | Performed by: FAMILY MEDICINE

## 2022-04-05 PROCEDURE — 1101F PT FALLS ASSESS-DOCD LE1/YR: CPT | Mod: CPTII,S$GLB,, | Performed by: FAMILY MEDICINE

## 2022-04-05 PROCEDURE — 3075F SYST BP GE 130 - 139MM HG: CPT | Mod: CPTII,S$GLB,, | Performed by: FAMILY MEDICINE

## 2022-04-05 PROCEDURE — 99499 UNLISTED E&M SERVICE: CPT | Mod: HCNC,S$GLB,, | Performed by: FAMILY MEDICINE

## 2022-04-05 PROCEDURE — 1159F PR MEDICATION LIST DOCUMENTED IN MEDICAL RECORD: ICD-10-PCS | Mod: CPTII,S$GLB,, | Performed by: FAMILY MEDICINE

## 2022-04-05 PROCEDURE — 1125F PR PAIN SEVERITY QUANTIFIED, PAIN PRESENT: ICD-10-PCS | Mod: CPTII,S$GLB,, | Performed by: FAMILY MEDICINE

## 2022-04-05 PROCEDURE — 3288F FALL RISK ASSESSMENT DOCD: CPT | Mod: CPTII,S$GLB,, | Performed by: FAMILY MEDICINE

## 2022-04-05 PROCEDURE — 3079F DIAST BP 80-89 MM HG: CPT | Mod: CPTII,S$GLB,, | Performed by: FAMILY MEDICINE

## 2022-04-05 PROCEDURE — 99999 PR PBB SHADOW E&M-EST. PATIENT-LVL III: ICD-10-PCS | Mod: PBBFAC,,, | Performed by: FAMILY MEDICINE

## 2022-04-05 PROCEDURE — 3075F PR MOST RECENT SYSTOLIC BLOOD PRESS GE 130-139MM HG: ICD-10-PCS | Mod: CPTII,S$GLB,, | Performed by: FAMILY MEDICINE

## 2022-04-05 PROCEDURE — 3008F BODY MASS INDEX DOCD: CPT | Mod: CPTII,S$GLB,, | Performed by: FAMILY MEDICINE

## 2022-04-05 PROCEDURE — 3288F PR FALLS RISK ASSESSMENT DOCUMENTED: ICD-10-PCS | Mod: CPTII,S$GLB,, | Performed by: FAMILY MEDICINE

## 2022-04-05 PROCEDURE — 99499 RISK ADDL DX/OHS AUDIT: ICD-10-PCS | Mod: HCNC,S$GLB,, | Performed by: FAMILY MEDICINE

## 2022-04-05 PROCEDURE — 99999 PR PBB SHADOW E&M-EST. PATIENT-LVL III: CPT | Mod: PBBFAC,,, | Performed by: FAMILY MEDICINE

## 2022-04-05 PROCEDURE — 1125F AMNT PAIN NOTED PAIN PRSNT: CPT | Mod: CPTII,S$GLB,, | Performed by: FAMILY MEDICINE

## 2022-04-05 PROCEDURE — 4010F ACE/ARB THERAPY RXD/TAKEN: CPT | Mod: CPTII,S$GLB,, | Performed by: FAMILY MEDICINE

## 2022-04-05 PROCEDURE — 3079F PR MOST RECENT DIASTOLIC BLOOD PRESSURE 80-89 MM HG: ICD-10-PCS | Mod: CPTII,S$GLB,, | Performed by: FAMILY MEDICINE

## 2022-04-05 PROCEDURE — 3008F PR BODY MASS INDEX (BMI) DOCUMENTED: ICD-10-PCS | Mod: CPTII,S$GLB,, | Performed by: FAMILY MEDICINE

## 2022-04-05 PROCEDURE — 1159F MED LIST DOCD IN RCRD: CPT | Mod: CPTII,S$GLB,, | Performed by: FAMILY MEDICINE

## 2022-04-05 PROCEDURE — 1101F PR PT FALLS ASSESS DOC 0-1 FALLS W/OUT INJ PAST YR: ICD-10-PCS | Mod: CPTII,S$GLB,, | Performed by: FAMILY MEDICINE

## 2022-04-05 PROCEDURE — 3051F HG A1C>EQUAL 7.0%<8.0%: CPT | Mod: CPTII,S$GLB,, | Performed by: FAMILY MEDICINE

## 2022-04-05 PROCEDURE — 4010F PR ACE/ARB THEARPY RXD/TAKEN: ICD-10-PCS | Mod: CPTII,S$GLB,, | Performed by: FAMILY MEDICINE

## 2022-04-05 PROCEDURE — 99397 PR PREVENTIVE VISIT,EST,65 & OVER: ICD-10-PCS | Mod: S$GLB,,, | Performed by: FAMILY MEDICINE

## 2022-04-05 PROCEDURE — 99397 PER PM REEVAL EST PAT 65+ YR: CPT | Mod: S$GLB,,, | Performed by: FAMILY MEDICINE

## 2022-04-05 RX ORDER — FLUTICASONE PROPIONATE 50 MCG
SPRAY, SUSPENSION (ML) NASAL
Qty: 16 G | Refills: 2 | Status: SHIPPED | OUTPATIENT
Start: 2022-04-05 | End: 2022-11-01

## 2022-04-05 RX ORDER — ALPRAZOLAM 1 MG/1
TABLET ORAL
Qty: 90 TABLET | Refills: 2 | Status: SHIPPED | OUTPATIENT
Start: 2022-04-05 | End: 2022-06-01

## 2022-04-05 RX ORDER — HYDROCODONE BITARTRATE AND ACETAMINOPHEN 10; 325 MG/1; MG/1
1 TABLET ORAL 3 TIMES DAILY PRN
Qty: 80 TABLET | Refills: 0 | Status: SHIPPED | OUTPATIENT
Start: 2022-04-05 | End: 2022-05-06 | Stop reason: SDUPTHER

## 2022-04-05 NOTE — PROGRESS NOTES
Subjective:       Patient ID: Fred Blackwell is a 66 y.o. male.    Chief Complaint: Follow-up (3mth f/u)    Here for wellness and f/u chronic issues. Doing well overall. Worse AR symptoms lately.    Hypertension  This is a chronic problem. The current episode started more than 1 year ago. The problem is controlled. Pertinent negatives include no chest pain, palpitations or shortness of breath.   Diabetes  He presents for his follow-up diabetic visit. He has type 2 diabetes mellitus. His disease course has been stable. Hypoglycemia symptoms include nervousness/anxiousness. Pertinent negatives for diabetes include no chest pain.     Review of Systems   Constitutional: Negative for chills and fever.   HENT: Positive for congestion.    Respiratory: Negative for cough, chest tightness and shortness of breath.    Cardiovascular: Negative for chest pain, palpitations and leg swelling.   Endocrine: Negative for cold intolerance and heat intolerance.   Musculoskeletal: Positive for arthralgias and back pain.   Psychiatric/Behavioral: Negative for decreased concentration. The patient is nervous/anxious.        Objective:      Physical Exam  Vitals and nursing note reviewed.   Constitutional:       Appearance: He is well-developed.   HENT:      Head: Normocephalic and atraumatic.   Cardiovascular:      Rate and Rhythm: Normal rate and regular rhythm.      Heart sounds: Normal heart sounds.   Pulmonary:      Effort: Pulmonary effort is normal.      Breath sounds: Normal breath sounds.         Assessment:       1. Wellness examination    2. Other acute sinusitis, recurrence not specified    3. Sinus complaint    4. Post-nasal drip    5. Current moderate episode of major depressive disorder without prior episode    6. Mild carotid artery disease    7. Type 2 diabetes mellitus with other specified complication, without long-term current use of insulin    8. Essential hypertension    9. Fibromyalgia    10. Panic attack     11. Degenerative disc disease, lumbar    12. SHANNAN (generalized anxiety disorder)        Plan:       Wellness examination    Other acute sinusitis, recurrence not specified  -     fluticasone propionate (FLONASE) 50 mcg/actuation nasal spray; USE 1 SPRAY IN EACH NOSTRIL ONCE DAILY.  Dispense: 16 g; Refill: 2    Sinus complaint  -     fluticasone propionate (FLONASE) 50 mcg/actuation nasal spray; USE 1 SPRAY IN EACH NOSTRIL ONCE DAILY.  Dispense: 16 g; Refill: 2    Post-nasal drip  -     fluticasone propionate (FLONASE) 50 mcg/actuation nasal spray; USE 1 SPRAY IN EACH NOSTRIL ONCE DAILY.  Dispense: 16 g; Refill: 2    Current moderate episode of major depressive disorder without prior episode    Mild carotid artery disease    Type 2 diabetes mellitus with other specified complication, without long-term current use of insulin    Essential hypertension    Fibromyalgia    Panic attack    Degenerative disc disease, lumbar  -     HYDROcodone-acetaminophen (NORCO)  mg per tablet; Take 1 tablet by mouth 3 (three) times daily as needed for Pain.  Dispense: 80 tablet; Refill: 0    SHANNAN (generalized anxiety disorder)  -     ALPRAZolam (XANAX) 1 MG tablet; TAKE 1 TABLET (1 MG TOTAL) BY MOUTH THREE TIMES DAILY AS NEEDED FOR ANXIETY.  Dispense: 90 tablet; Refill: 2    has lost wt  Mood stable  Still with chronic anxiety and pain  Trial flonase for ar  htn stable  Diabetes stable  Will monitor chronic medical issues and continue current plan of care.  Goal to wean benzo and opiate but has been long standing with good response    Follow up in about 3 months (around 7/5/2022), or if symptoms worsen or fail to improve.

## 2022-04-26 ENCOUNTER — DOCUMENTATION ONLY (OUTPATIENT)
Dept: FAMILY MEDICINE | Facility: CLINIC | Age: 67
End: 2022-04-26
Payer: MEDICARE

## 2022-05-06 DIAGNOSIS — M51.36 DEGENERATIVE DISC DISEASE, LUMBAR: ICD-10-CM

## 2022-05-06 RX ORDER — METFORMIN HYDROCHLORIDE 500 MG/1
500 TABLET, EXTENDED RELEASE ORAL
Qty: 90 TABLET | Refills: 3 | Status: SHIPPED | OUTPATIENT
Start: 2022-05-06 | End: 2023-07-24

## 2022-05-06 RX ORDER — HYDROCODONE BITARTRATE AND ACETAMINOPHEN 10; 325 MG/1; MG/1
1 TABLET ORAL 3 TIMES DAILY PRN
Qty: 80 TABLET | Refills: 0 | Status: SHIPPED | OUTPATIENT
Start: 2022-05-06 | End: 2022-05-31 | Stop reason: SDUPTHER

## 2022-05-06 NOTE — TELEPHONE ENCOUNTER
----- Message from Meg Morgan sent at 5/6/2022  2:27 PM CDT -----  Contact: patient  Type: Needs Medical Advice  Who Called: patient  Medication: HYDROcodone-acetaminophen (NORCO)  mg per tablet  Pharmacy name and phone #:  [facility] - [place], [place] - [address]  [address] [employer] [address] [place] [address]  Phone: [phone] Fax: [phone]  Best Call Back Number:[phone]  Additional Information: contact to advise status of refill on Norco, patient original request was sent in on 05/02/22 and several request sent since, patient is very upset that he has been calling for days and has not gotten a call back from the nurse- he is requesting to speak to someone before end of day.

## 2022-05-06 NOTE — TELEPHONE ENCOUNTER
----- Message from Michela Power sent at 5/6/2022  2:21 PM CDT -----  Contact: Pt  Type:  RX Refill Request    Who Called:  Pt    Refill or New Rx:  Refill    RX Name and Strength:  HYDROcodone-acetaminophen (NORCO)  mg per tablet    How is the patient currently taking it? (ex. 1XDay):  3 x day    Preferred Pharmacy with phone number:    [email or web address] - [address]  [address] [address]  Phone: [phone] Fax: [phone]    Best Call Back Number:  [phone]    Additional Information:  Please call back. Thanks.

## 2022-05-06 NOTE — TELEPHONE ENCOUNTER
----- Message from Michela Power sent at 5/6/2022  2:21 PM CDT -----  Contact: Pt  Type:  RX Refill Request    Who Called:  Pt    Refill or New Rx:  Refill    RX Name and Strength:  HYDROcodone-acetaminophen (NORCO)  mg per tablet    How is the patient currently taking it? (ex. 1XDay):  3 x day    Preferred Pharmacy with phone number:    [email or web address] - [address]  [address] [address]  Phone: [phone] Fax: [phone]    Best Call Back Number:  [phone]    Additional Information:  Please call back. Thanks.

## 2022-05-06 NOTE — TELEPHONE ENCOUNTER
----- Message from Michela Power sent at 5/6/2022  2:21 PM CDT -----  Contact: Pt  Type:  RX Refill Request    Who Called:  Pt    Refill or New Rx:  Refill    RX Name and Strength:  HYDROcodone-acetaminophen (NORCO)  mg per tablet    How is the patient currently taking it? (ex. 1XDay):  3 x day    Preferred Pharmacy with phone number:    [email or web address] - [address]  [address] [address]  Phone: [phone] Fax: [phone]    Best Call Back Number:  [phone]    Additional Information:  Please call back. Thanks.

## 2022-05-06 NOTE — TELEPHONE ENCOUNTER
No new care gaps identified.  [facility] Embedded Care Gaps. Reference number: [id number]. 5/06/2022   1:42:24 PM CDT

## 2022-05-09 ENCOUNTER — PATIENT MESSAGE (OUTPATIENT)
Dept: SMOKING CESSATION | Facility: CLINIC | Age: 67
End: 2022-05-09
Payer: MEDICARE

## 2022-05-31 ENCOUNTER — PATIENT MESSAGE (OUTPATIENT)
Dept: ADMINISTRATIVE | Facility: HOSPITAL | Age: 67
End: 2022-05-31
Payer: MEDICARE

## 2022-06-03 ENCOUNTER — TELEPHONE (OUTPATIENT)
Dept: FAMILY MEDICINE | Facility: CLINIC | Age: 67
End: 2022-06-03
Payer: MEDICARE

## 2022-06-03 NOTE — TELEPHONE ENCOUNTER
----- Message from Shawna Zambrano sent at 6/3/2022 11:26 AM CDT -----  Contact: Patient  Type:  RX Refill Request    Who Called:  patient     Refill or New Rx: Refill    RX Name and Strength: HYDROcodone-acetaminophen (NORCO)  mg per tablet      How is the patient currently taking it? (ex. 1XDay):     Is this a 30 day or 90 day RX: 30    Preferred Pharmacy with phone number:       [facility] [place] [address]  [address] [address]  Phone: [phone] Fax: [phone]      Local or Mail Order: Local     Ordering Provider: Dr Bahena    Would the patient rather a call back or a response via [facility]?  Call    Best Call Back Number: [phone]    Additional Information:   Patient wants a call back letting him know this has been sent to the pharmacy

## 2022-06-15 NOTE — TELEPHONE ENCOUNTER
Refill Routing Note    Medication(s) are appropriate for refill Outside of protocol      Requested Prescriptions   Pending Prescriptions Disp Refills    HYDROcodone-acetaminophen (NORCO)  mg per tablet [Pharmacy Med Name: HYDROCODONE-APAP  TAB  TAB] 80 tablet 0     Sig: TAKE 1 TABLET BY MOUTH THREE TIMES A DAY AS NEEDED.       Narcotics Refill Protocol Failed - 11/27/2019  5:02 PM        Failed - Patient seen within 3 months     Last visit with MARITZA Bahena MD: 8/5/2019  [facility]: 8/5/2019    Patient's next visit in [facility]: Visit date not found           Failed - Med not refilled within 4 weeks               Localized Dermabrasion With Wire Brush Text: The patient was draped in routine manner.  Localized dermabrasion using 3 x 17 mm wire brush was performed in routine manner to papillary dermis. This spot dermabrasion is being performed to complete skin cancer reconstruction. It also will eliminate the other sun damaged precancerous cells that are known to be part of the regional effect of a lifetime's worth of sun exposure. This localized dermabrasion is therapeutic and should not be considered cosmetic in any regard. Localized Dermabrasion Text: The patient was draped in routine manner.  Localized dermabrasion using 3 x 17 mm wire brush was performed in routine manner to papillary dermis. This spot dermabrasion is being performed to complete skin cancer reconstruction. It also will eliminate the other sun damaged precancerous cells that are known to be part of the regional effect of a lifetime's worth of sun exposure. This localized dermabrasion is therapeutic and should not be considered cosmetic in any regard.

## 2022-06-23 ENCOUNTER — LAB VISIT (OUTPATIENT)
Dept: LAB | Facility: HOSPITAL | Age: 67
End: 2022-06-23
Attending: INTERNAL MEDICINE
Payer: MEDICARE

## 2022-06-23 DIAGNOSIS — Z79.02 LONG TERM (CURRENT) USE OF ANTITHROMBOTICS/ANTIPLATELETS: Chronic | ICD-10-CM

## 2022-06-23 DIAGNOSIS — I25.10 CORONARY ARTERY DISEASE INVOLVING NATIVE CORONARY ARTERY OF NATIVE HEART WITHOUT ANGINA PECTORIS: Chronic | ICD-10-CM

## 2022-06-23 DIAGNOSIS — I10 ESSENTIAL HYPERTENSION: Chronic | ICD-10-CM

## 2022-06-23 DIAGNOSIS — E78.00 PURE HYPERCHOLESTEROLEMIA: Chronic | ICD-10-CM

## 2022-06-23 LAB
ALBUMIN SERPL BCP-MCNC: 4 G/DL (ref 3.5–5.2)
ALP SERPL-CCNC: 53 U/L (ref 55–135)
ALT SERPL W/O P-5'-P-CCNC: 15 U/L (ref 10–44)
ANION GAP SERPL CALC-SCNC: 12 MMOL/L (ref 8–16)
AST SERPL-CCNC: 16 U/L (ref 10–40)
BILIRUB SERPL-MCNC: 1 MG/DL (ref 0.1–1)
BUN SERPL-MCNC: 13 MG/DL (ref 8–23)
CALCIUM SERPL-MCNC: 9.9 MG/DL (ref 8.7–10.5)
CHLORIDE SERPL-SCNC: 105 MMOL/L (ref 95–110)
CHOLEST SERPL-MCNC: 220 MG/DL (ref 120–199)
CHOLEST/HDLC SERPL: 4.7 {RATIO} (ref 2–5)
CO2 SERPL-SCNC: 24 MMOL/L (ref 23–29)
CREAT SERPL-MCNC: 1 MG/DL (ref 0.5–1.4)
EST. GFR  (AFRICAN AMERICAN): >60 ML/MIN/1.73 M^2
EST. GFR  (NON AFRICAN AMERICAN): >60 ML/MIN/1.73 M^2
GLUCOSE SERPL-MCNC: 123 MG/DL (ref 70–110)
HDLC SERPL-MCNC: 47 MG/DL (ref 40–75)
HDLC SERPL: 21.4 % (ref 20–50)
HGB BLD-MCNC: 15.9 G/DL (ref 14–18)
LDLC SERPL CALC-MCNC: 155.6 MG/DL (ref 63–159)
NONHDLC SERPL-MCNC: 173 MG/DL
POTASSIUM SERPL-SCNC: 4.6 MMOL/L (ref 3.5–5.1)
PROT SERPL-MCNC: 7.2 G/DL (ref 6–8.4)
SODIUM SERPL-SCNC: 141 MMOL/L (ref 136–145)
TRIGL SERPL-MCNC: 87 MG/DL (ref 30–150)

## 2022-06-23 PROCEDURE — 85018 HEMOGLOBIN: CPT | Performed by: INTERNAL MEDICINE

## 2022-06-23 PROCEDURE — 80053 COMPREHEN METABOLIC PANEL: CPT | Performed by: INTERNAL MEDICINE

## 2022-06-23 PROCEDURE — 36415 COLL VENOUS BLD VENIPUNCTURE: CPT | Mod: PO | Performed by: INTERNAL MEDICINE

## 2022-06-23 PROCEDURE — 80061 LIPID PANEL: CPT | Performed by: INTERNAL MEDICINE

## 2022-06-29 DIAGNOSIS — M51.36 DEGENERATIVE DISC DISEASE, LUMBAR: ICD-10-CM

## 2022-06-30 ENCOUNTER — OFFICE VISIT (OUTPATIENT)
Dept: CARDIOLOGY | Facility: CLINIC | Age: 67
End: 2022-06-30
Payer: MEDICARE

## 2022-06-30 VITALS
WEIGHT: 249 LBS | SYSTOLIC BLOOD PRESSURE: 138 MMHG | HEART RATE: 85 BPM | DIASTOLIC BLOOD PRESSURE: 89 MMHG | HEIGHT: 72 IN | BODY MASS INDEX: 33.72 KG/M2

## 2022-06-30 DIAGNOSIS — Z91.199 NON-COMPLIANCE WITH TREATMENT: ICD-10-CM

## 2022-06-30 DIAGNOSIS — E66.9 OBESITY, CLASS I, BMI 30-34.9: Chronic | ICD-10-CM

## 2022-06-30 DIAGNOSIS — Z79.02 LONG TERM (CURRENT) USE OF ANTITHROMBOTICS/ANTIPLATELETS: Chronic | ICD-10-CM

## 2022-06-30 DIAGNOSIS — M51.36 DEGENERATIVE DISC DISEASE, LUMBAR: ICD-10-CM

## 2022-06-30 DIAGNOSIS — E78.00 PURE HYPERCHOLESTEROLEMIA: Chronic | ICD-10-CM

## 2022-06-30 DIAGNOSIS — I25.10 CORONARY ARTERY DISEASE INVOLVING NATIVE CORONARY ARTERY OF NATIVE HEART WITHOUT ANGINA PECTORIS: Primary | Chronic | ICD-10-CM

## 2022-06-30 DIAGNOSIS — I10 ESSENTIAL HYPERTENSION: ICD-10-CM

## 2022-06-30 DIAGNOSIS — I65.22 CAROTID ARTERY PLAQUE, LEFT: ICD-10-CM

## 2022-06-30 PROCEDURE — 3079F PR MOST RECENT DIASTOLIC BLOOD PRESSURE 80-89 MM HG: ICD-10-PCS | Mod: CPTII,S$GLB,, | Performed by: INTERNAL MEDICINE

## 2022-06-30 PROCEDURE — 99214 PR OFFICE/OUTPT VISIT, EST, LEVL IV, 30-39 MIN: ICD-10-PCS | Mod: S$GLB,,, | Performed by: INTERNAL MEDICINE

## 2022-06-30 PROCEDURE — 1126F PR PAIN SEVERITY QUANTIFIED, NO PAIN PRESENT: ICD-10-PCS | Mod: CPTII,S$GLB,, | Performed by: INTERNAL MEDICINE

## 2022-06-30 PROCEDURE — 3008F PR BODY MASS INDEX (BMI) DOCUMENTED: ICD-10-PCS | Mod: CPTII,S$GLB,, | Performed by: INTERNAL MEDICINE

## 2022-06-30 PROCEDURE — 3075F PR MOST RECENT SYSTOLIC BLOOD PRESS GE 130-139MM HG: ICD-10-PCS | Mod: CPTII,S$GLB,, | Performed by: INTERNAL MEDICINE

## 2022-06-30 PROCEDURE — 3008F BODY MASS INDEX DOCD: CPT | Mod: CPTII,S$GLB,, | Performed by: INTERNAL MEDICINE

## 2022-06-30 PROCEDURE — 3075F SYST BP GE 130 - 139MM HG: CPT | Mod: CPTII,S$GLB,, | Performed by: INTERNAL MEDICINE

## 2022-06-30 PROCEDURE — 99214 OFFICE O/P EST MOD 30 MIN: CPT | Mod: S$GLB,,, | Performed by: INTERNAL MEDICINE

## 2022-06-30 PROCEDURE — 1126F AMNT PAIN NOTED NONE PRSNT: CPT | Mod: CPTII,S$GLB,, | Performed by: INTERNAL MEDICINE

## 2022-06-30 PROCEDURE — 1159F MED LIST DOCD IN RCRD: CPT | Mod: CPTII,S$GLB,, | Performed by: INTERNAL MEDICINE

## 2022-06-30 PROCEDURE — 3079F DIAST BP 80-89 MM HG: CPT | Mod: CPTII,S$GLB,, | Performed by: INTERNAL MEDICINE

## 2022-06-30 PROCEDURE — 99999 PR PBB SHADOW E&M-EST. PATIENT-LVL III: CPT | Mod: PBBFAC,,, | Performed by: INTERNAL MEDICINE

## 2022-06-30 PROCEDURE — 4010F ACE/ARB THERAPY RXD/TAKEN: CPT | Mod: CPTII,S$GLB,, | Performed by: INTERNAL MEDICINE

## 2022-06-30 PROCEDURE — 1159F PR MEDICATION LIST DOCUMENTED IN MEDICAL RECORD: ICD-10-PCS | Mod: CPTII,S$GLB,, | Performed by: INTERNAL MEDICINE

## 2022-06-30 PROCEDURE — 4010F PR ACE/ARB THEARPY RXD/TAKEN: ICD-10-PCS | Mod: CPTII,S$GLB,, | Performed by: INTERNAL MEDICINE

## 2022-06-30 PROCEDURE — 99999 PR PBB SHADOW E&M-EST. PATIENT-LVL III: ICD-10-PCS | Mod: PBBFAC,,, | Performed by: INTERNAL MEDICINE

## 2022-06-30 RX ORDER — LOSARTAN POTASSIUM 100 MG/1
100 TABLET ORAL DAILY
Qty: 90 TABLET | Refills: 1 | Status: SHIPPED | OUTPATIENT
Start: 2022-06-30 | End: 2022-12-16 | Stop reason: SDUPTHER

## 2022-06-30 RX ORDER — EZETIMIBE 10 MG/1
10 TABLET ORAL DAILY
Qty: 90 TABLET | Refills: 1 | Status: SHIPPED | OUTPATIENT
Start: 2022-06-30 | End: 2023-08-08

## 2022-06-30 RX ORDER — AMLODIPINE BESYLATE 5 MG/1
5 TABLET ORAL DAILY
Qty: 90 TABLET | Refills: 1 | Status: SHIPPED | OUTPATIENT
Start: 2022-06-30 | End: 2022-12-16 | Stop reason: SDUPTHER

## 2022-06-30 RX ORDER — ATORVASTATIN CALCIUM 80 MG/1
80 TABLET, FILM COATED ORAL DAILY
Qty: 90 TABLET | Refills: 1 | Status: SHIPPED | OUTPATIENT
Start: 2022-06-30 | End: 2023-08-08

## 2022-06-30 NOTE — TELEPHONE ENCOUNTER
No new care gaps identified.  [facility] Embedded Care Gaps. Reference number: [id number]. 6/30/2022   1:51:53 PM CDT

## 2022-06-30 NOTE — TELEPHONE ENCOUNTER
----- Message from Noellejose Raymondnis sent at 6/30/2022 11:36 AM CDT -----  Type:  RX Refill Request    Who Called:  Pt     Refill or New Rx: Refills     RX Name and Strength:ALPRAZolam (XANAX) 1 MG tablet and the HYDROcodone-acetaminophen (NORCO)  mg per tablet    How is the patient currently taking it? Sig: TAKE 1 TABLET (1 MG TOTAL) BY MOUTH THREE TIMES DAILY AS NEEDED FOR ANXIETY.  Sent to pharmacy as: ALPRAZolam (XANAX) 1 MG tablet Sig - Route: Take 1 tablet by mouth 3 (three) times daily as needed for Pain. - Oral  Sent to pharmacy as: HYDROcodone-acetaminophen (NORCO)  mg per tablet            Is this a 30 day or 90 day RX:    Preferred Pharmacy with phone number:[facility] [address]    Local or Mail Order:local     Ordering Provider:MARITZA Bahena MD    Would the patient rather a call back or a response via [facility]? Call back     Best Call Back Number:[phone] (mobile)     Additional Information:

## 2022-06-30 NOTE — PROGRESS NOTES
Subjective:    Patient ID:  Fred Blackwell is a 66 y.o. male who presents for Coronary Artery Disease (6 month f/u ) and Results (Review labs )        HPI  DISCUSSED LABS AND GOALS  UP FROM 60, HDL 47, CMP OK HEMOGLOBIN 15.9, NOT TAKING MEDS, NO SE'S,WHANTED TO SEE HOW HE DOES,  TROUBLE WITH CHILDREN, SEE ROS  Past Medical History:   Diagnosis Date    Anxiety     Bell's palsy     Current moderate episode of major depressive disorder without prior episode 4/1/2019    DDD (degenerative disc disease), lumbar     Diabetes mellitus     Fibromyalgia     GERD (gastroesophageal reflux disease)     Hyperlipidemia     Hypertension     Sciatica      Past Surgical History:   Procedure Laterality Date    COLONOSCOPY N/A 12/11/2019    Procedure: COLONOSCOPY;  Surgeon: Mich Mota MD;  Location: [facility] ENDO;  Service: Endoscopy;  Laterality: N/A;    CORONARY ANGIOGRAPHY N/A 6/2/2020    Procedure: ANGIOGRAM, CORONARY ARTERY;  Surgeon: Romario Flaherty MD;  Location: [facility] CATH;  Service: Cardiology;  Laterality: N/A;    LEFT HEART CATHETERIZATION Right 6/2/2020    Procedure: Left heart cath;  Surgeon: Romario Flaherty MD;  Location: [facility] CATH;  Service: Cardiology;  Laterality: Right;    SHOULDER SURGERY       Family History   Problem Relation Age of Onset    Arthritis Mother      Social History     Socioeconomic History    Marital status:    Tobacco Use    Smoking status: Former Smoker     Packs/day: 2.00     Types: Cigarettes    Smokeless tobacco: Never Used    Tobacco comment: 2 packs/day   Substance and Sexual Activity    Alcohol use: No    Drug use: No       Review of patient's allergies indicates:   Allergen Reactions    Cymbalta [duloxetine] Nausea And Vomiting    Penicillins      Other reaction(s): Hives       Current Outpatient Medications:     albuterol (PROVENTIL) 2.5 mg /3 mL (0.083 %) nebulizer solution, Take 3 mLs (2.5 mg total) by nebulization every 6 (six) hours as  needed for Wheezing. Rescue, Disp: 90 mL, Rfl: 0    ALPRAZolam (XANAX) 1 MG tablet, TAKE 1 TABLET (1 MG TOTAL) BY MOUTH THREE TIMES DAILY AS NEEDED FOR ANXIETY., Disp: 90 tablet, Rfl: 2    aspirin (ECOTRIN) 81 MG EC tablet, Take 1 tablet (81 mg total) by mouth once daily., Disp: , Rfl: 0    blood sugar diagnostic Strp, To check BG 2 (two) times daily, to use with insurance preferred meter, Disp: 200 strip, Rfl: 3    blood-glucose meter kit, To check BG 2 (two) times daily, to use with insurance preferred meter, Disp: 1 each, Rfl: 0    BRILINTA 90 mg tablet, TAKE 1 TABLET (90 MG TOTAL) BY MOUTH TWO TIMES A DAY., Disp: 60 tablet, Rfl: 1    citalopram (CELEXA) 10 MG tablet, Take 1 tablet (10 mg total) by mouth once daily., Disp: 90 tablet, Rfl: 2    fluticasone propionate (FLONASE) 50 mcg/actuation nasal spray, USE 1 SPRAY IN EACH NOSTRIL ONCE DAILY., Disp: 16 g, Rfl: 2    HYDROcodone-acetaminophen (NORCO)  mg per tablet, Take 1 tablet by mouth 3 (three) times daily as needed for Pain., Disp: 80 tablet, Rfl: 0    lancets Misc, To check BG 2 (two) times daily, to use with insurance preferred meter, Disp: 200 each, Rfl: 3    metFORMIN (GLUCOPHAGE-XR) 500 MG ER 24hr tablet, Take 1 tablet (500 mg total) by mouth daily with breakfast., Disp: 90 tablet, Rfl: 3    omeprazole (PRILOSEC) 20 MG capsule, Take 1 capsule (20 mg total) by mouth once daily., Disp: 90 capsule, Rfl: 3    amLODIPine (NORVASC) 5 MG tablet, Take 1 tablet (5 mg total) by mouth once daily., Disp: 90 tablet, Rfl: 1    atorvastatin (LIPITOR) 80 MG tablet, Take 1 tablet (80 mg total) by mouth once daily., Disp: 90 tablet, Rfl: 1    ezetimibe (ZETIA) 10 mg tablet, Take 1 tablet (10 mg total) by mouth once daily., Disp: 90 tablet, Rfl: 1    losartan (COZAAR) 100 MG tablet, Take 1 tablet (100 mg total) by mouth once daily., Disp: 90 tablet, Rfl: 1    Review of Systems   Constitutional: Negative for chills, diaphoresis, fever, malaise/fatigue,  night sweats and weight gain.   HENT: Negative for congestion and sore throat.         BAD TEETH   Eyes: Negative for blurred vision and visual disturbance.   Cardiovascular: Negative for chest pain, claudication, cyanosis, dyspnea on exertion, irregular heartbeat, leg swelling, near-syncope, orthopnea, palpitations, paroxysmal nocturnal dyspnea and syncope.   Respiratory: Negative for cough, hemoptysis and shortness of breath.    Endocrine: Negative.    Hematologic/Lymphatic: Negative for adenopathy. Does not bruise/bleed easily.   Skin: Negative for color change and rash.   Musculoskeletal: Positive for arthritis. Negative for back pain and falls.   Gastrointestinal: Negative for abdominal pain, change in bowel habit, dysphagia, melena and nausea.   Genitourinary: Negative for dysuria and flank pain.   Neurological: Negative for brief paralysis, dizziness, focal weakness, light-headedness, loss of balance and weakness.   Psychiatric/Behavioral: Negative for altered mental status, depression and memory loss.   Allergic/Immunologic: Negative.         Objective:      Vitals:    06/30/22 1227 06/30/22 1242   BP: (!) 158/103 138/89   Pulse: 85    Weight: 112.9 kg (249 lb)    Height: 6' (1.829 m)    PainSc: 0-No pain      Body mass index is 33.77 kg/m².    Physical Exam  Constitutional:       Appearance: He is obese.   HENT:      Head: Normocephalic and atraumatic.   Eyes:      General: No scleral icterus.     Extraocular Movements: Extraocular movements intact.   Neck:      Vascular: No carotid bruit.   Cardiovascular:      Rate and Rhythm: Normal rate and regular rhythm.  No extrasystoles are present.     Pulses: Normal pulses.           Carotid pulses are 2+ on the right side and 2+ on the left side.       Radial pulses are 2+ on the right side and 2+ on the left side.      Heart sounds: Murmur heard.     No friction rub. No gallop.   Abdominal:      Palpations: Abdomen is soft.      Tenderness: There is no abdominal  tenderness.   Musculoskeletal:      Cervical back: Neck supple.      Right lower leg: No edema.      Left lower leg: No edema.   Skin:     General: Skin is warm and dry.      Capillary Refill: Capillary refill takes less than 2 seconds.   Neurological:      General: No focal deficit present.      Mental Status: He is alert and oriented to person, place, and time.      Cranial Nerves: No cranial nerve deficit.   Psychiatric:         Mood and Affect: Mood normal.         Behavior: Behavior normal.                 ..    Chemistry        Component Value Date/Time     06/23/2022 0702    K 4.6 06/23/2022 0702     06/23/2022 0702    CO2 24 06/23/2022 0702    BUN 13 06/23/2022 0702    CREATININE 1.0 06/23/2022 0702     (H) 06/23/2022 0702        Component Value Date/Time    CALCIUM 9.9 06/23/2022 0702    ALKPHOS 53 (L) 06/23/2022 0702    AST 16 06/23/2022 0702    ALT 15 06/23/2022 0702    BILITOT 1.0 06/23/2022 0702    ESTGFRAFRICA >60.0 06/23/2022 0702    EGFRNONAA >60.0 06/23/2022 0702            ..  Lab Results   Component Value Date    CHOL 220 (H) 06/23/2022    CHOL 118 (L) 07/09/2021    CHOL 152 03/12/2021     Lab Results   Component Value Date    HDL 47 06/23/2022    HDL 44 07/09/2021    HDL 46 03/12/2021     Lab Results   Component Value Date    LDLCALC 155.6 06/23/2022    LDLCALC 60.0 (L) 07/09/2021    LDLCALC 91.2 03/12/2021     Lab Results   Component Value Date    TRIG 87 06/23/2022    TRIG 70 07/09/2021    TRIG 74 03/12/2021     Lab Results   Component Value Date    CHOLHDL 21.4 06/23/2022    CHOLHDL 37.3 07/09/2021    CHOLHDL 30.3 03/12/2021     ..  Lab Results   Component Value Date    WBC 9.63 11/08/2021    HGB 15.9 06/23/2022    HCT 47.0 11/08/2021    MCV 86 11/08/2021     11/08/2021       Test(s) Reviewed  I have reviewed the following in detail:  [] Stress test   [] Angiography   [] Echocardiogram   [x] Labs   [] Other:       Assessment:         ICD-10-CM ICD-9-CM   1. Coronary  artery disease involving native coronary artery of native heart without angina pectoris  I25.10 414.01   2. Non-compliance with treatment  Z91.19 V15.81   3. Carotid artery plaque, left  I65.22 433.10   4. Pure hypercholesterolemia  E78.00 272.0   5. Long term (current) use of antithrombotics/antiplatelets  Z79.02 V58.63   6. Essential hypertension  I10 401.9   7. Obesity, Class I, BMI 30-34.9  E66.9 278.00     Problem List Items Addressed This Visit        Cardiac/Vascular    Essential hypertension    Overview     RF uniretic.             Pure hypercholesterolemia    Relevant Medications    ezetimibe (ZETIA) 10 mg tablet    Other Relevant Orders    Comprehensive Metabolic Panel    Lipid Panel    Coronary artery disease involving native coronary artery of native heart without angina pectoris - Primary    Relevant Orders    Comprehensive Metabolic Panel    Hemoglobin    Carotid artery plaque, left       Hematology    Long term (current) use of antithrombotics/antiplatelets       Endocrine    Obesity, Class I, BMI 30-34.9       Palliative Care    Non-compliance with treatment           Plan:     RE-START MEDS VERY LONG DISCUSSION REGARDING RISK AND IMPORTANCE OF TREATMENT OF RISK FACTORS INCLUDING HYPERTENSION DYSLIPIDEMIA ETC.  COMPLIANCE WITH MEDICATION PATIENT, UNDERSTANDS.ALL CV CLINICALLY RELATIVELY STABLE, NO ANGINA, NO HF, NO TIA, NO CLINICAL ARRHYTHMIA,CONTINUE CURRENT MEDS, EDUCATION, DIET, EXERCISE, WEIGHT LOSS RETURN TO CLINIC IN 6 MONTHS WITH LABS, MONITOR BLOOD PRESSURE AT HOME      Coronary artery disease involving native coronary artery of native heart without angina pectoris  -     Comprehensive Metabolic Panel; Future; Expected date: 12/30/2022  -     Hemoglobin; Future; Expected date: 12/30/2022    Non-compliance with treatment  Comments:  EDUCATION AND VERY LONG DISCUSSION REGARDING RISKS    Carotid artery plaque, left  Comments:  NO TIA    Pure hypercholesterolemia  Comments:  RESTART  MEDS  Orders:  -     ezetimibe (ZETIA) 10 mg tablet; Take 1 tablet (10 mg total) by mouth once daily.  Dispense: 90 tablet; Refill: 1  -     Comprehensive Metabolic Panel; Future; Expected date: 12/30/2022  -     Lipid Panel; Future; Expected date: 12/30/2022    Long term (current) use of antithrombotics/antiplatelets    Essential hypertension    Obesity, Class I, BMI 30-34.9    Other orders  -     atorvastatin (LIPITOR) 80 MG tablet; Take 1 tablet (80 mg total) by mouth once daily.  Dispense: 90 tablet; Refill: 1  -     amLODIPine (NORVASC) 5 MG tablet; Take 1 tablet (5 mg total) by mouth once daily.  Dispense: 90 tablet; Refill: 1  -     losartan (COZAAR) 100 MG tablet; Take 1 tablet (100 mg total) by mouth once daily.  Dispense: 90 tablet; Refill: 1    RTC Low level/low impact aerobic exercise 5x's/wk. Heart healthy diet and risk factor modification.    See labs and med orders.    Aerobic exercise 5x's/wk. Heart healthy diet and risk factor modification.    See labs and med orders.

## 2022-07-01 RX ORDER — HYDROCODONE BITARTRATE AND ACETAMINOPHEN 10; 325 MG/1; MG/1
1 TABLET ORAL 3 TIMES DAILY PRN
Qty: 80 TABLET | Refills: 0 | Status: SHIPPED | OUTPATIENT
Start: 2022-07-01 | End: 2022-07-08 | Stop reason: SDUPTHER

## 2022-07-01 RX ORDER — HYDROCODONE BITARTRATE AND ACETAMINOPHEN 10; 325 MG/1; MG/1
1 TABLET ORAL 3 TIMES DAILY PRN
Qty: 80 TABLET | Refills: 0 | Status: CANCELLED | OUTPATIENT
Start: 2022-07-01

## 2022-07-01 NOTE — TELEPHONE ENCOUNTER
----- Message from Anderson Franklin sent at 7/1/2022  9:50 AM CDT -----  Regarding: Refill Request  Please refill the medication(s) listed below :    Patient is upset, asking for a call back from office to let him know if taken care of. Please contact         Medication # 1 : HYDROcodone-acetaminophen (NORCO)  mg per tablet           Please call the patient when the prescription is sent to pharmacy : [phone]         Can the clinic reply in [facility] : Yes          Preferred Pharmacy :[facility] - [place] [address]

## 2022-07-01 NOTE — TELEPHONE ENCOUNTER
"Spoke with this patient.  Hydrocodone previously filled did not stated "greater than 7 days medically necessary".  His pharmacy was able to give him a 7 day supply only today, to get him through to the appointment on 7/8/22 with Dr. Bahena.    Per this patient statement, "some nurse dropped the ball and this isn't the first time. I keep having to go through this".    Re-assured patient that this would be discussed with entire team to prevent/decrease this from happening.  "

## 2022-07-01 NOTE — TELEPHONE ENCOUNTER
"----- Message from Radha Morales sent at 7/1/2022  2:08 PM CDT -----  "Type:  Patient Call Back    Who Called:[id number]    What is the reqeust in detail:Requesting call back in regards to pt medication HYDROcodone-acetaminophen (NORCO)  mg per tablet. Doctor need to state greater than seven days. Please call pharmacy. Please advise    Can the clinic reply by [facility]?no    Best Call Back Number:[phone]      Additional Information:Pt is waiting at Pharmacy            "

## 2022-07-01 NOTE — TELEPHONE ENCOUNTER
"----- Message from Michela Power sent at 7/1/2022  1:55 PM CDT -----  Contact: Pt  Type:  Needs New Rx With Wording as Below    Who Called:  Pt    Refill or New Rx:  Refill    RX Name and Strength:  HYDROcodone-acetaminophen (NORCO)  mg per tablet    Preferred Pharmacy with phone number:    [facility] - [address] [address]  Phone: [phone] Fax: [phone]    Best Call Back Number: [phone] (pharmacy)    Additional Information: Pharmacist says she needs a new Rx & it needs to say "Greater than 7 day supply is medically necessary".  Please call pt b    "

## 2022-07-01 NOTE — TELEPHONE ENCOUNTER
"----- Message from Carmela Gee sent at 7/1/2022  1:23 PM CDT -----  Contact: Radames at [phone]  Type: Needs Medical Advice  Who Called:  Radames at [facility]    Best Call Back Number: [phone]  Additional Information: Radames is calling the office stating she is unable to filled the prescription without the "greater than 7 day supply is medically necc" . She states a new prescription would have to be sent. Please call back and advise        "

## 2022-07-01 NOTE — TELEPHONE ENCOUNTER
----- Message from Donte Obregon sent at 7/1/2022  2:04 PM CDT -----  Type: Patient Call Back         Who called:Pt          What is the request in detail: Pt called in regarding wanting to speak with the nurse supervisor in Dr Bahena's office         Can the clinic reply by [facility]?no          Would the patient rather a call back or a response via [facility]?call back          Best call back number:[phone] (mobile)          Additional Information:           Thank You

## 2022-07-05 RX ORDER — HYDROCODONE BITARTRATE AND ACETAMINOPHEN 10; 325 MG/1; MG/1
1 TABLET ORAL 3 TIMES DAILY PRN
Qty: 80 TABLET | Refills: 0 | OUTPATIENT
Start: 2022-07-05

## 2022-07-08 ENCOUNTER — OFFICE VISIT (OUTPATIENT)
Dept: FAMILY MEDICINE | Facility: CLINIC | Age: 67
End: 2022-07-08
Payer: MEDICARE

## 2022-07-08 ENCOUNTER — LAB VISIT (OUTPATIENT)
Dept: LAB | Facility: HOSPITAL | Age: 67
End: 2022-07-08
Attending: FAMILY MEDICINE
Payer: MEDICARE

## 2022-07-08 VITALS
WEIGHT: 246.69 LBS | HEART RATE: 71 BPM | SYSTOLIC BLOOD PRESSURE: 128 MMHG | BODY MASS INDEX: 33.46 KG/M2 | OXYGEN SATURATION: 97 % | DIASTOLIC BLOOD PRESSURE: 82 MMHG

## 2022-07-08 DIAGNOSIS — M51.36 DEGENERATIVE DISC DISEASE, LUMBAR: ICD-10-CM

## 2022-07-08 DIAGNOSIS — F41.0 PANIC ATTACK: ICD-10-CM

## 2022-07-08 DIAGNOSIS — Z12.5 SCREENING PSA (PROSTATE SPECIFIC ANTIGEN): ICD-10-CM

## 2022-07-08 DIAGNOSIS — I10 ESSENTIAL HYPERTENSION: ICD-10-CM

## 2022-07-08 DIAGNOSIS — E78.00 PURE HYPERCHOLESTEROLEMIA: ICD-10-CM

## 2022-07-08 DIAGNOSIS — E11.69 TYPE 2 DIABETES MELLITUS WITH OTHER SPECIFIED COMPLICATION, WITHOUT LONG-TERM CURRENT USE OF INSULIN: ICD-10-CM

## 2022-07-08 DIAGNOSIS — Z00.00 WELLNESS EXAMINATION: Primary | ICD-10-CM

## 2022-07-08 LAB
COMPLEXED PSA SERPL-MCNC: 0.35 NG/ML (ref 0–4)
ESTIMATED AVG GLUCOSE: 148 MG/DL (ref 68–131)
HBA1C MFR BLD: 6.8 % (ref 4–5.6)

## 2022-07-08 PROCEDURE — 83036 HEMOGLOBIN GLYCOSYLATED A1C: CPT | Performed by: FAMILY MEDICINE

## 2022-07-08 PROCEDURE — 3051F HG A1C>EQUAL 7.0%<8.0%: CPT | Mod: CPTII,S$GLB,, | Performed by: FAMILY MEDICINE

## 2022-07-08 PROCEDURE — 3288F FALL RISK ASSESSMENT DOCD: CPT | Mod: CPTII,S$GLB,, | Performed by: FAMILY MEDICINE

## 2022-07-08 PROCEDURE — 4010F ACE/ARB THERAPY RXD/TAKEN: CPT | Mod: CPTII,S$GLB,, | Performed by: FAMILY MEDICINE

## 2022-07-08 PROCEDURE — 3008F PR BODY MASS INDEX (BMI) DOCUMENTED: ICD-10-PCS | Mod: CPTII,S$GLB,, | Performed by: FAMILY MEDICINE

## 2022-07-08 PROCEDURE — 99999 PR PBB SHADOW E&M-EST. PATIENT-LVL III: ICD-10-PCS | Mod: PBBFAC,,, | Performed by: FAMILY MEDICINE

## 2022-07-08 PROCEDURE — 1159F PR MEDICATION LIST DOCUMENTED IN MEDICAL RECORD: ICD-10-PCS | Mod: CPTII,S$GLB,, | Performed by: FAMILY MEDICINE

## 2022-07-08 PROCEDURE — 3074F SYST BP LT 130 MM HG: CPT | Mod: CPTII,S$GLB,, | Performed by: FAMILY MEDICINE

## 2022-07-08 PROCEDURE — 1125F AMNT PAIN NOTED PAIN PRSNT: CPT | Mod: CPTII,S$GLB,, | Performed by: FAMILY MEDICINE

## 2022-07-08 PROCEDURE — 3079F DIAST BP 80-89 MM HG: CPT | Mod: CPTII,S$GLB,, | Performed by: FAMILY MEDICINE

## 2022-07-08 PROCEDURE — 4010F PR ACE/ARB THEARPY RXD/TAKEN: ICD-10-PCS | Mod: CPTII,S$GLB,, | Performed by: FAMILY MEDICINE

## 2022-07-08 PROCEDURE — 3051F PR MOST RECENT HEMOGLOBIN A1C LEVEL 7.0 - < 8.0%: ICD-10-PCS | Mod: CPTII,S$GLB,, | Performed by: FAMILY MEDICINE

## 2022-07-08 PROCEDURE — 99213 PR OFFICE/OUTPT VISIT, EST, LEVL III, 20-29 MIN: ICD-10-PCS | Mod: S$GLB,,, | Performed by: FAMILY MEDICINE

## 2022-07-08 PROCEDURE — 99999 PR PBB SHADOW E&M-EST. PATIENT-LVL III: CPT | Mod: PBBFAC,,, | Performed by: FAMILY MEDICINE

## 2022-07-08 PROCEDURE — 1125F PR PAIN SEVERITY QUANTIFIED, PAIN PRESENT: ICD-10-PCS | Mod: CPTII,S$GLB,, | Performed by: FAMILY MEDICINE

## 2022-07-08 PROCEDURE — 1101F PT FALLS ASSESS-DOCD LE1/YR: CPT | Mod: CPTII,S$GLB,, | Performed by: FAMILY MEDICINE

## 2022-07-08 PROCEDURE — 3079F PR MOST RECENT DIASTOLIC BLOOD PRESSURE 80-89 MM HG: ICD-10-PCS | Mod: CPTII,S$GLB,, | Performed by: FAMILY MEDICINE

## 2022-07-08 PROCEDURE — 1101F PR PT FALLS ASSESS DOC 0-1 FALLS W/OUT INJ PAST YR: ICD-10-PCS | Mod: CPTII,S$GLB,, | Performed by: FAMILY MEDICINE

## 2022-07-08 PROCEDURE — 84153 ASSAY OF PSA TOTAL: CPT | Performed by: FAMILY MEDICINE

## 2022-07-08 PROCEDURE — 3008F BODY MASS INDEX DOCD: CPT | Mod: CPTII,S$GLB,, | Performed by: FAMILY MEDICINE

## 2022-07-08 PROCEDURE — 36415 COLL VENOUS BLD VENIPUNCTURE: CPT | Mod: PO | Performed by: FAMILY MEDICINE

## 2022-07-08 PROCEDURE — 99213 OFFICE O/P EST LOW 20 MIN: CPT | Mod: S$GLB,,, | Performed by: FAMILY MEDICINE

## 2022-07-08 PROCEDURE — 3288F PR FALLS RISK ASSESSMENT DOCUMENTED: ICD-10-PCS | Mod: CPTII,S$GLB,, | Performed by: FAMILY MEDICINE

## 2022-07-08 PROCEDURE — 1159F MED LIST DOCD IN RCRD: CPT | Mod: CPTII,S$GLB,, | Performed by: FAMILY MEDICINE

## 2022-07-08 PROCEDURE — 3074F PR MOST RECENT SYSTOLIC BLOOD PRESSURE < 130 MM HG: ICD-10-PCS | Mod: CPTII,S$GLB,, | Performed by: FAMILY MEDICINE

## 2022-07-08 RX ORDER — HYDROCODONE BITARTRATE AND ACETAMINOPHEN 10; 325 MG/1; MG/1
1 TABLET ORAL 3 TIMES DAILY PRN
Qty: 80 TABLET | Refills: 0 | Status: SHIPPED | OUTPATIENT
Start: 2022-07-08 | End: 2022-07-26 | Stop reason: SDUPTHER

## 2022-07-08 NOTE — PROGRESS NOTES
Subjective:       Patient ID: Fred Blackwell is a 66 y.o. male.    Chief Complaint: Medication Refill (Pain medication) and Follow-up    Here for wellness and f/u chronic issues. Had issue with pain med last fill. Had stopped most meds but taking again.    Hypertension  This is a chronic problem. The current episode started more than 1 year ago. The problem is controlled. Pertinent negatives include no chest pain, palpitations or shortness of breath.   Hyperlipidemia  This is a chronic problem. The current episode started more than 1 year ago. The problem is controlled. Pertinent negatives include no chest pain or shortness of breath.   Diabetes  He presents for his follow-up diabetic visit. He has type 2 diabetes mellitus. His disease course has been stable. Pertinent negatives for hypoglycemia include no nervousness/anxiousness. Pertinent negatives for diabetes include no chest pain.     Review of Systems   Constitutional: Negative for chills and fever.   Respiratory: Negative for cough, chest tightness and shortness of breath.    Cardiovascular: Negative for chest pain, palpitations and leg swelling.   Endocrine: Negative for cold intolerance and heat intolerance.   Psychiatric/Behavioral: Negative for decreased concentration. The patient is not nervous/anxious.        Objective:      Physical Exam  Vitals and nursing note reviewed.   Constitutional:       Appearance: He is well-developed.   HENT:      Head: Normocephalic and atraumatic.   Cardiovascular:      Rate and Rhythm: Normal rate and regular rhythm.      Heart sounds: Normal heart sounds.   Pulmonary:      Effort: Pulmonary effort is normal.      Breath sounds: Normal breath sounds.         Assessment:       1. Wellness examination    2. Pure hypercholesterolemia    3. Essential hypertension    4. Type 2 diabetes mellitus with other specified complication, without long-term current use of insulin    5. Panic attack    6. Degenerative disc  disease, lumbar    7. Screening PSA (prostate specific antigen)        Plan:       Wellness examination    Pure hypercholesterolemia    Essential hypertension    Type 2 diabetes mellitus with other specified complication, without long-term current use of insulin  -     Hemoglobin A1C; Future; Expected date: 07/08/2022    Panic attack    Degenerative disc disease, lumbar  -     HYDROcodone-acetaminophen (NORCO)  mg per tablet; Take 1 tablet by mouth 3 (three) times daily as needed for Pain (medically necessary for greater than 7 days).  Dispense: 80 tablet; Refill: 0    Screening PSA (prostate specific antigen)  -     PSA, Screening; Future; Expected date: 07/08/2022      htn stable  Diabetes ? Needs updated hga1c due to not taking meds last month but taking correctly now  Mood stable  Prn pain med helps still and not surgical candidate ; goal to wean over time as we can  Will monitor chronic medical issues and continue current plan of care.   reviewed  Follow up in about 3 months (around 10/8/2022), or if symptoms worsen or fail to improve.

## 2022-07-26 ENCOUNTER — PATIENT OUTREACH (OUTPATIENT)
Dept: ADMINISTRATIVE | Facility: HOSPITAL | Age: 67
End: 2022-07-26
Payer: MEDICARE

## 2022-07-26 NOTE — PROGRESS NOTES
Gap report chart review completed for overdue diabetic eye exam  Care everywhere and Media reports reviewed.     Scheduled eye exam/eye camera

## 2022-08-02 DIAGNOSIS — K21.9 GASTROESOPHAGEAL REFLUX DISEASE, UNSPECIFIED WHETHER ESOPHAGITIS PRESENT: ICD-10-CM

## 2022-08-02 RX ORDER — OMEPRAZOLE 20 MG/1
20 CAPSULE, DELAYED RELEASE ORAL DAILY
Qty: 90 CAPSULE | Refills: 3 | Status: SHIPPED | OUTPATIENT
Start: 2022-08-02 | End: 2022-11-07 | Stop reason: SDUPTHER

## 2022-08-02 NOTE — TELEPHONE ENCOUNTER
Refill Decision Note   Fred Blackwell  is requesting a refill authorization.  Brief Assessment and Rationale for Refill:  Approve     Medication Therapy Plan:       Medication Reconciliation Completed: No   Comments:     No Care Gaps recommended.     Note composed:6:25 PM 08/02/2022

## 2022-08-02 NOTE — TELEPHONE ENCOUNTER
No new care gaps identified.  [facility] Embedded Care Gaps. Reference number: [id number]. 8/02/2022   9:13:40 AM CDT

## 2022-08-15 ENCOUNTER — PATIENT OUTREACH (OUTPATIENT)
Dept: ADMINISTRATIVE | Facility: HOSPITAL | Age: 67
End: 2022-08-15
Payer: MEDICARE

## 2022-08-22 ENCOUNTER — TELEPHONE (OUTPATIENT)
Dept: FAMILY MEDICINE | Facility: CLINIC | Age: 67
End: 2022-08-22
Payer: MEDICARE

## 2022-08-22 ENCOUNTER — PATIENT MESSAGE (OUTPATIENT)
Dept: FAMILY MEDICINE | Facility: CLINIC | Age: 67
End: 2022-08-22
Payer: MEDICARE

## 2022-08-24 ENCOUNTER — PATIENT MESSAGE (OUTPATIENT)
Dept: ADMINISTRATIVE | Facility: HOSPITAL | Age: 67
End: 2022-08-24
Payer: MEDICARE

## 2022-09-08 ENCOUNTER — PATIENT OUTREACH (OUTPATIENT)
Dept: ADMINISTRATIVE | Facility: HOSPITAL | Age: 67
End: 2022-09-08
Payer: MEDICARE

## 2022-09-08 ENCOUNTER — PATIENT MESSAGE (OUTPATIENT)
Dept: ADMINISTRATIVE | Facility: HOSPITAL | Age: 67
End: 2022-09-08
Payer: MEDICARE

## 2022-09-08 NOTE — PROGRESS NOTES
Gap report chart review completed for overdue diabetic eye exam  Care everywhere and Media reports reviewed.       Patient Outreach performed.

## 2022-09-21 DIAGNOSIS — E11.9 TYPE 2 DIABETES MELLITUS WITHOUT COMPLICATION: ICD-10-CM

## 2022-09-27 ENCOUNTER — PATIENT MESSAGE (OUTPATIENT)
Dept: ADMINISTRATIVE | Facility: HOSPITAL | Age: 67
End: 2022-09-27
Payer: MEDICARE

## 2022-10-17 ENCOUNTER — PATIENT MESSAGE (OUTPATIENT)
Dept: ADMINISTRATIVE | Facility: HOSPITAL | Age: 67
End: 2022-10-17
Payer: MEDICARE

## 2022-10-31 DIAGNOSIS — R09.82 POST-NASAL DRIP: ICD-10-CM

## 2022-10-31 DIAGNOSIS — R09.89 SINUS COMPLAINT: ICD-10-CM

## 2022-10-31 DIAGNOSIS — J01.80 OTHER ACUTE SINUSITIS, RECURRENCE NOT SPECIFIED: ICD-10-CM

## 2022-10-31 NOTE — TELEPHONE ENCOUNTER
No new care gaps identified.  [facility] Embedded Care Gaps. Reference number: [id number]. 10/31/2022   2:50:16 PM CDT

## 2022-11-01 RX ORDER — FLUTICASONE PROPIONATE 50 MCG
SPRAY, SUSPENSION (ML) NASAL
Qty: 48 G | Refills: 2 | Status: SHIPPED | OUTPATIENT
Start: 2022-11-01

## 2022-11-01 NOTE — TELEPHONE ENCOUNTER
Refill Decision Note   Fred Blackwell  is requesting a refill authorization.  Brief Assessment and Rationale for Refill:  Approve     Medication Therapy Plan:       Medication Reconciliation Completed: No   Comments:     No Care Gaps recommended.     Note composed:2:11 PM 11/01/2022

## 2022-11-07 ENCOUNTER — OFFICE VISIT (OUTPATIENT)
Dept: FAMILY MEDICINE | Facility: CLINIC | Age: 67
End: 2022-11-07
Payer: MEDICARE

## 2022-11-07 ENCOUNTER — PATIENT MESSAGE (OUTPATIENT)
Dept: ADMINISTRATIVE | Facility: OTHER | Age: 67
End: 2022-11-07
Payer: MEDICARE

## 2022-11-07 VITALS
WEIGHT: 237.44 LBS | HEART RATE: 52 BPM | DIASTOLIC BLOOD PRESSURE: 70 MMHG | BODY MASS INDEX: 32.16 KG/M2 | OXYGEN SATURATION: 97 % | HEIGHT: 72 IN | SYSTOLIC BLOOD PRESSURE: 122 MMHG

## 2022-11-07 DIAGNOSIS — E11.69 TYPE 2 DIABETES MELLITUS WITH OTHER SPECIFIED COMPLICATION, WITHOUT LONG-TERM CURRENT USE OF INSULIN: Primary | ICD-10-CM

## 2022-11-07 DIAGNOSIS — F41.1 GAD (GENERALIZED ANXIETY DISORDER): ICD-10-CM

## 2022-11-07 DIAGNOSIS — K21.9 GASTROESOPHAGEAL REFLUX DISEASE, UNSPECIFIED WHETHER ESOPHAGITIS PRESENT: ICD-10-CM

## 2022-11-07 DIAGNOSIS — M51.36 DEGENERATIVE DISC DISEASE, LUMBAR: ICD-10-CM

## 2022-11-07 DIAGNOSIS — I10 ESSENTIAL HYPERTENSION: ICD-10-CM

## 2022-11-07 DIAGNOSIS — E78.00 PURE HYPERCHOLESTEROLEMIA: ICD-10-CM

## 2022-11-07 PROCEDURE — 3074F PR MOST RECENT SYSTOLIC BLOOD PRESSURE < 130 MM HG: ICD-10-PCS | Mod: CPTII,S$GLB,, | Performed by: FAMILY MEDICINE

## 2022-11-07 PROCEDURE — 3044F HG A1C LEVEL LT 7.0%: CPT | Mod: CPTII,S$GLB,, | Performed by: FAMILY MEDICINE

## 2022-11-07 PROCEDURE — 1159F PR MEDICATION LIST DOCUMENTED IN MEDICAL RECORD: ICD-10-PCS | Mod: CPTII,S$GLB,, | Performed by: FAMILY MEDICINE

## 2022-11-07 PROCEDURE — 3288F FALL RISK ASSESSMENT DOCD: CPT | Mod: CPTII,S$GLB,, | Performed by: FAMILY MEDICINE

## 2022-11-07 PROCEDURE — 1125F AMNT PAIN NOTED PAIN PRSNT: CPT | Mod: CPTII,S$GLB,, | Performed by: FAMILY MEDICINE

## 2022-11-07 PROCEDURE — 3078F PR MOST RECENT DIASTOLIC BLOOD PRESSURE < 80 MM HG: ICD-10-PCS | Mod: CPTII,S$GLB,, | Performed by: FAMILY MEDICINE

## 2022-11-07 PROCEDURE — 99999 PR PBB SHADOW E&M-EST. PATIENT-LVL III: CPT | Mod: PBBFAC,,, | Performed by: FAMILY MEDICINE

## 2022-11-07 PROCEDURE — 4010F ACE/ARB THERAPY RXD/TAKEN: CPT | Mod: CPTII,S$GLB,, | Performed by: FAMILY MEDICINE

## 2022-11-07 PROCEDURE — 3288F PR FALLS RISK ASSESSMENT DOCUMENTED: ICD-10-PCS | Mod: CPTII,S$GLB,, | Performed by: FAMILY MEDICINE

## 2022-11-07 PROCEDURE — 99214 PR OFFICE/OUTPT VISIT, EST, LEVL IV, 30-39 MIN: ICD-10-PCS | Mod: S$GLB,,, | Performed by: FAMILY MEDICINE

## 2022-11-07 PROCEDURE — 1101F PT FALLS ASSESS-DOCD LE1/YR: CPT | Mod: CPTII,S$GLB,, | Performed by: FAMILY MEDICINE

## 2022-11-07 PROCEDURE — 3074F SYST BP LT 130 MM HG: CPT | Mod: CPTII,S$GLB,, | Performed by: FAMILY MEDICINE

## 2022-11-07 PROCEDURE — 3078F DIAST BP <80 MM HG: CPT | Mod: CPTII,S$GLB,, | Performed by: FAMILY MEDICINE

## 2022-11-07 PROCEDURE — 99999 PR PBB SHADOW E&M-EST. PATIENT-LVL III: ICD-10-PCS | Mod: PBBFAC,,, | Performed by: FAMILY MEDICINE

## 2022-11-07 PROCEDURE — 1125F PR PAIN SEVERITY QUANTIFIED, PAIN PRESENT: ICD-10-PCS | Mod: CPTII,S$GLB,, | Performed by: FAMILY MEDICINE

## 2022-11-07 PROCEDURE — 99499 UNLISTED E&M SERVICE: CPT | Mod: HCNC,S$GLB,, | Performed by: FAMILY MEDICINE

## 2022-11-07 PROCEDURE — 99214 OFFICE O/P EST MOD 30 MIN: CPT | Mod: S$GLB,,, | Performed by: FAMILY MEDICINE

## 2022-11-07 PROCEDURE — 4010F PR ACE/ARB THEARPY RXD/TAKEN: ICD-10-PCS | Mod: CPTII,S$GLB,, | Performed by: FAMILY MEDICINE

## 2022-11-07 PROCEDURE — 1159F MED LIST DOCD IN RCRD: CPT | Mod: CPTII,S$GLB,, | Performed by: FAMILY MEDICINE

## 2022-11-07 PROCEDURE — 3008F BODY MASS INDEX DOCD: CPT | Mod: CPTII,S$GLB,, | Performed by: FAMILY MEDICINE

## 2022-11-07 PROCEDURE — 1101F PR PT FALLS ASSESS DOC 0-1 FALLS W/OUT INJ PAST YR: ICD-10-PCS | Mod: CPTII,S$GLB,, | Performed by: FAMILY MEDICINE

## 2022-11-07 PROCEDURE — 3044F PR MOST RECENT HEMOGLOBIN A1C LEVEL <7.0%: ICD-10-PCS | Mod: CPTII,S$GLB,, | Performed by: FAMILY MEDICINE

## 2022-11-07 PROCEDURE — 3008F PR BODY MASS INDEX (BMI) DOCUMENTED: ICD-10-PCS | Mod: CPTII,S$GLB,, | Performed by: FAMILY MEDICINE

## 2022-11-07 RX ORDER — OMEPRAZOLE 20 MG/1
20 CAPSULE, DELAYED RELEASE ORAL DAILY
Qty: 90 CAPSULE | Refills: 3 | Status: SHIPPED | OUTPATIENT
Start: 2022-11-07 | End: 2024-01-20

## 2022-11-07 RX ORDER — ALPRAZOLAM 1 MG/1
TABLET ORAL
Qty: 90 TABLET | Refills: 2 | Status: SHIPPED | OUTPATIENT
Start: 2022-11-07 | End: 2023-04-18 | Stop reason: SDUPTHER

## 2022-11-07 RX ORDER — HYDROCODONE BITARTRATE AND ACETAMINOPHEN 10; 325 MG/1; MG/1
1 TABLET ORAL 3 TIMES DAILY PRN
Qty: 80 TABLET | Refills: 0 | Status: SHIPPED | OUTPATIENT
Start: 2022-11-25 | End: 2022-12-20 | Stop reason: SDUPTHER

## 2022-11-07 NOTE — PROGRESS NOTES
Subjective:       Patient ID: Fred Blackwell is a 67 y.o. male.    Chief Complaint: Follow-up (Generalized pain /Medication refill)    Here for f/u multiple chronic medical issues. Doing well overall and in his normal state of health.      Follow-up  Pertinent negatives include no chest pain, chills, coughing or fever.   Review of Systems   Constitutional:  Negative for chills and fever.   Respiratory:  Negative for cough, chest tightness and shortness of breath.    Cardiovascular:  Negative for chest pain, palpitations and leg swelling.   Endocrine: Negative for cold intolerance and heat intolerance.   Psychiatric/Behavioral:  Negative for decreased concentration. The patient is not nervous/anxious.      Objective:      Physical Exam  Vitals and nursing note reviewed.   Constitutional:       Appearance: He is well-developed.   HENT:      Head: Normocephalic and atraumatic.   Cardiovascular:      Rate and Rhythm: Normal rate and regular rhythm.      Heart sounds: Normal heart sounds.   Pulmonary:      Effort: Pulmonary effort is normal.      Breath sounds: Normal breath sounds.       Assessment:       1. Type 2 diabetes mellitus with other specified complication, without long-term current use of insulin    2. SHANNAN (generalized anxiety disorder)    3. Gastroesophageal reflux disease, unspecified whether esophagitis present    4. Degenerative disc disease, lumbar    5. Essential hypertension    6. Pure hypercholesterolemia          Plan:       Type 2 diabetes mellitus with other specified complication, without long-term current use of insulin  -     CBC Without Differential; Future; Expected date: 11/07/2022  -     Hemoglobin A1C; Future; Expected date: 11/07/2022  -     TSH; Future; Expected date: 11/07/2022  -     Microalbumin/Creatinine Ratio, Urine; Future; Expected date: 11/07/2022    SHANNAN (generalized anxiety disorder)  -     ALPRAZolam (XANAX) 1 MG tablet; TAKE 1 TABLET (1 MG TOTAL) BY MOUTH THREE TIMES  DAILY AS NEEDED FOR ANXIETY.  Dispense: 90 tablet; Refill: 2    Gastroesophageal reflux disease, unspecified whether esophagitis present  -     omeprazole (PRILOSEC) 20 MG capsule; Take 1 capsule (20 mg total) by mouth once daily.  Dispense: 90 capsule; Refill: 3    Degenerative disc disease, lumbar  -     HYDROcodone-acetaminophen (NORCO)  mg per tablet; Take 1 tablet by mouth 3 (three) times daily as needed for Pain (medically necessary for greater than 7 days).  Dispense: 80 tablet; Refill: 0    Essential hypertension  -     Hypertension Digital Medicine (HDMP) Enrollment Order  -     Hypertension Digital Medicine (HDMP): Assign Onboarding Questionnaires    Pure hypercholesterolemia    Set up digital htn to start after discussion  Diabetes stable  Update labs and health maintennace  Gerd stable  Mood ok  Refill prn pain med; goal to wean more as tolerated  Will monitor chronic medical issues and continue current plan of care.  Covid booster this week    Follow up in about 3 months (around 2/7/2023), or if symptoms worsen or fail to improve, for Virtual Visit.

## 2022-12-16 ENCOUNTER — TELEPHONE (OUTPATIENT)
Dept: FAMILY MEDICINE | Facility: CLINIC | Age: 67
End: 2022-12-16

## 2022-12-16 NOTE — TELEPHONE ENCOUNTER
----- Message from Rissa Munguia sent at 12/16/2022  4:08 PM CST -----  Contact: Patient  Type: Patient Call Back         Who called: Patient         What is the request in detail: calling to sched a virtual appt for a 3 month c/u; LOV 11/7/22; please advise      Best call back number: [phone]         Additional Information: no preference in day           Thank You

## 2022-12-28 NOTE — PROGRESS NOTES
Please call the patient and let him know the chest xray is normal. No pneumonia. If he has any questions he can call us at anytime. Thanks.  4 = No assist / stand by assistance

## 2022-12-29 ENCOUNTER — LAB VISIT (OUTPATIENT)
Dept: LAB | Facility: HOSPITAL | Age: 67
End: 2022-12-29
Attending: INTERNAL MEDICINE
Payer: MEDICARE

## 2022-12-29 DIAGNOSIS — E11.69 TYPE 2 DIABETES MELLITUS WITH OTHER SPECIFIED COMPLICATION, WITHOUT LONG-TERM CURRENT USE OF INSULIN: ICD-10-CM

## 2022-12-29 DIAGNOSIS — E78.00 PURE HYPERCHOLESTEROLEMIA: Chronic | ICD-10-CM

## 2022-12-29 DIAGNOSIS — I25.10 CORONARY ARTERY DISEASE INVOLVING NATIVE CORONARY ARTERY OF NATIVE HEART WITHOUT ANGINA PECTORIS: Chronic | ICD-10-CM

## 2022-12-29 LAB
ALBUMIN SERPL BCP-MCNC: 3.8 G/DL (ref 3.5–5.2)
ALP SERPL-CCNC: 69 U/L (ref 55–135)
ALT SERPL W/O P-5'-P-CCNC: 22 U/L (ref 10–44)
ANION GAP SERPL CALC-SCNC: 8 MMOL/L (ref 8–16)
AST SERPL-CCNC: 19 U/L (ref 10–40)
BILIRUB SERPL-MCNC: 0.6 MG/DL (ref 0.1–1)
BUN SERPL-MCNC: 15 MG/DL (ref 8–23)
CALCIUM SERPL-MCNC: 9 MG/DL (ref 8.7–10.5)
CHLORIDE SERPL-SCNC: 105 MMOL/L (ref 95–110)
CHOLEST SERPL-MCNC: 132 MG/DL (ref 120–199)
CHOLEST/HDLC SERPL: 2.8 {RATIO} (ref 2–5)
CO2 SERPL-SCNC: 28 MMOL/L (ref 23–29)
CREAT SERPL-MCNC: 0.8 MG/DL (ref 0.5–1.4)
ERYTHROCYTE [DISTWIDTH] IN BLOOD BY AUTOMATED COUNT: 13.5 % (ref 11.5–14.5)
EST. GFR  (NO RACE VARIABLE): >60 ML/MIN/1.73 M^2
ESTIMATED AVG GLUCOSE: 146 MG/DL (ref 68–131)
GLUCOSE SERPL-MCNC: 127 MG/DL (ref 70–110)
HBA1C MFR BLD: 6.7 % (ref 4–5.6)
HCT VFR BLD AUTO: 44.9 % (ref 40–54)
HDLC SERPL-MCNC: 47 MG/DL (ref 40–75)
HDLC SERPL: 35.6 % (ref 20–50)
HGB BLD-MCNC: 14.6 G/DL (ref 14–18)
HGB BLD-MCNC: 14.6 G/DL (ref 14–18)
LDLC SERPL CALC-MCNC: 73 MG/DL (ref 63–159)
MCH RBC QN AUTO: 29.2 PG (ref 27–31)
MCHC RBC AUTO-ENTMCNC: 32.5 G/DL (ref 32–36)
MCV RBC AUTO: 90 FL (ref 82–98)
NONHDLC SERPL-MCNC: 85 MG/DL
PLATELET # BLD AUTO: 187 K/UL (ref 150–450)
PMV BLD AUTO: 11.2 FL (ref 9.2–12.9)
POTASSIUM SERPL-SCNC: 4.5 MMOL/L (ref 3.5–5.1)
PROT SERPL-MCNC: 7 G/DL (ref 6–8.4)
RBC # BLD AUTO: 5 M/UL (ref 4.6–6.2)
SODIUM SERPL-SCNC: 141 MMOL/L (ref 136–145)
T4 FREE SERPL-MCNC: 0.92 NG/DL (ref 0.71–1.51)
TRIGL SERPL-MCNC: 60 MG/DL (ref 30–150)
TSH SERPL DL<=0.005 MIU/L-ACNC: 4.28 UIU/ML (ref 0.4–4)
WBC # BLD AUTO: 7.56 K/UL (ref 3.9–12.7)

## 2022-12-29 PROCEDURE — 85027 COMPLETE CBC AUTOMATED: CPT | Mod: HCNC | Performed by: FAMILY MEDICINE

## 2022-12-29 PROCEDURE — 84439 ASSAY OF FREE THYROXINE: CPT | Mod: HCNC | Performed by: FAMILY MEDICINE

## 2022-12-29 PROCEDURE — 80061 LIPID PANEL: CPT | Mod: HCNC | Performed by: INTERNAL MEDICINE

## 2022-12-29 PROCEDURE — 84443 ASSAY THYROID STIM HORMONE: CPT | Mod: HCNC | Performed by: FAMILY MEDICINE

## 2022-12-29 PROCEDURE — 36415 COLL VENOUS BLD VENIPUNCTURE: CPT | Mod: HCNC,PO | Performed by: INTERNAL MEDICINE

## 2022-12-29 PROCEDURE — 83036 HEMOGLOBIN GLYCOSYLATED A1C: CPT | Mod: HCNC | Performed by: FAMILY MEDICINE

## 2022-12-29 PROCEDURE — 80053 COMPREHEN METABOLIC PANEL: CPT | Mod: HCNC | Performed by: INTERNAL MEDICINE

## 2023-01-06 ENCOUNTER — OFFICE VISIT (OUTPATIENT)
Dept: CARDIOLOGY | Facility: CLINIC | Age: 68
End: 2023-01-06
Payer: MEDICARE

## 2023-01-06 VITALS
DIASTOLIC BLOOD PRESSURE: 88 MMHG | BODY MASS INDEX: 32.63 KG/M2 | SYSTOLIC BLOOD PRESSURE: 158 MMHG | HEIGHT: 72 IN | WEIGHT: 240.94 LBS | HEART RATE: 62 BPM

## 2023-01-06 DIAGNOSIS — Z79.02 LONG TERM (CURRENT) USE OF ANTITHROMBOTICS/ANTIPLATELETS: Chronic | ICD-10-CM

## 2023-01-06 DIAGNOSIS — I25.10 CORONARY ARTERY DISEASE INVOLVING NATIVE CORONARY ARTERY OF NATIVE HEART WITHOUT ANGINA PECTORIS: Primary | Chronic | ICD-10-CM

## 2023-01-06 DIAGNOSIS — I77.9 MILD CAROTID ARTERY DISEASE: Chronic | ICD-10-CM

## 2023-01-06 DIAGNOSIS — E66.9 OBESITY, CLASS I, BMI 30-34.9: Chronic | ICD-10-CM

## 2023-01-06 DIAGNOSIS — E78.00 PURE HYPERCHOLESTEROLEMIA: Chronic | ICD-10-CM

## 2023-01-06 DIAGNOSIS — I10 ESSENTIAL HYPERTENSION: Chronic | ICD-10-CM

## 2023-01-06 PROCEDURE — 3077F SYST BP >= 140 MM HG: CPT | Mod: HCNC,CPTII,S$GLB, | Performed by: INTERNAL MEDICINE

## 2023-01-06 PROCEDURE — 1126F AMNT PAIN NOTED NONE PRSNT: CPT | Mod: HCNC,CPTII,S$GLB, | Performed by: INTERNAL MEDICINE

## 2023-01-06 PROCEDURE — 1126F PR PAIN SEVERITY QUANTIFIED, NO PAIN PRESENT: ICD-10-PCS | Mod: HCNC,CPTII,S$GLB, | Performed by: INTERNAL MEDICINE

## 2023-01-06 PROCEDURE — 3008F BODY MASS INDEX DOCD: CPT | Mod: HCNC,CPTII,S$GLB, | Performed by: INTERNAL MEDICINE

## 2023-01-06 PROCEDURE — 3079F PR MOST RECENT DIASTOLIC BLOOD PRESSURE 80-89 MM HG: ICD-10-PCS | Mod: HCNC,CPTII,S$GLB, | Performed by: INTERNAL MEDICINE

## 2023-01-06 PROCEDURE — 1159F MED LIST DOCD IN RCRD: CPT | Mod: HCNC,CPTII,S$GLB, | Performed by: INTERNAL MEDICINE

## 2023-01-06 PROCEDURE — 3008F PR BODY MASS INDEX (BMI) DOCUMENTED: ICD-10-PCS | Mod: HCNC,CPTII,S$GLB, | Performed by: INTERNAL MEDICINE

## 2023-01-06 PROCEDURE — 3288F PR FALLS RISK ASSESSMENT DOCUMENTED: ICD-10-PCS | Mod: HCNC,CPTII,S$GLB, | Performed by: INTERNAL MEDICINE

## 2023-01-06 PROCEDURE — 1159F PR MEDICATION LIST DOCUMENTED IN MEDICAL RECORD: ICD-10-PCS | Mod: HCNC,CPTII,S$GLB, | Performed by: INTERNAL MEDICINE

## 2023-01-06 PROCEDURE — 1101F PT FALLS ASSESS-DOCD LE1/YR: CPT | Mod: HCNC,CPTII,S$GLB, | Performed by: INTERNAL MEDICINE

## 2023-01-06 PROCEDURE — 1101F PR PT FALLS ASSESS DOC 0-1 FALLS W/OUT INJ PAST YR: ICD-10-PCS | Mod: HCNC,CPTII,S$GLB, | Performed by: INTERNAL MEDICINE

## 2023-01-06 PROCEDURE — 3079F DIAST BP 80-89 MM HG: CPT | Mod: HCNC,CPTII,S$GLB, | Performed by: INTERNAL MEDICINE

## 2023-01-06 PROCEDURE — 4010F PR ACE/ARB THEARPY RXD/TAKEN: ICD-10-PCS | Mod: HCNC,CPTII,S$GLB, | Performed by: INTERNAL MEDICINE

## 2023-01-06 PROCEDURE — 99214 OFFICE O/P EST MOD 30 MIN: CPT | Mod: HCNC,S$GLB,, | Performed by: INTERNAL MEDICINE

## 2023-01-06 PROCEDURE — 3288F FALL RISK ASSESSMENT DOCD: CPT | Mod: HCNC,CPTII,S$GLB, | Performed by: INTERNAL MEDICINE

## 2023-01-06 PROCEDURE — 99999 PR PBB SHADOW E&M-EST. PATIENT-LVL III: CPT | Mod: PBBFAC,HCNC,, | Performed by: INTERNAL MEDICINE

## 2023-01-06 PROCEDURE — 3077F PR MOST RECENT SYSTOLIC BLOOD PRESSURE >= 140 MM HG: ICD-10-PCS | Mod: HCNC,CPTII,S$GLB, | Performed by: INTERNAL MEDICINE

## 2023-01-06 PROCEDURE — 4010F ACE/ARB THERAPY RXD/TAKEN: CPT | Mod: HCNC,CPTII,S$GLB, | Performed by: INTERNAL MEDICINE

## 2023-01-06 PROCEDURE — 99214 PR OFFICE/OUTPT VISIT, EST, LEVL IV, 30-39 MIN: ICD-10-PCS | Mod: HCNC,S$GLB,, | Performed by: INTERNAL MEDICINE

## 2023-01-06 PROCEDURE — 99499 UNLISTED E&M SERVICE: CPT | Mod: S$GLB,,, | Performed by: INTERNAL MEDICINE

## 2023-01-06 PROCEDURE — 99999 PR PBB SHADOW E&M-EST. PATIENT-LVL III: ICD-10-PCS | Mod: PBBFAC,HCNC,, | Performed by: INTERNAL MEDICINE

## 2023-01-06 RX ORDER — OLMESARTAN MEDOXOMIL 40 MG/1
40 TABLET ORAL DAILY
Qty: 90 TABLET | Refills: 1 | Status: SHIPPED | OUTPATIENT
Start: 2023-01-06 | End: 2023-07-24

## 2023-01-06 RX ORDER — CARVEDILOL 3.12 MG/1
3.12 TABLET ORAL 2 TIMES DAILY WITH MEALS
Qty: 180 TABLET | Refills: 1 | Status: SHIPPED | OUTPATIENT
Start: 2023-01-06 | End: 2023-08-08

## 2023-01-06 NOTE — PROGRESS NOTES
Subjective:    Patient ID:  Fred Blackwell is a 67 y.o. male who presents for Coronary Artery Disease and Hypertension        Coronary Artery Disease  Pertinent negatives include no chest pain, dizziness, leg swelling, palpitations, shortness of breath or weight gain.     DISCUSSED LABS AND GOALS CMP OK, LDL 73, HBA1C 6.7 %, BP UP , SKIPS MEDS AT TIMES, BLOOD PRESSURE FLUCTUATES NO CHEST PAIN NO TIA TYPE SYMPTOMS NO NEAR-SYNCOPE SEE REVIEW OF SYSTEMS  Past Medical History:   Diagnosis Date    Anxiety     Bell's palsy     Current moderate episode of major depressive disorder without prior episode 4/1/2019    DDD (degenerative disc disease), lumbar     Diabetes mellitus     Fibromyalgia     GERD (gastroesophageal reflux disease)     Hyperlipidemia     Hypertension     Sciatica      Past Surgical History:   Procedure Laterality Date    COLONOSCOPY N/A 12/11/2019    Procedure: COLONOSCOPY;  Surgeon: Mich Mota MD;  Location: [facility] ENDO;  Service: Endoscopy;  Laterality: N/A;    CORONARY ANGIOGRAPHY N/A 6/2/2020    Procedure: ANGIOGRAM, CORONARY ARTERY;  Surgeon: Romario Flaherty MD;  Location: [facility] CATH;  Service: Cardiology;  Laterality: N/A;    LEFT HEART CATHETERIZATION Right 6/2/2020    Procedure: Left heart cath;  Surgeon: Romario Flaherty MD;  Location: [facility] CATH;  Service: Cardiology;  Laterality: Right;    SHOULDER SURGERY       Family History   Problem Relation Age of Onset    Arthritis Mother      Social History     Socioeconomic History    Marital status:    Tobacco Use    Smoking status: Former     Packs/day: 2.00     Types: Cigarettes    Smokeless tobacco: Never    Tobacco comments:     2 packs/day   Substance and Sexual Activity    Alcohol use: No    Drug use: No       Review of patient's allergies indicates:   Allergen Reactions    Cymbalta [duloxetine] Nausea And Vomiting    Penicillins      Other reaction(s): Hives       Current Outpatient Medications:     ALPRAZolam (XANAX) 1 MG  tablet, TAKE 1 TABLET (1 MG TOTAL) BY MOUTH THREE TIMES DAILY AS NEEDED FOR ANXIETY., Disp: 90 tablet, Rfl: 2    amLODIPine (NORVASC) 5 MG tablet, Take 1 tablet (5 mg total) by mouth once daily., Disp: 90 tablet, Rfl: 1    atorvastatin (LIPITOR) 80 MG tablet, Take 1 tablet (80 mg total) by mouth once daily., Disp: 90 tablet, Rfl: 1    blood sugar diagnostic Strp, To check BG 2 (two) times daily, to use with insurance preferred meter, Disp: 200 strip, Rfl: 3    blood-glucose meter kit, To check BG 2 (two) times daily, to use with insurance preferred meter, Disp: 1 each, Rfl: 0    BRILINTA 90 mg tablet, TAKE 1 TABLET (90 MG TOTAL) BY MOUTH TWO TIMES A DAY., Disp: 60 tablet, Rfl: 1    citalopram (CELEXA) 10 MG tablet, Take 1 tablet (10 mg total) by mouth once daily., Disp: 90 tablet, Rfl: 2    fluticasone propionate (FLONASE) 50 mcg/actuation nasal spray, USE 1 SPRAY IN EACH NOSTRIL ONCE DAILY., Disp: 48 g, Rfl: 2    HYDROcodone-acetaminophen (NORCO)  mg per tablet, Take 1 tablet by mouth 3 (three) times daily as needed for Pain (medically necessary for greater than 7 days)., Disp: 80 tablet, Rfl: 0    lancets Misc, To check BG 2 (two) times daily, to use with insurance preferred meter, Disp: 200 each, Rfl: 3    metFORMIN (GLUCOPHAGE-XR) 500 MG ER 24hr tablet, Take 1 tablet (500 mg total) by mouth daily with breakfast., Disp: 90 tablet, Rfl: 3    omeprazole (PRILOSEC) 20 MG capsule, Take 1 capsule (20 mg total) by mouth once daily., Disp: 90 capsule, Rfl: 3    albuterol (PROVENTIL) 2.5 mg /3 mL (0.083 %) nebulizer solution, Take 3 mLs (2.5 mg total) by nebulization every 6 (six) hours as needed for Wheezing. Rescue, Disp: 90 mL, Rfl: 0    aspirin (ECOTRIN) 81 MG EC tablet, Take 1 tablet (81 mg total) by mouth once daily., Disp: , Rfl: 0    carvediloL (COREG) 3.125 MG tablet, Take 1 tablet (3.125 mg total) by mouth 2 (two) times daily with meals., Disp: 180 tablet, Rfl: 1    ezetimibe (ZETIA) 10 mg tablet, Take 1  tablet (10 mg total) by mouth once daily., Disp: 90 tablet, Rfl: 1    olmesartan (BENICAR) 40 MG tablet, Take 1 tablet (40 mg total) by mouth once daily., Disp: 90 tablet, Rfl: 1    Review of Systems   Constitutional: Negative for chills, diaphoresis, fever, malaise/fatigue, night sweats and weight gain.   HENT:  Negative for congestion and sore throat.    Eyes:  Negative for blurred vision and visual disturbance.   Cardiovascular:  Negative for chest pain, claudication, cyanosis, dyspnea on exertion, irregular heartbeat, leg swelling, near-syncope, orthopnea, palpitations, paroxysmal nocturnal dyspnea and syncope.   Respiratory:  Negative for cough, hemoptysis and shortness of breath.    Endocrine: Negative.    Hematologic/Lymphatic: Negative for adenopathy. Does not bruise/bleed easily.   Skin:  Negative for color change and rash.   Musculoskeletal:  Positive for joint pain. Negative for back pain (SOME) and falls.   Gastrointestinal:  Negative for abdominal pain, change in bowel habit, dysphagia, jaundice, melena, nausea and vomiting.   Genitourinary:  Negative for dysuria and flank pain.   Neurological:  Negative for brief paralysis, dizziness, focal weakness, loss of balance, paresthesias and weakness.   Psychiatric/Behavioral:  Negative for altered mental status and depression.    Allergic/Immunologic: Negative for hives and persistent infections.      Objective:      Vitals:    01/06/23 0949 01/06/23 0959   BP: (!) 166/93 (!) 158/88   Pulse: 62    Weight: 109.3 kg (240 lb 15.4 oz)    Height: 6' (1.829 m)    PainSc: 0-No pain      Body mass index is 32.68 kg/m².    Physical Exam  Constitutional:       Appearance: He is obese.   HENT:      Head: Normocephalic and atraumatic.   Eyes:      Extraocular Movements: Extraocular movements intact.      Conjunctiva/sclera: Conjunctivae normal.      Pupils: Pupils are equal, round, and reactive to light.   Neck:      Vascular: No carotid bruit.   Cardiovascular:       Rate and Rhythm: Normal rate and regular rhythm. No extrasystoles are present.     Pulses: Normal pulses.           Carotid pulses are 2+ on the right side and 2+ on the left side.       Radial pulses are 2+ on the right side and 2+ on the left side.      Heart sounds: Normal heart sounds.     No friction rub. No gallop.   Pulmonary:      Effort: Pulmonary effort is normal.      Breath sounds: Normal breath sounds. No rales.   Abdominal:      Palpations: Abdomen is soft.      Tenderness: There is no abdominal tenderness.   Musculoskeletal:      Cervical back: Neck supple.      Right lower leg: No edema.      Left lower leg: No edema.   Skin:     General: Skin is warm and dry.      Capillary Refill: Capillary refill takes less than 2 seconds.   Neurological:      General: No focal deficit present.      Mental Status: He is alert and oriented to person, place, and time.      Cranial Nerves: No cranial nerve deficit.   Psychiatric:         Mood and Affect: Mood normal.         Behavior: Behavior normal.               ..    Chemistry        Component Value Date/Time     12/29/2022 0724    K 4.5 12/29/2022 0724     12/29/2022 0724    CO2 28 12/29/2022 0724    BUN 15 12/29/2022 0724    CREATININE 0.8 12/29/2022 0724     (H) 12/29/2022 0724        Component Value Date/Time    CALCIUM 9.0 12/29/2022 0724    ALKPHOS 69 12/29/2022 0724    AST 19 12/29/2022 0724    ALT 22 12/29/2022 0724    BILITOT 0.6 12/29/2022 0724    ESTGFRAFRICA >60.0 06/23/2022 0702    EGFRNONAA >60.0 06/23/2022 0702            ..  Lab Results   Component Value Date    CHOL 132 12/29/2022    CHOL 220 (H) 06/23/2022    CHOL 118 (L) 07/09/2021     Lab Results   Component Value Date    HDL 47 12/29/2022    HDL 47 06/23/2022    HDL 44 07/09/2021     Lab Results   Component Value Date    LDLCALC 73.0 12/29/2022    LDLCALC 155.6 06/23/2022    LDLCALC 60.0 (L) 07/09/2021     Lab Results   Component Value Date    TRIG 60 12/29/2022    TRIG 87  06/23/2022    TRIG 70 07/09/2021     Lab Results   Component Value Date    CHOLHDL 35.6 12/29/2022    CHOLHDL 21.4 06/23/2022    CHOLHDL 37.3 07/09/2021     ..  Lab Results   Component Value Date    WBC 7.56 12/29/2022    HGB 14.6 12/29/2022    HGB 14.6 12/29/2022    HCT 44.9 12/29/2022    MCV 90 12/29/2022     12/29/2022       Test(s) Reviewed  I have reviewed the following in detail:  [] Stress test   [] Angiography   [] Echocardiogram   [x] Labs   [] Other:       Assessment:         ICD-10-CM ICD-9-CM   1. Coronary artery disease involving native coronary artery of native heart without angina pectoris  I25.10 414.01   2. Long term (current) use of antithrombotics/antiplatelets  Z79.02 V58.63   3. Essential hypertension  I10 401.9   4. Pure hypercholesterolemia  E78.00 272.0   5. Mild carotid artery disease  I77.9 447.9   6. Obesity, Class I, BMI 30-34.9  E66.9 278.00     Problem List Items Addressed This Visit          Cardiac/Vascular    Essential hypertension    Overview     RF uniretic.           Pure hypercholesterolemia    Coronary artery disease involving native coronary artery of native heart without angina pectoris - Primary    Mild carotid artery disease       Hematology    Long term (current) use of antithrombotics/antiplatelets       Endocrine    Obesity, Class I, BMI 30-34.9        Plan:         CHANGE LOSARTAN TO BENICAR FOR BETTER EFFICACY IN VIEW OF RECENT DATA ALSO ADD CARVEDILOL, COMPLIANCE WITH MEDICATION DAILY MEDS AND DAILY CHOLESTEROL MEDS, NO ANGINA NO OVERT HEART FAILURE NO TIA TYPE SYMPTOMS NO NEAR-SYNCOPE DIET EXERCISE WEIGHT LOSS INCREASED CV RISK WITH DIABETES RETURN TO CLINIC IN 6 MONTHS  Coronary artery disease involving native coronary artery of native heart without angina pectoris    Long term (current) use of antithrombotics/antiplatelets    Essential hypertension  Comments:  ADJUST MEDS    Pure hypercholesterolemia    Mild carotid artery disease  Comments:  NO  TIA    Obesity, Class I, BMI 30-34.9    Other orders  -     olmesartan (BENICAR) 40 MG tablet; Take 1 tablet (40 mg total) by mouth once daily.  Dispense: 90 tablet; Refill: 1  -     carvediloL (COREG) 3.125 MG tablet; Take 1 tablet (3.125 mg total) by mouth 2 (two) times daily with meals.  Dispense: 180 tablet; Refill: 1    RTC Low level/low impact aerobic exercise 5x's/wk. Heart healthy diet and risk factor modification.    See labs and med orders.    Aerobic exercise 5x's/wk. Heart healthy diet and risk factor modification.    See labs and med orders.

## 2023-01-10 ENCOUNTER — PATIENT MESSAGE (OUTPATIENT)
Dept: FAMILY MEDICINE | Facility: CLINIC | Age: 68
End: 2023-01-10
Payer: MEDICARE

## 2023-01-19 DIAGNOSIS — M51.36 DEGENERATIVE DISC DISEASE, LUMBAR: ICD-10-CM

## 2023-01-19 NOTE — TELEPHONE ENCOUNTER
No new care gaps identified.  [facility] Embedded Care Gaps. Reference number: [id number]. 1/19/2023   11:07:31 AM CST

## 2023-01-19 NOTE — TELEPHONE ENCOUNTER
----- Message from Ameliajahaira Delaney sent at 1/19/2023  9:57 AM CST -----  Contact: [phone]  Type:  RX Refill Request    Who Called:  Pt   Refill or New Rx:  refill  RX Name and Strength:  HYDROcodone-acetaminophen (NORCO)  mg per tablet   How is the patient currently taking it? (ex. 1XDay):  3xday   Is this a 30 day or 90 day RX:  30  Preferred Pharmacy with phone number:    [facility] [address] [address]  Phone: [phone] Fax: [phone]  Local or Mail Order:  Local   Ordering Provider:  Josef Spear Call Back Number:  [phone]    Type:  RX Refill Request    Refill or New Rx:  refill   RX Name and Strength:  ALPRAZolam (XANAX) 1 MG tablet  How is the patient currently taking it? (ex. 1XDay):  3xday   Is this a 30 day or 90 day RX:  30  Preferred Pharmacy with phone number:  [facility]   Ordering Provider: Josef

## 2023-01-23 RX ORDER — HYDROCODONE BITARTRATE AND ACETAMINOPHEN 10; 325 MG/1; MG/1
1 TABLET ORAL 3 TIMES DAILY PRN
Qty: 80 TABLET | Refills: 0 | Status: SHIPPED | OUTPATIENT
Start: 2023-01-23 | End: 2023-02-22 | Stop reason: SDUPTHER

## 2023-02-07 DIAGNOSIS — Z00.00 ENCOUNTER FOR MEDICARE ANNUAL WELLNESS EXAM: ICD-10-CM

## 2023-02-09 DIAGNOSIS — Z00.00 ENCOUNTER FOR MEDICARE ANNUAL WELLNESS EXAM: ICD-10-CM

## 2023-03-23 ENCOUNTER — PATIENT OUTREACH (OUTPATIENT)
Dept: ADMINISTRATIVE | Facility: HOSPITAL | Age: 68
End: 2023-03-23
Payer: MEDICARE

## 2023-03-23 NOTE — PROGRESS NOTES
Statin Therapy for Patients with CVD (SPC_STATIN)  Percentage of males 21?75 years old and females 40?75 years old during the measurement year who were identified as having clinical atherosclerotic cardiovascular disease (ASCVD).    The patient is showing as non-compliant on the Statin Therapy Measure for [employer].     H/M intensity statin last year but no statin therapy at all this year.  MSG Sent to PCP.            At least one dispensing event for a high- or moderate-intensity statin medication in the measurement year  Moderate-Intensity Statin Therapy  Atorvastatin 10?20 mg*  Pitavastatin 1?4 mg  Simvastatin 20?40 mg  Rosuvastatin 5?10 mg  Pravastatin 40?80 mg  Lovastatin 40 mg  Fluvastatin 40 mg  Fluvastatin XL 80 mg  High-intensity Statin Therapy  Atorvastatin 40?80 mg  Rosuvastatin 20?40 mg  Simvastatin 80 mg

## 2023-04-06 ENCOUNTER — HOSPITAL ENCOUNTER (OUTPATIENT)
Dept: RADIOLOGY | Facility: HOSPITAL | Age: 68
Discharge: HOME | End: 2023-04-06
Attending: FAMILY MEDICINE
Payer: MEDICARE

## 2023-04-06 ENCOUNTER — OFFICE VISIT (OUTPATIENT)
Dept: FAMILY MEDICINE | Facility: CLINIC | Age: 68
End: 2023-04-06
Payer: MEDICARE

## 2023-04-06 VITALS
WEIGHT: 233.13 LBS | BODY MASS INDEX: 31.58 KG/M2 | TEMPERATURE: 99 F | SYSTOLIC BLOOD PRESSURE: 132 MMHG | HEART RATE: 54 BPM | HEIGHT: 72 IN | DIASTOLIC BLOOD PRESSURE: 90 MMHG | OXYGEN SATURATION: 97 %

## 2023-04-06 DIAGNOSIS — J44.1 COPD EXACERBATION: Primary | ICD-10-CM

## 2023-04-06 DIAGNOSIS — J44.1 COPD EXACERBATION: ICD-10-CM

## 2023-04-06 PROCEDURE — 1101F PR PT FALLS ASSESS DOC 0-1 FALLS W/OUT INJ PAST YR: ICD-10-PCS | Mod: HCNC,CPTII,S$GLB, | Performed by: FAMILY MEDICINE

## 2023-04-06 PROCEDURE — 4010F ACE/ARB THERAPY RXD/TAKEN: CPT | Mod: HCNC,CPTII,S$GLB, | Performed by: FAMILY MEDICINE

## 2023-04-06 PROCEDURE — 71046 X-RAY EXAM CHEST 2 VIEWS: CPT | Mod: TC,HCNC,PN

## 2023-04-06 PROCEDURE — 3075F SYST BP GE 130 - 139MM HG: CPT | Mod: HCNC,CPTII,S$GLB, | Performed by: FAMILY MEDICINE

## 2023-04-06 PROCEDURE — 94640 PR INHAL RX, AIRWAY OBST/DX SPUTUM INDUCT: ICD-10-PCS | Mod: HCNC,S$GLB,, | Performed by: FAMILY MEDICINE

## 2023-04-06 PROCEDURE — 3288F PR FALLS RISK ASSESSMENT DOCUMENTED: ICD-10-PCS | Mod: HCNC,CPTII,S$GLB, | Performed by: FAMILY MEDICINE

## 2023-04-06 PROCEDURE — 99999 PR PBB SHADOW E&M-EST. PATIENT-LVL IV: ICD-10-PCS | Mod: PBBFAC,HCNC,, | Performed by: FAMILY MEDICINE

## 2023-04-06 PROCEDURE — 1160F RVW MEDS BY RX/DR IN RCRD: CPT | Mod: HCNC,CPTII,S$GLB, | Performed by: FAMILY MEDICINE

## 2023-04-06 PROCEDURE — 1126F AMNT PAIN NOTED NONE PRSNT: CPT | Mod: HCNC,CPTII,S$GLB, | Performed by: FAMILY MEDICINE

## 2023-04-06 PROCEDURE — 3080F DIAST BP >= 90 MM HG: CPT | Mod: HCNC,CPTII,S$GLB, | Performed by: FAMILY MEDICINE

## 2023-04-06 PROCEDURE — 1160F PR REVIEW ALL MEDS BY PRESCRIBER/CLIN PHARMACIST DOCUMENTED: ICD-10-PCS | Mod: HCNC,CPTII,S$GLB, | Performed by: FAMILY MEDICINE

## 2023-04-06 PROCEDURE — 3008F BODY MASS INDEX DOCD: CPT | Mod: HCNC,CPTII,S$GLB, | Performed by: FAMILY MEDICINE

## 2023-04-06 PROCEDURE — 71046 XR CHEST PA AND LATERAL: ICD-10-PCS | Mod: 26,HCNC,, | Performed by: RADIOLOGY

## 2023-04-06 PROCEDURE — 94640 AIRWAY INHALATION TREATMENT: CPT | Mod: HCNC,S$GLB,, | Performed by: FAMILY MEDICINE

## 2023-04-06 PROCEDURE — 1101F PT FALLS ASSESS-DOCD LE1/YR: CPT | Mod: HCNC,CPTII,S$GLB, | Performed by: FAMILY MEDICINE

## 2023-04-06 PROCEDURE — 3288F FALL RISK ASSESSMENT DOCD: CPT | Mod: HCNC,CPTII,S$GLB, | Performed by: FAMILY MEDICINE

## 2023-04-06 PROCEDURE — 1159F PR MEDICATION LIST DOCUMENTED IN MEDICAL RECORD: ICD-10-PCS | Mod: HCNC,CPTII,S$GLB, | Performed by: FAMILY MEDICINE

## 2023-04-06 PROCEDURE — 3080F PR MOST RECENT DIASTOLIC BLOOD PRESSURE >= 90 MM HG: ICD-10-PCS | Mod: HCNC,CPTII,S$GLB, | Performed by: FAMILY MEDICINE

## 2023-04-06 PROCEDURE — 4010F PR ACE/ARB THEARPY RXD/TAKEN: ICD-10-PCS | Mod: HCNC,CPTII,S$GLB, | Performed by: FAMILY MEDICINE

## 2023-04-06 PROCEDURE — 71046 X-RAY EXAM CHEST 2 VIEWS: CPT | Mod: 26,HCNC,, | Performed by: RADIOLOGY

## 2023-04-06 PROCEDURE — 1159F MED LIST DOCD IN RCRD: CPT | Mod: HCNC,CPTII,S$GLB, | Performed by: FAMILY MEDICINE

## 2023-04-06 PROCEDURE — 1126F PR PAIN SEVERITY QUANTIFIED, NO PAIN PRESENT: ICD-10-PCS | Mod: HCNC,CPTII,S$GLB, | Performed by: FAMILY MEDICINE

## 2023-04-06 PROCEDURE — 99999 PR PBB SHADOW E&M-EST. PATIENT-LVL IV: CPT | Mod: PBBFAC,HCNC,, | Performed by: FAMILY MEDICINE

## 2023-04-06 PROCEDURE — 3075F PR MOST RECENT SYSTOLIC BLOOD PRESS GE 130-139MM HG: ICD-10-PCS | Mod: HCNC,CPTII,S$GLB, | Performed by: FAMILY MEDICINE

## 2023-04-06 PROCEDURE — 99214 OFFICE O/P EST MOD 30 MIN: CPT | Mod: HCNC,25,S$GLB, | Performed by: FAMILY MEDICINE

## 2023-04-06 PROCEDURE — 99214 PR OFFICE/OUTPT VISIT, EST, LEVL IV, 30-39 MIN: ICD-10-PCS | Mod: HCNC,25,S$GLB, | Performed by: FAMILY MEDICINE

## 2023-04-06 PROCEDURE — 3008F PR BODY MASS INDEX (BMI) DOCUMENTED: ICD-10-PCS | Mod: HCNC,CPTII,S$GLB, | Performed by: FAMILY MEDICINE

## 2023-04-06 RX ORDER — IPRATROPIUM BROMIDE AND ALBUTEROL SULFATE 2.5; .5 MG/3ML; MG/3ML
3 SOLUTION RESPIRATORY (INHALATION) EVERY 6 HOURS PRN
Qty: 75 ML | Refills: 0 | Status: SHIPPED | OUTPATIENT
Start: 2023-04-06 | End: 2024-04-05

## 2023-04-06 RX ORDER — PREDNISONE 20 MG/1
TABLET ORAL
Qty: 10 TABLET | Refills: 0 | Status: SHIPPED | OUTPATIENT
Start: 2023-04-06 | End: 2023-04-24

## 2023-04-06 RX ORDER — IPRATROPIUM BROMIDE AND ALBUTEROL SULFATE 2.5; .5 MG/3ML; MG/3ML
3 SOLUTION RESPIRATORY (INHALATION)
Status: COMPLETED | OUTPATIENT
Start: 2023-04-06 | End: 2023-04-06

## 2023-04-06 RX ORDER — DOXYCYCLINE 100 MG/1
100 CAPSULE ORAL EVERY 12 HOURS
Qty: 14 CAPSULE | Refills: 0 | Status: SHIPPED | OUTPATIENT
Start: 2023-04-06 | End: 2023-04-24

## 2023-04-06 RX ADMIN — IPRATROPIUM BROMIDE AND ALBUTEROL SULFATE 3 ML: 2.5; .5 SOLUTION RESPIRATORY (INHALATION) at 02:04

## 2023-04-06 NOTE — PROGRESS NOTES
THIS DOCUMENT WAS MADE IN PART WITH VOICE RECOGNITION SOFTWARE.  OCCASIONALLY THIS SOFTWARE WILL MISINTERPRET WORDS OR PHRASES.    Assessment and Plan:    1. COPD exacerbation  albuterol-ipratropium (DUO-NEB) 2.5 mg-0.5 mg/3 mL nebulizer solution    albuterol-ipratropium 2.5 mg-0.5 mg/3 mL nebulizer solution 3 mL    doxycycline (VIBRAMYCIN) 100 MG Cap    predniSONE (DELTASONE) 20 MG tablet    X-Ray Chest PA And Lateral          PLAN    Treat for COPD exacerbation       ______________________________________________________________________  Subjective:    Chief Complaint:  Chief Complaint   Patient presents with    URI     Been going on for almost 2 weeks, wheezing, a little trouble breathing, congested         HPI:  Fred Santos is a 67 y.o. yea    67 yr old with susp COPD  SOB / wheeze / cough  Duration 1 week          Past Medical History:  Past Medical History:   Diagnosis Date    Anxiety     Bell's palsy     Current moderate episode of major depressive disorder without prior episode 4/1/2019    DDD (degenerative disc disease), lumbar     Diabetes mellitus     Fibromyalgia     GERD (gastroesophageal reflux disease)     Hyperlipidemia     Hypertension     Sciatica        Past Surgical History:  Past Surgical History:   Procedure Laterality Date    COLONOSCOPY N/A 12/11/2019    Procedure: COLONOSCOPY;  Surgeon: Mich Mota MD;  Location: [facility] ENDO;  Service: Endoscopy;  Laterality: N/A;    CORONARY ANGIOGRAPHY N/A 6/2/2020    Procedure: ANGIOGRAM, CORONARY ARTERY;  Surgeon: Romario Flaherty MD;  Location: [facility] CATH;  Service: Cardiology;  Laterality: N/A;    LEFT HEART CATHETERIZATION Right 6/2/2020    Procedure: Left heart cath;  Surgeon: Romario Flaherty MD;  Location: [facility] CATH;  Service: Cardiology;  Laterality: Right;    SHOULDER SURGERY         Family History:  Family History   Problem Relation Age of Onset    Arthritis Mother        Social History:  Social History     Socioeconomic History    Marital  status:    Tobacco Use    Smoking status: Former     Packs/day: 2.00     Types: Cigarettes    Smokeless tobacco: Never    Tobacco comments:     2 packs/day   Substance and Sexual Activity    Alcohol use: No    Drug use: No       Medications:  Current Outpatient Medications on File Prior to Visit   Medication Sig Dispense Refill    albuterol (PROVENTIL) 2.5 mg /3 mL (0.083 %) nebulizer solution Take 3 mLs (2.5 mg total) by nebulization every 6 (six) hours as needed for Wheezing. Rescue 90 mL 0    ALPRAZolam (XANAX) 1 MG tablet TAKE 1 TABLET (1 MG TOTAL) BY MOUTH THREE TIMES DAILY AS NEEDED FOR ANXIETY. 90 tablet 2    amLODIPine (NORVASC) 5 MG tablet Take 1 tablet (5 mg total) by mouth once daily. 90 tablet 1    aspirin (ECOTRIN) 81 MG EC tablet Take 1 tablet (81 mg total) by mouth once daily.  0    atorvastatin (LIPITOR) 80 MG tablet Take 1 tablet (80 mg total) by mouth once daily. 90 tablet 1    blood sugar diagnostic Strp To check BG 2 (two) times daily, to use with insurance preferred meter 200 strip 3    blood-glucose meter kit To check BG 2 (two) times daily, to use with insurance preferred meter 1 each 0    BRILINTA 90 mg tablet TAKE 1 TABLET (90 MG TOTAL) BY MOUTH TWO TIMES A DAY. 60 tablet 1    carvediloL (COREG) 3.125 MG tablet Take 1 tablet (3.125 mg total) by mouth 2 (two) times daily with meals. 180 tablet 1    citalopram (CELEXA) 10 MG tablet Take 1 tablet (10 mg total) by mouth once daily. 90 tablet 2    ezetimibe (ZETIA) 10 mg tablet Take 1 tablet (10 mg total) by mouth once daily. 90 tablet 1    fluticasone propionate (FLONASE) 50 mcg/actuation nasal spray USE 1 SPRAY IN EACH NOSTRIL ONCE DAILY. 48 g 2    HYDROcodone-acetaminophen (NORCO)  mg per tablet Take 1 tablet by mouth 3 (three) times daily as needed for Pain (medically necessary for greater than 7 days). 80 tablet 0    lancets Misc To check BG 2 (two) times daily, to use with insurance preferred meter 200 each 3    metFORMIN  (GLUCOPHAGE-XR) 500 MG ER 24hr tablet Take 1 tablet (500 mg total) by mouth daily with breakfast. 90 tablet 3    olmesartan (BENICAR) 40 MG tablet Take 1 tablet (40 mg total) by mouth once daily. 90 tablet 1    omeprazole (PRILOSEC) 20 MG capsule Take 1 capsule (20 mg total) by mouth once daily. 90 capsule 3     No current facility-administered medications on file prior to visit.       Allergies:  Cymbalta [duloxetine] and Penicillins    Immunizations:  Immunization History   Administered Date(s) Administered    COVID-19, MRNA, LN-S, PF (Pfizer) (Purple Cap) 02/09/2021, 03/02/2021, 12/23/2021    Influenza 10/14/2009    Influenza - Quadrivalent - MDCK - PF 10/22/2017    Influenza - Quadrivalent - PF *Preferred* (6 months and older) 10/14/2009, 12/27/2018    Pneumococcal Polysaccharide - 23 Valent 07/20/2017    Td (ADULT) 10/14/2009    Tdap 10/14/2009, 06/23/2016       Review of Systems:  Review of Systems   All other systems reviewed and are negative.    Objective:    Vitals:  Vitals:    04/06/23 1401   BP: (!) 132/90   Pulse: (!) 54   Temp: 99 °F (37.2 °C)   SpO2: 97%   Weight: 105.7 kg (233 lb 2.2 oz)   Height: 6' (1.829 m)   PainSc: 0-No pain       Physical Exam  Vitals reviewed.   Constitutional:       General: He is not in acute distress.     Appearance: He is well-developed.   HENT:      Head: Normocephalic and atraumatic.      Nose: Mucosal edema and rhinorrhea present.      Mouth/Throat:      Pharynx: Posterior oropharyngeal erythema present. No oropharyngeal exudate.   Eyes:      Pupils: Pupils are equal, round, and reactive to light.   Cardiovascular:      Rate and Rhythm: Normal rate and regular rhythm.      Heart sounds: No murmur heard.    No friction rub.   Pulmonary:      Effort: Pulmonary effort is normal.      Breath sounds: Normal breath sounds.   Abdominal:      General: Bowel sounds are normal. There is no distension.      Palpations: Abdomen is soft.      Tenderness: There is no abdominal  tenderness.   Musculoskeletal:      Cervical back: Normal range of motion and neck supple.   Skin:     General: Skin is warm and dry.      Findings: No rash.   Psychiatric:         Behavior: Behavior normal.           Hank Miguel MD  Family Medicine

## 2023-04-17 ENCOUNTER — PATIENT MESSAGE (OUTPATIENT)
Dept: ADMINISTRATIVE | Facility: HOSPITAL | Age: 68
End: 2023-04-17
Payer: MEDICARE

## 2023-04-17 ENCOUNTER — PATIENT OUTREACH (OUTPATIENT)
Dept: ADMINISTRATIVE | Facility: HOSPITAL | Age: 68
End: 2023-04-17
Payer: MEDICARE

## 2023-04-17 NOTE — PROGRESS NOTES
Health Maintenance Due   Topic Date Due    Sign Pain Contract  Never done    Complete Opioid Risk Tool  Never done    Aspirin/Antiplatelet Therapy  Never done    Shingles Vaccine (1 of 2) Never done    Pneumococcal Vaccines (Age 65+) (2 - PCV) 2018    Eye Exam  2018    Foot Exam  06/15/2021    COVID-19 Vaccine (4 - Booster for Pfizer series) 2022       Chart review completed 2023.  Care Everywhere updates requested and reviewed.  Immunizations reconciled. Media reports reviewed.  Duplicate HM overrides and  orders removed.  Overdue HM topic chart audit and/or requested.  Overdue lab testing linked to upcoming lab appointments if applies.

## 2023-04-24 ENCOUNTER — OFFICE VISIT (OUTPATIENT)
Dept: FAMILY MEDICINE | Facility: CLINIC | Age: 68
End: 2023-04-24
Payer: MEDICARE

## 2023-04-24 VITALS
HEART RATE: 68 BPM | WEIGHT: 237.31 LBS | BODY MASS INDEX: 32.14 KG/M2 | OXYGEN SATURATION: 95 % | HEIGHT: 72 IN | DIASTOLIC BLOOD PRESSURE: 88 MMHG | SYSTOLIC BLOOD PRESSURE: 132 MMHG

## 2023-04-24 DIAGNOSIS — M54.17 RADICULAR PAIN OF LUMBOSACRAL REGION: ICD-10-CM

## 2023-04-24 DIAGNOSIS — E11.69 TYPE 2 DIABETES MELLITUS WITH OTHER SPECIFIED COMPLICATION, WITHOUT LONG-TERM CURRENT USE OF INSULIN: Primary | ICD-10-CM

## 2023-04-24 DIAGNOSIS — F32.1 CURRENT MODERATE EPISODE OF MAJOR DEPRESSIVE DISORDER WITHOUT PRIOR EPISODE: ICD-10-CM

## 2023-04-24 DIAGNOSIS — I10 ESSENTIAL HYPERTENSION: ICD-10-CM

## 2023-04-24 DIAGNOSIS — J44.9 CHRONIC OBSTRUCTIVE PULMONARY DISEASE, UNSPECIFIED COPD TYPE: ICD-10-CM

## 2023-04-24 DIAGNOSIS — F41.1 GAD (GENERALIZED ANXIETY DISORDER): ICD-10-CM

## 2023-04-24 PROBLEM — F41.0 PANIC ATTACK: Status: RESOLVED | Noted: 2017-07-20 | Resolved: 2023-04-24

## 2023-04-24 PROBLEM — Z91.199 NON-COMPLIANCE WITH TREATMENT: Status: RESOLVED | Noted: 2022-06-30 | Resolved: 2023-04-24

## 2023-04-24 PROCEDURE — 1159F MED LIST DOCD IN RCRD: CPT | Mod: HCNC,CPTII,S$GLB, | Performed by: FAMILY MEDICINE

## 2023-04-24 PROCEDURE — 3075F PR MOST RECENT SYSTOLIC BLOOD PRESS GE 130-139MM HG: ICD-10-PCS | Mod: HCNC,CPTII,S$GLB, | Performed by: FAMILY MEDICINE

## 2023-04-24 PROCEDURE — 4010F PR ACE/ARB THEARPY RXD/TAKEN: ICD-10-PCS | Mod: HCNC,CPTII,S$GLB, | Performed by: FAMILY MEDICINE

## 2023-04-24 PROCEDURE — 3079F DIAST BP 80-89 MM HG: CPT | Mod: HCNC,CPTII,S$GLB, | Performed by: FAMILY MEDICINE

## 2023-04-24 PROCEDURE — 3008F BODY MASS INDEX DOCD: CPT | Mod: HCNC,CPTII,S$GLB, | Performed by: FAMILY MEDICINE

## 2023-04-24 PROCEDURE — 99999 PR PBB SHADOW E&M-EST. PATIENT-LVL IV: ICD-10-PCS | Mod: PBBFAC,HCNC,, | Performed by: FAMILY MEDICINE

## 2023-04-24 PROCEDURE — 3075F SYST BP GE 130 - 139MM HG: CPT | Mod: HCNC,CPTII,S$GLB, | Performed by: FAMILY MEDICINE

## 2023-04-24 PROCEDURE — 99999 PR PBB SHADOW E&M-EST. PATIENT-LVL IV: CPT | Mod: PBBFAC,HCNC,, | Performed by: FAMILY MEDICINE

## 2023-04-24 PROCEDURE — 3008F PR BODY MASS INDEX (BMI) DOCUMENTED: ICD-10-PCS | Mod: HCNC,CPTII,S$GLB, | Performed by: FAMILY MEDICINE

## 2023-04-24 PROCEDURE — 1159F PR MEDICATION LIST DOCUMENTED IN MEDICAL RECORD: ICD-10-PCS | Mod: HCNC,CPTII,S$GLB, | Performed by: FAMILY MEDICINE

## 2023-04-24 PROCEDURE — 3079F PR MOST RECENT DIASTOLIC BLOOD PRESSURE 80-89 MM HG: ICD-10-PCS | Mod: HCNC,CPTII,S$GLB, | Performed by: FAMILY MEDICINE

## 2023-04-24 PROCEDURE — 4010F ACE/ARB THERAPY RXD/TAKEN: CPT | Mod: HCNC,CPTII,S$GLB, | Performed by: FAMILY MEDICINE

## 2023-04-24 PROCEDURE — 99214 OFFICE O/P EST MOD 30 MIN: CPT | Mod: HCNC,S$GLB,, | Performed by: FAMILY MEDICINE

## 2023-04-24 PROCEDURE — 99214 PR OFFICE/OUTPT VISIT, EST, LEVL IV, 30-39 MIN: ICD-10-PCS | Mod: HCNC,S$GLB,, | Performed by: FAMILY MEDICINE

## 2023-04-24 PROCEDURE — 1125F AMNT PAIN NOTED PAIN PRSNT: CPT | Mod: HCNC,CPTII,S$GLB, | Performed by: FAMILY MEDICINE

## 2023-04-24 PROCEDURE — 1125F PR PAIN SEVERITY QUANTIFIED, PAIN PRESENT: ICD-10-PCS | Mod: HCNC,CPTII,S$GLB, | Performed by: FAMILY MEDICINE

## 2023-04-24 RX ORDER — FLUTICASONE PROPIONATE AND SALMETEROL 250; 50 UG/1; UG/1
1 POWDER RESPIRATORY (INHALATION) 2 TIMES DAILY
Qty: 60 EACH | Refills: 5 | Status: SHIPPED | OUTPATIENT
Start: 2023-04-24 | End: 2023-07-24

## 2023-04-24 NOTE — PROGRESS NOTES
Subjective:       Patient ID: Fred Blackwell is a 67 y.o. male.    Chief Complaint: URI (Follow up)    Here for follow up multiple chronic medical issues. Doing well overall and in normal state of health.  Uri treated last week and  resolving.      URI   This is a new problem. The current episode started 1 to 4 weeks ago. The problem has been gradually improving. Pertinent negatives include no chest pain or coughing.   Review of Systems   Constitutional:  Negative for chills and fever.   Respiratory:  Negative for cough, chest tightness and shortness of breath.    Cardiovascular:  Negative for chest pain, palpitations and leg swelling.   Endocrine: Negative for cold intolerance and heat intolerance.   Musculoskeletal:  Positive for back pain.   Psychiatric/Behavioral:  Negative for decreased concentration. The patient is nervous/anxious.      Objective:      Physical Exam  Vitals and nursing note reviewed.   Constitutional:       Appearance: He is well-developed.   HENT:      Head: Normocephalic and atraumatic.   Cardiovascular:      Rate and Rhythm: Normal rate and regular rhythm.      Heart sounds: Normal heart sounds.   Pulmonary:      Effort: Pulmonary effort is normal.      Breath sounds: Normal breath sounds.       Assessment:       1. Type 2 diabetes mellitus with other specified complication, without long-term current use of insulin    2. Essential hypertension    3. SHANNAN (generalized anxiety disorder)    4. Radicular pain of lumbosacral region    5. Current moderate episode of major depressive disorder without prior episode    6. Chronic obstructive pulmonary disease, unspecified COPD type        Plan:       Type 2 diabetes mellitus with other specified complication, without long-term current use of insulin  -     CBC Without Differential; Future; Expected date: 04/24/2023  -     Comprehensive Metabolic Panel; Future; Expected date: 04/24/2023  -     Hemoglobin A1C; Future; Expected date:  04/24/2023  -     Lipid Panel; Future; Expected date: 04/24/2023  -     Microalbumin/Creatinine Ratio, Urine; Future; Expected date: 04/24/2023  -     TSH; Future; Expected date: 04/24/2023    Essential hypertension  -     CBC Without Differential; Future; Expected date: 04/24/2023  -     Comprehensive Metabolic Panel; Future; Expected date: 04/24/2023  -     Hemoglobin A1C; Future; Expected date: 04/24/2023  -     Lipid Panel; Future; Expected date: 04/24/2023  -     Microalbumin/Creatinine Ratio, Urine; Future; Expected date: 04/24/2023  -     TSH; Future; Expected date: 04/24/2023    SHANNAN (generalized anxiety disorder)    Radicular pain of lumbosacral region    Current moderate episode of major depressive disorder without prior episode    Chronic obstructive pulmonary disease, unspecified COPD type    Other orders  -     fluticasone-salmeterol diskus inhaler 250-50 mcg; Inhale 1 puff into the lungs 2 (two) times daily. Controller  Dispense: 60 each; Refill: 5        Ok in  database  Update labs before f/u  Resolving copd exacerbation  Not smoking  Htn stable  Diabetes stable  Mood stable with goal to wean   Prn pain med tolerated with no signs abuse  Will monitor chronic medical issues and continue current plan of care.  Mood stable  Update covid vaccine/booster    Follow up in about 3 months (around 7/24/2023), or if symptoms worsen or fail to improve, for Virtual Visit.

## 2023-05-04 NOTE — TELEPHONE ENCOUNTER
done   Bactrim Pregnancy And Lactation Text: This medication is Pregnancy Category D and is known to cause fetal risk.  It is also excreted in breast milk.

## 2023-05-16 DIAGNOSIS — M51.36 DEGENERATIVE DISC DISEASE, LUMBAR: ICD-10-CM

## 2023-05-16 RX ORDER — CITALOPRAM 10 MG/1
10 TABLET ORAL DAILY
Qty: 90 TABLET | Refills: 2 | Status: SHIPPED | OUTPATIENT
Start: 2023-05-16

## 2023-05-16 RX ORDER — HYDROCODONE BITARTRATE AND ACETAMINOPHEN 10; 325 MG/1; MG/1
1 TABLET ORAL 3 TIMES DAILY PRN
Qty: 80 TABLET | Refills: 0 | Status: SHIPPED | OUTPATIENT
Start: 2023-05-16 | End: 2023-06-14 | Stop reason: SDUPTHER

## 2023-05-16 NOTE — TELEPHONE ENCOUNTER
No care due was identified.  [facility] Embedded Care Due Messages. Reference number: [id number].   5/16/2023 10:26:04 AM CDT

## 2023-05-16 NOTE — TELEPHONE ENCOUNTER
No care due was identified.  [facility] Embedded Care Due Messages. Reference number: [id number].   5/16/2023 8:16:24 AM CDT

## 2023-05-16 NOTE — TELEPHONE ENCOUNTER
Refill Routing Note   Medication(s) are not appropriate for processing by [facility] for the following reason(s):      Drug-disease interaction    [facility] action(s):  Defer None identified   Medication Therapy Plan: Drug-Disease: citalopram and NSTEMI (non-ST elevated myocardial infarction)      Appointments  past 12m or future 3m with PCP    Date Provider   Last Visit   4/24/2023 MARITZA Bahena MD   Next Visit   7/24/2023 MARITZA Bahena MD   ED visits in past 90 days: 0        Note composed:10:33 AM 05/16/2023

## 2023-05-18 ENCOUNTER — TELEPHONE (OUTPATIENT)
Dept: FAMILY MEDICINE | Facility: CLINIC | Age: 68
End: 2023-05-18
Payer: MEDICARE

## 2023-06-09 ENCOUNTER — TELEPHONE (OUTPATIENT)
Dept: CARDIOLOGY | Facility: CLINIC | Age: 68
End: 2023-06-09
Payer: MEDICARE

## 2023-06-09 NOTE — TELEPHONE ENCOUNTER
----- Message from Olga Malik sent at 6/9/2023  8:31 AM CDT -----  Regarding: return call  Contact: Patient  Type:  Patient Returning Call    Who Called:  Patient   Who Left Message for Patient:    Does the patient know what this is regarding?:    Best Call Back Number:  [phone] (home)     Additional Information:  Patient stated that he missed a call from nurse and was returning the phone call. Please contact patient to advise. Thanks!

## 2023-06-14 DIAGNOSIS — M51.36 DEGENERATIVE DISC DISEASE, LUMBAR: ICD-10-CM

## 2023-06-14 RX ORDER — HYDROCODONE BITARTRATE AND ACETAMINOPHEN 10; 325 MG/1; MG/1
1 TABLET ORAL 3 TIMES DAILY PRN
Qty: 80 TABLET | Refills: 0 | Status: SHIPPED | OUTPATIENT
Start: 2023-06-14 | End: 2023-07-13 | Stop reason: SDUPTHER

## 2023-06-14 NOTE — TELEPHONE ENCOUNTER
No care due was identified.  [facility] Embedded Care Due Messages. Reference number: [id number].   6/14/2023 9:16:31 AM CDT

## 2023-06-26 ENCOUNTER — LAB VISIT (OUTPATIENT)
Dept: LAB | Facility: HOSPITAL | Age: 68
End: 2023-06-26
Attending: FAMILY MEDICINE
Payer: MEDICARE

## 2023-06-26 DIAGNOSIS — I10 ESSENTIAL HYPERTENSION: ICD-10-CM

## 2023-06-26 DIAGNOSIS — E11.69 TYPE 2 DIABETES MELLITUS WITH OTHER SPECIFIED COMPLICATION, WITHOUT LONG-TERM CURRENT USE OF INSULIN: ICD-10-CM

## 2023-07-13 DIAGNOSIS — M51.36 DEGENERATIVE DISC DISEASE, LUMBAR: ICD-10-CM

## 2023-07-13 NOTE — TELEPHONE ENCOUNTER
No care due was identified.  [facility] Embedded Care Due Messages. Reference number: [id number].   7/13/2023 11:00:30 AM CDT

## 2023-07-14 DIAGNOSIS — F41.1 GAD (GENERALIZED ANXIETY DISORDER): ICD-10-CM

## 2023-07-14 RX ORDER — ALPRAZOLAM 1 MG/1
TABLET ORAL
Qty: 90 TABLET | Refills: 2 | Status: SHIPPED | OUTPATIENT
Start: 2023-07-14 | End: 2023-07-24 | Stop reason: SDUPTHER

## 2023-07-14 RX ORDER — HYDROCODONE BITARTRATE AND ACETAMINOPHEN 10; 325 MG/1; MG/1
1 TABLET ORAL 3 TIMES DAILY PRN
Qty: 80 TABLET | Refills: 0 | Status: SHIPPED | OUTPATIENT
Start: 2023-07-14 | End: 2023-09-07 | Stop reason: SDUPTHER

## 2023-07-14 RX ORDER — ALPRAZOLAM 1 MG/1
TABLET ORAL
Qty: 90 TABLET | Refills: 2 | OUTPATIENT
Start: 2023-07-14

## 2023-07-14 NOTE — TELEPHONE ENCOUNTER
----- Message from Lacy Diane sent at 7/14/2023 10:07 AM CDT -----  Type:  RX Refill Request    Who Called:  pt   Refill or New Rx:  refill  RX Name and Strength:   ALPRAZolam (XANAX) 1 MG tablet  How is the patient currently taking it? (ex. 1XDay):  as directed   Is this a 30 day or 90 day RX:  90  Preferred Pharmacy with phone number:    [facility] - [place], [place] - [facility] [address] [facility]  [address] [place] [address]  Phone: [phone] Fax: [phone]      Local or Mail Order:  local   Ordering Provider:  kj Spear Call Back Number:  [phone] (home)     Additional Information:  requesting a call back , pt is at the pharmacy right now please advise thank you

## 2023-07-14 NOTE — TELEPHONE ENCOUNTER
No care due was identified.  [facility] Embedded Care Due Messages. Reference number: [id number].   7/14/2023 9:33:44 AM CDT

## 2023-07-24 ENCOUNTER — OFFICE VISIT (OUTPATIENT)
Dept: FAMILY MEDICINE | Facility: CLINIC | Age: 68
End: 2023-07-24
Payer: MEDICARE

## 2023-07-24 VITALS
DIASTOLIC BLOOD PRESSURE: 82 MMHG | HEIGHT: 72 IN | WEIGHT: 237.19 LBS | BODY MASS INDEX: 32.13 KG/M2 | HEART RATE: 62 BPM | OXYGEN SATURATION: 97 % | SYSTOLIC BLOOD PRESSURE: 138 MMHG

## 2023-07-24 DIAGNOSIS — I10 ESSENTIAL HYPERTENSION: Primary | ICD-10-CM

## 2023-07-24 DIAGNOSIS — E78.00 PURE HYPERCHOLESTEROLEMIA: ICD-10-CM

## 2023-07-24 DIAGNOSIS — E11.69 TYPE 2 DIABETES MELLITUS WITH OTHER SPECIFIED COMPLICATION, WITHOUT LONG-TERM CURRENT USE OF INSULIN: ICD-10-CM

## 2023-07-24 DIAGNOSIS — F41.1 GAD (GENERALIZED ANXIETY DISORDER): ICD-10-CM

## 2023-07-24 DIAGNOSIS — Z12.5 SCREENING FOR MALIGNANT NEOPLASM OF PROSTATE: ICD-10-CM

## 2023-07-24 PROBLEM — I21.4 NSTEMI (NON-ST ELEVATED MYOCARDIAL INFARCTION): Status: RESOLVED | Noted: 2020-06-01 | Resolved: 2023-07-24

## 2023-07-24 PROCEDURE — 3044F HG A1C LEVEL LT 7.0%: CPT | Mod: HCNC,CPTII,S$GLB, | Performed by: FAMILY MEDICINE

## 2023-07-24 PROCEDURE — 4010F PR ACE/ARB THEARPY RXD/TAKEN: ICD-10-PCS | Mod: HCNC,CPTII,S$GLB, | Performed by: FAMILY MEDICINE

## 2023-07-24 PROCEDURE — 99397 PR PREVENTIVE VISIT,EST,65 & OVER: ICD-10-PCS | Mod: HCNC,S$GLB,, | Performed by: FAMILY MEDICINE

## 2023-07-24 PROCEDURE — 99397 PER PM REEVAL EST PAT 65+ YR: CPT | Mod: HCNC,S$GLB,, | Performed by: FAMILY MEDICINE

## 2023-07-24 PROCEDURE — 1126F AMNT PAIN NOTED NONE PRSNT: CPT | Mod: HCNC,CPTII,S$GLB, | Performed by: FAMILY MEDICINE

## 2023-07-24 PROCEDURE — 3079F DIAST BP 80-89 MM HG: CPT | Mod: HCNC,CPTII,S$GLB, | Performed by: FAMILY MEDICINE

## 2023-07-24 PROCEDURE — 4010F ACE/ARB THERAPY RXD/TAKEN: CPT | Mod: HCNC,CPTII,S$GLB, | Performed by: FAMILY MEDICINE

## 2023-07-24 PROCEDURE — 1126F PR PAIN SEVERITY QUANTIFIED, NO PAIN PRESENT: ICD-10-PCS | Mod: HCNC,CPTII,S$GLB, | Performed by: FAMILY MEDICINE

## 2023-07-24 PROCEDURE — 99999 PR PBB SHADOW E&M-EST. PATIENT-LVL III: ICD-10-PCS | Mod: PBBFAC,HCNC,, | Performed by: FAMILY MEDICINE

## 2023-07-24 PROCEDURE — 1101F PR PT FALLS ASSESS DOC 0-1 FALLS W/OUT INJ PAST YR: ICD-10-PCS | Mod: HCNC,CPTII,S$GLB, | Performed by: FAMILY MEDICINE

## 2023-07-24 PROCEDURE — 3008F PR BODY MASS INDEX (BMI) DOCUMENTED: ICD-10-PCS | Mod: HCNC,CPTII,S$GLB, | Performed by: FAMILY MEDICINE

## 2023-07-24 PROCEDURE — 3079F PR MOST RECENT DIASTOLIC BLOOD PRESSURE 80-89 MM HG: ICD-10-PCS | Mod: HCNC,CPTII,S$GLB, | Performed by: FAMILY MEDICINE

## 2023-07-24 PROCEDURE — 3288F FALL RISK ASSESSMENT DOCD: CPT | Mod: HCNC,CPTII,S$GLB, | Performed by: FAMILY MEDICINE

## 2023-07-24 PROCEDURE — 3075F PR MOST RECENT SYSTOLIC BLOOD PRESS GE 130-139MM HG: ICD-10-PCS | Mod: HCNC,CPTII,S$GLB, | Performed by: FAMILY MEDICINE

## 2023-07-24 PROCEDURE — 3008F BODY MASS INDEX DOCD: CPT | Mod: HCNC,CPTII,S$GLB, | Performed by: FAMILY MEDICINE

## 2023-07-24 PROCEDURE — 99999 PR PBB SHADOW E&M-EST. PATIENT-LVL III: CPT | Mod: PBBFAC,HCNC,, | Performed by: FAMILY MEDICINE

## 2023-07-24 PROCEDURE — 3044F PR MOST RECENT HEMOGLOBIN A1C LEVEL <7.0%: ICD-10-PCS | Mod: HCNC,CPTII,S$GLB, | Performed by: FAMILY MEDICINE

## 2023-07-24 PROCEDURE — 3075F SYST BP GE 130 - 139MM HG: CPT | Mod: HCNC,CPTII,S$GLB, | Performed by: FAMILY MEDICINE

## 2023-07-24 PROCEDURE — 1101F PT FALLS ASSESS-DOCD LE1/YR: CPT | Mod: HCNC,CPTII,S$GLB, | Performed by: FAMILY MEDICINE

## 2023-07-24 PROCEDURE — 3288F PR FALLS RISK ASSESSMENT DOCUMENTED: ICD-10-PCS | Mod: HCNC,CPTII,S$GLB, | Performed by: FAMILY MEDICINE

## 2023-07-24 RX ORDER — ALPRAZOLAM 1 MG/1
TABLET ORAL
Qty: 90 TABLET | Refills: 2 | Status: SHIPPED | OUTPATIENT
Start: 2023-07-24 | End: 2023-10-02 | Stop reason: SDUPTHER

## 2023-07-24 RX ORDER — LOSARTAN POTASSIUM 100 MG/1
100 TABLET ORAL
COMMUNITY
Start: 2023-06-14 | End: 2023-08-15 | Stop reason: ALTCHOICE

## 2023-07-24 NOTE — PROGRESS NOTES
Subjective:       Patient ID: Fred Blackwell is a 67 y.o. male.    Chief Complaint: Follow-up (3 month)    Here for wellness and follow up multiple chronic medical issues. Doing well overall and in normal state of health.      Follow-up  Pertinent negatives include no chest pain, chills, coughing or fever.   Review of Systems   Constitutional:  Negative for chills and fever.   Respiratory:  Negative for cough, chest tightness and shortness of breath.    Cardiovascular:  Negative for chest pain, palpitations and leg swelling.   Endocrine: Negative for cold intolerance and heat intolerance.   Musculoskeletal:  Positive for back pain.   Psychiatric/Behavioral:  Negative for decreased concentration. The patient is nervous/anxious.      Objective:      Physical Exam  Vitals and nursing note reviewed.   Constitutional:       Appearance: He is well-developed.   HENT:      Head: Normocephalic and atraumatic.   Cardiovascular:      Rate and Rhythm: Normal rate and regular rhythm.      Heart sounds: Normal heart sounds.   Pulmonary:      Effort: Pulmonary effort is normal.      Breath sounds: Normal breath sounds.       Assessment:       1. Essential hypertension    2. Type 2 diabetes mellitus with other specified complication, without long-term current use of insulin    3. Screening for malignant neoplasm of prostate    4. Pure hypercholesterolemia    5. SHANNAN (generalized anxiety disorder)        Plan:       Essential hypertension    Type 2 diabetes mellitus with other specified complication, without long-term current use of insulin  -     CBC Without Differential; Future; Expected date: 07/24/2023  -     Comprehensive Metabolic Panel; Future; Expected date: 07/24/2023  -     Hemoglobin A1C; Future; Expected date: 07/24/2023  -     Lipid Panel; Future; Expected date: 07/24/2023  -     Microalbumin/Creatinine Ratio, Urine; Future; Expected date: 07/24/2023  -     TSH; Future; Expected date: 07/24/2023  -      Ambulatory referral/consult to Podiatry; Future; Expected date: 07/31/2023  -     Ambulatory referral/consult to Optometry; Future; Expected date: 07/31/2023    Screening for malignant neoplasm of prostate  -     PSA, Screening; Future; Expected date: 07/24/2023    Pure hypercholesterolemia    SHANNAN (generalized anxiety disorder)  -     ALPRAZolam (XANAX) 1 MG tablet; TAKE 1 TABLET (1 MG TOTAL) BY MOUTH THREE TIMES DAILY AS NEEDED FOR ANXIETY.  Dispense: 90 tablet; Refill: 2      Mood stable with prn med; goal to wean  Eating better and hga1c at goal; off metformin  Htn stable  Lipid stable but monitor  Labs before f/u  Will monitor chronic medical issues and continue current plan of care.      Follow up in about 3 months (around 10/24/2023), or if symptoms worsen or fail to improve, for Virtual Visit.

## 2023-08-07 DIAGNOSIS — E78.00 PURE HYPERCHOLESTEROLEMIA: Chronic | ICD-10-CM

## 2023-08-08 RX ORDER — CARVEDILOL 3.12 MG/1
3.12 TABLET ORAL 2 TIMES DAILY WITH MEALS
Qty: 180 TABLET | Refills: 0 | Status: SHIPPED | OUTPATIENT
Start: 2023-08-08 | End: 2023-11-11

## 2023-08-08 RX ORDER — EZETIMIBE 10 MG/1
10 TABLET ORAL DAILY
Qty: 90 TABLET | Refills: 0 | Status: SHIPPED | OUTPATIENT
Start: 2023-08-08 | End: 2023-11-11

## 2023-08-08 RX ORDER — ATORVASTATIN CALCIUM 80 MG/1
80 TABLET, FILM COATED ORAL DAILY
Qty: 90 TABLET | Refills: 1 | Status: SHIPPED | OUTPATIENT
Start: 2023-08-08

## 2023-08-10 ENCOUNTER — PATIENT MESSAGE (OUTPATIENT)
Dept: CARDIOLOGY | Facility: CLINIC | Age: 68
End: 2023-08-10
Payer: MEDICARE

## 2023-08-11 ENCOUNTER — TELEPHONE (OUTPATIENT)
Dept: NEUROLOGY | Facility: CLINIC | Age: 68
End: 2023-08-11
Payer: MEDICARE

## 2023-08-15 ENCOUNTER — OFFICE VISIT (OUTPATIENT)
Dept: CARDIOLOGY | Facility: CLINIC | Age: 68
End: 2023-08-15
Payer: MEDICARE

## 2023-08-15 VITALS
HEART RATE: 58 BPM | DIASTOLIC BLOOD PRESSURE: 92 MMHG | SYSTOLIC BLOOD PRESSURE: 164 MMHG | WEIGHT: 234.56 LBS | HEIGHT: 72 IN | BODY MASS INDEX: 31.77 KG/M2 | RESPIRATION RATE: 18 BRPM

## 2023-08-15 DIAGNOSIS — E66.9 OBESITY, CLASS I, BMI 30-34.9: Chronic | ICD-10-CM

## 2023-08-15 DIAGNOSIS — I70.0 AORTOILIAC STENOSIS: Chronic | ICD-10-CM

## 2023-08-15 DIAGNOSIS — R00.1 BRADYCARDIA: ICD-10-CM

## 2023-08-15 DIAGNOSIS — I63.49 CEREBROVASCULAR ACCIDENT (CVA) DUE TO EMBOLISM OF OTHER CEREBRAL ARTERY: ICD-10-CM

## 2023-08-15 DIAGNOSIS — I77.9 MILD CAROTID ARTERY DISEASE: Chronic | ICD-10-CM

## 2023-08-15 DIAGNOSIS — Z91.148 NON COMPLIANCE W MEDICATION REGIMEN: Chronic | ICD-10-CM

## 2023-08-15 DIAGNOSIS — I25.10 CORONARY ARTERY DISEASE INVOLVING NATIVE CORONARY ARTERY OF NATIVE HEART WITHOUT ANGINA PECTORIS: Primary | Chronic | ICD-10-CM

## 2023-08-15 DIAGNOSIS — I10 UNCONTROLLED HYPERTENSION: Chronic | ICD-10-CM

## 2023-08-15 PROBLEM — I63.9 CEREBROVASCULAR ACCIDENT (CVA) DUE TO EMBOLISM: Status: ACTIVE | Noted: 2023-08-15

## 2023-08-15 PROCEDURE — 3008F BODY MASS INDEX DOCD: CPT | Mod: HCNC,CPTII,S$GLB, | Performed by: INTERNAL MEDICINE

## 2023-08-15 PROCEDURE — 3044F PR MOST RECENT HEMOGLOBIN A1C LEVEL <7.0%: ICD-10-PCS | Mod: HCNC,CPTII,S$GLB, | Performed by: INTERNAL MEDICINE

## 2023-08-15 PROCEDURE — 1126F PR PAIN SEVERITY QUANTIFIED, NO PAIN PRESENT: ICD-10-PCS | Mod: HCNC,CPTII,S$GLB, | Performed by: INTERNAL MEDICINE

## 2023-08-15 PROCEDURE — 99214 OFFICE O/P EST MOD 30 MIN: CPT | Mod: HCNC,S$GLB,, | Performed by: INTERNAL MEDICINE

## 2023-08-15 PROCEDURE — 3077F SYST BP >= 140 MM HG: CPT | Mod: HCNC,CPTII,S$GLB, | Performed by: INTERNAL MEDICINE

## 2023-08-15 PROCEDURE — 3077F PR MOST RECENT SYSTOLIC BLOOD PRESSURE >= 140 MM HG: ICD-10-PCS | Mod: HCNC,CPTII,S$GLB, | Performed by: INTERNAL MEDICINE

## 2023-08-15 PROCEDURE — 3080F PR MOST RECENT DIASTOLIC BLOOD PRESSURE >= 90 MM HG: ICD-10-PCS | Mod: HCNC,CPTII,S$GLB, | Performed by: INTERNAL MEDICINE

## 2023-08-15 PROCEDURE — 99999 PR PBB SHADOW E&M-EST. PATIENT-LVL III: ICD-10-PCS | Mod: PBBFAC,HCNC,, | Performed by: INTERNAL MEDICINE

## 2023-08-15 PROCEDURE — 3288F PR FALLS RISK ASSESSMENT DOCUMENTED: ICD-10-PCS | Mod: HCNC,CPTII,S$GLB, | Performed by: INTERNAL MEDICINE

## 2023-08-15 PROCEDURE — 1159F MED LIST DOCD IN RCRD: CPT | Mod: HCNC,CPTII,S$GLB, | Performed by: INTERNAL MEDICINE

## 2023-08-15 PROCEDURE — 3008F PR BODY MASS INDEX (BMI) DOCUMENTED: ICD-10-PCS | Mod: HCNC,CPTII,S$GLB, | Performed by: INTERNAL MEDICINE

## 2023-08-15 PROCEDURE — 3080F DIAST BP >= 90 MM HG: CPT | Mod: HCNC,CPTII,S$GLB, | Performed by: INTERNAL MEDICINE

## 2023-08-15 PROCEDURE — 99214 PR OFFICE/OUTPT VISIT, EST, LEVL IV, 30-39 MIN: ICD-10-PCS | Mod: HCNC,S$GLB,, | Performed by: INTERNAL MEDICINE

## 2023-08-15 PROCEDURE — 1101F PT FALLS ASSESS-DOCD LE1/YR: CPT | Mod: HCNC,CPTII,S$GLB, | Performed by: INTERNAL MEDICINE

## 2023-08-15 PROCEDURE — 3288F FALL RISK ASSESSMENT DOCD: CPT | Mod: HCNC,CPTII,S$GLB, | Performed by: INTERNAL MEDICINE

## 2023-08-15 PROCEDURE — 4010F ACE/ARB THERAPY RXD/TAKEN: CPT | Mod: HCNC,CPTII,S$GLB, | Performed by: INTERNAL MEDICINE

## 2023-08-15 PROCEDURE — 4010F PR ACE/ARB THEARPY RXD/TAKEN: ICD-10-PCS | Mod: HCNC,CPTII,S$GLB, | Performed by: INTERNAL MEDICINE

## 2023-08-15 PROCEDURE — 3044F HG A1C LEVEL LT 7.0%: CPT | Mod: HCNC,CPTII,S$GLB, | Performed by: INTERNAL MEDICINE

## 2023-08-15 PROCEDURE — 1159F PR MEDICATION LIST DOCUMENTED IN MEDICAL RECORD: ICD-10-PCS | Mod: HCNC,CPTII,S$GLB, | Performed by: INTERNAL MEDICINE

## 2023-08-15 PROCEDURE — 1101F PR PT FALLS ASSESS DOC 0-1 FALLS W/OUT INJ PAST YR: ICD-10-PCS | Mod: HCNC,CPTII,S$GLB, | Performed by: INTERNAL MEDICINE

## 2023-08-15 PROCEDURE — 1126F AMNT PAIN NOTED NONE PRSNT: CPT | Mod: HCNC,CPTII,S$GLB, | Performed by: INTERNAL MEDICINE

## 2023-08-15 PROCEDURE — 99999 PR PBB SHADOW E&M-EST. PATIENT-LVL III: CPT | Mod: PBBFAC,HCNC,, | Performed by: INTERNAL MEDICINE

## 2023-08-15 RX ORDER — APIXABAN 5 MG/1
5 TABLET, FILM COATED ORAL 2 TIMES DAILY
COMMUNITY
Start: 2023-08-03 | End: 2023-09-01

## 2023-08-15 RX ORDER — LISINOPRIL 10 MG/1
10 TABLET ORAL
COMMUNITY
Start: 2023-08-03 | End: 2023-08-15

## 2023-08-15 RX ORDER — OLMESARTAN MEDOXOMIL 40 MG/1
40 TABLET ORAL DAILY
Qty: 90 TABLET | Refills: 0 | Status: SHIPPED | OUTPATIENT
Start: 2023-08-15 | End: 2024-01-14 | Stop reason: CLARIF

## 2023-08-16 ENCOUNTER — OFFICE VISIT (OUTPATIENT)
Dept: NEUROLOGY | Facility: CLINIC | Age: 68
End: 2023-08-16
Payer: MEDICARE

## 2023-08-16 VITALS
WEIGHT: 232.69 LBS | HEART RATE: 54 BPM | RESPIRATION RATE: 18 BRPM | HEIGHT: 72 IN | BODY MASS INDEX: 31.52 KG/M2 | SYSTOLIC BLOOD PRESSURE: 144 MMHG | DIASTOLIC BLOOD PRESSURE: 93 MMHG

## 2023-08-16 DIAGNOSIS — Z72.0 TOBACCO USE: ICD-10-CM

## 2023-08-16 DIAGNOSIS — E66.9 OBESITY, CLASS I, BMI 30-34.9: ICD-10-CM

## 2023-08-16 DIAGNOSIS — Z91.148 NON COMPLIANCE W MEDICATION REGIMEN: ICD-10-CM

## 2023-08-16 DIAGNOSIS — I10 ESSENTIAL HYPERTENSION: ICD-10-CM

## 2023-08-16 DIAGNOSIS — E78.00 PURE HYPERCHOLESTEROLEMIA: ICD-10-CM

## 2023-08-16 DIAGNOSIS — I63.10 CEREBROVASCULAR ACCIDENT (CVA) DUE TO EMBOLISM OF PRECEREBRAL ARTERY: Primary | ICD-10-CM

## 2023-08-16 DIAGNOSIS — E11.69 TYPE 2 DIABETES MELLITUS WITH OTHER SPECIFIED COMPLICATION, WITHOUT LONG-TERM CURRENT USE OF INSULIN: ICD-10-CM

## 2023-08-16 DIAGNOSIS — I77.9 MILD CAROTID ARTERY DISEASE: ICD-10-CM

## 2023-08-16 PROCEDURE — 3288F PR FALLS RISK ASSESSMENT DOCUMENTED: ICD-10-PCS | Mod: HCNC,CPTII,S$GLB, | Performed by: NURSE PRACTITIONER

## 2023-08-16 PROCEDURE — 3008F BODY MASS INDEX DOCD: CPT | Mod: HCNC,CPTII,S$GLB, | Performed by: NURSE PRACTITIONER

## 2023-08-16 PROCEDURE — 3044F HG A1C LEVEL LT 7.0%: CPT | Mod: HCNC,CPTII,S$GLB, | Performed by: NURSE PRACTITIONER

## 2023-08-16 PROCEDURE — 4010F PR ACE/ARB THEARPY RXD/TAKEN: ICD-10-PCS | Mod: HCNC,CPTII,S$GLB, | Performed by: NURSE PRACTITIONER

## 2023-08-16 PROCEDURE — 4010F ACE/ARB THERAPY RXD/TAKEN: CPT | Mod: HCNC,CPTII,S$GLB, | Performed by: NURSE PRACTITIONER

## 2023-08-16 PROCEDURE — 99999 PR PBB SHADOW E&M-EST. PATIENT-LVL III: CPT | Mod: PBBFAC,HCNC,, | Performed by: NURSE PRACTITIONER

## 2023-08-16 PROCEDURE — 99203 OFFICE O/P NEW LOW 30 MIN: CPT | Mod: HCNC,S$GLB,, | Performed by: NURSE PRACTITIONER

## 2023-08-16 PROCEDURE — 3077F PR MOST RECENT SYSTOLIC BLOOD PRESSURE >= 140 MM HG: ICD-10-PCS | Mod: HCNC,CPTII,S$GLB, | Performed by: NURSE PRACTITIONER

## 2023-08-16 PROCEDURE — 1126F PR PAIN SEVERITY QUANTIFIED, NO PAIN PRESENT: ICD-10-PCS | Mod: HCNC,CPTII,S$GLB, | Performed by: NURSE PRACTITIONER

## 2023-08-16 PROCEDURE — 1126F AMNT PAIN NOTED NONE PRSNT: CPT | Mod: HCNC,CPTII,S$GLB, | Performed by: NURSE PRACTITIONER

## 2023-08-16 PROCEDURE — 3080F PR MOST RECENT DIASTOLIC BLOOD PRESSURE >= 90 MM HG: ICD-10-PCS | Mod: HCNC,CPTII,S$GLB, | Performed by: NURSE PRACTITIONER

## 2023-08-16 PROCEDURE — 3288F FALL RISK ASSESSMENT DOCD: CPT | Mod: HCNC,CPTII,S$GLB, | Performed by: NURSE PRACTITIONER

## 2023-08-16 PROCEDURE — 1101F PT FALLS ASSESS-DOCD LE1/YR: CPT | Mod: HCNC,CPTII,S$GLB, | Performed by: NURSE PRACTITIONER

## 2023-08-16 PROCEDURE — 3044F PR MOST RECENT HEMOGLOBIN A1C LEVEL <7.0%: ICD-10-PCS | Mod: HCNC,CPTII,S$GLB, | Performed by: NURSE PRACTITIONER

## 2023-08-16 PROCEDURE — 99203 PR OFFICE/OUTPT VISIT, NEW, LEVL III, 30-44 MIN: ICD-10-PCS | Mod: HCNC,S$GLB,, | Performed by: NURSE PRACTITIONER

## 2023-08-16 PROCEDURE — 1101F PR PT FALLS ASSESS DOC 0-1 FALLS W/OUT INJ PAST YR: ICD-10-PCS | Mod: HCNC,CPTII,S$GLB, | Performed by: NURSE PRACTITIONER

## 2023-08-16 PROCEDURE — 99999 PR PBB SHADOW E&M-EST. PATIENT-LVL III: ICD-10-PCS | Mod: PBBFAC,HCNC,, | Performed by: NURSE PRACTITIONER

## 2023-08-16 PROCEDURE — 3080F DIAST BP >= 90 MM HG: CPT | Mod: HCNC,CPTII,S$GLB, | Performed by: NURSE PRACTITIONER

## 2023-08-16 PROCEDURE — 3008F PR BODY MASS INDEX (BMI) DOCUMENTED: ICD-10-PCS | Mod: HCNC,CPTII,S$GLB, | Performed by: NURSE PRACTITIONER

## 2023-08-16 PROCEDURE — 3077F SYST BP >= 140 MM HG: CPT | Mod: HCNC,CPTII,S$GLB, | Performed by: NURSE PRACTITIONER

## 2023-08-16 NOTE — ASSESSMENT & PLAN NOTE
"Truly unclear if he had a CVA or not based on history and exam, states told that it was "possible"   Request records from hospitalization and add any additional stroke workup if warranted  Reviewed stroke RF at length with him - HTN, HLD, DM2, tobacco use  Labs from 6/2023 - , A1C 6.6  Discussed importance of compliance with medication regimen given multiple chronic illnesses / RF -- he verbalizes understanding  Continue HI statin for LDL < 70  Continue glycemic management with PCP  Dietary / lifestyle modifications   Unclear indication for DOAC. Event monitor pending. No hx of Afib or VTE reported. Continue for now...  Cardiology d/c Brilinta. Continues on ASA 81 mg daily.   BP above goal -- recent Rx changes   Continue to refrain from smoking!  CVA education provided    "

## 2023-08-16 NOTE — PATIENT INSTRUCTIONS
"Will obtain records from [facility] and add any additional testing if needed    Continue same medications for now    Continue not to smoke!     Will see you back for a follow up in 6 weeks     STROKE EDUCATION:    During a stroke, blood stops flowing to part of the brain. This can damage areas in the brain that control the rest of the body. Symptoms after a stroke depend on which part of the brain has been affected. A TIA is often called a "mini stroke" and can be a warning sign for future strokes.     Stroke Risk Factors:  - Previous stroke  - High blood pressure  - High cholesterol  - Cigarette/cigar smoking  - Diabetes  - Carotid or other artery disease  - Atrial fibrillation/flutter  - Obesity  - Blood clotting disorders  - Excessive alcohol use  - Street drug use  - Family history of stroke    Call 911 right away if you have any of the following symptoms of stroke:  Weakness, tingling, or loss of feeling on one side of your face or body  Sudden double vision or trouble seeing in one or both eyes  Sudden trouble talking or slurred speech  Trouble understanding others  Sudden, severe headache  Dizziness, loss of balance, or a sense of falling  Blackouts or seizures     F.A.S.T. is an easy way to remember the signs of stroke. When you see these signs, you know that you need to call 911 FAST!   F is for face drooping. One side of the face is drooping or numb. When the person smiles, the smile is uneven.   A is for arm weakness. One arm is weak or numb. When the person lifts both arms at the same time, one arm may drift downward.   S is for speech difficulty. You may notice slurred speech or trouble speaking. The person can't repeat a simple sentence correctly when asked.   T is for time to call 911. If someone shows any of these symptoms, even if they go away, call 911 immediately. Make note of the time the symptoms first appeared.          "

## 2023-08-16 NOTE — PROGRESS NOTES
NEUROLOGY  Outpatient Consultation Visit     [facility]  [address] [facility], [place] [address]  [phone] (office) / [phone] (fax)    Patient Name:  Fred Blackwell  :  1955  MR #:  [id number]  Acct #:  [id number]    Date of  Visit: 2023    Other Physicians:  MARITZA Bahena MD (Primary Care Physician)      CHIEF COMPLAINT: Stroke        HISTORY OF PRESENT ILLNESS:  Fred Blackwell is a 68 y.o. R-handed male seen in consultation for CVA per Self, Aaareferral    Medical history is significant for HLD, DM2, CAD w/stents, aortoiliac stenosis, anxiety, L Beard's Palsy, depression, lumbar DDD, GERD, fibromyalgia    Admitted to [facility] 23. Was out working in the yard and got overheated. Hands were clammy and shaky. Denies having any lateralized weakness, sensory change, HA, dizziness or vision change. Brother felt that speech was slurred and took him to the ED. [place] better shortly after resting.     Admits that he was non compliant with meds. Initially says 3 weeks, then says 3 months. Stopped taking all Rx, including ASA, BP agents, statin. Didn't think that he needed them bc he was feeling well.     Recalls having MRI and also a test with dye while admitted. Also had a heart test, but not sure about details. States that he was told that he possibly had a stroke. Per cardiology note, he was started on Eliquis and Brilinta upon dc. Cardiology d/c Brilinta yesterday. He continues on Eliquis and has resumed aspirin and statin.     Was smoking up until recent event, now quit. No ETOH abuse.     Saw Dr. Flaherty yesterday. Holter ordered. Brilinta stopped. No hx of arrythmia. Denies sx.     Denies KANDACE history or symptoms.     Denies prior CVA / TIA. No neuro sx since discharge.     Allergies:  Review of patient's allergies indicates:   Allergen Reactions    Cymbalta [duloxetine] Nausea And Vomiting    Penicillins      Other reaction(s): Hives       Current  Medications:  Current Outpatient Medications   Medication Sig Dispense Refill    albuterol-ipratropium (DUO-NEB) 2.5 mg-0.5 mg/3 mL nebulizer solution Take 3 mLs by nebulization every 6 (six) hours as needed for Wheezing. Rescue 75 mL 0    ALPRAZolam (XANAX) 1 MG tablet TAKE 1 TABLET (1 MG TOTAL) BY MOUTH THREE TIMES DAILY AS NEEDED FOR ANXIETY. 90 tablet 2    amLODIPine (NORVASC) 5 MG tablet Take 1 tablet (5 mg total) by mouth once daily. 90 tablet 1    atorvastatin (LIPITOR) 80 MG tablet TAKE 1 TABLET (80 MG TOTAL) BY MOUTH ONCE DAILY. 90 tablet 1    blood sugar diagnostic Strp To check BG 2 (two) times daily, to use with insurance preferred meter 200 strip 3    blood-glucose meter kit To check BG 2 (two) times daily, to use with insurance preferred meter 1 each 0    carvediloL (COREG) 3.125 MG tablet TAKE 1 TABLET (3.125 MG TOTAL) BY MOUTH 2 (TWO) TIMES DAILY WITH MEALS. 180 tablet 0    citalopram (CELEXA) 10 MG tablet TAKE 1 TABLET (10 MG TOTAL) BY MOUTH ONCE DAILY. 90 tablet 2    ELIQUIS 5 mg Tab Take 5 mg by mouth 2 (two) times daily.      ezetimibe (ZETIA) 10 mg tablet TAKE 1 TABLET (10 MG TOTAL) BY MOUTH ONCE DAILY. 90 tablet 0    fluticasone propionate (FLONASE) 50 mcg/actuation nasal spray USE 1 SPRAY IN EACH NOSTRIL ONCE DAILY. 48 g 2    HYDROcodone-acetaminophen (NORCO)  mg per tablet Take 1 tablet by mouth 3 (three) times daily as needed for Pain (medically necessary for greater than 7 days). 80 tablet 0    lancets Misc To check BG 2 (two) times daily, to use with insurance preferred meter 200 each 3    olmesartan (BENICAR) 40 MG tablet Take 1 tablet (40 mg total) by mouth once daily. 90 tablet 0    omeprazole (PRILOSEC) 20 MG capsule Take 1 capsule (20 mg total) by mouth once daily. 90 capsule 3    aspirin (ECOTRIN) 81 MG EC tablet Take 1 tablet (81 mg total) by mouth once daily.  0     No current facility-administered medications for this visit.       Past Medical History:  Past Medical History:    Diagnosis Date    Anxiety     Bell's palsy     Current moderate episode of major depressive disorder without prior episode 4/1/2019    DDD (degenerative disc disease), lumbar     Diabetes mellitus     Fibromyalgia     GERD (gastroesophageal reflux disease)     Hyperlipidemia     Hypertension     Sciatica        Past Surgical History:  Past Surgical History:   Procedure Laterality Date    COLONOSCOPY N/A 12/11/2019    Procedure: COLONOSCOPY;  Surgeon: Mich Mota MD;  Location: [facility] ENDO;  Service: Endoscopy;  Laterality: N/A;    CORONARY ANGIOGRAPHY N/A 6/2/2020    Procedure: ANGIOGRAM, CORONARY ARTERY;  Surgeon: Romario Flaherty MD;  Location: [facility] CATH;  Service: Cardiology;  Laterality: N/A;    LEFT HEART CATHETERIZATION Right 6/2/2020    Procedure: Left heart cath;  Surgeon: Romario Flaherty MD;  Location: [facility] CATH;  Service: Cardiology;  Laterality: Right;    SHOULDER SURGERY         Family History:  family history includes Arthritis in his mother.    Social History:   reports that he has quit smoking. His smoking use included cigarettes. He has never used smokeless tobacco. He reports that he does not drink alcohol and does not use drugs.      REVIEW OF SYSTEMS:  As per HPI    PHYSICAL EXAM:  BP (!) 144/93 (BP Location: Left arm, Patient Position: Sitting, BP Method: Small (Manual))   Pulse (!) 54   Resp 18   Ht 6' (1.829 m)   Wt 105.6 kg (232 lb 11.1 oz)   BMI 31.56 kg/m²     General: Well groomed. No acute distress.  Pulmonary: Normal effort and rate.   Musculoskeletal: No obvious joint deformities, moves all extremities well.  Extremities: No clubbing, cyanosis or edema.     Neurological exam:  Mental status: Awake and alert.  Oriented to person, place, time and situation. Recent and remote memory appear to be intact.  Fund of knowledge normal. Normal attention and concentration.   Speech/Language: Fluent and appropriate. No dysarthria or aphasia on conversation. Able to follow complex commands.    Cranial nerves (II-XII): Visual fields full. Pupils equal round and reactive to light, extraocular movements intact, mild L ptosis, no nystagmus. Facial sensation intact. L peripheral weakness present. Hearing grossly intact. Palate mobile and midline. Shoulder shrug normal bilaterally. Normal tongue protrusion.   Motor: 5 out of 5 strength throughout the upper and lower extremities bilaterally. Normal bulk and tone. No abnormal movements or drift.   Sensation: Intact to light touch and vibration bilaterally.    DTR: 2+ at the knees and biceps bilaterally.  Coordination: Finger-nose-finger testing intact bilaterally. No tremor.   Gait: Normal gait.    DIAGNOSTIC DATA:  I have personally reviewed provider notes, labs and imaging made available to me today.     Imaging:  CUS 6/2020:  Mild heterogeneous calcified plaque in the left internal carotid artery with no significant stenosis on either side.  Normal antegrade vertebral flow bilaterally.     Cardiac:  EKG 2020:  Sinus bradycardia with 1st degree A-V block     Labs:  Lab Results   Component Value Date    WBC 8.46 06/26/2023    HGB 15.9 06/26/2023    HCT 47.9 06/26/2023     06/26/2023    MCV 87 06/26/2023    RDW 13.9 06/26/2023     Lab Results   Component Value Date     06/26/2023    K 3.8 06/26/2023     06/26/2023    CO2 24 06/26/2023    BUN 11 06/26/2023    CREATININE 0.8 06/26/2023     (H) 06/26/2023    CALCIUM 9.4 06/26/2023     Lab Results   Component Value Date    PROT 7.3 06/26/2023    ALBUMIN 4.0 06/26/2023    BILITOT 0.9 06/26/2023    AST 15 06/26/2023    ALKPHOS 59 06/26/2023    ALT 13 06/26/2023     Lab Results   Component Value Date    INR 1.0 06/01/2020     Lab Results   Component Value Date    CHOL 217 (H) 06/26/2023    HDL 49 06/26/2023    LDLCALC 150.4 06/26/2023    TRIG 88 06/26/2023    CHOLHDL 22.6 06/26/2023     Lab Results   Component Value Date    HGBA1C 6.6 (H) 06/26/2023      Lab Results   Component Value Date     "PCCKGBYA13 282 03/02/2011     No results found for: "FOLATE"  Lab Results   Component Value Date    TSH 2.495 06/26/2023         ASSESSMENT & PLAN:  Fred Blackwell is a 68 y.o. R-handed male seen in consultation for CVA.     Problem List Items Addressed This Visit          Neuro    Cerebrovascular accident (CVA) due to embolism - Primary    Overview     Treated at [facility], no records available  No clinical deficits          Current Assessment & Plan     Truly unclear if he had a CVA or not based on history and exam, states told that it was "possible"   Request records from hospitalization and add any additional stroke workup if warranted  Reviewed stroke RF at length with him - HTN, HLD, DM2, tobacco use  Labs from 6/2023 - , A1C 6.6  Discussed importance of compliance with medication regimen given multiple chronic illnesses / RF -- he verbalizes understanding  Continue HI statin for LDL < 70  Continue glycemic management with PCP  Dietary / lifestyle modifications   Unclear indication for DOAC. Event monitor pending. No hx of Afib or VTE reported. Continue for now...  Cardiology d/c Brilinta. Continues on ASA 81 mg daily.   BP above goal -- recent Rx changes   Continue to refrain from smoking!  CVA education provided              Cardiac/Vascular    Essential hypertension    Overview     RF uniretic.           Pure hypercholesterolemia    Mild carotid artery disease       Endocrine    Type 2 diabetes mellitus with other specified complication, without long-term current use of insulin    Obesity, Class I, BMI 30-34.9       Palliative Care    Non compliance w medication regimen       Other    Tobacco use         Follow up: 6 weeks, ok for VV     I spent a total of 30 minutes on the day of the visit.    This includes face to face time with the patient, as well as non-face to face time preparing for and completing the visit (review of prior diagnostic testing and clinical notes, obtaining or reviewing " history, documenting clinical information in the EMR, independently interpreting and communicating results to the patient/family and coordinating ongoing care).       I appreciate the opportunity to participate in the care of this patient. Please feel free to contact me with any concerns or questions.       Xochitl Macias, ACNPC-AG  [facility] [address] [place] [address]

## 2023-08-18 ENCOUNTER — TELEPHONE (OUTPATIENT)
Dept: NEUROLOGY | Facility: CLINIC | Age: 68
End: 2023-08-18
Payer: MEDICARE

## 2023-08-23 ENCOUNTER — DOCUMENTATION ONLY (OUTPATIENT)
Dept: NEUROLOGY | Facility: CLINIC | Age: 68
End: 2023-08-23
Payer: MEDICARE

## 2023-08-23 NOTE — PROGRESS NOTES
Records from admission to [facility] reviewed:    H&P indicates that family reported onset of slurred speech night PTA. He also indicated paresthesias in the R hand for 3 days PTA. Speech normal upon arrival to ED. Evaluated by MD Karlie via telestroke. No TNK. BP 200s/100s upon arrival. Reported non compliance with Rx, including baseline Brilinta and ASA.      UDS + for benzos        CTA head & neck 8/1/23:  R M1 occlusion/stenosis without flow I the M2 and subsequent branches of the MCA circulation    MRI brain wo 8/1/23:  Multiple small areas of restricted diffusion in the posterior left parietal lobe and subcortical white matter     TTE 8/2/23:  EF 60%  Negative bubble study  LA size upper limits of normal     MICHEAL 8/2/23:  No LA or ALEXA thrombus  Negative bubble study     Cardiology note indicates several episodes of 2:1 AV block during sleep, as well as a several second pause during MICHEAL. PPM insertion recommended, but pt deferred    Evaluated by MD Fred inpatient  - recommended changing Brilinta to Plavix to improve compliance  - Placed on DOAC for presumed embolic strokes

## 2023-08-29 ENCOUNTER — CLINICAL SUPPORT (OUTPATIENT)
Dept: CARDIOLOGY | Facility: HOSPITAL | Age: 68
End: 2023-08-29
Attending: INTERNAL MEDICINE
Payer: MEDICARE

## 2023-08-29 DIAGNOSIS — I63.49 CEREBROVASCULAR ACCIDENT (CVA) DUE TO EMBOLISM OF OTHER CEREBRAL ARTERY: ICD-10-CM

## 2023-08-29 DIAGNOSIS — R00.1 BRADYCARDIA: ICD-10-CM

## 2023-08-29 DIAGNOSIS — I10 UNCONTROLLED HYPERTENSION: Chronic | ICD-10-CM

## 2023-08-29 LAB
ABDOMINAL AORTA PROX EDV: 16 CM/S
ABDOMINAL AORTA PROX PSV: 90 CM/S
LEFT RENAL DIST DIAS: 24 CM/S
LEFT RENAL DIST SYS: 86 CM/S
LEFT RENAL MID DIAS: 27 CM/S
LEFT RENAL MID SYS: 88 CM/S
LEFT RENAL ORIGIN DIAS: 19 CM/S
LEFT RENAL ORIGIN SYS: 74 CM/S
LEFT RENAL PROX DIAS: 22 CM/S
LEFT RENAL PROX RAR: 0.93
LEFT RENAL PROX SYS: 84 CM/S
LEFT RENAL ULTRASOUND ACCELERATION TIME MEASUREMENT 1: 49 MS
LEFT RENAL ULTRASOUND ACCELERATION TIME MEASUREMENT 2: 91 MS
LEFT RENAL ULTRASOUND ACCELERATION TIME MEASUREMENT 3: 79 MS
LEFT RENAL ULTRASOUND ACCELERATION TIME MEASUREMENT AVERAGE: 91 MS
LEFT RENAL ULTRASOUND KIDNEY SIZE MEASUREMENT 1: 12.95 CM
LEFT RENAL ULTRASOUND KIDNEY SIZE MEASUREMENT 2: 13.1 CM
LEFT RENAL ULTRASOUND KIDNEY SIZE MEASUREMENT 3: 13.06 CM
LEFT RENAL ULTRASOUND KIDNEY SIZE MEASUREMENT AVERAGE: 13.1 CM
LEFT RENAL ULTRASOUND RESISTIVE INDEX MEASUREMENT 1: 0.68
LEFT RENAL ULTRASOUND RESISTIVE INDEX MEASUREMENT 2: 0.68
LEFT RENAL ULTRASOUND RESISTIVE INDEX MEASUREMENT 3: 0.65
LEFT RENAL ULTRASOUND RESISTIVE INDEX MEASUREMENT AVERAGE: 0.68
OHS CV LEFT RENAL RAR: 0.98
OHS CV RIGHT RENAL RAR: 1.6
OHS CV US LEFT RENAL HIGHEST EDV: 27
OHS CV US LEFT RENAL HIGHEST PSV: 88
OHS CV US RIGHT RENAL HIGHEST EDV: 38
OHS CV US RIGHT RENAL HIGHEST PSV: 144
RIGHT RENAL DIST DIAS: 31 CM/S
RIGHT RENAL DIST SYS: 109 CM/S
RIGHT RENAL MID DIAS: 37 CM/S
RIGHT RENAL MID SYS: 111 CM/S
RIGHT RENAL ORIGIN DIAS: 25 CM/S
RIGHT RENAL ORIGIN SYS: 130 CM/S
RIGHT RENAL PROX DIAS: 38 CM/S
RIGHT RENAL PROX RAR: 1.6
RIGHT RENAL PROX SYS: 144 CM/S
RIGHT RENAL ULTRASOUND ACCELERATION TIME MEASUREMENT 1: 64 MS
RIGHT RENAL ULTRASOUND ACCELERATION TIME MEASUREMENT 2: 60 MS
RIGHT RENAL ULTRASOUND ACCELERATION TIME MEASUREMENT 3: 64 MS
RIGHT RENAL ULTRASOUND ACCELERATION TIME MEASUREMENT AVERAGE: 64 MS
RIGHT RENAL ULTRASOUND KIDNEY SIZE MEASUREMENT 1: 10.76 CM
RIGHT RENAL ULTRASOUND KIDNEY SIZE MEASUREMENT 2: 10.96 CM
RIGHT RENAL ULTRASOUND KIDNEY SIZE MEASUREMENT 3: 10.99 CM
RIGHT RENAL ULTRASOUND KIDNEY SIZE MEASUREMENT AVERAGE: 10.99 CM
RIGHT RENAL ULTRASOUND RESISTIVE INDEX MEASUREMENT 1: 0.64
RIGHT RENAL ULTRASOUND RESISTIVE INDEX MEASUREMENT 2: 0.69
RIGHT RENAL ULTRASOUND RESISTIVE INDEX MEASUREMENT 3: 0.65
RIGHT RENAL ULTRASOUND RESISTIVE INDEX MEASUREMENT AVERAGE: 0.69

## 2023-08-29 PROCEDURE — 93975 CV US RENAL ARTERY STENOSIS HYPERTENSION COMPLETE (CUPID ONLY): ICD-10-PCS | Mod: 26,HCNC,, | Performed by: INTERNAL MEDICINE

## 2023-08-29 PROCEDURE — 93975 VASCULAR STUDY: CPT | Mod: 26,HCNC,, | Performed by: INTERNAL MEDICINE

## 2023-08-29 PROCEDURE — 93272 ECG/REVIEW INTERPRET ONLY: CPT | Mod: HCNC,,, | Performed by: INTERNAL MEDICINE

## 2023-08-29 PROCEDURE — 93272 CARDIAC EVENT MONITOR (CUPID ONLY): ICD-10-PCS | Mod: HCNC,,, | Performed by: INTERNAL MEDICINE

## 2023-08-29 PROCEDURE — 93271 ECG/MONITORING AND ANALYSIS: CPT | Mod: HCNC,PO

## 2023-08-29 PROCEDURE — 93975 VASCULAR STUDY: CPT | Mod: HCNC,PO

## 2023-08-29 PROCEDURE — 93270 REMOTE 30 DAY ECG REV/REPORT: CPT | Mod: HCNC,PO

## 2023-09-01 RX ORDER — APIXABAN 5 MG/1
TABLET, FILM COATED ORAL
Qty: 60 TABLET | Refills: 1 | Status: SHIPPED | OUTPATIENT
Start: 2023-09-01 | End: 2023-11-11

## 2023-09-07 DIAGNOSIS — M51.36 DEGENERATIVE DISC DISEASE, LUMBAR: ICD-10-CM

## 2023-09-07 RX ORDER — HYDROCODONE BITARTRATE AND ACETAMINOPHEN 10; 325 MG/1; MG/1
1 TABLET ORAL 3 TIMES DAILY PRN
Qty: 80 TABLET | Refills: 0 | Status: CANCELLED | OUTPATIENT
Start: 2023-09-07

## 2023-09-07 NOTE — TELEPHONE ENCOUNTER
No care due was identified.  [facility] Embedded Care Due Messages. Reference number: [id number].   9/07/2023 12:40:28 PM CDT

## 2023-09-07 NOTE — TELEPHONE ENCOUNTER
No care due was identified.  [facility] Embedded Care Due Messages. Reference number: [id number].   9/07/2023 12:39:29 PM CDT

## 2023-09-08 RX ORDER — HYDROCODONE BITARTRATE AND ACETAMINOPHEN 10; 325 MG/1; MG/1
1 TABLET ORAL 3 TIMES DAILY PRN
Qty: 80 TABLET | Refills: 0 | Status: SHIPPED | OUTPATIENT
Start: 2023-09-08 | End: 2023-10-02 | Stop reason: SDUPTHER

## 2023-09-19 ENCOUNTER — TELEPHONE (OUTPATIENT)
Dept: FAMILY MEDICINE | Facility: CLINIC | Age: 68
End: 2023-09-19
Payer: MEDICARE

## 2023-09-19 NOTE — TELEPHONE ENCOUNTER
----- Message from Nilda Evangelista sent at 9/19/2023  1:37 PM CDT -----  Contact: Patient  Type: Needs Medical Advice    Who Called:  Patient  What is this regarding?:  Pt is looking to schedule his covid booster with the newest booster.  Best Call Back Number:  [phone]  Additional Information:  Please call the patient back at the phone number listed above to advise. Thank you!

## 2023-09-20 ENCOUNTER — TELEPHONE (OUTPATIENT)
Dept: FAMILY MEDICINE | Facility: CLINIC | Age: 68
End: 2023-09-20
Payer: MEDICARE

## 2023-09-20 NOTE — TELEPHONE ENCOUNTER
----- Message from Sincere Pearson sent at 9/20/2023  1:55 PM CDT -----  Regarding: rt call  Type:  Patient Returning Call    Who Called:pt    Who Left Message for Patient:Ale Perkins    Does the patient know what this is regarding?:yes    Best Call Back Number:[phone]      Additional Information:

## 2023-09-27 ENCOUNTER — OFFICE VISIT (OUTPATIENT)
Dept: NEUROLOGY | Facility: CLINIC | Age: 68
End: 2023-09-27
Payer: MEDICARE

## 2023-09-27 DIAGNOSIS — E11.69 TYPE 2 DIABETES MELLITUS WITH OTHER SPECIFIED COMPLICATION, WITHOUT LONG-TERM CURRENT USE OF INSULIN: ICD-10-CM

## 2023-09-27 DIAGNOSIS — I10 ESSENTIAL HYPERTENSION: ICD-10-CM

## 2023-09-27 DIAGNOSIS — I67.2 INTRACRANIAL ATHEROSCLEROSIS: ICD-10-CM

## 2023-09-27 DIAGNOSIS — Z72.0 TOBACCO USE: ICD-10-CM

## 2023-09-27 DIAGNOSIS — I25.10 CORONARY ARTERY DISEASE INVOLVING NATIVE CORONARY ARTERY OF NATIVE HEART WITHOUT ANGINA PECTORIS: ICD-10-CM

## 2023-09-27 DIAGNOSIS — E78.00 PURE HYPERCHOLESTEROLEMIA: ICD-10-CM

## 2023-09-27 DIAGNOSIS — I63.412 CEREBROVASCULAR ACCIDENT (CVA) DUE TO EMBOLISM OF LEFT MIDDLE CEREBRAL ARTERY: Primary | ICD-10-CM

## 2023-09-27 PROCEDURE — 4010F PR ACE/ARB THEARPY RXD/TAKEN: ICD-10-PCS | Mod: HCNC,CPTII,95, | Performed by: NURSE PRACTITIONER

## 2023-09-27 PROCEDURE — 3044F HG A1C LEVEL LT 7.0%: CPT | Mod: HCNC,CPTII,95, | Performed by: NURSE PRACTITIONER

## 2023-09-27 PROCEDURE — 99214 OFFICE O/P EST MOD 30 MIN: CPT | Mod: HCNC,95,, | Performed by: NURSE PRACTITIONER

## 2023-09-27 PROCEDURE — 3044F PR MOST RECENT HEMOGLOBIN A1C LEVEL <7.0%: ICD-10-PCS | Mod: HCNC,CPTII,95, | Performed by: NURSE PRACTITIONER

## 2023-09-27 PROCEDURE — 4010F ACE/ARB THERAPY RXD/TAKEN: CPT | Mod: HCNC,CPTII,95, | Performed by: NURSE PRACTITIONER

## 2023-09-27 PROCEDURE — 99214 PR OFFICE/OUTPT VISIT, EST, LEVL IV, 30-39 MIN: ICD-10-PCS | Mod: HCNC,95,, | Performed by: NURSE PRACTITIONER

## 2023-09-27 NOTE — PROGRESS NOTES
NEUROLOGY  Outpatient Visit     [facility]  [address] [facility], [place] [address]  [phone] (office) / [phone] (fax)    Patient Name:  Fred Blackwell  :  1955  MR #:  [id number]  Acct #:  [id number]    Date of  Visit: 2023    Other Physicians:  MARITZA Bahena MD (Primary Care Physician)        The patient location is: [place], [place]  The chief complaint leading to consultation is: CVA    Visit type: audiovisual    Each patient to whom he or she provides medical services by telemedicine is:  (1) informed of the relationship between the physician and patient and the respective role of any other health care provider with respect to management of the patient; and (2) notified that he or she may decline to receive medical services by telemedicine and may withdraw from such care at any time.      Interval history:  23:  Fred Blackwell is a 68 y.o. R-handed male seen in f/u for CVA     Medical history is significant for HLD, DM2, CAD w/stents, aortoiliac stenosis, anxiety, L Beard's Palsy, depression, lumbar DDD, GERD, fibromyalgia    Received records from recent hospitalization (see documentation). MRI brain report indicates multiple small areas of restricted diffusion in the posterior L parietal and subcortical white matter. CTA showed RIGHT MCA occlusion. Cardiology note mentions several episodes of 2:1 AV block during sleep, as well as several pauses during MICHEAL. Pt refused PPM insertion.     He had the cardiac event monitor, but hasn't mailed it back yet to be read. Today, recalls that he felt a fluttering sensation in his chest on the date of symptom onset when he was outside and got overheated. Denies recurrence of this. He also recalls feeling tingling in the R fingers at the time. His R hand feels back to normal.     He continues not to smoke.     He reports that he has been compliant with Rx. Reviewed meds with him - taking Eliquis 5 mg  BID and ASA 81 mg daily. Also on HI statin, Zetia.     HISTORY OF PRESENT ILLNESS 8/16/23:  Fred Blackwell is a 68 y.o. R-handed male seen in consultation for CVA per No ref. provider found    Medical history is significant for HLD, DM2, CAD w/stents, aortoiliac stenosis, anxiety, L Beard's Palsy, depression, lumbar DDD, GERD, fibromyalgia    Admitted to [facility] 8/1/23. Was out working in the yard and got overheated. Hands were clammy and shaky. Denies having any lateralized weakness, sensory change, HA, dizziness or vision change. Brother felt that speech was slurred and took him to the ED. [place] better shortly after resting.     Admits that he was non compliant with meds. Initially says 3 weeks, then says 3 months. Stopped taking all Rx, including ASA, BP agents, statin. Didn't think that he needed them bc he was feeling well.     Recalls having MRI and also a test with dye while admitted. Also had a heart test, but not sure about details. States that he was told that he possibly had a stroke. Per cardiology note, he was started on Eliquis and Brilinta upon dc. Cardiology d/c Brilinta yesterday. He continues on Eliquis and has resumed aspirin and statin.     Was smoking up until recent event, now quit. No ETOH abuse.     Saw Dr. Flaherty yesterday. Holter ordered. Brilinta stopped. No hx of arrythmia. Denies sx.     Denies KANDACE history or symptoms.     Denies prior CVA / TIA. No neuro sx since discharge.     Allergies:  Review of patient's allergies indicates:   Allergen Reactions    Cymbalta [duloxetine] Nausea And Vomiting    Penicillins      Other reaction(s): Hives       Current Medications:  Current Outpatient Medications   Medication Sig Dispense Refill    albuterol-ipratropium (DUO-NEB) 2.5 mg-0.5 mg/3 mL nebulizer solution Take 3 mLs by nebulization every 6 (six) hours as needed for Wheezing. Rescue 75 mL 0    ALPRAZolam (XANAX) 1 MG tablet TAKE 1 TABLET (1 MG TOTAL) BY MOUTH THREE TIMES DAILY AS NEEDED  FOR ANXIETY. 90 tablet 2    amLODIPine (NORVASC) 5 MG tablet Take 1 tablet (5 mg total) by mouth once daily. 90 tablet 1    apixaban (ELIQUIS) 5 mg Tab TAKE 1 TABLET (5 MG) BY MOUTH TWO TIMES DAILY. 60 tablet 1    aspirin (ECOTRIN) 81 MG EC tablet Take 1 tablet (81 mg total) by mouth once daily.  0    atorvastatin (LIPITOR) 80 MG tablet TAKE 1 TABLET (80 MG TOTAL) BY MOUTH ONCE DAILY. 90 tablet 1    blood sugar diagnostic Strp To check BG 2 (two) times daily, to use with insurance preferred meter 200 strip 3    blood-glucose meter kit To check BG 2 (two) times daily, to use with insurance preferred meter 1 each 0    carvediloL (COREG) 3.125 MG tablet TAKE 1 TABLET (3.125 MG TOTAL) BY MOUTH 2 (TWO) TIMES DAILY WITH MEALS. 180 tablet 0    citalopram (CELEXA) 10 MG tablet TAKE 1 TABLET (10 MG TOTAL) BY MOUTH ONCE DAILY. 90 tablet 2    ezetimibe (ZETIA) 10 mg tablet TAKE 1 TABLET (10 MG TOTAL) BY MOUTH ONCE DAILY. 90 tablet 0    fluticasone propionate (FLONASE) 50 mcg/actuation nasal spray USE 1 SPRAY IN EACH NOSTRIL ONCE DAILY. 48 g 2    HYDROcodone-acetaminophen (NORCO)  mg per tablet Take 1 tablet by mouth 3 (three) times daily as needed for Pain (medically necessary for greater than 7 days). 80 tablet 0    lancets Misc To check BG 2 (two) times daily, to use with insurance preferred meter 200 each 3    olmesartan (BENICAR) 40 MG tablet Take 1 tablet (40 mg total) by mouth once daily. 90 tablet 0    omeprazole (PRILOSEC) 20 MG capsule Take 1 capsule (20 mg total) by mouth once daily. 90 capsule 3     No current facility-administered medications for this visit.       Past Medical History:  Past Medical History:   Diagnosis Date    Anxiety     Bell's palsy     Current moderate episode of major depressive disorder without prior episode 4/1/2019    DDD (degenerative disc disease), lumbar     Diabetes mellitus     Fibromyalgia     GERD (gastroesophageal reflux disease)     Hyperlipidemia     Hypertension      Sciatica        Past Surgical History:  Past Surgical History:   Procedure Laterality Date    COLONOSCOPY N/A 12/11/2019    Procedure: COLONOSCOPY;  Surgeon: Mich Mota MD;  Location: [facility] ENDO;  Service: Endoscopy;  Laterality: N/A;    CORONARY ANGIOGRAPHY N/A 6/2/2020    Procedure: ANGIOGRAM, CORONARY ARTERY;  Surgeon: Romario Flaherty MD;  Location: [facility] CATH;  Service: Cardiology;  Laterality: N/A;    LEFT HEART CATHETERIZATION Right 6/2/2020    Procedure: Left heart cath;  Surgeon: Romario Flaherty MD;  Location: [facility] CATH;  Service: Cardiology;  Laterality: Right;    SHOULDER SURGERY         Family History:  family history includes Arthritis in his mother.    Social History:   reports that he has quit smoking. His smoking use included cigarettes. He has never used smokeless tobacco. He reports that he does not drink alcohol and does not use drugs.      REVIEW OF SYSTEMS:  As per HPI    PHYSICAL EXAM:  There were no vitals taken for this visit.    General: Well groomed. No acute distress.  Pulmonary: Normal effort and rate.     Neurological exam:  Mental status: Awake and alert.  Oriented to person, place, time and situation. Recent and remote memory appear to be intact.  Fund of knowledge normal. Normal attention and concentration.   Speech/Language: Fluent and appropriate. No dysarthria or aphasia on conversation.   Cranial nerves (II-XII): Extraocular movements intact, mild L ptosis. Facial sensation reportedly intact. L peripheral weakness present. Hearing grossly intact. Palate deferred. Shoulder shrug normal bilaterally. Normal tongue protrusion.   Motor: CUADRA equally. No UE drift.   Sensation: Intact to light touch throughout per report  DTR: MANNY  Coordination: No visible tremor.   Gait: MANNY    DIAGNOSTIC DATA:  I have personally reviewed provider notes, labs and imaging made available to me today.     Imaging:  CTA head & neck 8/1/23: OSH report only  R M1 occlusion/stenosis without flow I the M2  "and subsequent branches of the MCA circulation     MRI brain wo 8/1/23: OSH report only  Multiple small areas of restricted diffusion in the posterior left parietal lobe and subcortical white matter     CUS 6/2020:  Mild heterogeneous calcified plaque in the left internal carotid artery with no significant stenosis on either side.  Normal antegrade vertebral flow bilaterally.     Cardiac:  TTE 8/2/23: OSH report   EF 60%  Negative bubble study  LA size upper limits of normal      MICHEAL 8/2/23: OSH report   No LA or ALEXA thrombus  Negative bubble study     EKG 2020:  Sinus bradycardia with 1st degree A-V block     Labs:  Lab Results   Component Value Date    WBC 8.46 06/26/2023    HGB 15.9 06/26/2023    HCT 47.9 06/26/2023     06/26/2023    MCV 87 06/26/2023    RDW 13.9 06/26/2023     Lab Results   Component Value Date     06/26/2023    K 3.8 06/26/2023     06/26/2023    CO2 24 06/26/2023    BUN 11 06/26/2023    CREATININE 0.8 06/26/2023     (H) 06/26/2023    CALCIUM 9.4 06/26/2023     Lab Results   Component Value Date    PROT 7.3 06/26/2023    ALBUMIN 4.0 06/26/2023    BILITOT 0.9 06/26/2023    AST 15 06/26/2023    ALKPHOS 59 06/26/2023    ALT 13 06/26/2023     Lab Results   Component Value Date    INR 1.0 06/01/2020     Lab Results   Component Value Date    CHOL 217 (H) 06/26/2023    HDL 49 06/26/2023    LDLCALC 150.4 06/26/2023    TRIG 88 06/26/2023    CHOLHDL 22.6 06/26/2023     Lab Results   Component Value Date    HGBA1C 6.6 (H) 06/26/2023      Lab Results   Component Value Date    VWBXORVC76 282 03/02/2011     No results found for: "FOLATE"  Lab Results   Component Value Date    TSH 2.495 06/26/2023 8/2023:   per records    ASSESSMENT & PLAN:  Fred Blackwell is a 68 y.o. R-handed male seen in f/u for CVA.     Problem List Items Addressed This Visit          Neuro    Cerebrovascular accident (CVA) due to embolism - Primary    Overview     Treated at [facility] 8/2023, " records in media           Current Assessment & Plan     Diagnostic testing from recent hospitalization reviewed with patient and family  - MRI brain shows acute strokes in the L MCA territory, likely embolic based on distribution   - Vascular imaging indicates R MCA stenosis. Unrelated to recent CVA and likely chronic although no prior for comparison.   - TTE / MICHEAL - no intracardiac shunt or thrombus, normal EF, possible mild LAE  - Cardiac event monitor pending to evaluate for subclinical arrhythmia / other conduction abnormality  - LDL was 115 in 8/2023, last A1C from 6/2023 was 6.6     Reviewed personal vascular RF: CAD, HTN, HLD, tobacco use, DM2 and presumed Afib      Continue Eliquis 5 mg BID to prevent cardioembolic strokes  Continue ASA 81 mg daily due to atherosclerotic cerebrovascular / cardiac disease   Continue HI statin for goal LDL < 70.   Reviewed BP goals, states controlled   Dietary / lifestyle changes discussed  F/u with PCP for DM2 monitoring and mgmt   CVA education / ED precautions discussed   Continue not to smoke              Intracranial atherosclerosis    Overview     Likely chronic R MCA occlusion based on OSH CTA 8/2023            Cardiac/Vascular    Essential hypertension    Overview     RF uniretic.           Pure hypercholesterolemia    Coronary artery disease involving native coronary artery of native heart without angina pectoris       Endocrine    Type 2 diabetes mellitus with other specified complication, without long-term current use of insulin       Other    Tobacco use           Follow up: PRN    I spent a total of 38 minutes on the day of the visit.    This includes face to face time with the patient, as well as non-face to face time preparing for and completing the visit (review of prior diagnostic testing and clinical notes, obtaining or reviewing history, documenting clinical information in the EMR, independently interpreting and communicating results to the patient/family  and coordinating ongoing care).       I appreciate the opportunity to participate in the care of this patient. Please feel free to contact me with any concerns or questions.       Xochitl Macias, ACNPC-AG  [facility]  [address] [place] [address]

## 2023-09-27 NOTE — ASSESSMENT & PLAN NOTE
Diagnostic testing from recent hospitalization reviewed with patient and family  - MRI brain shows acute strokes in the L MCA territory, likely embolic based on distribution   - Vascular imaging indicates R MCA stenosis. Unrelated to recent CVA and likely chronic although no prior for comparison.   - TTE / MICHEAL - no intracardiac shunt or thrombus, normal EF, possible mild LAE  - Cardiac event monitor pending to evaluate for subclinical arrhythmia / other conduction abnormality  - LDL was 115 in 8/2023, last A1C from 6/2023 was 6.6     Reviewed personal vascular RF: CAD, HTN, HLD, tobacco use, DM2 and presumed Afib      Continue Eliquis 5 mg BID to prevent cardioembolic strokes  Continue ASA 81 mg daily due to atherosclerotic cerebrovascular / cardiac disease   Continue HI statin for goal LDL < 70.   Reviewed BP goals, states controlled   Dietary / lifestyle changes discussed  F/u with PCP for DM2 monitoring and mgmt   CVA education / ED precautions discussed   Continue not to smoke

## 2023-10-02 DIAGNOSIS — F41.1 GAD (GENERALIZED ANXIETY DISORDER): ICD-10-CM

## 2023-10-02 DIAGNOSIS — M51.36 DEGENERATIVE DISC DISEASE, LUMBAR: ICD-10-CM

## 2023-10-03 RX ORDER — HYDROCODONE BITARTRATE AND ACETAMINOPHEN 10; 325 MG/1; MG/1
1 TABLET ORAL 3 TIMES DAILY PRN
Qty: 80 TABLET | Refills: 0 | Status: SHIPPED | OUTPATIENT
Start: 2023-10-03 | End: 2023-10-25 | Stop reason: SDUPTHER

## 2023-10-03 RX ORDER — ALPRAZOLAM 1 MG/1
TABLET ORAL
Qty: 90 TABLET | Refills: 2 | Status: SHIPPED | OUTPATIENT
Start: 2023-10-03 | End: 2023-11-01 | Stop reason: SDUPTHER

## 2023-10-03 NOTE — TELEPHONE ENCOUNTER
No care due was identified.  [facility] Embedded Care Due Messages. Reference number: [id number].   10/03/2023 12:52:01 PM CDT

## 2023-10-03 NOTE — TELEPHONE ENCOUNTER
No care due was identified.  [facility] Embedded Care Due Messages. Reference number: [id number].   10/03/2023 12:52:32 PM CDT

## 2023-10-16 ENCOUNTER — OFFICE VISIT (OUTPATIENT)
Dept: CARDIOLOGY | Facility: CLINIC | Age: 68
End: 2023-10-16
Payer: MEDICARE

## 2023-10-16 VITALS
DIASTOLIC BLOOD PRESSURE: 86 MMHG | SYSTOLIC BLOOD PRESSURE: 148 MMHG | HEIGHT: 72 IN | WEIGHT: 230.81 LBS | BODY MASS INDEX: 31.26 KG/M2 | HEART RATE: 63 BPM

## 2023-10-16 DIAGNOSIS — Z79.01 LONG TERM (CURRENT) USE OF ANTICOAGULANTS: Chronic | ICD-10-CM

## 2023-10-16 DIAGNOSIS — Z79.02 LONG TERM (CURRENT) USE OF ANTITHROMBOTICS/ANTIPLATELETS: Chronic | ICD-10-CM

## 2023-10-16 DIAGNOSIS — I25.10 CORONARY ARTERY DISEASE INVOLVING NATIVE CORONARY ARTERY OF NATIVE HEART WITHOUT ANGINA PECTORIS: Primary | Chronic | ICD-10-CM

## 2023-10-16 DIAGNOSIS — E66.9 OBESITY, CLASS I, BMI 30-34.9: Chronic | ICD-10-CM

## 2023-10-16 DIAGNOSIS — I10 ESSENTIAL HYPERTENSION: Chronic | ICD-10-CM

## 2023-10-16 DIAGNOSIS — I65.22 CAROTID ARTERY PLAQUE, LEFT: Chronic | ICD-10-CM

## 2023-10-16 DIAGNOSIS — E78.00 PURE HYPERCHOLESTEROLEMIA: Chronic | ICD-10-CM

## 2023-10-16 DIAGNOSIS — I70.0 AORTOILIAC STENOSIS: Chronic | ICD-10-CM

## 2023-10-16 PROCEDURE — 3044F HG A1C LEVEL LT 7.0%: CPT | Mod: HCNC,CPTII,S$GLB, | Performed by: INTERNAL MEDICINE

## 2023-10-16 PROCEDURE — 1125F AMNT PAIN NOTED PAIN PRSNT: CPT | Mod: HCNC,CPTII,S$GLB, | Performed by: INTERNAL MEDICINE

## 2023-10-16 PROCEDURE — 1125F PR PAIN SEVERITY QUANTIFIED, PAIN PRESENT: ICD-10-PCS | Mod: HCNC,CPTII,S$GLB, | Performed by: INTERNAL MEDICINE

## 2023-10-16 PROCEDURE — 3077F PR MOST RECENT SYSTOLIC BLOOD PRESSURE >= 140 MM HG: ICD-10-PCS | Mod: HCNC,CPTII,S$GLB, | Performed by: INTERNAL MEDICINE

## 2023-10-16 PROCEDURE — 99999 PR PBB SHADOW E&M-EST. PATIENT-LVL III: ICD-10-PCS | Mod: PBBFAC,HCNC,, | Performed by: INTERNAL MEDICINE

## 2023-10-16 PROCEDURE — 3288F PR FALLS RISK ASSESSMENT DOCUMENTED: ICD-10-PCS | Mod: HCNC,CPTII,S$GLB, | Performed by: INTERNAL MEDICINE

## 2023-10-16 PROCEDURE — 3288F FALL RISK ASSESSMENT DOCD: CPT | Mod: HCNC,CPTII,S$GLB, | Performed by: INTERNAL MEDICINE

## 2023-10-16 PROCEDURE — 3044F PR MOST RECENT HEMOGLOBIN A1C LEVEL <7.0%: ICD-10-PCS | Mod: HCNC,CPTII,S$GLB, | Performed by: INTERNAL MEDICINE

## 2023-10-16 PROCEDURE — 99214 OFFICE O/P EST MOD 30 MIN: CPT | Mod: HCNC,S$GLB,, | Performed by: INTERNAL MEDICINE

## 2023-10-16 PROCEDURE — 99214 PR OFFICE/OUTPT VISIT, EST, LEVL IV, 30-39 MIN: ICD-10-PCS | Mod: HCNC,S$GLB,, | Performed by: INTERNAL MEDICINE

## 2023-10-16 PROCEDURE — 3008F BODY MASS INDEX DOCD: CPT | Mod: HCNC,CPTII,S$GLB, | Performed by: INTERNAL MEDICINE

## 2023-10-16 PROCEDURE — 1101F PR PT FALLS ASSESS DOC 0-1 FALLS W/OUT INJ PAST YR: ICD-10-PCS | Mod: HCNC,CPTII,S$GLB, | Performed by: INTERNAL MEDICINE

## 2023-10-16 PROCEDURE — 4010F PR ACE/ARB THEARPY RXD/TAKEN: ICD-10-PCS | Mod: HCNC,CPTII,S$GLB, | Performed by: INTERNAL MEDICINE

## 2023-10-16 PROCEDURE — 3079F PR MOST RECENT DIASTOLIC BLOOD PRESSURE 80-89 MM HG: ICD-10-PCS | Mod: HCNC,CPTII,S$GLB, | Performed by: INTERNAL MEDICINE

## 2023-10-16 PROCEDURE — 3077F SYST BP >= 140 MM HG: CPT | Mod: HCNC,CPTII,S$GLB, | Performed by: INTERNAL MEDICINE

## 2023-10-16 PROCEDURE — 1101F PT FALLS ASSESS-DOCD LE1/YR: CPT | Mod: HCNC,CPTII,S$GLB, | Performed by: INTERNAL MEDICINE

## 2023-10-16 PROCEDURE — 4010F ACE/ARB THERAPY RXD/TAKEN: CPT | Mod: HCNC,CPTII,S$GLB, | Performed by: INTERNAL MEDICINE

## 2023-10-16 PROCEDURE — 1159F PR MEDICATION LIST DOCUMENTED IN MEDICAL RECORD: ICD-10-PCS | Mod: HCNC,CPTII,S$GLB, | Performed by: INTERNAL MEDICINE

## 2023-10-16 PROCEDURE — 3008F PR BODY MASS INDEX (BMI) DOCUMENTED: ICD-10-PCS | Mod: HCNC,CPTII,S$GLB, | Performed by: INTERNAL MEDICINE

## 2023-10-16 PROCEDURE — 99999 PR PBB SHADOW E&M-EST. PATIENT-LVL III: CPT | Mod: PBBFAC,HCNC,, | Performed by: INTERNAL MEDICINE

## 2023-10-16 PROCEDURE — 3079F DIAST BP 80-89 MM HG: CPT | Mod: HCNC,CPTII,S$GLB, | Performed by: INTERNAL MEDICINE

## 2023-10-16 PROCEDURE — 1159F MED LIST DOCD IN RCRD: CPT | Mod: HCNC,CPTII,S$GLB, | Performed by: INTERNAL MEDICINE

## 2023-10-16 RX ORDER — AMLODIPINE BESYLATE 10 MG/1
10 TABLET ORAL DAILY
Qty: 90 TABLET | Refills: 1 | Status: SHIPPED | OUTPATIENT
Start: 2023-10-16 | End: 2024-10-15

## 2023-10-16 NOTE — PROGRESS NOTES
Subjective:    Patient ID:  Fred Blackwell is a 68 y.o. male who presents for Follow-up (6MO, RESULTS/)        HPI    DISCUSSED TESTS NORMAL BOBO MILD LEFT CAROTID PLAQUE, NO RENAL ARTERY STENOSIS , EVENT MONITOR SINUS RHYTHM VERY FREQUENT PACS FREQUENT PVCS, NO SIGNIFICANT HEART BLOCK MOST SEVERE IS SECOND-DEGREE TYPE 1, STATES BP OK, STOPPED TOBACCO, SEE ROS  Past Medical History:   Diagnosis Date    Anxiety     Bell's palsy     Current moderate episode of major depressive disorder without prior episode 4/1/2019    DDD (degenerative disc disease), lumbar     Diabetes mellitus     Fibromyalgia     GERD (gastroesophageal reflux disease)     Hyperlipidemia     Hypertension     Sciatica      Past Surgical History:   Procedure Laterality Date    COLONOSCOPY N/A 12/11/2019    Procedure: COLONOSCOPY;  Surgeon: Mich Mota MD;  Location: [facility] ENDO;  Service: Endoscopy;  Laterality: N/A;    CORONARY ANGIOGRAPHY N/A 6/2/2020    Procedure: ANGIOGRAM, CORONARY ARTERY;  Surgeon: Romario Flaherty MD;  Location: [facility] CATH;  Service: Cardiology;  Laterality: N/A;    LEFT HEART CATHETERIZATION Right 6/2/2020    Procedure: Left heart cath;  Surgeon: Romario Flaherty MD;  Location: [facility] CATH;  Service: Cardiology;  Laterality: Right;    SHOULDER SURGERY       Family History   Problem Relation Age of Onset    Arthritis Mother      Social History     Socioeconomic History    Marital status:    Tobacco Use    Smoking status: Former     Current packs/day: 2.00     Types: Cigarettes    Smokeless tobacco: Never    Tobacco comments:     2 packs/day   Substance and Sexual Activity    Alcohol use: No    Drug use: No       Review of patient's allergies indicates:   Allergen Reactions    Cymbalta [duloxetine] Nausea And Vomiting    Penicillins      Other reaction(s): Hives       Current Outpatient Medications:     albuterol-ipratropium (DUO-NEB) 2.5 mg-0.5 mg/3 mL nebulizer solution, Take 3 mLs by nebulization every 6 (six)  hours as needed for Wheezing. Rescue, Disp: 75 mL, Rfl: 0    ALPRAZolam (XANAX) 1 MG tablet, TAKE 1 TABLET (1 MG TOTAL) BY MOUTH THREE TIMES DAILY AS NEEDED FOR ANXIETY., Disp: 90 tablet, Rfl: 2    apixaban (ELIQUIS) 5 mg Tab, TAKE 1 TABLET (5 MG) BY MOUTH TWO TIMES DAILY., Disp: 60 tablet, Rfl: 1    aspirin (ECOTRIN) 81 MG EC tablet, Take 1 tablet (81 mg total) by mouth once daily., Disp: , Rfl: 0    atorvastatin (LIPITOR) 80 MG tablet, TAKE 1 TABLET (80 MG TOTAL) BY MOUTH ONCE DAILY., Disp: 90 tablet, Rfl: 1    blood sugar diagnostic Strp, To check BG 2 (two) times daily, to use with insurance preferred meter, Disp: 200 strip, Rfl: 3    blood-glucose meter kit, To check BG 2 (two) times daily, to use with insurance preferred meter, Disp: 1 each, Rfl: 0    carvediloL (COREG) 3.125 MG tablet, TAKE 1 TABLET (3.125 MG TOTAL) BY MOUTH 2 (TWO) TIMES DAILY WITH MEALS., Disp: 180 tablet, Rfl: 0    citalopram (CELEXA) 10 MG tablet, TAKE 1 TABLET (10 MG TOTAL) BY MOUTH ONCE DAILY., Disp: 90 tablet, Rfl: 2    ezetimibe (ZETIA) 10 mg tablet, TAKE 1 TABLET (10 MG TOTAL) BY MOUTH ONCE DAILY., Disp: 90 tablet, Rfl: 0    fluticasone propionate (FLONASE) 50 mcg/actuation nasal spray, USE 1 SPRAY IN EACH NOSTRIL ONCE DAILY., Disp: 48 g, Rfl: 2    HYDROcodone-acetaminophen (NORCO)  mg per tablet, Take 1 tablet by mouth 3 (three) times daily as needed for Pain (medically necessary for greater than 7 days)., Disp: 80 tablet, Rfl: 0    lancets Misc, To check BG 2 (two) times daily, to use with insurance preferred meter, Disp: 200 each, Rfl: 3    olmesartan (BENICAR) 40 MG tablet, Take 1 tablet (40 mg total) by mouth once daily., Disp: 90 tablet, Rfl: 0    omeprazole (PRILOSEC) 20 MG capsule, Take 1 capsule (20 mg total) by mouth once daily., Disp: 90 capsule, Rfl: 3    amLODIPine (NORVASC) 10 MG tablet, Take 1 tablet (10 mg total) by mouth once daily., Disp: 90 tablet, Rfl: 1    Review of Systems   Constitutional: Negative for  diaphoresis, fever and malaise/fatigue.   HENT:  Negative for congestion and sore throat.    Eyes: Negative.    Cardiovascular:  Negative for chest pain and claudication.   Respiratory:  Negative for hemoptysis and shortness of breath.    Endocrine: Negative.    Hematologic/Lymphatic: Negative for adenopathy. Does not bruise/bleed easily.   Skin:  Negative for color change and rash.   Musculoskeletal:  Negative for back pain and falls.   Gastrointestinal:  Negative for abdominal pain and dysphagia.   Genitourinary:  Negative for dysuria and flank pain.        Objective:      Vitals:    10/16/23 1542 10/16/23 1601   BP: (!) 163/87 (!) 148/86   Pulse: 63    Weight: 104.7 kg (230 lb 13.2 oz)    Height: 6' (1.829 m)    PainSc:   3    PainLoc: Generalized      Body mass index is 31.31 kg/m².    Physical Exam  Constitutional:       Appearance: He is obese.   HENT:      Head: Normocephalic and atraumatic.   Eyes:      Extraocular Movements: Extraocular movements intact.      Conjunctiva/sclera: Conjunctivae normal.   Neck:      Vascular: No carotid bruit.   Cardiovascular:      Rate and Rhythm: Normal rate and regular rhythm. No extrasystoles are present.     Pulses: Normal pulses.           Carotid pulses are 2+ on the right side and 2+ on the left side.       Radial pulses are 2+ on the right side and 2+ on the left side.        Femoral pulses are 2+ on the right side and 2+ on the left side.       Posterior tibial pulses are 2+ on the right side and 2+ on the left side.      Heart sounds: Murmur heard.      No friction rub. No gallop.   Pulmonary:      Effort: Pulmonary effort is normal.      Breath sounds: Normal breath sounds. No rales.   Abdominal:      Palpations: Abdomen is soft.      Tenderness: There is no abdominal tenderness.   Musculoskeletal:      Cervical back: Neck supple.      Right lower leg: No edema.      Left lower leg: No edema.   Skin:     General: Skin is warm and dry.      Capillary Refill:  Capillary refill takes less than 2 seconds.   Neurological:      General: No focal deficit present.      Mental Status: He is alert and oriented to person, place, and time.      Cranial Nerves: No cranial nerve deficit.   Psychiatric:         Mood and Affect: Mood normal.         Speech: Speech normal.         Behavior: Behavior normal.                 ..    Chemistry        Component Value Date/Time     06/26/2023 0756    K 3.8 06/26/2023 0756     06/26/2023 0756    CO2 24 06/26/2023 0756    BUN 11 06/26/2023 0756    CREATININE 0.8 06/26/2023 0756     (H) 06/26/2023 0756        Component Value Date/Time    CALCIUM 9.4 06/26/2023 0756    ALKPHOS 59 06/26/2023 0756    AST 15 06/26/2023 0756    ALT 13 06/26/2023 0756    BILITOT 0.9 06/26/2023 0756    ESTGFRAFRICA >60.0 06/23/2022 0702    EGFRNONAA >60.0 06/23/2022 0702            ..  Lab Results   Component Value Date    CHOL 217 (H) 06/26/2023    CHOL 132 12/29/2022    CHOL 220 (H) 06/23/2022     Lab Results   Component Value Date    HDL 49 06/26/2023    HDL 47 12/29/2022    HDL 47 06/23/2022     Lab Results   Component Value Date    LDLCALC 150.4 06/26/2023    LDLCALC 73.0 12/29/2022    LDLCALC 155.6 06/23/2022     Lab Results   Component Value Date    TRIG 88 06/26/2023    TRIG 60 12/29/2022    TRIG 87 06/23/2022     Lab Results   Component Value Date    CHOLHDL 22.6 06/26/2023    CHOLHDL 35.6 12/29/2022    CHOLHDL 21.4 06/23/2022     ..  Lab Results   Component Value Date    WBC 8.46 06/26/2023    HGB 15.9 06/26/2023    HCT 47.9 06/26/2023    MCV 87 06/26/2023     06/26/2023       Test(s) Reviewed  I have reviewed the following in detail:  [] Stress test   [] Angiography   [] Echocardiogram   [] Labs   [x] Other:       Assessment:         ICD-10-CM ICD-9-CM   1. Coronary artery disease involving native coronary artery of native heart without angina pectoris  I25.10 414.01   2. Carotid artery plaque, left  I65.22 433.10   3. Long term  (current) use of antithrombotics/antiplatelets  Z79.02 V58.63   4. Pure hypercholesterolemia  E78.00 272.0   5. Essential hypertension  I10 401.9   6. Aortoiliac stenosis  I70.0 440.0   7. Obesity, Class I, BMI 30-34.9  E66.9 278.00   8. Long term (current) use of anticoagulants  Z79.01 V58.61     Problem List Items Addressed This Visit          Cardiac/Vascular    Essential hypertension    Overview     RF uniretic.           Relevant Orders    Comprehensive Metabolic Panel    Aortoiliac stenosis    Overview     Noted on imaging from 11/4/10         Pure hypercholesterolemia    Relevant Orders    Comprehensive Metabolic Panel    Lipid Panel    Coronary artery disease involving native coronary artery of native heart without angina pectoris - Primary    Relevant Orders    Comprehensive Metabolic Panel    Lipid Panel    CBC Auto Differential    Carotid artery plaque, left       Hematology    Long term (current) use of antithrombotics/antiplatelets    Long term (current) use of anticoagulants    Relevant Orders    CBC Auto Differential       Endocrine    Obesity, Class I, BMI 30-34.9        Plan:         ACCOMPANIED BY DAUGHTER WHO STATES THAT HE IS NOW COMPLIANT WITH MEDS, AS SHE TAKES CARE OF IT ,INCREASE NORVASC TO 10 MG EXPLAINED POSSIBLE SIDE EFFECTS AND EDEMA NEEDS BETTER BLOOD PRESSURE CONTROL, CONTINUE TO ABSTAIN FROM ANY TOBACCO,ALL CV CLINICALLY STABLE, NO ANGINA, NO HF, NO TIA, BENIGN CLINICAL ARRHYTHMIA,CONTINUE CURRENT MEDS, EDUCATION, DIET, EXERCISE , WEIGHT LOSS RETURN TO CLINIC IN  6 MO WITH LABS  Coronary artery disease involving native coronary artery of native heart without angina pectoris  -     Comprehensive Metabolic Panel; Future; Expected date: 10/16/2023  -     Lipid Panel; Future; Expected date: 10/16/2023  -     CBC Auto Differential; Future; Expected date: 10/16/2023    Carotid artery plaque, left    Long term (current) use of antithrombotics/antiplatelets    Pure hypercholesterolemia  -      Comprehensive Metabolic Panel; Future; Expected date: 10/16/2023  -     Lipid Panel; Future; Expected date: 10/16/2023    Essential hypertension  Comments:  ADJUST MEDS  Orders:  -     Comprehensive Metabolic Panel; Future; Expected date: 10/16/2023    Aortoiliac stenosis    Obesity, Class I, BMI 30-34.9    Long term (current) use of anticoagulants  -     CBC Auto Differential; Future; Expected date: 10/16/2023    Other orders  -     amLODIPine (NORVASC) 10 MG tablet; Take 1 tablet (10 mg total) by mouth once daily.  Dispense: 90 tablet; Refill: 1    RTC Low level/low impact aerobic exercise 5x's/wk. Heart healthy diet and risk factor modification.    See labs and med orders.    Aerobic exercise 5x's/wk. Heart healthy diet and risk factor modification.    See labs and med orders.

## 2023-10-25 ENCOUNTER — OFFICE VISIT (OUTPATIENT)
Dept: FAMILY MEDICINE | Facility: CLINIC | Age: 68
End: 2023-10-25
Payer: MEDICARE

## 2023-10-25 VITALS — SYSTOLIC BLOOD PRESSURE: 125 MMHG | DIASTOLIC BLOOD PRESSURE: 75 MMHG

## 2023-10-25 DIAGNOSIS — M54.17 RADICULAR PAIN OF LUMBOSACRAL REGION: ICD-10-CM

## 2023-10-25 DIAGNOSIS — F41.1 GAD (GENERALIZED ANXIETY DISORDER): Primary | ICD-10-CM

## 2023-10-25 DIAGNOSIS — M51.36 DEGENERATIVE DISC DISEASE, LUMBAR: ICD-10-CM

## 2023-10-25 DIAGNOSIS — I10 ESSENTIAL HYPERTENSION: ICD-10-CM

## 2023-10-25 PROCEDURE — 3074F PR MOST RECENT SYSTOLIC BLOOD PRESSURE < 130 MM HG: ICD-10-PCS | Mod: HCNC,CPTII,95, | Performed by: FAMILY MEDICINE

## 2023-10-25 PROCEDURE — 4010F PR ACE/ARB THEARPY RXD/TAKEN: ICD-10-PCS | Mod: HCNC,CPTII,95, | Performed by: FAMILY MEDICINE

## 2023-10-25 PROCEDURE — 99213 OFFICE O/P EST LOW 20 MIN: CPT | Mod: HCNC,95,, | Performed by: FAMILY MEDICINE

## 2023-10-25 PROCEDURE — 3078F PR MOST RECENT DIASTOLIC BLOOD PRESSURE < 80 MM HG: ICD-10-PCS | Mod: HCNC,CPTII,95, | Performed by: FAMILY MEDICINE

## 2023-10-25 PROCEDURE — 3074F SYST BP LT 130 MM HG: CPT | Mod: HCNC,CPTII,95, | Performed by: FAMILY MEDICINE

## 2023-10-25 PROCEDURE — 3044F HG A1C LEVEL LT 7.0%: CPT | Mod: HCNC,CPTII,95, | Performed by: FAMILY MEDICINE

## 2023-10-25 PROCEDURE — 99213 PR OFFICE/OUTPT VISIT, EST, LEVL III, 20-29 MIN: ICD-10-PCS | Mod: HCNC,95,, | Performed by: FAMILY MEDICINE

## 2023-10-25 PROCEDURE — 3078F DIAST BP <80 MM HG: CPT | Mod: HCNC,CPTII,95, | Performed by: FAMILY MEDICINE

## 2023-10-25 PROCEDURE — 3044F PR MOST RECENT HEMOGLOBIN A1C LEVEL <7.0%: ICD-10-PCS | Mod: HCNC,CPTII,95, | Performed by: FAMILY MEDICINE

## 2023-10-25 PROCEDURE — 4010F ACE/ARB THERAPY RXD/TAKEN: CPT | Mod: HCNC,CPTII,95, | Performed by: FAMILY MEDICINE

## 2023-10-25 RX ORDER — HYDROCODONE BITARTRATE AND ACETAMINOPHEN 10; 325 MG/1; MG/1
1 TABLET ORAL 3 TIMES DAILY PRN
Qty: 80 TABLET | Refills: 0 | Status: SHIPPED | OUTPATIENT
Start: 2023-11-02 | End: 2023-11-29 | Stop reason: SDUPTHER

## 2023-10-25 NOTE — PROGRESS NOTES
Subjective:       Patient ID: Fred Blackwell is a 68 y.o. male.    Chief Complaint: Hypertension    Virtual for f/u htn and recent cva in July.  Thinks overheated.   Taking meds now routinely and saw cardiology recently with good bp checks post adjustment.      The patient location is: home in [place]  The chief complaint leading to consultation is: hnt and pain    Visit type: audiovisual    Face to Face time with patient: 9 minutes  11 minutes of total time spent on the encounter, which includes face to face time and non-face to face time preparing to see the patient (eg, review of tests), Obtaining and/or reviewing separately obtained history, Documenting clinical information in the electronic or other health record, Independently interpreting results (not separately reported) and communicating results to the patient/family/caregiver, or Care coordination (not separately reported).         Each patient to whom he or she provides medical services by telemedicine is:  (1) informed of the relationship between the physician and patient and the respective role of any other health care provider with respect to management of the patient; and (2) notified that he or she may decline to receive medical services by telemedicine and may withdraw from such care at any time.    Notes:    Review of Systems   Constitutional:  Negative for activity change, chills, fever and unexpected weight change.   HENT:  Negative for hearing loss, rhinorrhea and trouble swallowing.    Eyes:  Negative for discharge and visual disturbance.   Respiratory:  Negative for cough, chest tightness, shortness of breath and wheezing.    Cardiovascular:  Negative for chest pain, palpitations and leg swelling.   Gastrointestinal:  Negative for blood in stool, constipation, diarrhea and vomiting.   Endocrine: Negative for cold intolerance, heat intolerance, polydipsia and polyuria.   Genitourinary:  Negative for difficulty urinating, hematuria and  urgency.   Musculoskeletal:  Positive for back pain. Negative for arthralgias, joint swelling and neck pain.   Neurological:  Negative for weakness and headaches.   Psychiatric/Behavioral:  Negative for confusion, decreased concentration and dysphoric mood. The patient is nervous/anxious.        Objective:      Physical Exam  Constitutional:       Appearance: Normal appearance.   Neurological:      Mental Status: He is alert.   Psychiatric:         Mood and Affect: Mood normal.         Behavior: Behavior normal.         Assessment:       1. SHANNAN (generalized anxiety disorder)    2. Radicular pain of lumbosacral region    3. Essential hypertension    4. Degenerative disc disease, lumbar        Plan:       SHANNAN (generalized anxiety disorder)    Radicular pain of lumbosacral region  -     HYDROcodone-acetaminophen (NORCO)  mg per tablet; Take 1 tablet by mouth 3 (three) times daily as needed for Pain (medically necessary for greater than 7 days).  Dispense: 80 tablet; Refill: 0    Essential hypertension    Degenerative disc disease, lumbar  -     HYDROcodone-acetaminophen (NORCO)  mg per tablet; Take 1 tablet by mouth 3 (three) times daily as needed for Pain (medically necessary for greater than 7 days).  Dispense: 80 tablet; Refill: 0      Discussed med and htn check  Back pain stable on prn pain med  Mood stable post cva  Will monitor chronic medical issues and continue current plan of care.      Follow up in about 3 months (around 1/25/2024), or if symptoms worsen or fail to improve.

## 2023-11-01 DIAGNOSIS — F41.1 GAD (GENERALIZED ANXIETY DISORDER): ICD-10-CM

## 2023-11-01 RX ORDER — ALPRAZOLAM 1 MG/1
TABLET ORAL
Qty: 90 TABLET | Refills: 2 | Status: SHIPPED | OUTPATIENT
Start: 2023-11-01 | End: 2023-11-29 | Stop reason: SDUPTHER

## 2023-11-01 NOTE — TELEPHONE ENCOUNTER
No care due was identified.  [facility] Embedded Care Due Messages. Reference number: [id number].   11/01/2023 12:33:03 PM CDT

## 2023-11-08 RX ORDER — LISINOPRIL 10 MG/1
TABLET ORAL
Qty: 30 TABLET | Refills: 3 | Status: SHIPPED | OUTPATIENT
Start: 2023-11-08 | End: 2024-03-14

## 2023-11-10 DIAGNOSIS — E78.00 PURE HYPERCHOLESTEROLEMIA: Chronic | ICD-10-CM

## 2023-11-11 RX ORDER — EZETIMIBE 10 MG/1
10 TABLET ORAL DAILY
Qty: 90 TABLET | Refills: 0 | Status: SHIPPED | OUTPATIENT
Start: 2023-11-11 | End: 2023-11-30

## 2023-11-11 RX ORDER — APIXABAN 5 MG/1
TABLET, FILM COATED ORAL
Qty: 60 TABLET | Refills: 0 | Status: SHIPPED | OUTPATIENT
Start: 2023-11-11 | End: 2024-01-25 | Stop reason: SDUPTHER

## 2023-11-11 RX ORDER — CARVEDILOL 3.12 MG/1
3.12 TABLET ORAL 2 TIMES DAILY WITH MEALS
Qty: 180 TABLET | Refills: 0 | Status: ON HOLD | OUTPATIENT
Start: 2023-11-11 | End: 2024-01-16 | Stop reason: HOSPADM

## 2023-11-29 DIAGNOSIS — M54.17 RADICULAR PAIN OF LUMBOSACRAL REGION: ICD-10-CM

## 2023-11-29 DIAGNOSIS — M51.36 DEGENERATIVE DISC DISEASE, LUMBAR: ICD-10-CM

## 2023-11-29 DIAGNOSIS — F41.1 GAD (GENERALIZED ANXIETY DISORDER): ICD-10-CM

## 2023-11-29 NOTE — TELEPHONE ENCOUNTER
No care due was identified.  [facility] Embedded Care Due Messages. Reference number: [id number].   11/29/2023 9:58:12 AM CST

## 2023-11-29 NOTE — TELEPHONE ENCOUNTER
No care due was identified.  Health [facility] Embedded Care Due Messages. Reference number: [id number].   11/29/2023 9:59:16 AM CST

## 2023-12-01 RX ORDER — ALPRAZOLAM 1 MG/1
TABLET ORAL
Qty: 90 TABLET | Refills: 2 | Status: SHIPPED | OUTPATIENT
Start: 2023-12-01 | End: 2024-01-16 | Stop reason: SDUPTHER

## 2023-12-01 RX ORDER — HYDROCODONE BITARTRATE AND ACETAMINOPHEN 10; 325 MG/1; MG/1
1 TABLET ORAL 3 TIMES DAILY PRN
Qty: 80 TABLET | Refills: 0 | Status: SHIPPED | OUTPATIENT
Start: 2023-12-01 | End: 2023-12-27 | Stop reason: SDUPTHER

## 2023-12-27 DIAGNOSIS — M54.17 RADICULAR PAIN OF LUMBOSACRAL REGION: ICD-10-CM

## 2023-12-27 DIAGNOSIS — M51.36 DEGENERATIVE DISC DISEASE, LUMBAR: ICD-10-CM

## 2023-12-27 DIAGNOSIS — F41.1 GAD (GENERALIZED ANXIETY DISORDER): ICD-10-CM

## 2023-12-27 RX ORDER — HYDROCODONE BITARTRATE AND ACETAMINOPHEN 10; 325 MG/1; MG/1
1 TABLET ORAL 3 TIMES DAILY PRN
Qty: 80 TABLET | Refills: 0 | Status: SHIPPED | OUTPATIENT
Start: 2023-12-27 | End: 2024-01-16 | Stop reason: SDUPTHER

## 2023-12-27 NOTE — TELEPHONE ENCOUNTER
No care due was identified.  Health [facility] Embedded Care Due Messages. Reference number: [id number].   12/27/2023 2:46:24 PM CST

## 2023-12-27 NOTE — TELEPHONE ENCOUNTER
No care due was identified.  [facility] Embedded Care Due Messages. Reference number: [id number].   12/27/2023 2:45:50 PM CST

## 2023-12-28 RX ORDER — ALPRAZOLAM 1 MG/1
TABLET ORAL
Qty: 90 TABLET | Refills: 2 | OUTPATIENT
Start: 2023-12-28

## 2024-01-01 PROBLEM — I63.9 CEREBROVASCULAR ACCIDENT (CVA) DUE TO EMBOLISM: Status: RESOLVED | Noted: 2023-08-15 | Resolved: 2024-01-01

## 2024-01-02 ENCOUNTER — PATIENT MESSAGE (OUTPATIENT)
Dept: FAMILY MEDICINE | Facility: CLINIC | Age: 69
End: 2024-01-02
Payer: MEDICARE

## 2024-01-02 ENCOUNTER — PATIENT MESSAGE (OUTPATIENT)
Dept: NEUROLOGY | Facility: CLINIC | Age: 69
End: 2024-01-02
Payer: MEDICARE

## 2024-01-02 NOTE — TELEPHONE ENCOUNTER
Received message in portal from the pts daughter reporting she thinks the pt is currently having a stroke. I called and spoke with the daughter, Roseanne, reports the pt began exhibiting symptoms he previously had when he had a stroke. She states the pt is confused, disoriented, dysphasic, difficulty writing down his symptoms (can not process information) x2-3 days. I advised the daughter that she is to bring the pt to the ER now. She v/u and agreed to do so. Plans to bring to [facility].

## 2024-01-14 PROBLEM — R29.90 NEUROLOGICAL SYMPTOMS: Status: ACTIVE | Noted: 2024-01-14

## 2024-01-14 PROBLEM — Z71.89 ACP (ADVANCE CARE PLANNING): Status: ACTIVE | Noted: 2024-01-14

## 2024-01-14 PROBLEM — F43.9 STRESS AT HOME: Status: ACTIVE | Noted: 2024-01-14

## 2024-01-14 PROBLEM — Z72.0 TOBACCO ABUSE: Status: ACTIVE | Noted: 2024-01-14

## 2024-01-15 PROBLEM — R47.01 APHASIA: Status: ACTIVE | Noted: 2024-01-15

## 2024-01-15 PROBLEM — I45.9 HEART BLOCK: Status: ACTIVE | Noted: 2024-01-15

## 2024-01-15 PROBLEM — I63.512 ACUTE ISCHEMIC LEFT MCA STROKE: Status: ACTIVE | Noted: 2024-01-14

## 2024-01-16 ENCOUNTER — TELEPHONE (OUTPATIENT)
Dept: NEUROLOGY | Facility: CLINIC | Age: 69
End: 2024-01-16
Payer: MEDICARE

## 2024-01-16 DIAGNOSIS — M54.17 RADICULAR PAIN OF LUMBOSACRAL REGION: ICD-10-CM

## 2024-01-16 DIAGNOSIS — M51.36 DEGENERATIVE DISC DISEASE, LUMBAR: ICD-10-CM

## 2024-01-16 DIAGNOSIS — F41.1 GAD (GENERALIZED ANXIETY DISORDER): ICD-10-CM

## 2024-01-16 PROBLEM — R29.90 NEUROLOGICAL SYMPTOMS: Status: ACTIVE | Noted: 2024-01-16

## 2024-01-16 NOTE — TELEPHONE ENCOUNTER
No care due was identified.  Health [facility] Embedded Care Due Messages. Reference number: [id number].   1/16/2024 4:29:13 PM CST

## 2024-01-16 NOTE — TELEPHONE ENCOUNTER
No care due was identified.  [facility] Embedded Care Due Messages. Reference number: [id number].   1/16/2024 4:27:43 PM CST

## 2024-01-16 NOTE — TELEPHONE ENCOUNTER
----- Message from Karen Wolfe sent at 1/16/2024  9:39 AM CST -----  Type: Need Medical Advice   Who Called: RADHA  Best callback number: [phone]  Symptoms: Stroke  Additional Information: RADHA called to schedule a 3 week hf/u for the patient . Patient is being discharged today 1/16  Please call to further assist, Thanks.

## 2024-01-17 ENCOUNTER — PATIENT MESSAGE (OUTPATIENT)
Dept: NEUROLOGY | Facility: CLINIC | Age: 69
End: 2024-01-17
Payer: MEDICARE

## 2024-01-17 ENCOUNTER — PATIENT MESSAGE (OUTPATIENT)
Dept: FAMILY MEDICINE | Facility: CLINIC | Age: 69
End: 2024-01-17
Payer: MEDICARE

## 2024-01-17 ENCOUNTER — PATIENT MESSAGE (OUTPATIENT)
Dept: CARDIOLOGY | Facility: CLINIC | Age: 69
End: 2024-01-17
Payer: MEDICARE

## 2024-01-17 PROCEDURE — G0180 MD CERTIFICATION HHA PATIENT: HCPCS | Mod: ,,, | Performed by: FAMILY MEDICINE

## 2024-01-18 ENCOUNTER — LAB VISIT (OUTPATIENT)
Dept: LAB | Facility: HOSPITAL | Age: 69
End: 2024-01-18
Attending: FAMILY MEDICINE
Payer: MEDICARE

## 2024-01-18 DIAGNOSIS — Z12.5 SCREENING FOR MALIGNANT NEOPLASM OF PROSTATE: ICD-10-CM

## 2024-01-18 DIAGNOSIS — E11.69 TYPE 2 DIABETES MELLITUS WITH OTHER SPECIFIED COMPLICATION, WITHOUT LONG-TERM CURRENT USE OF INSULIN: ICD-10-CM

## 2024-01-18 LAB
ALBUMIN SERPL BCP-MCNC: 3.8 G/DL (ref 3.5–5.2)
ALP SERPL-CCNC: 74 U/L (ref 55–135)
ALT SERPL W/O P-5'-P-CCNC: 17 U/L (ref 10–44)
ANION GAP SERPL CALC-SCNC: 9 MMOL/L (ref 8–16)
AST SERPL-CCNC: 15 U/L (ref 10–40)
BILIRUB SERPL-MCNC: 0.7 MG/DL (ref 0.1–1)
BUN SERPL-MCNC: 13 MG/DL (ref 8–23)
CALCIUM SERPL-MCNC: 9.5 MG/DL (ref 8.7–10.5)
CHLORIDE SERPL-SCNC: 107 MMOL/L (ref 95–110)
CHOLEST SERPL-MCNC: 102 MG/DL (ref 120–199)
CHOLEST/HDLC SERPL: 2.9 {RATIO} (ref 2–5)
CO2 SERPL-SCNC: 27 MMOL/L (ref 23–29)
COMPLEXED PSA SERPL-MCNC: 0.24 NG/ML (ref 0–4)
CREAT SERPL-MCNC: 0.9 MG/DL (ref 0.5–1.4)
ERYTHROCYTE [DISTWIDTH] IN BLOOD BY AUTOMATED COUNT: 13.3 % (ref 11.5–14.5)
EST. GFR  (NO RACE VARIABLE): >60 ML/MIN/1.73 M^2
ESTIMATED AVG GLUCOSE: 146 MG/DL (ref 68–131)
GLUCOSE SERPL-MCNC: 119 MG/DL (ref 70–110)
HBA1C MFR BLD: 6.7 % (ref 4–5.6)
HCT VFR BLD AUTO: 44.1 % (ref 40–54)
HDLC SERPL-MCNC: 35 MG/DL (ref 40–75)
HDLC SERPL: 34.3 % (ref 20–50)
HGB BLD-MCNC: 14.9 G/DL (ref 14–18)
LDLC SERPL CALC-MCNC: 56.8 MG/DL (ref 63–159)
MCH RBC QN AUTO: 29.2 PG (ref 27–31)
MCHC RBC AUTO-ENTMCNC: 33.8 G/DL (ref 32–36)
MCV RBC AUTO: 86 FL (ref 82–98)
NONHDLC SERPL-MCNC: 67 MG/DL
PLATELET # BLD AUTO: 187 K/UL (ref 150–450)
PMV BLD AUTO: 11.6 FL (ref 9.2–12.9)
POTASSIUM SERPL-SCNC: 4.1 MMOL/L (ref 3.5–5.1)
PROT SERPL-MCNC: 7 G/DL (ref 6–8.4)
RBC # BLD AUTO: 5.11 M/UL (ref 4.6–6.2)
SODIUM SERPL-SCNC: 143 MMOL/L (ref 136–145)
TRIGL SERPL-MCNC: 51 MG/DL (ref 30–150)
TSH SERPL DL<=0.005 MIU/L-ACNC: 3.5 UIU/ML (ref 0.4–4)
WBC # BLD AUTO: 9.3 K/UL (ref 3.9–12.7)

## 2024-01-18 PROCEDURE — 83036 HEMOGLOBIN GLYCOSYLATED A1C: CPT | Mod: HCNC | Performed by: FAMILY MEDICINE

## 2024-01-18 PROCEDURE — 84443 ASSAY THYROID STIM HORMONE: CPT | Mod: HCNC | Performed by: FAMILY MEDICINE

## 2024-01-18 PROCEDURE — 80053 COMPREHEN METABOLIC PANEL: CPT | Mod: HCNC | Performed by: FAMILY MEDICINE

## 2024-01-18 PROCEDURE — 80061 LIPID PANEL: CPT | Mod: HCNC | Performed by: FAMILY MEDICINE

## 2024-01-18 PROCEDURE — 36415 COLL VENOUS BLD VENIPUNCTURE: CPT | Mod: HCNC,PO | Performed by: FAMILY MEDICINE

## 2024-01-18 PROCEDURE — 84153 ASSAY OF PSA TOTAL: CPT | Mod: HCNC | Performed by: FAMILY MEDICINE

## 2024-01-18 PROCEDURE — 85027 COMPLETE CBC AUTOMATED: CPT | Mod: HCNC | Performed by: FAMILY MEDICINE

## 2024-01-19 DIAGNOSIS — K21.9 GASTROESOPHAGEAL REFLUX DISEASE, UNSPECIFIED WHETHER ESOPHAGITIS PRESENT: ICD-10-CM

## 2024-01-19 NOTE — TELEPHONE ENCOUNTER
No care due was identified.  Health [facility] Embedded Care Due Messages. Reference number: [id number].   1/19/2024 1:31:57 PM CST

## 2024-01-19 NOTE — TELEPHONE ENCOUNTER
----- Message from Savanah Toro sent at 1/19/2024  1:38 PM CST -----  Regarding: Early Refill  Contact: Patient  Type:  RX Refill Request    Who Called:  Pharmacist  Refill or New Rx:  Refill  RX Name and Strength:  ALPRAZolam (XANAX) 1 MG tablet  How is the patient currently taking it? (ex. 1XDay):  1x daily  Is this a 30 day or 90 day RX:  30  Preferred Pharmacy with phone number:    [facility] - [place], [place] - [address]  [address]  [facility] [address]  Phone: [phone] Fax: [phone]    Local or Mail Order:  Local  Ordering Provider:  Josef Spear Call Back Number:  [phone](pharmacy) [phone] (patient)     Additional Information:  Lost medication. Not sure what happened to his medication but they are looking to refill it as soon as possible. Pharmacy needs the okay to refill it early. Please call patient or pharmacy if any questions. Thanks!

## 2024-01-20 RX ORDER — OMEPRAZOLE 20 MG/1
20 CAPSULE, DELAYED RELEASE ORAL DAILY
Qty: 90 CAPSULE | Refills: 3 | Status: SHIPPED | OUTPATIENT
Start: 2024-01-20 | End: 2024-01-25 | Stop reason: SDUPTHER

## 2024-01-20 RX ORDER — ALPRAZOLAM 1 MG/1
TABLET ORAL
Qty: 90 TABLET | Refills: 2 | Status: SHIPPED | OUTPATIENT
Start: 2024-01-20 | End: 2024-04-29 | Stop reason: SDUPTHER

## 2024-01-20 RX ORDER — HYDROCODONE BITARTRATE AND ACETAMINOPHEN 10; 325 MG/1; MG/1
1 TABLET ORAL 3 TIMES DAILY PRN
Qty: 80 TABLET | Refills: 0 | Status: SHIPPED | OUTPATIENT
Start: 2024-01-20 | End: 2024-01-25 | Stop reason: SDUPTHER

## 2024-01-25 ENCOUNTER — OFFICE VISIT (OUTPATIENT)
Dept: FAMILY MEDICINE | Facility: CLINIC | Age: 69
End: 2024-01-25
Payer: MEDICARE

## 2024-01-25 VITALS
HEART RATE: 67 BPM | BODY MASS INDEX: 29.95 KG/M2 | SYSTOLIC BLOOD PRESSURE: 138 MMHG | OXYGEN SATURATION: 97 % | WEIGHT: 221.13 LBS | HEIGHT: 72 IN | DIASTOLIC BLOOD PRESSURE: 89 MMHG

## 2024-01-25 DIAGNOSIS — M51.36 DEGENERATIVE DISC DISEASE, LUMBAR: ICD-10-CM

## 2024-01-25 DIAGNOSIS — K21.9 GASTROESOPHAGEAL REFLUX DISEASE, UNSPECIFIED WHETHER ESOPHAGITIS PRESENT: ICD-10-CM

## 2024-01-25 DIAGNOSIS — M54.17 RADICULAR PAIN OF LUMBOSACRAL REGION: ICD-10-CM

## 2024-01-25 DIAGNOSIS — E78.00 PURE HYPERCHOLESTEROLEMIA: Chronic | ICD-10-CM

## 2024-01-25 DIAGNOSIS — I10 ESSENTIAL HYPERTENSION: Primary | ICD-10-CM

## 2024-01-25 DIAGNOSIS — E11.65 TYPE 2 DIABETES MELLITUS WITH HYPERGLYCEMIA, WITHOUT LONG-TERM CURRENT USE OF INSULIN: ICD-10-CM

## 2024-01-25 DIAGNOSIS — E78.00 PURE HYPERCHOLESTEROLEMIA: ICD-10-CM

## 2024-01-25 PROBLEM — F43.9 STRESS AT HOME: Status: RESOLVED | Noted: 2024-01-14 | Resolved: 2024-01-25

## 2024-01-25 PROBLEM — R47.01 APHASIA: Status: RESOLVED | Noted: 2024-01-15 | Resolved: 2024-01-25

## 2024-01-25 PROBLEM — I63.512 ACUTE ISCHEMIC LEFT MCA STROKE: Status: RESOLVED | Noted: 2024-01-14 | Resolved: 2024-01-25

## 2024-01-25 PROBLEM — R09.89 BRUIT OF RIGHT CAROTID ARTERY: Status: RESOLVED | Noted: 2020-06-08 | Resolved: 2024-01-25

## 2024-01-25 PROBLEM — Z91.148 NON COMPLIANCE W MEDICATION REGIMEN: Status: RESOLVED | Noted: 2022-06-30 | Resolved: 2024-01-25

## 2024-01-25 PROCEDURE — 3008F BODY MASS INDEX DOCD: CPT | Mod: HCNC,CPTII,S$GLB, | Performed by: FAMILY MEDICINE

## 2024-01-25 PROCEDURE — 99214 OFFICE O/P EST MOD 30 MIN: CPT | Mod: HCNC,S$GLB,, | Performed by: FAMILY MEDICINE

## 2024-01-25 PROCEDURE — 4010F ACE/ARB THERAPY RXD/TAKEN: CPT | Mod: HCNC,CPTII,S$GLB, | Performed by: FAMILY MEDICINE

## 2024-01-25 PROCEDURE — 1111F DSCHRG MED/CURRENT MED MERGE: CPT | Mod: HCNC,CPTII,S$GLB, | Performed by: FAMILY MEDICINE

## 2024-01-25 PROCEDURE — 1126F AMNT PAIN NOTED NONE PRSNT: CPT | Mod: HCNC,CPTII,S$GLB, | Performed by: FAMILY MEDICINE

## 2024-01-25 PROCEDURE — 3044F HG A1C LEVEL LT 7.0%: CPT | Mod: HCNC,CPTII,S$GLB, | Performed by: FAMILY MEDICINE

## 2024-01-25 PROCEDURE — 3079F DIAST BP 80-89 MM HG: CPT | Mod: HCNC,CPTII,S$GLB, | Performed by: FAMILY MEDICINE

## 2024-01-25 PROCEDURE — 1159F MED LIST DOCD IN RCRD: CPT | Mod: HCNC,CPTII,S$GLB, | Performed by: FAMILY MEDICINE

## 2024-01-25 PROCEDURE — 99999 PR PBB SHADOW E&M-EST. PATIENT-LVL IV: CPT | Mod: PBBFAC,HCNC,, | Performed by: FAMILY MEDICINE

## 2024-01-25 PROCEDURE — 1101F PT FALLS ASSESS-DOCD LE1/YR: CPT | Mod: HCNC,CPTII,S$GLB, | Performed by: FAMILY MEDICINE

## 2024-01-25 PROCEDURE — 3075F SYST BP GE 130 - 139MM HG: CPT | Mod: HCNC,CPTII,S$GLB, | Performed by: FAMILY MEDICINE

## 2024-01-25 PROCEDURE — 3288F FALL RISK ASSESSMENT DOCD: CPT | Mod: HCNC,CPTII,S$GLB, | Performed by: FAMILY MEDICINE

## 2024-01-25 RX ORDER — HYDROCODONE BITARTRATE AND ACETAMINOPHEN 10; 325 MG/1; MG/1
1 TABLET ORAL 3 TIMES DAILY PRN
Qty: 80 TABLET | Refills: 0 | Status: SHIPPED | OUTPATIENT
Start: 2024-01-25 | End: 2024-02-26 | Stop reason: SDUPTHER

## 2024-01-25 RX ORDER — OMEPRAZOLE 20 MG/1
20 CAPSULE, DELAYED RELEASE ORAL DAILY
Qty: 90 CAPSULE | Refills: 3 | Status: SHIPPED | OUTPATIENT
Start: 2024-01-25 | End: 2024-04-16

## 2024-01-25 RX ORDER — OMEPRAZOLE 20 MG/1
20 CAPSULE, DELAYED RELEASE ORAL DAILY
Qty: 90 CAPSULE | Refills: 3 | Status: CANCELLED | OUTPATIENT
Start: 2024-01-25

## 2024-01-25 RX ORDER — APIXABAN 5 MG/1
TABLET, FILM COATED ORAL
Qty: 60 TABLET | Refills: 0 | Status: CANCELLED | OUTPATIENT
Start: 2024-01-25

## 2024-01-25 RX ORDER — HYDROCODONE BITARTRATE AND ACETAMINOPHEN 10; 325 MG/1; MG/1
1 TABLET ORAL 3 TIMES DAILY PRN
Qty: 80 TABLET | Refills: 0 | Status: CANCELLED | OUTPATIENT
Start: 2024-01-25

## 2024-01-25 NOTE — TELEPHONE ENCOUNTER
No care due was identified.  [facility] Embedded Care Due Messages. Reference number: [id number].   1/25/2024 2:47:48 PM CST

## 2024-01-25 NOTE — TELEPHONE ENCOUNTER
No care due was identified.  Health [facility] Embedded Care Due Messages. Reference number: [id number].   1/25/2024 11:15:51 AM CST

## 2024-01-25 NOTE — PROGRESS NOTES
Subjective:       Patient ID: Fred Blackwell is a 68 y.o. male.    Chief Complaint: Follow-up (6 month )    Here with daughter for hospital follow up.  Had mild cva. No residual complaints today.  Had not been taking eliquis routinely as likely cause.  Daughter helping with compliance of meds.      Follow-up  Pertinent negatives include no chest pain, chills, coughing or fever.     Review of Systems   Constitutional:  Negative for chills and fever.   Respiratory:  Negative for cough, chest tightness and shortness of breath.    Cardiovascular:  Negative for chest pain, palpitations and leg swelling.   Endocrine: Negative for cold intolerance and heat intolerance.   Psychiatric/Behavioral:  Negative for decreased concentration. The patient is not nervous/anxious.        Objective:      Physical Exam  Vitals and nursing note reviewed.   Constitutional:       Appearance: He is well-developed.   HENT:      Head: Normocephalic and atraumatic.   Cardiovascular:      Rate and Rhythm: Normal rate and regular rhythm.      Heart sounds: Normal heart sounds.   Pulmonary:      Effort: Pulmonary effort is normal.      Breath sounds: Normal breath sounds.         Assessment:       1. Essential hypertension    2. Pure hypercholesterolemia    3. Type 2 diabetes mellitus with hyperglycemia, without long-term current use of insulin        Plan:       Essential hypertension    Pure hypercholesterolemia    Type 2 diabetes mellitus with hyperglycemia, without long-term current use of insulin      Htn stable  Lipid stable  Diabetes stable  Reviewed recent hospital visit and labs  Will monitor chronic medical issues and continue current plan of care.      Follow up in about 3 months (around 4/25/2024), or if symptoms worsen or fail to improve, for Virtual Visit.

## 2024-02-07 ENCOUNTER — EXTERNAL HOME HEALTH (OUTPATIENT)
Dept: HOME HEALTH SERVICES | Facility: HOSPITAL | Age: 69
End: 2024-02-07
Payer: MEDICARE

## 2024-02-08 ENCOUNTER — OFFICE VISIT (OUTPATIENT)
Dept: CARDIOLOGY | Facility: CLINIC | Age: 69
End: 2024-02-08
Payer: MEDICARE

## 2024-02-08 VITALS
BODY MASS INDEX: 30.16 KG/M2 | WEIGHT: 222.69 LBS | HEIGHT: 72 IN | DIASTOLIC BLOOD PRESSURE: 86 MMHG | HEART RATE: 66 BPM | SYSTOLIC BLOOD PRESSURE: 139 MMHG

## 2024-02-08 DIAGNOSIS — I34.0 NONRHEUMATIC MITRAL VALVE REGURGITATION: Chronic | ICD-10-CM

## 2024-02-08 DIAGNOSIS — E66.9 OBESITY, CLASS I, BMI 30-34.9: ICD-10-CM

## 2024-02-08 DIAGNOSIS — Z91.148 NON COMPLIANCE W MEDICATION REGIMEN: Chronic | ICD-10-CM

## 2024-02-08 DIAGNOSIS — I63.9 ACUTE CVA (CEREBROVASCULAR ACCIDENT): ICD-10-CM

## 2024-02-08 DIAGNOSIS — I70.0 AORTOILIAC STENOSIS: Chronic | ICD-10-CM

## 2024-02-08 DIAGNOSIS — I77.9 MILD CAROTID ARTERY DISEASE: Chronic | ICD-10-CM

## 2024-02-08 DIAGNOSIS — I48.0 PAF (PAROXYSMAL ATRIAL FIBRILLATION): Chronic | ICD-10-CM

## 2024-02-08 DIAGNOSIS — I25.10 CORONARY ARTERY DISEASE INVOLVING NATIVE CORONARY ARTERY OF NATIVE HEART WITHOUT ANGINA PECTORIS: Primary | Chronic | ICD-10-CM

## 2024-02-08 DIAGNOSIS — Z79.01 LONG TERM (CURRENT) USE OF ANTICOAGULANTS: Chronic | ICD-10-CM

## 2024-02-08 DIAGNOSIS — Z87.891 H/O TOBACCO USE, PRESENTING HAZARDS TO HEALTH: Chronic | ICD-10-CM

## 2024-02-08 DIAGNOSIS — E78.00 PURE HYPERCHOLESTEROLEMIA: Chronic | ICD-10-CM

## 2024-02-08 PROCEDURE — 3079F DIAST BP 80-89 MM HG: CPT | Mod: HCNC,CPTII,S$GLB, | Performed by: INTERNAL MEDICINE

## 2024-02-08 PROCEDURE — 1125F AMNT PAIN NOTED PAIN PRSNT: CPT | Mod: HCNC,CPTII,S$GLB, | Performed by: INTERNAL MEDICINE

## 2024-02-08 PROCEDURE — 99214 OFFICE O/P EST MOD 30 MIN: CPT | Mod: HCNC,S$GLB,, | Performed by: INTERNAL MEDICINE

## 2024-02-08 PROCEDURE — 1159F MED LIST DOCD IN RCRD: CPT | Mod: HCNC,CPTII,S$GLB, | Performed by: INTERNAL MEDICINE

## 2024-02-08 PROCEDURE — 3075F SYST BP GE 130 - 139MM HG: CPT | Mod: HCNC,CPTII,S$GLB, | Performed by: INTERNAL MEDICINE

## 2024-02-08 PROCEDURE — 1101F PT FALLS ASSESS-DOCD LE1/YR: CPT | Mod: HCNC,CPTII,S$GLB, | Performed by: INTERNAL MEDICINE

## 2024-02-08 PROCEDURE — 3288F FALL RISK ASSESSMENT DOCD: CPT | Mod: HCNC,CPTII,S$GLB, | Performed by: INTERNAL MEDICINE

## 2024-02-08 PROCEDURE — 1111F DSCHRG MED/CURRENT MED MERGE: CPT | Mod: HCNC,CPTII,S$GLB, | Performed by: INTERNAL MEDICINE

## 2024-02-08 PROCEDURE — 3044F HG A1C LEVEL LT 7.0%: CPT | Mod: HCNC,CPTII,S$GLB, | Performed by: INTERNAL MEDICINE

## 2024-02-08 PROCEDURE — 4010F ACE/ARB THERAPY RXD/TAKEN: CPT | Mod: HCNC,CPTII,S$GLB, | Performed by: INTERNAL MEDICINE

## 2024-02-08 PROCEDURE — 3008F BODY MASS INDEX DOCD: CPT | Mod: HCNC,CPTII,S$GLB, | Performed by: INTERNAL MEDICINE

## 2024-02-08 PROCEDURE — 99999 PR PBB SHADOW E&M-EST. PATIENT-LVL IV: CPT | Mod: PBBFAC,HCNC,, | Performed by: INTERNAL MEDICINE

## 2024-02-08 RX ORDER — METFORMIN HYDROCHLORIDE 500 MG/1
1 TABLET, EXTENDED RELEASE ORAL 2 TIMES DAILY
COMMUNITY
Start: 2024-01-17

## 2024-02-08 RX ORDER — LISINOPRIL 10 MG/1
1 TABLET ORAL DAILY
COMMUNITY
Start: 2023-03-07

## 2024-02-08 NOTE — PROGRESS NOTES
Subjective:    Patient ID:  Fred Blackwell is a 68 y.o. male who presents for Hospital Follow Up (Pt okay-aside from unable to process thoughts correctly)        HPI  S/P L MCA CVA AT [facility], 1/14, BEFORE THAT AMS 1/2, SPEECH AFFECTED,GETS SPEECH TX AT HOME, ECHO MILD MR, LDL 56, HDL 35, BP OK, ON METFORMIN FOR DM, STOPPED SMOKING,WAS MISSING ELIQUIS, SEE ROS    Past Medical History:   Diagnosis Date    Anxiety     Bell's palsy     Chronic pain     COPD (chronic obstructive pulmonary disease)     Current moderate episode of major depressive disorder without prior episode 04/01/2019    DDD (degenerative disc disease), lumbar     Depression with anxiety     Diabetes mellitus     type 2    Embolic stroke 08/2023    residual slurred speech    Fibromyalgia     GERD (gastroesophageal reflux disease)     Hyperlipidemia     Hypertension     Obesity     Sciatica      Past Surgical History:   Procedure Laterality Date    COLONOSCOPY N/A 12/11/2019    Procedure: COLONOSCOPY;  Surgeon: Mich Mota MD;  Location: [facility] ENDO;  Service: Endoscopy;  Laterality: N/A;    CORONARY ANGIOGRAPHY N/A 06/02/2020    Procedure: ANGIOGRAM, CORONARY ARTERY;  Surgeon: Romario Flaherty MD;  Location: [facility] CATH;  Service: Cardiology;  Laterality: N/A;    CORONARY ANGIOPLASTY WITH STENT PLACEMENT      LEFT HEART CATHETERIZATION Right 06/02/2020    Procedure: Left heart cath;  Surgeon: Romario Flaherty MD;  Location: [facility] CATH;  Service: Cardiology;  Laterality: Right;    SHOULDER SURGERY       Family History   Problem Relation Age of Onset    Arthritis Mother      Social History     Socioeconomic History    Marital status:    Tobacco Use    Smoking status: Every Day     Current packs/day: 2.00     Types: Cigarettes    Smokeless tobacco: Never    Tobacco comments:     2 packs/day   Substance and Sexual Activity    Alcohol use: No    Drug use: No     Social Determinants of Health     Financial Resource Strain: Low Risk  (2/7/2024)     Overall Financial Resource Strain (CARDIA)     Difficulty of Paying Living Expenses: Not very hard   Food Insecurity: Food Insecurity Present (2/7/2024)    Hunger Vital Sign     Worried About Running Out of Food in the Last Year: Never true     Ran Out of Food in the Last Year: Sometimes true   Transportation Needs: No Transportation Needs (2/7/2024)    PRAPARE - Transportation     Lack of Transportation (Medical): No     Lack of Transportation (Non-Medical): No   Physical Activity: Inactive (2/7/2024)    Exercise Vital Sign     Days of Exercise per Week: 1 day     Minutes of Exercise per Session: 0 min   Stress: Stress Concern Present (2/7/2024)    [employer] - Occupational Stress Questionnaire     Feeling of Stress : Rather much   Social Connections: Socially Isolated (2/7/2024)    Social Connection and Isolation Panel [NHANES]     Frequency of Communication with Friends and Family: More than three times a week     Frequency of Social Gatherings with Friends and Family: Once a week     Attends Bahai Services: Never     Active Member of Clubs or Organizations: No     Attends Club or Organization Meetings: Never     Marital Status:    Housing Stability: Low Risk  (2/7/2024)    Housing Stability Vital Sign     Unable to Pay for Housing in the Last Year: No     Number of Places Lived in the Last Year: 1     Unstable Housing in the Last Year: No       Review of patient's allergies indicates:   Allergen Reactions    Cymbalta [duloxetine] Nausea And Vomiting    Penicillins      Other reaction(s): Hives       Current Outpatient Medications:     albuterol-ipratropium (DUO-NEB) 2.5 mg-0.5 mg/3 mL nebulizer solution, Take 3 mLs by nebulization every 6 (six) hours as needed for Wheezing. Rescue, Disp: 75 mL, Rfl: 0    ALPRAZolam (XANAX) 1 MG tablet, TAKE 1 TABLET (1 MG TOTAL) BY MOUTH THREE TIMES DAILY AS NEEDED FOR ANXIETY., Disp: 90 tablet, Rfl: 2    amLODIPine (NORVASC) 10 MG  tablet, Take 1 tablet (10 mg total) by mouth once daily., Disp: 90 tablet, Rfl: 1    aspirin (ECOTRIN) 81 MG EC tablet, Take 1 tablet (81 mg total) by mouth once daily., Disp: , Rfl: 0    atorvastatin (LIPITOR) 80 MG tablet, TAKE 1 TABLET (80 MG TOTAL) BY MOUTH ONCE DAILY. (Patient taking differently: Take 80 mg by mouth every evening.), Disp: 90 tablet, Rfl: 1    blood sugar diagnostic Strp, To check BG 2 (two) times daily, to use with insurance preferred meter, Disp: 200 strip, Rfl: 3    blood sugar diagnostic Strp, To check BG 1-2 times daily, to use with insurance preferred meter Dx E 11.65, Disp: 50 each, Rfl: 1    blood-glucose meter kit, To check BG 2 (two) times daily, to use with insurance preferred meter, Disp: 1 each, Rfl: 0    blood-glucose meter kit, To check BG 1-2 times daily, to use with insurance preferred meter Dx E 11.65, Disp: 1 each, Rfl: 1    citalopram (CELEXA) 10 MG tablet, TAKE 1 TABLET (10 MG TOTAL) BY MOUTH ONCE DAILY. (Patient taking differently: Take 10 mg by mouth every evening.), Disp: 90 tablet, Rfl: 2    ezetimibe (ZETIA) 10 mg tablet, Take 1 tablet (10 mg total) by mouth once daily. (Patient taking differently: Take 10 mg by mouth every evening.), Disp: 90 tablet, Rfl: 1    fluticasone propionate (FLONASE) 50 mcg/actuation nasal spray, USE 1 SPRAY IN EACH NOSTRIL ONCE DAILY. (Patient taking differently: 1 spray by Each Nostril route 2 (two) times daily as needed for Rhinitis or Allergies.), Disp: 48 g, Rfl: 2    HYDROcodone-acetaminophen (NORCO)  mg per tablet, Take 1 tablet by mouth 3 (three) times daily as needed for Pain (medically necessary for greater than 7 days)., Disp: 80 tablet, Rfl: 0    lancets Misc, To check BG 2 (two) times daily, to use with insurance preferred meter, Disp: 200 each, Rfl: 3    lancets Misc, To check BG 1-2 times daily, to use with insurance preferred meter Dx E 11.65, Disp: 50 each, Rfl: 1    lisinopriL 10 MG tablet, TAKE 1 TABLET (10 MG) BY  MOUTH DAILY. (Patient taking differently: Take 10 mg by mouth every evening.), Disp: 30 tablet, Rfl: 3    lisinopriL 10 MG tablet, Take 1 tablet by mouth once daily., Disp: , Rfl:     metFORMIN (GLUCOPHAGE) 500 MG tablet, Take 1 tablet (500 mg total) by mouth 2 (two) times daily with meals., Disp: 60 tablet, Rfl: 1    metFORMIN (GLUCOPHAGE-XR) 500 MG ER 24hr tablet, Take 1 tablet by mouth 2 (two) times daily., Disp: , Rfl:     omeprazole (PRILOSEC) 20 MG capsule, TAKE 1 CAPSULE (20 MG TOTAL) BY MOUTH ONCE DAILY., Disp: 90 capsule, Rfl: 3    apixaban (ELIQUIS) 5 mg Tab, TAKE 1 TABLET (5 MG) BY MOUTH TWO TIMES DAILY., Disp: 60 tablet, Rfl: 5    Review of Systems   Constitutional: Negative for chills, decreased appetite, diaphoresis, fever and malaise/fatigue.   HENT:  Positive for ear pain. Negative for congestion and sore throat.    Eyes:  Negative for blurred vision and visual disturbance.   Cardiovascular:  Negative for chest pain, claudication, cyanosis, dyspnea on exertion, irregular heartbeat, leg swelling, near-syncope, orthopnea, palpitations, paroxysmal nocturnal dyspnea and syncope.   Respiratory:  Negative for cough, hemoptysis and shortness of breath.    Endocrine: Negative for polyphagia and polyuria.   Hematologic/Lymphatic: Negative for adenopathy. Does not bruise/bleed easily.   Skin:  Negative for color change and rash.   Musculoskeletal:  Negative for back pain and falls.   Gastrointestinal:  Negative for abdominal pain, dysphagia and jaundice.   Genitourinary:  Negative for dysuria and flank pain.   Neurological:  Negative for brief paralysis and numbness.        ABNORMAL SPEECH        Objective:      Vitals:    02/08/24 1511   BP: 139/86   Pulse: 66   Weight: 101 kg (222 lb 10.6 oz)   Height: 6' (1.829 m)   PainSc: 10-Worst pain ever   PainLoc: Neck     Body mass index is 30.2 kg/m².    Physical Exam  Constitutional:       Appearance: He is overweight.   HENT:      Head: Normocephalic and  atraumatic.   Eyes:      Extraocular Movements: Extraocular movements intact.      Conjunctiva/sclera: Conjunctivae normal.   Neck:      Vascular: No carotid bruit.   Cardiovascular:      Rate and Rhythm: Normal rate and regular rhythm. No extrasystoles are present.     Pulses: Normal pulses.           Carotid pulses are 2+ on the right side and 2+ on the left side.       Radial pulses are 2+ on the right side and 2+ on the left side.        Femoral pulses are 2+ on the right side and 2+ on the left side.       Posterior tibial pulses are 2+ on the right side and 2+ on the left side.      Heart sounds: Murmur heard.      Systolic murmur is present with a grade of 1/6 at the lower left sternal border.      No friction rub. No gallop.   Pulmonary:      Effort: Pulmonary effort is normal.      Breath sounds: Normal breath sounds. No rales.   Abdominal:      Palpations: Abdomen is soft.      Tenderness: There is no abdominal tenderness.   Musculoskeletal:      Cervical back: Neck supple.      Right lower leg: No edema.      Left lower leg: No edema.   Skin:     General: Skin is warm and dry.      Capillary Refill: Capillary refill takes less than 2 seconds.   Neurological:      Mental Status: He is alert and oriented to person, place, and time.      Comments: SLIGHTLY ABNORMAL SPEECH   Psychiatric:         Mood and Affect: Mood normal.         Behavior: Behavior normal.                 ..    Chemistry        Component Value Date/Time     01/18/2024 0837    K 4.1 01/18/2024 0837     01/18/2024 0837    CO2 27 01/18/2024 0837    BUN 13 01/18/2024 0837    CREATININE 0.9 01/18/2024 0837     (H) 01/18/2024 0837        Component Value Date/Time    CALCIUM 9.5 01/18/2024 0837    ALKPHOS 74 01/18/2024 0837    AST 15 01/18/2024 0837    ALT 17 01/18/2024 0837    BILITOT 0.7 01/18/2024 0837    ESTGFRAFRICA >60.0 06/23/2022 0702    EGFRNONAA >60.0 06/23/2022 0702            ..  Lab Results   Component Value Date     CHOL 102 (L) 01/18/2024    CHOL 82 (L) 01/15/2024    CHOL 217 (H) 06/26/2023     Lab Results   Component Value Date    HDL 35 (L) 01/18/2024    HDL 28 (L) 01/15/2024    HDL 49 06/26/2023     Lab Results   Component Value Date    LDLCALC 56.8 (L) 01/18/2024    LDLCALC 42.2 (L) 01/15/2024    LDLCALC 150.4 06/26/2023     Lab Results   Component Value Date    TRIG 51 01/18/2024    TRIG 59 01/15/2024    TRIG 88 06/26/2023     Lab Results   Component Value Date    CHOLHDL 34.3 01/18/2024    CHOLHDL 34.1 01/15/2024    CHOLHDL 22.6 06/26/2023     ..  Lab Results   Component Value Date    WBC 9.30 01/18/2024    HGB 14.9 01/18/2024    HCT 44.1 01/18/2024    MCV 86 01/18/2024     01/18/2024       Test(s) Reviewed  I have reviewed the following in detail:  [] Stress test   [] Angiography   [x] Echocardiogram   [x] Labs   [x] Other:       Assessment:         ICD-10-CM ICD-9-CM   1. Coronary artery disease involving native coronary artery of native heart without angina pectoris  I25.10 414.01   2. Mild carotid artery disease  I77.9 447.9   3. Aortoiliac stenosis  I70.0 440.0   4. Obesity, Class I, BMI 30-34.9  E66.9 278.00   5. H/O tobacco use, presenting hazards to health  Z87.891 V15.82   6. Acute CVA (cerebrovascular accident)  I63.9 434.91   7. Nonrheumatic mitral valve regurgitation  I34.0 424.0   8. Long term (current) use of anticoagulants  Z79.01 V58.61   9. Non compliance w medication regimen  Z91.148 V15.81   10. PAF (paroxysmal atrial fibrillation)  I48.0 427.31   11. Pure hypercholesterolemia  E78.00 272.0     Problem List Items Addressed This Visit          Neuro    Acute CVA (cerebrovascular accident)    Overview     Treated at [facility] 8/2023, records in media           Relevant Orders    Cardiac event monitor    Transesophageal echo (MICHEAL)       Cardiac/Vascular    Aortoiliac stenosis    Overview     Noted on imaging from 11/4/10         Relevant Orders    Transesophageal echo (MICHEAL)    Pure  hypercholesterolemia    Relevant Medications    apixaban (ELIQUIS) 5 mg Tab    Other Relevant Orders    Transesophageal echo (MICHEAL)    Coronary artery disease involving native coronary artery of native heart without angina pectoris - Primary    Relevant Orders    Transesophageal echo (MICHEAL)    Mild carotid artery disease    Relevant Orders    Transesophageal echo (MICHEAL)    Nonrheumatic mitral valve regurgitation    Relevant Orders    Transesophageal echo (MICHEAL)    PAF (paroxysmal atrial fibrillation)    Relevant Orders    Transesophageal echo (MICHEAL)       Hematology    Long term (current) use of anticoagulants    Relevant Orders    Transesophageal echo (MICHEAL)       Endocrine    Obesity, Class I, BMI 30-34.9    Relevant Orders    Transesophageal echo (MICHEAL)       Palliative Care    Non compliance w medication regimen    Relevant Orders    Transesophageal echo (MICHEAL)       Other    H/O tobacco use, presenting hazards to health    Relevant Orders    Transesophageal echo (MICHEAL)        Plan:     MICHEAL TO ASSESS FOR SHUNT, OR THROMBUS, 4 WEEKS EVENT, MONITOR SUSPECT EVENT RELATED TO NONCOMPLIANCE WITH ANTICOAGULATION PROBABLE AFIB AT THAT TIME, VERY LONG DISCUSSION WITH THE PATIENT AND HIS DAUGHTER REGARDING IMPORTANCE OF MEDS AND COMPLIANCE, CONTINUE TO ABSTAIN FROM ANY TOBACCO, MONITOR BLOOD PRESSURE, NO ANGINA NO OVERT HEART FAILURE NO TIA TYPE SYMPTOMS NO NEAR-SYNCOPE DIET EXERCISE, RETURN TO CLINIC IN FEW WEEKS      Coronary artery disease involving native coronary artery of native heart without angina pectoris  -     Transesophageal echo (MICHEAL); Future    Mild carotid artery disease  -     Transesophageal echo (MICHEAL); Future    Aortoiliac stenosis  -     Transesophageal echo (MICHEAL); Future    Obesity, Class I, BMI 30-34.9  -     Transesophageal echo (MICHEAL); Future    H/O tobacco use, presenting hazards to health  Comments:  CONTINUE TO ABSTAIN FROM TOBACCO  Orders:  -     Transesophageal echo (MICHEAL); Future    Acute CVA  (cerebrovascular accident)  Comments:  RECENT  Orders:  -     Cardiac event monitor; Future  -     Transesophageal echo (MICHEAL); Future    Nonrheumatic mitral valve regurgitation  -     Transesophageal echo (MICHEAL); Future    Long term (current) use of anticoagulants  Comments:  COMPLIANCE  Orders:  -     Transesophageal echo (MICHEAL); Future    Non compliance w medication regimen  -     Transesophageal echo (MICHEAL); Future    PAF (paroxysmal atrial fibrillation)  -     Transesophageal echo (MICHEAL); Future    Pure hypercholesterolemia  Comments:  RESTART MEDS  Orders:  -     apixaban (ELIQUIS) 5 mg Tab; TAKE 1 TABLET (5 MG) BY MOUTH TWO TIMES DAILY.  Dispense: 60 tablet; Refill: 5  -     Transesophageal echo (MICHEAL); Future    RTC Low level/low impact aerobic exercise 5x's/wk. Heart healthy diet and risk factor modification.    See labs and med orders.    Aerobic exercise 5x's/wk. Heart healthy diet and risk factor modification.    See labs and med orders.

## 2024-02-09 ENCOUNTER — PATIENT MESSAGE (OUTPATIENT)
Dept: FAMILY MEDICINE | Facility: CLINIC | Age: 69
End: 2024-02-09
Payer: MEDICARE

## 2024-02-09 ENCOUNTER — PATIENT MESSAGE (OUTPATIENT)
Dept: CARDIOLOGY | Facility: CLINIC | Age: 69
End: 2024-02-09
Payer: MEDICARE

## 2024-02-09 NOTE — TELEPHONE ENCOUNTER
"LOV 1/25/2024    Per the patient record, ST has noted receptive and expressive aphasia.      From 1/26 notes " Pt with improved comprehension today for following simple commands. Improvement also noted with repetition and with written cues to generate automatic speech. Pt was able to communicate basic needs regarding pain and specific medication needed. S.T. will continue to follow for treatment. Spoke with  to be sure S.T. services included in discharge orders.    90% accurate with 1-2 syllable word repetition.  Pt continues with fluent speech and word finding difficulty but able to convey personal need message independently."      The documentation requested is for legal purposes.   "

## 2024-02-11 ENCOUNTER — PATIENT MESSAGE (OUTPATIENT)
Dept: FAMILY MEDICINE | Facility: CLINIC | Age: 69
End: 2024-02-11
Payer: MEDICARE

## 2024-02-12 DIAGNOSIS — I34.0 NONRHEUMATIC MITRAL VALVE REGURGITATION: Primary | ICD-10-CM

## 2024-02-14 ENCOUNTER — PATIENT MESSAGE (OUTPATIENT)
Dept: FAMILY MEDICINE | Facility: CLINIC | Age: 69
End: 2024-02-14
Payer: MEDICARE

## 2024-02-15 ENCOUNTER — OFFICE VISIT (OUTPATIENT)
Dept: NEUROLOGY | Facility: CLINIC | Age: 69
End: 2024-02-15
Payer: MEDICARE

## 2024-02-15 VITALS
HEART RATE: 71 BPM | HEIGHT: 72 IN | WEIGHT: 221 LBS | DIASTOLIC BLOOD PRESSURE: 88 MMHG | RESPIRATION RATE: 14 BRPM | BODY MASS INDEX: 29.93 KG/M2 | SYSTOLIC BLOOD PRESSURE: 156 MMHG

## 2024-02-15 DIAGNOSIS — Z86.73 HISTORY OF CVA (CEREBROVASCULAR ACCIDENT): Primary | ICD-10-CM

## 2024-02-15 DIAGNOSIS — I10 ESSENTIAL HYPERTENSION: ICD-10-CM

## 2024-02-15 DIAGNOSIS — Z87.891 H/O TOBACCO USE, PRESENTING HAZARDS TO HEALTH: ICD-10-CM

## 2024-02-15 DIAGNOSIS — I67.2 INTRACRANIAL ATHEROSCLEROSIS: ICD-10-CM

## 2024-02-15 DIAGNOSIS — I48.0 PAF (PAROXYSMAL ATRIAL FIBRILLATION): ICD-10-CM

## 2024-02-15 PROBLEM — R29.90 NEUROLOGICAL SYMPTOMS: Status: RESOLVED | Noted: 2024-01-16 | Resolved: 2024-02-15

## 2024-02-15 PROCEDURE — 99215 OFFICE O/P EST HI 40 MIN: CPT | Mod: HCNC,S$GLB,, | Performed by: NURSE PRACTITIONER

## 2024-02-15 PROCEDURE — 3044F HG A1C LEVEL LT 7.0%: CPT | Mod: HCNC,CPTII,S$GLB, | Performed by: NURSE PRACTITIONER

## 2024-02-15 PROCEDURE — 1101F PT FALLS ASSESS-DOCD LE1/YR: CPT | Mod: HCNC,CPTII,S$GLB, | Performed by: NURSE PRACTITIONER

## 2024-02-15 PROCEDURE — 3288F FALL RISK ASSESSMENT DOCD: CPT | Mod: HCNC,CPTII,S$GLB, | Performed by: NURSE PRACTITIONER

## 2024-02-15 PROCEDURE — 1159F MED LIST DOCD IN RCRD: CPT | Mod: HCNC,CPTII,S$GLB, | Performed by: NURSE PRACTITIONER

## 2024-02-15 PROCEDURE — 3008F BODY MASS INDEX DOCD: CPT | Mod: HCNC,CPTII,S$GLB, | Performed by: NURSE PRACTITIONER

## 2024-02-15 PROCEDURE — 3079F DIAST BP 80-89 MM HG: CPT | Mod: HCNC,CPTII,S$GLB, | Performed by: NURSE PRACTITIONER

## 2024-02-15 PROCEDURE — 3077F SYST BP >= 140 MM HG: CPT | Mod: HCNC,CPTII,S$GLB, | Performed by: NURSE PRACTITIONER

## 2024-02-15 PROCEDURE — 1111F DSCHRG MED/CURRENT MED MERGE: CPT | Mod: HCNC,CPTII,S$GLB, | Performed by: NURSE PRACTITIONER

## 2024-02-15 PROCEDURE — 4010F ACE/ARB THERAPY RXD/TAKEN: CPT | Mod: HCNC,CPTII,S$GLB, | Performed by: NURSE PRACTITIONER

## 2024-02-15 PROCEDURE — 1126F AMNT PAIN NOTED NONE PRSNT: CPT | Mod: HCNC,CPTII,S$GLB, | Performed by: NURSE PRACTITIONER

## 2024-02-15 PROCEDURE — 99999 PR PBB SHADOW E&M-EST. PATIENT-LVL IV: CPT | Mod: PBBFAC,HCNC,, | Performed by: NURSE PRACTITIONER

## 2024-02-15 NOTE — PATIENT INSTRUCTIONS
"The strokes were most likely due to undiagnosed atrial fibrillation. This allows blood clots to form in the heart and travel to the brain, causing stroke. It is very important that you take the Eliquis twice per day as prescribed for it to be effective in preventing stroke.     Continue also taking the aspirin to prevent worsening or additional blockages in the arteries.     Your cholesterol appears well controlled on current medications.     Continue care with Dr. Flaherty as planned.     When you are discharged from home health, we need to get you into outpatient speech therapy to promote continued improvement in the stroke language issues. Let me know when this occurs and I can send an order to [facility] outpatient.     Follow up with me in about 6 months     STROKE EDUCATION:    During a stroke, blood stops flowing to part of the brain. This can damage areas in the brain that control the rest of the body. Symptoms after a stroke depend on which part of the brain has been affected. A TIA is often called a "mini stroke" and can be a warning sign for future strokes.     Stroke Risk Factors:  - Previous stroke  - High blood pressure  - High cholesterol  - Cigarette/cigar smoking  - Diabetes  - Carotid or other artery disease  - Atrial fibrillation/flutter  - Obesity  - Blood clotting disorders  - Excessive alcohol use  - Street drug use  - Family history of stroke    Call 911 right away if you have any of the following symptoms of stroke:  Weakness, tingling, or loss of feeling on one side of your face or body  Sudden double vision or trouble seeing in one or both eyes  Sudden trouble talking or slurred speech  Trouble understanding others  Sudden, severe headache  Dizziness, loss of balance, or a sense of falling  Blackouts or seizures     F.A.S.T. is an easy way to remember the signs of stroke. When you see these signs, you know that you need to call 911 FAST!   F is for face drooping. One side of the face is " drooping or numb. When the person smiles, the smile is uneven.   A is for arm weakness. One arm is weak or numb. When the person lifts both arms at the same time, one arm may drift downward.   S is for speech difficulty. You may notice slurred speech or trouble speaking. The person can't repeat a simple sentence correctly when asked.   T is for time to call 911. If someone shows any of these symptoms, even if they go away, call 911 immediately. Make note of the time the symptoms first appeared.

## 2024-02-15 NOTE — ASSESSMENT & PLAN NOTE
Diagnostic testing from recent hospitalization reviewed with patient and family  - MRI brain shows acute L MCA strokes, embolic pattern, as well as chronic prior L MCA gliosis  - Vascular imaging again shows chronic R M1 occlusion, as well as inferior L M2 occlusion    - TTE with normal EF. No bubble done, but had MICHEAL in 8/2023 that was negative for shunt.   - EKG did confirm Afib in Jan 2024  - A1C 6.7 , LDL down to 56 from 115 in Aug      Reviewed vascular RF: HTN, HLD, tobacco dependence, pAfib, intracranial atherosclerosis, DM2     Etiology of strokes is likely cardioembolic - Afib recently captured on EKG, medical non compliance with Eliquis     Continue Eliquis 5 mg BID & ASA 81 mg daily   Continue HI statin for goal LDL < 70.   BP slightly elevated today, mgmt per cardiology. Avoid hypotension in light of focal intracranial athero  Dietary / lifestyle changes discussed - info on Mediterranean diet provided   CVA education / ED precautions discussed   Reinforced smoking cessation necessity - declines need for referral to cessation program  Recommend transition to outpatient ST when HH is completed

## 2024-02-15 NOTE — TELEPHONE ENCOUNTER
Looks like Dr. Haider next available isnt until march 14th.  Pt states they need virtual before march 1st

## 2024-02-15 NOTE — PROGRESS NOTES
"  NEUROLOGY  Outpatient Visit     [facility]  [address] [facility] [address], [place] [address]  [phone] (office) / [phone] (fax)    Patient Name:  Fred Blackwell  :  1955  MR #:  [id number]  Acct #:  [id number]    Date of  Visit: 02/15/2024    Other Physicians:  MARITZA Bahena MD (Primary Care Physician)      Interval history:  2/15/24:  Fred Blackwell is a 68 y.o. R-handed male seen in f/u for CVA     Medical history is significant for HLD, DM2, CAD w/stents, aortoiliac stenosis, anxiety, L Beard's Palsy, depression, lumbar DDD, GERD, fibromyalgia    Here with his daughter today. I last saw him in 2023. He was admitted to [facility] in January with recurrent CVA. His daughter noted onset of symptoms (confusion, slurred speech, expressive difficulty) in late December, initially went to the ED early January but left AMA and returned 2 weeks later. MRI during admission did show acute L MCA strokes. He was continued on Eliquis and ASA at [place].     He wasn't taking the Eliquis twice per day at the time. Daughter is now overseeing that he takes his medications. They live together.     Has residual expressive difficulty. Does better when he takes his time. Worse when he is stressed or rushed. Endorses a lot of stress related to issues with his other daughter. Needs a letter today documenting CVA with residual language deficits for legal purposes. Currently getting HH ST, once per week. No physical effects from CVA.     BP has been good at home. A bit elevated today, but this is "great" for him.     Working on smoking cessation, less than 1/2 ppd at  present. Declines need for cessation assistance.     Inpt EKG did confirm Afib. Has upcoming MICHEAL and repeat event monitoring scheduled per cardiology.     PRIOR HISTORY:  23:  Fred Blackwell is a 68 y.o. R-handed male seen in f/u for CVA     Medical history is significant for HLD, DM2, CAD w/stents, aortoiliac stenosis, " anxiety, L Beard's Palsy, depression, lumbar DDD, GERD, fibromyalgia    Received records from recent hospitalization (see documentation). MRI brain report indicates multiple small areas of restricted diffusion in the posterior L parietal and subcortical white matter. CTA showed RIGHT MCA occlusion. Cardiology note mentions several episodes of 2:1 AV block during sleep, as well as several pauses during MICHEAL. Pt refused PPM insertion.     He had the cardiac event monitor, but hasn't mailed it back yet to be read. Today, recalls that he felt a fluttering sensation in his chest on the date of symptom onset when he was outside and got overheated. Denies recurrence of this. He also recalls feeling tingling in the R fingers at the time. His R hand feels back to normal.     He continues not to smoke.     He reports that he has been compliant with Rx. Reviewed meds with him - taking Eliquis 5 mg BID and ASA 81 mg daily. Also on HI statin, Zetia.     HISTORY OF PRESENT ILLNESS 8/16/23:  Fred Blackwell is a 68 y.o. R-handed male seen in consultation for CVA per No ref. provider found    Medical history is significant for HLD, DM2, CAD w/stents, aortoiliac stenosis, anxiety, L Beard's Palsy, depression, lumbar DDD, GERD, fibromyalgia    Admitted to [facility] 8/1/23. Was out working in the yard and got overheated. Hands were clammy and shaky. Denies having any lateralized weakness, sensory change, HA, dizziness or vision change. Brother felt that speech was slurred and took him to the ED. [place] better shortly after resting.     Admits that he was non compliant with meds. Initially says 3 weeks, then says 3 months. Stopped taking all Rx, including ASA, BP agents, statin. Didn't think that he needed them bc he was feeling well.     Recalls having MRI and also a test with dye while admitted. Also had a heart test, but not sure about details. States that he was told that he possibly had a stroke. Per cardiology note, he was started  on Eliquis and Brilinta upon dc. Cardiology d/c Brilinta yesterday. He continues on Eliquis and has resumed aspirin and statin.     Was smoking up until recent event, now quit. No ETOH abuse.     Saw Dr. Flaherty yesterday. Holter ordered. Brilinta stopped. No hx of arrythmia. Denies sx.     Denies KANDACE history or symptoms.     Denies prior CVA / TIA. No neuro sx since discharge.     Allergies:  Review of patient's allergies indicates:   Allergen Reactions    Cymbalta [duloxetine] Nausea And Vomiting    Penicillins      Other reaction(s): Hives       Current Medications:  Current Outpatient Medications   Medication Sig Dispense Refill    albuterol-ipratropium (DUO-NEB) 2.5 mg-0.5 mg/3 mL nebulizer solution Take 3 mLs by nebulization every 6 (six) hours as needed for Wheezing. Rescue 75 mL 0    ALPRAZolam (XANAX) 1 MG tablet TAKE 1 TABLET (1 MG TOTAL) BY MOUTH THREE TIMES DAILY AS NEEDED FOR ANXIETY. 90 tablet 2    amLODIPine (NORVASC) 10 MG tablet Take 1 tablet (10 mg total) by mouth once daily. 90 tablet 1    apixaban (ELIQUIS) 5 mg Tab TAKE 1 TABLET (5 MG) BY MOUTH TWO TIMES DAILY. 60 tablet 5    aspirin (ECOTRIN) 81 MG EC tablet Take 1 tablet (81 mg total) by mouth once daily.  0    atorvastatin (LIPITOR) 80 MG tablet TAKE 1 TABLET (80 MG TOTAL) BY MOUTH ONCE DAILY. (Patient taking differently: Take 80 mg by mouth every evening.) 90 tablet 1    blood sugar diagnostic Strp To check BG 2 (two) times daily, to use with insurance preferred meter 200 strip 3    blood sugar diagnostic Strp To check BG 1-2 times daily, to use with insurance preferred meter Dx E 11.65 50 each 1    blood-glucose meter kit To check BG 2 (two) times daily, to use with insurance preferred meter 1 each 0    blood-glucose meter kit To check BG 1-2 times daily, to use with insurance preferred meter Dx E 11.65 1 each 1    citalopram (CELEXA) 10 MG tablet TAKE 1 TABLET (10 MG TOTAL) BY MOUTH ONCE DAILY. (Patient taking differently: Take 10 mg by  mouth every evening.) 90 tablet 2    ezetimibe (ZETIA) 10 mg tablet Take 1 tablet (10 mg total) by mouth once daily. (Patient taking differently: Take 10 mg by mouth every evening.) 90 tablet 1    fluticasone propionate (FLONASE) 50 mcg/actuation nasal spray USE 1 SPRAY IN EACH NOSTRIL ONCE DAILY. (Patient taking differently: 1 spray by Each Nostril route 2 (two) times daily as needed for Rhinitis or Allergies.) 48 g 2    HYDROcodone-acetaminophen (NORCO)  mg per tablet Take 1 tablet by mouth 3 (three) times daily as needed for Pain (medically necessary for greater than 7 days). 80 tablet 0    lancets Misc To check BG 2 (two) times daily, to use with insurance preferred meter 200 each 3    lancets Misc To check BG 1-2 times daily, to use with insurance preferred meter Dx E 11.65 50 each 1    lisinopriL 10 MG tablet TAKE 1 TABLET (10 MG) BY MOUTH DAILY. (Patient taking differently: Take 10 mg by mouth every evening.) 30 tablet 3    lisinopriL 10 MG tablet Take 1 tablet by mouth once daily.      metFORMIN (GLUCOPHAGE) 500 MG tablet Take 1 tablet (500 mg total) by mouth 2 (two) times daily with meals. 60 tablet 1    metFORMIN (GLUCOPHAGE-XR) 500 MG ER 24hr tablet Take 1 tablet by mouth 2 (two) times daily.      omeprazole (PRILOSEC) 20 MG capsule TAKE 1 CAPSULE (20 MG TOTAL) BY MOUTH ONCE DAILY. 90 capsule 3     No current facility-administered medications for this visit.       Past Medical History:  Past Medical History:   Diagnosis Date    Anxiety     Bell's palsy     Chronic pain     COPD (chronic obstructive pulmonary disease)     Current moderate episode of major depressive disorder without prior episode 04/01/2019    DDD (degenerative disc disease), lumbar     Depression with anxiety     Diabetes mellitus     type 2    Embolic stroke 08/2023    residual slurred speech    Fibromyalgia     GERD (gastroesophageal reflux disease)     Hyperlipidemia     Hypertension     Obesity     Sciatica        Past Surgical  History:  Past Surgical History:   Procedure Laterality Date    COLONOSCOPY N/A 12/11/2019    Procedure: COLONOSCOPY;  Surgeon: Mich Mota MD;  Location: [facility] ENDO;  Service: Endoscopy;  Laterality: N/A;    CORONARY ANGIOGRAPHY N/A 06/02/2020    Procedure: ANGIOGRAM, CORONARY ARTERY;  Surgeon: Romario Flaherty MD;  Location: [facility] CATH;  Service: Cardiology;  Laterality: N/A;    CORONARY ANGIOPLASTY WITH STENT PLACEMENT      LEFT HEART CATHETERIZATION Right 06/02/2020    Procedure: Left heart cath;  Surgeon: Romario Flaherty MD;  Location: [facility] CATH;  Service: Cardiology;  Laterality: Right;    SHOULDER SURGERY         Family History:  family history includes Arthritis in his mother.    Social History:   reports that he has been smoking cigarettes. He has never used smokeless tobacco. He reports that he does not drink alcohol and does not use drugs.      REVIEW OF SYSTEMS:  As per HPI    PHYSICAL EXAM:  BP (!) 156/88 (BP Location: Right arm, Patient Position: Sitting, BP Method: Medium (Automatic))   Pulse 71   Resp 14   Ht 6' (1.829 m)   Wt 100.3 kg (221 lb 0.2 oz)   BMI 29.97 kg/m²     General: Well groomed. No acute distress.  Pulmonary: Normal effort and rate.   Musculoskeletal: No obvious joint deformities, moves all extremities well.  Extremities: No clubbing, cyanosis or edema.      Neurological exam:  Mental status: Awake and alert. Oriented to person, place and situation. States month is August initially, then corrects to February. States year is 2025. Registers 1/3 words, recalls 0/3 after 5 min. Decreased attention and concentration.   Speech/Language: Mild expressive aphasia, no dysarthria. Impaired repetition, 2/3 correct naming. Unable to follow complex commands. Does answer appropriately when given multiple choices.   Cranial nerves (II-XII): Visual fields full. Pupils equal round and reactive to light, extraocular movements intact, mild L ptosis, no nystagmus. Facial sensation and symmetry  intact. Hearing grossly intact. Palate mobile and midline. Shoulder shrug normal bilaterally. Normal tongue protrusion.   Motor: 5 out of 5 strength throughout the upper and lower extremities bilaterally. Normal bulk and tone. No abnormal movements or drift.   Sensation: Intact to light touch and vibration bilaterally.    DTR: 2+ at the knees and biceps bilaterally.  Coordination: Finger-nose-finger testing intact bilaterally. No tremor.   Gait: Normal gait.    DIAGNOSTIC DATA:  I have personally reviewed provider notes, labs and imaging made available to me today.     Imaging:  CTA head and neck 01/15/2024:  Impression:     1. Occlusion of the inferior division of the left MCA M2 segment with paucity of opacified branches in the left insular region.  2. Chronic appearing occlusion of the M1 segment of the right MCA with tortuous collaterals opacified distally.  3. Normal extracranial carotid and vertebral arteries.  [facility].     MRI brain 01/15/2024:  Impression:     1. There are multiple foci of restricted diffusion involving the left temporal lobe and perisylvian cortex extending to the subcortical left parietal white matter, compatible with acute or subacute left MCA territory ischemia/infarction.  2. Additional findings and details as above.  3. The findings are concordant with [facility].    CTA head & neck 8/1/23: OSH report only  R M1 occlusion/stenosis without flow I the M2 and subsequent branches of the MCA circulation     MRI brain wo 8/1/23: OSH report only  Multiple small areas of restricted diffusion in the posterior left parietal lobe and subcortical white matter     CUS 6/2020:  Mild heterogeneous calcified plaque in the left internal carotid artery with no significant stenosis on either side.  Normal antegrade vertebral flow bilaterally.     Cardiac:  EKG 1/14/24:  Atrial fibrillation    4 day event monitor 8/2023:    Patient monitored for 4d 8h  55m    Baseline rhythm is Sinus bradycardia with average heart rate of 50 beats per minute, lowest heart rate 36 beats per minute with heart rate 117 beats per minute.    Very frequent isolated PACs burden of 20%.    Frequent isolated PVCs burden of 4%    No atrial fibrillation, no ventricular tachycardia, no PSVT    Heart Block occurred 10 time(s) the most severe 2° type 1; slowest 60 BPM    TTE 8/2/23: OSH report   EF 60%  Negative bubble study  LA size upper limits of normal      MICHEAL 8/2/23: OSH report   No LA or ALEXA thrombus  Negative bubble study     EKG 2020:  Sinus bradycardia with 1st degree A-V block     Labs:  Lab Results   Component Value Date    WBC 9.30 01/18/2024    HGB 14.9 01/18/2024    HCT 44.1 01/18/2024     01/18/2024    MCV 86 01/18/2024    RDW 13.3 01/18/2024     Lab Results   Component Value Date     01/18/2024    K 4.1 01/18/2024     01/18/2024    CO2 27 01/18/2024    BUN 13 01/18/2024    CREATININE 0.9 01/18/2024     (H) 01/18/2024    CALCIUM 9.5 01/18/2024    MG 1.8 01/15/2024    PHOS 3.5 01/15/2024     Lab Results   Component Value Date    PROT 7.0 01/18/2024    ALBUMIN 3.8 01/18/2024    BILITOT 0.7 01/18/2024    AST 15 01/18/2024    ALKPHOS 74 01/18/2024    ALT 17 01/18/2024     Lab Results   Component Value Date    INR 1.1 01/02/2024     Lab Results   Component Value Date    CHOL 102 (L) 01/18/2024    HDL 35 (L) 01/18/2024    LDLCALC 56.8 (L) 01/18/2024    TRIG 51 01/18/2024    CHOLHDL 34.3 01/18/2024     Lab Results   Component Value Date    HGBA1C 6.7 (H) 01/18/2024      Lab Results   Component Value Date    XVUEPGJW89 372 01/15/2024     Lab Results   Component Value Date    FOLATE 7.2 01/15/2024     Lab Results   Component Value Date    TSH 3.505 01/18/2024 8/2023:   per records    ASSESSMENT & PLAN:  Fred Blackwell is a 68 y.o. R-handed male seen in f/u for CVA.     Problem List Items Addressed This Visit          Neuro    History of CVA  (cerebrovascular accident) - Primary    Overview     L MCA embolic appearing CVA 8/2023 (records from [facility] in media)  Recurrent L MCA embolic appearing CVA 1/2024           Current Assessment & Plan     Diagnostic testing from recent hospitalization reviewed with patient and family  - MRI brain shows acute L MCA strokes, embolic pattern, as well as chronic prior L MCA gliosis  - Vascular imaging again shows chronic R M1 occlusion, as well as inferior L M2 occlusion    - TTE with normal EF. No bubble done, but had MICHEAL in 8/2023 that was negative for shunt.   - EKG did confirm Afib in Jan 2024  - A1C 6.7 , LDL down to 56 from 115 in Aug      Reviewed vascular RF: HTN, HLD, tobacco dependence, pAfib, intracranial atherosclerosis, DM2     Etiology of strokes is likely cardioembolic - Afib recently captured on EKG, medical non compliance with Eliquis     Continue Eliquis 5 mg BID & ASA 81 mg daily   Continue HI statin for goal LDL < 70.   BP slightly elevated today, mgmt per cardiology. Avoid hypotension in light of focal intracranial athero  Dietary / lifestyle changes discussed - info on Mediterranean diet provided   CVA education / ED precautions discussed   Reinforced smoking cessation necessity - declines need for referral to cessation program  Recommend transition to outpatient ST when HH is completed              Intracranial atherosclerosis    Overview     Likely chronic R MCA occlusion based on OSH CTA 8/2023            Cardiac/Vascular    Essential hypertension    Overview     RF uniretic.           PAF (paroxysmal atrial fibrillation)       Other    H/O tobacco use, presenting hazards to health             Follow up: 6 months     I spent a total of 54 minutes on the day of the visit.    This includes face to face time with the patient, as well as non-face to face time preparing for and completing the visit (review of prior diagnostic testing and clinical notes, obtaining or reviewing history, documenting  clinical information in the EMR, independently interpreting and communicating results to the patient/family and coordinating ongoing care).       I appreciate the opportunity to participate in the care of this patient. Please feel free to contact me with any concerns or questions.       Xochitl Macias, [facility]  [facility]  [address] [place] [address]

## 2024-02-16 ENCOUNTER — PATIENT MESSAGE (OUTPATIENT)
Dept: NEUROLOGY | Facility: CLINIC | Age: 69
End: 2024-02-16
Payer: MEDICARE

## 2024-02-22 ENCOUNTER — HOSPITAL ENCOUNTER (OUTPATIENT)
Dept: CARDIOLOGY | Facility: HOSPITAL | Age: 69
Discharge: HOME | End: 2024-02-22
Attending: INTERNAL MEDICINE
Payer: MEDICARE

## 2024-02-22 DIAGNOSIS — I63.9 ACUTE CVA (CEREBROVASCULAR ACCIDENT): ICD-10-CM

## 2024-02-22 PROCEDURE — 93271 ECG/MONITORING AND ANALYSIS: CPT | Mod: HCNC,PO

## 2024-02-22 PROCEDURE — 93272 ECG/REVIEW INTERPRET ONLY: CPT | Mod: ,,, | Performed by: INTERNAL MEDICINE

## 2024-02-23 ENCOUNTER — PATIENT MESSAGE (OUTPATIENT)
Dept: ADMINISTRATIVE | Facility: HOSPITAL | Age: 69
End: 2024-02-23
Payer: MEDICARE

## 2024-02-23 ENCOUNTER — PATIENT OUTREACH (OUTPATIENT)
Dept: ADMINISTRATIVE | Facility: HOSPITAL | Age: 69
End: 2024-02-23
Payer: MEDICARE

## 2024-02-23 NOTE — PROGRESS NOTES
Population Health Chart Review & Patient Outreach Details    Outreach Performed: YES Portal    Additional [facility] Health Notes:           Updates Requested / Reviewed:      Updated Care Coordination Note         Health Maintenance Topics Overdue:    Health Maintenance Due   Topic Date Due    Sign Pain Contract  Never done    Complete Opioid Risk Tool  Never done    Shingles Vaccine (1 of 2) Never done    RSV Vaccine (Age 60+ and Pregnant patients) (1 - 1-dose 60+ series) Never done    Pneumococcal Vaccines (Age 65+) (2 of 2 - PCV) 07/20/2018    Eye Exam  11/08/2018    Foot Exam  06/15/2021    Influenza Vaccine (1) 09/01/2023    COVID-19 Vaccine (4 - 2023-24 season) 09/01/2023    Diabetes Urine Screening  12/29/2023         Health Maintenance Topic(s) Outreach Outcomes & Actions Taken:    Eye Exam - Outreach Outcomes & Actions Taken  : Eye Camera Scheduled or Optometry/Ophthalmology Referral Placed/Appt Scheduled

## 2024-02-26 DIAGNOSIS — M51.36 DEGENERATIVE DISC DISEASE, LUMBAR: ICD-10-CM

## 2024-02-26 DIAGNOSIS — M54.17 RADICULAR PAIN OF LUMBOSACRAL REGION: ICD-10-CM

## 2024-02-26 NOTE — TELEPHONE ENCOUNTER
No care due was identified.  Health [facility] Embedded Care Due Messages. Reference number: [id number].   2/26/2024 9:54:32 AM CST

## 2024-02-26 NOTE — TELEPHONE ENCOUNTER
----- Message from Maxim Riojas sent at 2/26/2024  4:44 PM CST -----  Contact: pt  Type: Needs Medical Advice  Who Called:  pt  Best Call Back Number: [phone]    Additional Information: Pt is calling the office needs HYDROcodone-acetaminophen (NORCO)  mg per tablet   Refilled to [facility] [place] [address] [facility] [place] [address]  Phone: [phone] Fax: [phone]    Please call and advise.

## 2024-02-27 ENCOUNTER — TELEPHONE (OUTPATIENT)
Dept: FAMILY MEDICINE | Facility: CLINIC | Age: 69
End: 2024-02-27

## 2024-02-27 RX ORDER — HYDROCODONE BITARTRATE AND ACETAMINOPHEN 10; 325 MG/1; MG/1
1 TABLET ORAL 3 TIMES DAILY PRN
Qty: 80 TABLET | Refills: 0 | Status: SHIPPED | OUTPATIENT
Start: 2024-02-27 | End: 2024-03-22 | Stop reason: SDUPTHER

## 2024-02-27 RX ORDER — HYDROCODONE BITARTRATE AND ACETAMINOPHEN 7.5; 325 MG/1; MG/1
1 TABLET ORAL EVERY 6 HOURS PRN
Qty: 90 TABLET | Refills: 0 | Status: SHIPPED | OUTPATIENT
Start: 2024-02-27 | End: 2024-03-12 | Stop reason: DRUGHIGH

## 2024-02-27 NOTE — TELEPHONE ENCOUNTER
----- Message from Mine Cuba sent at 2/27/2024  9:39 AM CST -----  Regarding: RX Refill Request  Contact: patient at [phone]  Type:  RX Refill Request    Who Called:  patient at [phone]    Preferred Pharmacy with phone number:    [facility] [place] [address] [place] [address]  Phone: [phone] Fax: [phone]      Additional Information:  calling to check on status of prescription refill if prior authorization needed or diagnostic code. Please call and advise. Thank you    HYDROcodone-acetaminophen (NORCO)  mg per tablet 80 tablet 0 2/27/2024 -   Sig - Route: Take 1 tablet by mouth 3 (three) times daily as needed for Pain (medically necessary for greater than 7 days). - Oral   Sent to pharmacy as: HYDROcodone-acetaminophen (NORCO)  mg per tablet   Earliest Fill Date: 2/27/2024   Notes to Pharmacy: Quantity prescribed more than 7 day supply? Yes, quantity medically necessary   No prior authorization was found for this prescription.   Found prior authorization for another prescription for the same medication: Closed

## 2024-03-04 ENCOUNTER — PATIENT OUTREACH (OUTPATIENT)
Dept: ADMINISTRATIVE | Facility: HOSPITAL | Age: 69
End: 2024-03-04
Payer: MEDICARE

## 2024-03-04 ENCOUNTER — OFFICE VISIT (OUTPATIENT)
Dept: OPTOMETRY | Facility: CLINIC | Age: 69
End: 2024-03-04
Payer: MEDICARE

## 2024-03-04 DIAGNOSIS — H52.4 MYOPIA WITH ASTIGMATISM AND PRESBYOPIA, BILATERAL: ICD-10-CM

## 2024-03-04 DIAGNOSIS — H25.813 COMBINED FORMS OF AGE-RELATED CATARACT, BILATERAL: ICD-10-CM

## 2024-03-04 DIAGNOSIS — H52.203 MYOPIA WITH ASTIGMATISM AND PRESBYOPIA, BILATERAL: ICD-10-CM

## 2024-03-04 DIAGNOSIS — E11.9 TYPE 2 DIABETES MELLITUS WITHOUT RETINOPATHY: Primary | ICD-10-CM

## 2024-03-04 DIAGNOSIS — H52.13 MYOPIA WITH ASTIGMATISM AND PRESBYOPIA, BILATERAL: ICD-10-CM

## 2024-03-04 DIAGNOSIS — H43.393 VITREOUS FLOATERS OF BOTH EYES: ICD-10-CM

## 2024-03-04 PROCEDURE — 2023F DILAT RTA XM W/O RTNOPTHY: CPT | Mod: HCNC,CPTII,S$GLB,

## 2024-03-04 PROCEDURE — 1159F MED LIST DOCD IN RCRD: CPT | Mod: HCNC,CPTII,S$GLB,

## 2024-03-04 PROCEDURE — 92004 COMPRE OPH EXAM NEW PT 1/>: CPT | Mod: HCNC,S$GLB,,

## 2024-03-04 PROCEDURE — 3288F FALL RISK ASSESSMENT DOCD: CPT | Mod: HCNC,CPTII,S$GLB,

## 2024-03-04 PROCEDURE — 99999 PR PBB SHADOW E&M-EST. PATIENT-LVL III: CPT | Mod: PBBFAC,HCNC,,

## 2024-03-04 PROCEDURE — 92015 DETERMINE REFRACTIVE STATE: CPT | Mod: HCNC,S$GLB,,

## 2024-03-04 PROCEDURE — 4010F ACE/ARB THERAPY RXD/TAKEN: CPT | Mod: HCNC,CPTII,S$GLB,

## 2024-03-04 PROCEDURE — 1101F PT FALLS ASSESS-DOCD LE1/YR: CPT | Mod: HCNC,CPTII,S$GLB,

## 2024-03-04 PROCEDURE — 1126F AMNT PAIN NOTED NONE PRSNT: CPT | Mod: HCNC,CPTII,S$GLB,

## 2024-03-04 PROCEDURE — 3044F HG A1C LEVEL LT 7.0%: CPT | Mod: HCNC,CPTII,S$GLB,

## 2024-03-04 NOTE — PROGRESS NOTES
Population Health Chart Review & Patient Outreach Details      Additional Pop Health Notes:               Updates Requested / Reviewed:      Updated Care Coordination Note         Health Maintenance Topics Overdue:    Health Maintenance Due   Topic Date Due    Sign Pain Contract  Never done    Complete Opioid Risk Tool  Never done    Shingles Vaccine (1 of 2) Never done    RSV Vaccine (Age 60+ and Pregnant patients) (1 - 1-dose 60+ series) Never done    Pneumococcal Vaccines (Age 65+) (2 of 2 - PCV) 07/20/2018    Eye Exam  11/08/2018    Foot Exam  06/15/2021    Influenza Vaccine (1) 09/01/2023    COVID-19 Vaccine (4 - 2023-24 season) 09/01/2023    Diabetes Urine Screening  12/29/2023         Health Maintenance Topic(s) Outreach Outcomes & Actions Taken:    Eye Exam - Outreach Outcomes & Actions Taken  : Eye Camera Scheduled or Optometry/Ophthalmology Referral Placed/Appt Scheduled

## 2024-03-04 NOTE — PROGRESS NOTES
HPI    New pt here for diabetic exam. Last exam- 17 YO    Pt sts [facility] OU blurry. Pt denies flashes/floaters/pain. Pt denies use of   gtt. Pt Dm is ok, daughter checks BS, he does not know.    Hemoglobin A1C       Date                     Value               Ref Range             Status                01/18/2024               6.7 (H)             4.0 - 5.6 %           Final                 01/15/2024               7.1 (H)             0.0 - 5.6 %           Final                  06/26/2023               6.6 (H)             4.0 - 5.6 %           Final                Last edited by Kassy Gonzalez, OD on 3/4/2024 11:56 AM.            Assessment /Plan     For exam results, see Encounter Report.    Type 2 diabetes mellitus without retinopathy    Combined forms of age-related cataract, bilateral    Vitreous floaters of both eyes    Myopia with astigmatism and presbyopia, bilateral      No diabetic retinopathy or macular edema evident on today's exam OD, OS. Ed pt on importance of maintaining strict BS control due to potential for visual fluctuations and/or vision loss. Monitor with yearly dilated exam.  Early visual significance, pt largely asymptomatic. Surgery not recommended at this time. Ed pt on symptoms of worsening cataracts including reduced BCVA and glare. Monitor yearly for changes.  Ed pt on benign nature of vitreous floaters. Reviewed signs and symptoms of a retinal detachment thoroughly and ed pt to RTC asap if experienced.  Discussed spectacle options with pt and released final spec rx. Ed pt on change in rx and adaptation.    RTC: 1 year for comprehensive exam or sooner prn

## 2024-03-05 ENCOUNTER — OFFICE VISIT (OUTPATIENT)
Dept: CARDIOLOGY | Facility: CLINIC | Age: 69
End: 2024-03-05
Payer: MEDICARE

## 2024-03-05 ENCOUNTER — PATIENT MESSAGE (OUTPATIENT)
Dept: CARDIOLOGY | Facility: CLINIC | Age: 69
End: 2024-03-05

## 2024-03-05 ENCOUNTER — HOSPITAL ENCOUNTER (OUTPATIENT)
Dept: CARDIOLOGY | Facility: HOSPITAL | Age: 69
Discharge: HOME | End: 2024-03-05
Attending: INTERNAL MEDICINE | Admitting: INTERNAL MEDICINE
Payer: MEDICARE

## 2024-03-05 VITALS
BODY MASS INDEX: 29.97 KG/M2 | SYSTOLIC BLOOD PRESSURE: 129 MMHG | WEIGHT: 221 LBS | DIASTOLIC BLOOD PRESSURE: 87 MMHG | HEART RATE: 54 BPM

## 2024-03-05 DIAGNOSIS — Z79.01 LONG TERM (CURRENT) USE OF ANTICOAGULANTS: Chronic | ICD-10-CM

## 2024-03-05 DIAGNOSIS — I34.0 MITRAL VALVE INSUFFICIENCY, UNSPECIFIED ETIOLOGY: ICD-10-CM

## 2024-03-05 DIAGNOSIS — J43.8 OTHER EMPHYSEMA: Chronic | ICD-10-CM

## 2024-03-05 DIAGNOSIS — I77.9 MILD CAROTID ARTERY DISEASE: Chronic | ICD-10-CM

## 2024-03-05 DIAGNOSIS — I44.2 COMPLETE HEART BLOCK: Primary | ICD-10-CM

## 2024-03-05 DIAGNOSIS — I70.0 AORTOILIAC STENOSIS: Chronic | ICD-10-CM

## 2024-03-05 DIAGNOSIS — I25.10 CORONARY ARTERY DISEASE INVOLVING NATIVE CORONARY ARTERY OF NATIVE HEART WITHOUT ANGINA PECTORIS: Chronic | ICD-10-CM

## 2024-03-05 PROBLEM — E66.09 CLASS 1 OBESITY DUE TO EXCESS CALORIES WITHOUT SERIOUS COMORBIDITY WITH BODY MASS INDEX (BMI) OF 32.0 TO 32.9 IN ADULT: Status: RESOLVED | Noted: 2019-12-16 | Resolved: 2024-03-05

## 2024-03-05 PROBLEM — E66.3 OVERWEIGHT (BMI 25.0-29.9): Status: ACTIVE | Noted: 2020-06-08

## 2024-03-05 PROBLEM — E66.811 CLASS 1 OBESITY DUE TO EXCESS CALORIES WITHOUT SERIOUS COMORBIDITY WITH BODY MASS INDEX (BMI) OF 32.0 TO 32.9 IN ADULT: Status: RESOLVED | Noted: 2019-12-16 | Resolved: 2024-03-05

## 2024-03-05 PROCEDURE — 3079F DIAST BP 80-89 MM HG: CPT | Mod: HCNC,CPTII,S$GLB, | Performed by: INTERNAL MEDICINE

## 2024-03-05 PROCEDURE — 1159F MED LIST DOCD IN RCRD: CPT | Mod: HCNC,CPTII,S$GLB, | Performed by: INTERNAL MEDICINE

## 2024-03-05 PROCEDURE — 3074F SYST BP LT 130 MM HG: CPT | Mod: HCNC,CPTII,S$GLB, | Performed by: INTERNAL MEDICINE

## 2024-03-05 PROCEDURE — 4010F ACE/ARB THERAPY RXD/TAKEN: CPT | Mod: HCNC,CPTII,S$GLB, | Performed by: INTERNAL MEDICINE

## 2024-03-05 PROCEDURE — 3044F HG A1C LEVEL LT 7.0%: CPT | Mod: HCNC,CPTII,S$GLB, | Performed by: INTERNAL MEDICINE

## 2024-03-05 PROCEDURE — 99999 PR PBB SHADOW E&M-EST. PATIENT-LVL IV: CPT | Mod: PBBFAC,HCNC,, | Performed by: INTERNAL MEDICINE

## 2024-03-05 PROCEDURE — 99214 OFFICE O/P EST MOD 30 MIN: CPT | Mod: HCNC,S$GLB,, | Performed by: INTERNAL MEDICINE

## 2024-03-05 PROCEDURE — 1101F PT FALLS ASSESS-DOCD LE1/YR: CPT | Mod: HCNC,CPTII,S$GLB, | Performed by: INTERNAL MEDICINE

## 2024-03-05 PROCEDURE — 1126F AMNT PAIN NOTED NONE PRSNT: CPT | Mod: HCNC,CPTII,S$GLB, | Performed by: INTERNAL MEDICINE

## 2024-03-05 PROCEDURE — 3288F FALL RISK ASSESSMENT DOCD: CPT | Mod: HCNC,CPTII,S$GLB, | Performed by: INTERNAL MEDICINE

## 2024-03-05 PROCEDURE — 3008F BODY MASS INDEX DOCD: CPT | Mod: HCNC,CPTII,S$GLB, | Performed by: INTERNAL MEDICINE

## 2024-03-05 RX ORDER — SODIUM CHLORIDE 9 MG/ML
INJECTION, SOLUTION INTRAVENOUS ONCE
Status: CANCELLED | OUTPATIENT
Start: 2024-03-05 | End: 2024-03-05

## 2024-03-05 NOTE — PROGRESS NOTES
Subjective:    Patient ID:  Fred Blackwell is a 68 y.o. male who presents for Results (Discuss test)        HPI  COLD PATIENT IN VIEW OF ABNORMAL RESULTS EVENT MONITOR 3RD DEGREE AVB, INTERMITTENT CX MICHEAL FOR NOW THIS NEEDS TO BE ADDRESSED 1ST PATIENT GETS CONFUSED DOES HAVE SOME DIZZY SPELLS NOT SURE IF THERE IS ANY CORRELATION HOWEVER SAID PROBABLY THE RISK, NO CHEST PAIN NO SIGNIFICANT SHORTNESS OF BREATH, SEE ROS    Past Medical History:   Diagnosis Date    Anxiety     Bell's palsy     Chronic pain     COPD (chronic obstructive pulmonary disease)     Current moderate episode of major depressive disorder without prior episode 04/01/2019    DDD (degenerative disc disease), lumbar     Depression with anxiety     Diabetes mellitus     type 2    Embolic stroke 08/2023    residual slurred speech    Fibromyalgia     GERD (gastroesophageal reflux disease)     Hyperlipidemia     Hypertension     Obesity     Sciatica      Past Surgical History:   Procedure Laterality Date    COLONOSCOPY N/A 12/11/2019    Procedure: COLONOSCOPY;  Surgeon: Mich Mota MD;  Location: [facility] ENDO;  Service: Endoscopy;  Laterality: N/A;    CORONARY ANGIOGRAPHY N/A 06/02/2020    Procedure: ANGIOGRAM, CORONARY ARTERY;  Surgeon: Romario Flaherty MD;  Location: [facility] CATH;  Service: Cardiology;  Laterality: N/A;    CORONARY ANGIOPLASTY WITH STENT PLACEMENT      LEFT HEART CATHETERIZATION Right 06/02/2020    Procedure: Left heart cath;  Surgeon: Romario Flaherty MD;  Location: [facility] CATH;  Service: Cardiology;  Laterality: Right;    SHOULDER SURGERY       Family History   Problem Relation Age of Onset    Arthritis Mother     Glaucoma Neg Hx     Macular degeneration Neg Hx      Social History     Socioeconomic History    Marital status:    Tobacco Use    Smoking status: Every Day     Current packs/day: 2.00     Types: Cigarettes    Smokeless tobacco: Never    Tobacco comments:     2 packs/day   Substance and Sexual Activity    Alcohol  use: No    Drug use: No     Social Determinants of Health     Financial Resource Strain: Low Risk  (2/7/2024)    Overall Financial Resource Strain (CARDIA)     Difficulty of Paying Living Expenses: Not very hard   Food Insecurity: Food Insecurity Present (2/7/2024)    Hunger Vital Sign     Worried About Running Out of Food in the Last Year: Never true     Ran Out of Food in the Last Year: Sometimes true   Transportation Needs: No Transportation Needs (2/7/2024)    PRAPARE - Transportation     Lack of Transportation (Medical): No     Lack of Transportation (Non-Medical): No   Physical Activity: Inactive (2/7/2024)    Exercise Vital Sign     Days of Exercise per Week: 1 day     Minutes of Exercise per Session: 0 min   Stress: Stress Concern Present (2/7/2024)    [employer] [employer] - Occupational Stress Questionnaire     Feeling of Stress : Rather much   Social Connections: Socially Isolated (2/7/2024)    Social Connection and Isolation Panel [NHANES]     Frequency of Communication with Friends and Family: More than three times a week     Frequency of Social Gatherings with Friends and Family: Once a week     Attends [employer] Services: Never     Active Member of Clubs or Organizations: No     Attends Club or Organization Meetings: Never     Marital Status:    Housing Stability: Low Risk  (2/7/2024)    Housing Stability Vital Sign     Unable to Pay for Housing in the Last Year: No     Number of Places Lived in the Last Year: 1     Unstable Housing in the Last Year: No       Review of patient's allergies indicates:   Allergen Reactions    Cymbalta [duloxetine] Nausea And Vomiting    Penicillins      Other reaction(s): Hives       Current Outpatient Medications:     albuterol-ipratropium (DUO-NEB) 2.5 mg-0.5 mg/3 mL nebulizer solution, Take 3 mLs by nebulization every 6 (six) hours as needed for Wheezing. Rescue, Disp: 75 mL, Rfl: 0    ALPRAZolam (XANAX) 1 MG tablet, TAKE 1 TABLET (1 MG  TOTAL) BY MOUTH THREE TIMES DAILY AS NEEDED FOR ANXIETY., Disp: 90 tablet, Rfl: 2    amLODIPine (NORVASC) 10 MG tablet, Take 1 tablet (10 mg total) by mouth once daily., Disp: 90 tablet, Rfl: 1    apixaban (ELIQUIS) 5 mg Tab, TAKE 1 TABLET (5 MG) BY MOUTH TWO TIMES DAILY., Disp: 60 tablet, Rfl: 5    aspirin (ECOTRIN) 81 MG EC tablet, Take 1 tablet (81 mg total) by mouth once daily., Disp: , Rfl: 0    atorvastatin (LIPITOR) 80 MG tablet, TAKE 1 TABLET (80 MG TOTAL) BY MOUTH ONCE DAILY. (Patient taking differently: Take 80 mg by mouth every evening.), Disp: 90 tablet, Rfl: 1    blood sugar diagnostic Strp, To check BG 2 (two) times daily, to use with insurance preferred meter, Disp: 200 strip, Rfl: 3    blood sugar diagnostic Strp, To check BG 1-2 times daily, to use with insurance preferred meter Dx E 11.65, Disp: 50 each, Rfl: 1    blood-glucose meter kit, To check BG 2 (two) times daily, to use with insurance preferred meter, Disp: 1 each, Rfl: 0    blood-glucose meter kit, To check BG 1-2 times daily, to use with insurance preferred meter Dx E 11.65, Disp: 1 each, Rfl: 1    citalopram (CELEXA) 10 MG tablet, TAKE 1 TABLET (10 MG TOTAL) BY MOUTH ONCE DAILY. (Patient taking differently: Take 10 mg by mouth every evening.), Disp: 90 tablet, Rfl: 2    ezetimibe (ZETIA) 10 mg tablet, Take 1 tablet (10 mg total) by mouth once daily. (Patient taking differently: Take 10 mg by mouth every evening.), Disp: 90 tablet, Rfl: 1    fluticasone propionate (FLONASE) 50 mcg/actuation nasal spray, USE 1 SPRAY IN EACH NOSTRIL ONCE DAILY. (Patient taking differently: 1 spray by Each Nostril route 2 (two) times daily as needed for Rhinitis or Allergies.), Disp: 48 g, Rfl: 2    HYDROcodone-acetaminophen (NORCO)  mg per tablet, Take 1 tablet by mouth 3 (three) times daily as needed for Pain (medically necessary for greater than 7 days)., Disp: 80 tablet, Rfl: 0    HYDROcodone-acetaminophen (NORCO) 7.5-325 mg per tablet, Take 1  tablet by mouth every 6 (six) hours as needed for Pain., Disp: 90 tablet, Rfl: 0    lancets Misc, To check BG 2 (two) times daily, to use with insurance preferred meter, Disp: 200 each, Rfl: 3    lancets Misc, To check BG 1-2 times daily, to use with insurance preferred meter Dx E 11.65, Disp: 50 each, Rfl: 1    lisinopriL 10 MG tablet, TAKE 1 TABLET (10 MG) BY MOUTH DAILY. (Patient taking differently: Take 10 mg by mouth every evening.), Disp: 30 tablet, Rfl: 3    lisinopriL 10 MG tablet, Take 1 tablet by mouth once daily., Disp: , Rfl:     metFORMIN (GLUCOPHAGE) 500 MG tablet, Take 1 tablet (500 mg total) by mouth 2 (two) times daily with meals., Disp: 60 tablet, Rfl: 1    metFORMIN (GLUCOPHAGE-XR) 500 MG ER 24hr tablet, Take 1 tablet by mouth 2 (two) times daily., Disp: , Rfl:     omeprazole (PRILOSEC) 20 MG capsule, TAKE 1 CAPSULE (20 MG TOTAL) BY MOUTH ONCE DAILY., Disp: 90 capsule, Rfl: 3    Review of Systems   Constitutional: Negative for chills, decreased appetite, diaphoresis, fever and malaise/fatigue. Weight loss: SOME.  HENT:  Negative for congestion and sore throat.    Eyes:  Negative for blurred vision and visual disturbance.   Cardiovascular:  Negative for chest pain, claudication, cyanosis, dyspnea on exertion, irregular heartbeat, leg swelling, near-syncope, orthopnea, palpitations, paroxysmal nocturnal dyspnea and syncope.   Respiratory:  Negative for cough, hemoptysis and shortness of breath.    Endocrine: Negative for polyphagia and polyuria.   Hematologic/Lymphatic: Negative for adenopathy. Does not bruise/bleed easily.   Skin:  Negative for color change and rash.   Musculoskeletal:  Negative for back pain and falls.   Gastrointestinal:  Negative for abdominal pain, dysphagia and jaundice.   Genitourinary:  Negative for dysuria and flank pain.   Neurological:  Negative for brief paralysis and numbness. Light-headedness: AT TIMES.       ABNORMAL SPEECH   Psychiatric/Behavioral:  Negative for  altered mental status and depression.         Objective:      Vitals:    03/05/24 1159   BP: 129/87   Pulse: (!) 54   Weight: 100.2 kg (221 lb)   PainSc: 0-No pain     Body mass index is 29.97 kg/m².    Physical Exam  Constitutional:       Appearance: He is overweight.   HENT:      Head: Normocephalic and atraumatic.   Eyes:      Extraocular Movements: Extraocular movements intact.      Conjunctiva/sclera: Conjunctivae normal.   Neck:      Vascular: No carotid bruit.   Cardiovascular:      Rate and Rhythm: Normal rate and regular rhythm. No extrasystoles are present.     Pulses: Normal pulses.           Carotid pulses are 2+ on the right side and 2+ on the left side.       Radial pulses are 2+ on the right side and 2+ on the left side.        Posterior tibial pulses are 2+ on the right side and 2+ on the left side.      Heart sounds: Murmur heard.      Systolic murmur is present with a grade of 1/6 at the lower left sternal border and apex.      No friction rub. No gallop.   Pulmonary:      Effort: Pulmonary effort is normal.      Breath sounds: Normal breath sounds. No rales.   Abdominal:      Palpations: Abdomen is soft.      Tenderness: There is no abdominal tenderness.   Musculoskeletal:      Cervical back: Neck supple.      Right lower leg: No edema.      Left lower leg: No edema.   Skin:     General: Skin is warm and dry.      Capillary Refill: Capillary refill takes less than 2 seconds.   Neurological:      Mental Status: He is alert and oriented to person, place, and time.      Comments: SLIGHTLY ABNORMAL SPEECH   Psychiatric:         Mood and Affect: Mood normal.         Behavior: Behavior normal.               ..    Chemistry        Component Value Date/Time     01/18/2024 0837    K 4.1 01/18/2024 0837     01/18/2024 0837    CO2 27 01/18/2024 0837    BUN 13 01/18/2024 0837    CREATININE 0.9 01/18/2024 0837     (H) 01/18/2024 0837        Component Value Date/Time    CALCIUM 9.5 01/18/2024  0837    ALKPHOS 74 01/18/2024 0837    AST 15 01/18/2024 0837    ALT 17 01/18/2024 0837    BILITOT 0.7 01/18/2024 0837    ESTGFRAFRICA >60.0 06/23/2022 0702    EGFRNONAA >60.0 06/23/2022 0702            ..  Lab Results   Component Value Date    CHOL 102 (L) 01/18/2024    CHOL 82 (L) 01/15/2024    CHOL 217 (H) 06/26/2023     Lab Results   Component Value Date    HDL 35 (L) 01/18/2024    HDL 28 (L) 01/15/2024    HDL 49 06/26/2023     Lab Results   Component Value Date    LDLCALC 56.8 (L) 01/18/2024    LDLCALC 42.2 (L) 01/15/2024    LDLCALC 150.4 06/26/2023     Lab Results   Component Value Date    TRIG 51 01/18/2024    TRIG 59 01/15/2024    TRIG 88 06/26/2023     Lab Results   Component Value Date    CHOLHDL 34.3 01/18/2024    CHOLHDL 34.1 01/15/2024    CHOLHDL 22.6 06/26/2023     ..  Lab Results   Component Value Date    WBC 9.30 01/18/2024    HGB 14.9 01/18/2024    HCT 44.1 01/18/2024    MCV 86 01/18/2024     01/18/2024       Test(s) Reviewed  I have reviewed the following in detail:  [] Stress test   [] Angiography   [] Echocardiogram   [] Labs   [x] Other:       Assessment:         ICD-10-CM ICD-9-CM   1. Complete heart block  I44.2 426.0   2. Coronary artery disease involving native coronary artery of native heart without angina pectoris  I25.10 414.01   3. Long term (current) use of anticoagulants  Z79.01 V58.61   4. Mild carotid artery disease  I77.9 447.9   5. Other emphysema  J43.8 492.8   6. Aortoiliac stenosis  I70.0 440.0     Problem List Items Addressed This Visit          Pulmonary    Chronic obstructive pulmonary disease       Cardiac/Vascular    Aortoiliac stenosis    Overview     Noted on imaging from 11/4/10         Coronary artery disease involving native coronary artery of native heart without angina pectoris    Mild carotid artery disease    Complete heart block - Primary    Relevant Orders    Case Request-Cath Lab: INSERTION, CARDIAC PACEMAKER, DUAL CHAMBER (Completed)    Vital signs     Cardiac Monitoring - Adult    Basic metabolic panel       Hematology    Long term (current) use of anticoagulants    Relevant Orders    Vital signs    Cardiac Monitoring - Adult    Basic metabolic panel        Plan:         IN VIEW OF COMPLETE HEART BLOCK WILL RECOMMEND DUAL-CHAMBER PPM STOP ELIQUIS 24-36 H, BEFORE IMPLANT VERY LONG DISCUSSION WITH THE PATIENT AND HIS DAUGHTER WHO IS NOW BECOMING CAREGIVER ARE ON THE CHART AS AUTHORIZE PERSONALLY DISCUSSED HIS HEALTH, WILL POSTPONE MICHEAL FOR NOW CONTINUE ANTICOAGULATION OTHERWISE AND PACEMAKER WILL TELL IF THERE IS ANY RECURRENCE OR ANY ATRIAL FIBRILLATION, NO OVERT HEART FAILURE NO ARRHYTHMIA DIET EXERCISE DAUGHTER REQUESTED SPECIFIC DATE FOR THE IMPLANT  Complete heart block  -     Case Request-Cath Lab: INSERTION, CARDIAC PACEMAKER, DUAL CHAMBER; Standing  -     Establish IV access and maintain saline lock; Standing  -     Hold Warfarin; Standing  -     Hold Enoxaparin/subcutaneous Heparin; Standing  -     Hold Heparin drip; Standing  -     Height and weight; Standing  -     Verify informed consent; Standing  -     Vital signs; Standing  -     Cardiac Monitoring - Adult; Standing  -     Pulse Oximetry Q4H; Standing  -     Diet NPO; Standing  -     CBC auto differential; Standing  -     Basic metabolic panel; Standing    Coronary artery disease involving native coronary artery of native heart without angina pectoris    Long term (current) use of anticoagulants  -     Establish IV access and maintain saline lock; Standing  -     Hold Warfarin; Standing  -     Hold Enoxaparin/subcutaneous Heparin; Standing  -     Hold Heparin drip; Standing  -     Height and weight; Standing  -     Verify informed consent; Standing  -     Vital signs; Standing  -     Cardiac Monitoring - Adult; Standing  -     Pulse Oximetry Q4H; Standing  -     Diet NPO; Standing  -     CBC auto differential; Standing  -     Basic metabolic panel; Standing    Mild carotid artery disease    Other  emphysema    Aortoiliac stenosis    Other orders  -     0.9%  NaCl infusion    RTC Low level/low impact aerobic exercise 5x's/wk. Heart healthy diet and risk factor modification.    See labs and med orders.    Aerobic exercise 5x's/wk. Heart healthy diet and risk factor modification.    See labs and med orders.

## 2024-03-12 ENCOUNTER — DOCUMENT SCAN (OUTPATIENT)
Dept: HOME HEALTH SERVICES | Facility: HOSPITAL | Age: 69
End: 2024-03-12
Payer: MEDICARE

## 2024-03-14 DIAGNOSIS — I25.10 CORONARY ARTERY DISEASE INVOLVING NATIVE CORONARY ARTERY OF NATIVE HEART WITHOUT ANGINA PECTORIS: Primary | ICD-10-CM

## 2024-03-14 RX ORDER — LISINOPRIL 10 MG/1
10 TABLET ORAL
Qty: 30 TABLET | Refills: 3 | Status: SHIPPED | OUTPATIENT
Start: 2024-03-14

## 2024-03-15 DIAGNOSIS — I44.2 COMPLETE HEART BLOCK: Primary | ICD-10-CM

## 2024-03-15 DIAGNOSIS — Z95.0 STATUS POST PLACEMENT OF CARDIAC PACEMAKER: ICD-10-CM

## 2024-03-22 ENCOUNTER — PATIENT MESSAGE (OUTPATIENT)
Dept: FAMILY MEDICINE | Facility: CLINIC | Age: 69
End: 2024-03-22
Payer: MEDICARE

## 2024-03-22 DIAGNOSIS — M54.17 RADICULAR PAIN OF LUMBOSACRAL REGION: ICD-10-CM

## 2024-03-22 DIAGNOSIS — M51.36 DEGENERATIVE DISC DISEASE, LUMBAR: ICD-10-CM

## 2024-03-22 NOTE — TELEPHONE ENCOUNTER
----- Message from Sincere Pearson sent at 3/22/2024  4:53 PM CDT -----  Regarding: refill  Type:  RX Refill Request    Who Called: pt    Refill or New Rx:refill    RX Name and Strength:HYDROcodone-acetaminophen (NORCO)  mg per tablet 80 tablet 0 2/27/2024 -   Sig - Route: Take 1 tablet by mouth 3 (three) times daily as needed for Pain (medically necessary for greater than 7 days). - Oral   Sent to pharmacy as: HYDROcodone-acetaminophen (NORCO)  mg per tablet   Earliest Fill Date: 2/27/2024   Notes to Pharmacy: Quantity prescribed more than 7 day supply? Yes, quantity medically necessary   No prior authorization was found for this prescription.   Found prior authorization for another prescription for the same medication: Closed         How is the patient currently taking it? (ex. 1XDay):see above    Is this a 30 day or 90 day RX:see above    Preferred Pharmacy with phone number:      [facility] - [address] [address]  Phone: [phone] Fax: [phone]          Local or Mail Order:Local    Ordering Provider:Josef Spear Call Back Number:[phone]      Additional Information: pt needs his refill sent to [facility]/ [facility] don't have any in stock.  Please call to discuss.

## 2024-03-22 NOTE — TELEPHONE ENCOUNTER
No care due was identified.  [facility] Embedded Care Due Messages. Reference number: [id number].   3/22/2024 5:07:31 PM CDT

## 2024-03-26 ENCOUNTER — HOSPITAL ENCOUNTER (OUTPATIENT)
Dept: CARDIOLOGY | Facility: HOSPITAL | Age: 69
Discharge: HOME | End: 2024-03-26
Attending: INTERNAL MEDICINE
Payer: MEDICARE

## 2024-03-26 DIAGNOSIS — Z95.0 STATUS POST PLACEMENT OF CARDIAC PACEMAKER: ICD-10-CM

## 2024-03-26 DIAGNOSIS — I44.2 COMPLETE HEART BLOCK: ICD-10-CM

## 2024-03-26 LAB
OHS CV AF BURDEN PERCENT: < 1
OHS CV DC REMOTE DEVICE TYPE: NORMAL
OHS CV RV PACING PERCENT: 99 %

## 2024-03-26 PROCEDURE — 93280 PM DEVICE PROGR EVAL DUAL: CPT | Mod: 26,,, | Performed by: INTERNAL MEDICINE

## 2024-03-26 PROCEDURE — 93280 PM DEVICE PROGR EVAL DUAL: CPT | Mod: PO

## 2024-03-26 RX ORDER — HYDROCODONE BITARTRATE AND ACETAMINOPHEN 10; 325 MG/1; MG/1
1 TABLET ORAL 3 TIMES DAILY PRN
Qty: 80 TABLET | Refills: 0 | Status: SHIPPED | OUTPATIENT
Start: 2024-03-26 | End: 2024-04-29 | Stop reason: SDUPTHER

## 2024-04-16 DIAGNOSIS — K21.9 GASTROESOPHAGEAL REFLUX DISEASE, UNSPECIFIED WHETHER ESOPHAGITIS PRESENT: ICD-10-CM

## 2024-04-16 RX ORDER — OMEPRAZOLE 20 MG/1
20 CAPSULE, DELAYED RELEASE ORAL DAILY
Qty: 90 CAPSULE | Refills: 3 | Status: SHIPPED | OUTPATIENT
Start: 2024-04-16

## 2024-04-16 NOTE — TELEPHONE ENCOUNTER
No care due was identified.  [facility] Embedded Care Due Messages. Reference number: [id number].   4/16/2024 12:46:05 PM CDT

## 2024-04-16 NOTE — TELEPHONE ENCOUNTER
Refill Decision Note   Fred Blackwell  is requesting a refill authorization.  Brief Assessment and Rationale for Refill:  Approve     Medication Therapy Plan:        Comments:     Note composed:1:58 PM 04/16/2024

## 2024-04-18 ENCOUNTER — PATIENT MESSAGE (OUTPATIENT)
Dept: CARDIOLOGY | Facility: CLINIC | Age: 69
End: 2024-04-18
Payer: MEDICARE

## 2024-04-25 ENCOUNTER — OFFICE VISIT (OUTPATIENT)
Dept: CARDIOLOGY | Facility: CLINIC | Age: 69
End: 2024-04-25
Payer: MEDICARE

## 2024-04-25 VITALS
HEART RATE: 70 BPM | HEIGHT: 72 IN | SYSTOLIC BLOOD PRESSURE: 136 MMHG | DIASTOLIC BLOOD PRESSURE: 86 MMHG | BODY MASS INDEX: 28.78 KG/M2 | WEIGHT: 212.5 LBS

## 2024-04-25 DIAGNOSIS — Z79.01 LONG TERM (CURRENT) USE OF ANTICOAGULANTS: Chronic | ICD-10-CM

## 2024-04-25 DIAGNOSIS — I25.10 CORONARY ARTERY DISEASE INVOLVING NATIVE CORONARY ARTERY OF NATIVE HEART WITHOUT ANGINA PECTORIS: Primary | Chronic | ICD-10-CM

## 2024-04-25 DIAGNOSIS — I48.0 PAF (PAROXYSMAL ATRIAL FIBRILLATION): ICD-10-CM

## 2024-04-25 DIAGNOSIS — E78.00 PURE HYPERCHOLESTEROLEMIA: Chronic | ICD-10-CM

## 2024-04-25 DIAGNOSIS — I10 ESSENTIAL HYPERTENSION: Chronic | ICD-10-CM

## 2024-04-25 PROCEDURE — 99214 OFFICE O/P EST MOD 30 MIN: CPT | Mod: HCNC,S$GLB,, | Performed by: INTERNAL MEDICINE

## 2024-04-25 PROCEDURE — 1159F MED LIST DOCD IN RCRD: CPT | Mod: HCNC,CPTII,S$GLB, | Performed by: INTERNAL MEDICINE

## 2024-04-25 PROCEDURE — 3008F BODY MASS INDEX DOCD: CPT | Mod: HCNC,CPTII,S$GLB, | Performed by: INTERNAL MEDICINE

## 2024-04-25 PROCEDURE — 3079F DIAST BP 80-89 MM HG: CPT | Mod: HCNC,CPTII,S$GLB, | Performed by: INTERNAL MEDICINE

## 2024-04-25 PROCEDURE — 3288F FALL RISK ASSESSMENT DOCD: CPT | Mod: HCNC,CPTII,S$GLB, | Performed by: INTERNAL MEDICINE

## 2024-04-25 PROCEDURE — 1101F PT FALLS ASSESS-DOCD LE1/YR: CPT | Mod: HCNC,CPTII,S$GLB, | Performed by: INTERNAL MEDICINE

## 2024-04-25 PROCEDURE — 4010F ACE/ARB THERAPY RXD/TAKEN: CPT | Mod: HCNC,CPTII,S$GLB, | Performed by: INTERNAL MEDICINE

## 2024-04-25 PROCEDURE — 3044F HG A1C LEVEL LT 7.0%: CPT | Mod: HCNC,CPTII,S$GLB, | Performed by: INTERNAL MEDICINE

## 2024-04-25 PROCEDURE — 3075F SYST BP GE 130 - 139MM HG: CPT | Mod: HCNC,CPTII,S$GLB, | Performed by: INTERNAL MEDICINE

## 2024-04-25 PROCEDURE — 99999 PR PBB SHADOW E&M-EST. PATIENT-LVL III: CPT | Mod: PBBFAC,HCNC,, | Performed by: INTERNAL MEDICINE

## 2024-04-25 RX ORDER — MUPIROCIN 20 MG/G
OINTMENT TOPICAL
COMMUNITY
Start: 2024-03-12

## 2024-04-25 RX ORDER — CHLORHEXIDINE GLUCONATE ORAL RINSE 1.2 MG/ML
SOLUTION DENTAL
COMMUNITY
Start: 2024-03-12

## 2024-04-25 NOTE — PROGRESS NOTES
Subjective:    Patient ID:  Fred Blackwell is a 68 y.o. male who presents for Complete heart block and Heart Problem        HPI  STATUS POST PERMANENT PACEMAKER FOR COMPLETE HEART BLOCK, COMPLIANT WITH MEDS, SPEECH BETTER, GETS CONFUSED  WITH MEDS GETS NERVOUS WHEN ASK ABOUT MEDS COMPLIANCE BY THE NURSE,, SEE ROS    Past Medical History:   Diagnosis Date    Anxiety     Bell's palsy     Chronic pain     Complete heart block     COPD (chronic obstructive pulmonary disease)     Current moderate episode of major depressive disorder without prior episode 04/01/2019    DDD (degenerative disc disease), lumbar     Depression with anxiety     Diabetes mellitus     type 2    Embolic stroke 08/2023    residual slurred speech    Fibromyalgia     GERD (gastroesophageal reflux disease)     Hyperlipidemia     Hypertension     Obesity     Sciatica      Past Surgical History:   Procedure Laterality Date    A-V CARDIAC PACEMAKER INSERTION Left 3/15/2024    Procedure: Dual Chamber PPM (Kolb);  Surgeon: Pj Hernandez MD;  Location: [facility] CATH;  Service: Cardiology;  Laterality: Left;    COLONOSCOPY N/A 12/11/2019    Procedure: COLONOSCOPY;  Surgeon: Mich Mota MD;  Location: [facility] ENDO;  Service: Endoscopy;  Laterality: N/A;    CORONARY ANGIOGRAPHY N/A 06/02/2020    Procedure: ANGIOGRAM, CORONARY ARTERY;  Surgeon: Romario Flaherty MD;  Location: [facility] CATH;  Service: Cardiology;  Laterality: N/A;    CORONARY ANGIOPLASTY WITH STENT PLACEMENT      LEFT HEART CATHETERIZATION Right 06/02/2020    Procedure: Left heart cath;  Surgeon: Romario Flaherty MD;  Location: [facility] CATH;  Service: Cardiology;  Laterality: Right;    SHOULDER SURGERY       Family History   Problem Relation Name Age of Onset    Arthritis Mother      Glaucoma Neg Hx      Macular degeneration Neg Hx       Social History     Socioeconomic History    Marital status:    Tobacco Use    Smoking status: Former     Current packs/day: 2.00     Average  packs/day: 2.0 packs/day for 0.4 years (0.8 ttl pk-yrs)     Types: Cigarettes     Start date: 12/2023    Smokeless tobacco: Never    Tobacco comments:     2 packs/day   Substance and Sexual Activity    Alcohol use: No    Drug use: No     Social Determinants of Health     Financial Resource Strain: Low Risk  (2/7/2024)    Overall Financial Resource Strain (CARDIA)     Difficulty of Paying Living Expenses: Not very hard   Food Insecurity: Food Insecurity Present (2/7/2024)    Hunger Vital Sign     Worried About Running Out of Food in the Last Year: Never true     Ran Out of Food in the Last Year: Sometimes true   Transportation Needs: No Transportation Needs (2/7/2024)    PRAPARE - Transportation     Lack of Transportation (Medical): No     Lack of Transportation (Non-Medical): No   Physical Activity: Inactive (2/7/2024)    Exercise Vital Sign     Days of Exercise per Week: 1 day     Minutes of Exercise per Session: 0 min   Stress: Stress Concern Present (2/7/2024)    [employer] [employer] - Occupational Stress Questionnaire     Feeling of Stress : Rather much   Social Connections: Socially Isolated (2/7/2024)    Social Connection and Isolation Panel [NHANES]     Frequency of Communication with Friends and Family: More than three times a week     Frequency of Social Gatherings with Friends and Family: Once a week     Attends Mandaen Services: Never     Active Member of Clubs or Organizations: No     Attends Club or Organization Meetings: Never     Marital Status:    Housing Stability: Low Risk  (2/7/2024)    Housing Stability Vital Sign     Unable to Pay for Housing in the Last Year: No     Number of Places Lived in the Last Year: 1     Unstable Housing in the Last Year: No       Review of patient's allergies indicates:   Allergen Reactions    Cymbalta [duloxetine] Nausea And Vomiting    Penicillins      Other reaction(s): Hives       Current Outpatient Medications:     ALPRAZolam (XANAX) 1  MG tablet, TAKE 1 TABLET (1 MG TOTAL) BY MOUTH THREE TIMES DAILY AS NEEDED FOR ANXIETY., Disp: 90 tablet, Rfl: 2    amLODIPine (NORVASC) 10 MG tablet, Take 1 tablet (10 mg total) by mouth once daily., Disp: 90 tablet, Rfl: 1    apixaban (ELIQUIS) 5 mg Tab, TAKE 1 TABLET (5 MG) BY MOUTH TWO TIMES DAILY., Disp: 60 tablet, Rfl: 5    aspirin (ECOTRIN) 81 MG EC tablet, Take 1 tablet (81 mg total) by mouth once daily., Disp: , Rfl: 0    atorvastatin (LIPITOR) 80 MG tablet, TAKE 1 TABLET (80 MG TOTAL) BY MOUTH ONCE DAILY. (Patient taking differently: Take 80 mg by mouth every evening.), Disp: 90 tablet, Rfl: 1    blood sugar diagnostic Strp, To check BG 2 (two) times daily, to use with insurance preferred meter, Disp: 200 strip, Rfl: 3    blood-glucose meter kit, To check BG 1-2 times daily, to use with insurance preferred meter Dx E 11.65, Disp: 1 each, Rfl: 1    chlorhexidine (PERIDEX) 0.12 % solution, SMARTSIG:By Mouth, Disp: , Rfl:     citalopram (CELEXA) 10 MG tablet, TAKE 1 TABLET (10 MG TOTAL) BY MOUTH ONCE DAILY. (Patient taking differently: Take 10 mg by mouth every evening.), Disp: 90 tablet, Rfl: 2    ezetimibe (ZETIA) 10 mg tablet, Take 1 tablet (10 mg total) by mouth once daily. (Patient taking differently: Take 10 mg by mouth every evening.), Disp: 90 tablet, Rfl: 1    fluticasone propionate (FLONASE) 50 mcg/actuation nasal spray, USE 1 SPRAY IN EACH NOSTRIL ONCE DAILY. (Patient taking differently: 1 spray by Each Nostril route 2 (two) times daily as needed for Rhinitis or Allergies.), Disp: 48 g, Rfl: 2    HYDROcodone-acetaminophen (NORCO)  mg per tablet, Take 1 tablet by mouth 3 (three) times daily as needed for Pain (medically necessary for greater than 7 days)., Disp: 80 tablet, Rfl: 0    HYDROcodone-acetaminophen (NORCO) 5-325 mg per tablet, Take 1 tablet by mouth every 6 (six) hours as needed for Pain., Disp: , Rfl:     lancets Misc, To check BG 1-2 times daily, to use with insurance preferred  meter Dx E 11.65, Disp: 50 each, Rfl: 1    lisinopriL 10 MG tablet, TAKE 1 TABLET BY MOUTH EVERY DAY, Disp: 30 tablet, Rfl: 3    metFORMIN (GLUCOPHAGE-XR) 500 MG ER 24hr tablet, Take 1 tablet by mouth 2 (two) times daily., Disp: , Rfl:     mupirocin (BACTROBAN) 2 % ointment, SMARTSI Application Topical 2-3 Times Daily, Disp: , Rfl:     omeprazole (PRILOSEC) 20 MG capsule, TAKE 1 CAPSULE (20 MG TOTAL) BY MOUTH ONCE DAILY., Disp: 90 capsule, Rfl: 3    albuterol-ipratropium (DUO-NEB) 2.5 mg-0.5 mg/3 mL nebulizer solution, Take 3 mLs by nebulization every 6 (six) hours as needed for Wheezing. Rescue (Patient not taking: Reported on 2024), Disp: 75 mL, Rfl: 0    lancets Misc, To check BG 2 (two) times daily, to use with insurance preferred meter, Disp: 200 each, Rfl: 3    lisinopriL 10 MG tablet, Take 1 tablet by mouth once daily., Disp: , Rfl:     metFORMIN (GLUCOPHAGE) 500 MG tablet, Take 1 tablet (500 mg total) by mouth 2 (two) times daily with meals., Disp: 60 tablet, Rfl: 1    Review of Systems   Constitutional: Positive for weight loss (SOME). Negative for chills, decreased appetite, diaphoresis, fever and malaise/fatigue.   HENT:  Negative for congestion and sore throat.    Eyes:  Negative for blurred vision and visual disturbance.   Cardiovascular:  Negative for chest pain, claudication, cyanosis, dyspnea on exertion, irregular heartbeat, leg swelling, near-syncope, orthopnea, palpitations, paroxysmal nocturnal dyspnea and syncope.   Respiratory:  Negative for cough, hemoptysis and shortness of breath.    Endocrine: Negative for polyphagia and polyuria.   Hematologic/Lymphatic: Negative for adenopathy. Does not bruise/bleed easily.   Skin:  Negative for color change and rash.   Musculoskeletal:  Negative for back pain and falls.   Gastrointestinal:  Negative for abdominal pain, dysphagia and jaundice.   Genitourinary:  Negative for dysuria and flank pain.   Neurological:  Negative for brief paralysis and  numbness. Light-headedness: AT TIMES.       SPEECH BETTER   Psychiatric/Behavioral:  Negative for altered mental status and depression.         Objective:      Vitals:    04/25/24 1442   BP: 136/86   Pulse: 70   Weight: 96.4 kg (212 lb 8.4 oz)   Height: 6' (1.829 m)     Body mass index is 28.82 kg/m².    Physical Exam  Constitutional:       Appearance: Normal appearance.   HENT:      Head: Normocephalic and atraumatic.   Eyes:      Extraocular Movements: Extraocular movements intact.      Pupils: Pupils are equal, round, and reactive to light.   Cardiovascular:      Rate and Rhythm: Normal rate and regular rhythm.      Heart sounds:      No friction rub. No gallop.   Pulmonary:      Effort: Pulmonary effort is normal.      Breath sounds: Normal breath sounds.   Abdominal:      Palpations: Abdomen is soft.      Tenderness: There is no abdominal tenderness.   Musculoskeletal:      Right lower leg: No edema.      Left lower leg: No edema.   Skin:     General: Skin is warm and dry.      Capillary Refill: Capillary refill takes more than 3 seconds.   Neurological:      General: No focal deficit present.      Mental Status: He is alert and oriented to person, place, and time.               ..    Chemistry        Component Value Date/Time     03/15/2024 0921    K 3.9 03/15/2024 0921     03/15/2024 0921    CO2 29 03/15/2024 0921    BUN 17 03/15/2024 0921    CREATININE 0.66 03/15/2024 0921     (H) 03/15/2024 0921        Component Value Date/Time    CALCIUM 8.9 03/15/2024 0921    ALKPHOS 72 03/15/2024 0921    AST 34 03/15/2024 0921    ALT 29 03/15/2024 0921    BILITOT 0.8 03/15/2024 0921    ESTGFRAFRICA >60.0 06/23/2022 0702    EGFRNONAA >60.0 06/23/2022 0702            ..  Lab Results   Component Value Date    CHOL 102 (L) 01/18/2024    CHOL 82 (L) 01/15/2024    CHOL 217 (H) 06/26/2023     Lab Results   Component Value Date    HDL 35 (L) 01/18/2024    HDL 28 (L) 01/15/2024    HDL 49 06/26/2023     Lab  Results   Component Value Date    LDLCALC 56.8 (L) 01/18/2024    LDLCALC 42.2 (L) 01/15/2024    LDLCALC 150.4 06/26/2023     Lab Results   Component Value Date    TRIG 51 01/18/2024    TRIG 59 01/15/2024    TRIG 88 06/26/2023     Lab Results   Component Value Date    CHOLHDL 34.3 01/18/2024    CHOLHDL 34.1 01/15/2024    CHOLHDL 22.6 06/26/2023     ..  Lab Results   Component Value Date    WBC 8.20 03/15/2024    HGB 14.1 03/15/2024    HCT 42.2 03/15/2024    MCV 87 03/15/2024     03/15/2024       Test(s) Reviewed  I have reviewed the following in detail:  [] Stress test   [] Angiography   [] Echocardiogram   [] Labs   [] Other:       Assessment:         ICD-10-CM ICD-9-CM   1. Coronary artery disease involving native coronary artery of native heart without angina pectoris  I25.10 414.01   2. PAF (paroxysmal atrial fibrillation)  I48.0 427.31   3. Long term (current) use of anticoagulants  Z79.01 V58.61   4. Essential hypertension  I10 401.9   5. Pure hypercholesterolemia  E78.00 272.0     Problem List Items Addressed This Visit          Cardiac/Vascular    Essential hypertension    Overview     RF uniretic.           Relevant Orders    Comprehensive Metabolic Panel    Pure hypercholesterolemia    Relevant Orders    Comprehensive Metabolic Panel    Lipid Panel    Coronary artery disease involving native coronary artery of native heart without angina pectoris - Primary    Relevant Orders    Comprehensive Metabolic Panel    Lipid Panel    Hemoglobin    PAF (paroxysmal atrial fibrillation)       Hematology    Long term (current) use of anticoagulants    Relevant Orders    Hemoglobin        Plan:     ALL CV CLINICALLY STABLE NOW NO ANGINA NO OVERT HEART FAILURE NO APPARENT CLINICAL ARRHYTHMIA WILL MONITOR DIET EXERCISE WEIGHT LOSS COMPLIANCE WITH MEDICATION RETURN TO CLINIC IN FEW MONTHS WITH LABS, DISCUSSED PLAN WITH THE PATIENT AND HIS BROTHER      Coronary artery disease involving native coronary artery of native  heart without angina pectoris  -     Comprehensive Metabolic Panel; Future; Expected date: 07/25/2024  -     Lipid Panel; Future; Expected date: 07/25/2024  -     Hemoglobin; Future; Expected date: 07/25/2024    PAF (paroxysmal atrial fibrillation)    Long term (current) use of anticoagulants  -     Hemoglobin; Future; Expected date: 07/25/2024    Essential hypertension  -     Comprehensive Metabolic Panel; Future; Expected date: 07/25/2024    Pure hypercholesterolemia  -     Comprehensive Metabolic Panel; Future; Expected date: 07/25/2024  -     Lipid Panel; Future; Expected date: 07/25/2024    RTC Low level/low impact aerobic exercise 5x's/wk. Heart healthy diet and risk factor modification.    See labs and med orders.    Aerobic exercise 5x's/wk. Heart healthy diet and risk factor modification.    See labs and med orders.

## 2024-04-28 DIAGNOSIS — E78.00 PURE HYPERCHOLESTEROLEMIA: Chronic | ICD-10-CM

## 2024-04-28 DIAGNOSIS — M51.36 DEGENERATIVE DISC DISEASE, LUMBAR: ICD-10-CM

## 2024-04-28 DIAGNOSIS — R09.82 POST-NASAL DRIP: ICD-10-CM

## 2024-04-28 DIAGNOSIS — F41.1 GAD (GENERALIZED ANXIETY DISORDER): ICD-10-CM

## 2024-04-28 DIAGNOSIS — M54.17 RADICULAR PAIN OF LUMBOSACRAL REGION: ICD-10-CM

## 2024-04-28 DIAGNOSIS — J01.80 OTHER ACUTE SINUSITIS, RECURRENCE NOT SPECIFIED: ICD-10-CM

## 2024-04-28 DIAGNOSIS — R09.89 SINUS COMPLAINT: ICD-10-CM

## 2024-04-28 DIAGNOSIS — I10 HYPERTENSION, UNSPECIFIED TYPE: Primary | ICD-10-CM

## 2024-04-29 ENCOUNTER — PATIENT MESSAGE (OUTPATIENT)
Dept: FAMILY MEDICINE | Facility: CLINIC | Age: 69
End: 2024-04-29

## 2024-04-29 ENCOUNTER — OFFICE VISIT (OUTPATIENT)
Dept: FAMILY MEDICINE | Facility: CLINIC | Age: 69
End: 2024-04-29
Payer: MEDICARE

## 2024-04-29 DIAGNOSIS — M51.36 DEGENERATIVE DISC DISEASE, LUMBAR: ICD-10-CM

## 2024-04-29 DIAGNOSIS — M54.17 RADICULAR PAIN OF LUMBOSACRAL REGION: ICD-10-CM

## 2024-04-29 DIAGNOSIS — E11.65 TYPE 2 DIABETES MELLITUS WITH HYPERGLYCEMIA, WITHOUT LONG-TERM CURRENT USE OF INSULIN: Primary | ICD-10-CM

## 2024-04-29 DIAGNOSIS — F41.1 GAD (GENERALIZED ANXIETY DISORDER): ICD-10-CM

## 2024-04-29 PROCEDURE — G2211 COMPLEX E/M VISIT ADD ON: HCPCS | Mod: HCNC,95,, | Performed by: FAMILY MEDICINE

## 2024-04-29 PROCEDURE — 3044F HG A1C LEVEL LT 7.0%: CPT | Mod: HCNC,CPTII,95, | Performed by: FAMILY MEDICINE

## 2024-04-29 PROCEDURE — 99213 OFFICE O/P EST LOW 20 MIN: CPT | Mod: HCNC,95,, | Performed by: FAMILY MEDICINE

## 2024-04-29 PROCEDURE — 4010F ACE/ARB THERAPY RXD/TAKEN: CPT | Mod: HCNC,CPTII,95, | Performed by: FAMILY MEDICINE

## 2024-04-29 RX ORDER — HYDROCODONE BITARTRATE AND ACETAMINOPHEN 10; 325 MG/1; MG/1
1 TABLET ORAL 3 TIMES DAILY PRN
Qty: 80 TABLET | Refills: 0 | Status: CANCELLED | OUTPATIENT
Start: 2024-04-29

## 2024-04-29 RX ORDER — ALPRAZOLAM 1 MG/1
TABLET ORAL
Qty: 90 TABLET | Refills: 2 | Status: CANCELLED | OUTPATIENT
Start: 2024-04-29

## 2024-04-29 RX ORDER — EZETIMIBE 10 MG/1
10 TABLET ORAL DAILY
Qty: 90 TABLET | Refills: 1 | Status: SHIPPED | OUTPATIENT
Start: 2024-04-29

## 2024-04-29 RX ORDER — ALPRAZOLAM 1 MG/1
TABLET ORAL
Qty: 90 TABLET | Refills: 2 | Status: SHIPPED | OUTPATIENT
Start: 2024-04-29

## 2024-04-29 RX ORDER — ATORVASTATIN CALCIUM 80 MG/1
80 TABLET, FILM COATED ORAL DAILY
Qty: 90 TABLET | Refills: 1 | Status: SHIPPED | OUTPATIENT
Start: 2024-04-29

## 2024-04-29 RX ORDER — AMLODIPINE BESYLATE 10 MG/1
10 TABLET ORAL DAILY
Qty: 90 TABLET | Refills: 1 | Status: SHIPPED | OUTPATIENT
Start: 2024-04-29 | End: 2025-04-29

## 2024-04-29 RX ORDER — HYDROCODONE BITARTRATE AND ACETAMINOPHEN 10; 325 MG/1; MG/1
1 TABLET ORAL 3 TIMES DAILY PRN
Qty: 80 TABLET | Refills: 0 | Status: SHIPPED | OUTPATIENT
Start: 2024-04-29 | End: 2024-06-10 | Stop reason: SDUPTHER

## 2024-04-29 NOTE — TELEPHONE ENCOUNTER
No care due was identified.  Health [facility] Embedded Care Due Messages. Reference number: [id number].   4/28/2024 7:22:07 PM CDT

## 2024-04-29 NOTE — PROGRESS NOTES
Subjective:       Patient ID: Fred Blackwell is a 68 y.o. male.    Chief Complaint: No chief complaint on file.    Virtual for f/u diabetes, pain and anxiety.  In normal state of health. Healing from cva and saw cards recently.      Review of Systems   Constitutional:  Positive for unexpected weight change. Negative for activity change, chills and fever.   HENT:  Positive for rhinorrhea. Negative for hearing loss and trouble swallowing.    Eyes:  Negative for discharge and visual disturbance.   Respiratory:  Negative for cough, chest tightness, shortness of breath and wheezing.    Cardiovascular:  Negative for chest pain, palpitations and leg swelling.   Gastrointestinal:  Negative for blood in stool, constipation, diarrhea and vomiting.   Endocrine: Negative for cold intolerance, heat intolerance, polydipsia and polyuria.   Genitourinary:  Negative for difficulty urinating, hematuria and urgency.   Musculoskeletal:  Positive for arthralgias and neck pain. Negative for joint swelling.   Neurological:  Positive for headaches. Negative for weakness.   Psychiatric/Behavioral:  Positive for dysphoric mood. Negative for confusion and decreased concentration. The patient is not nervous/anxious.      The patient location is: [place]  The chief complaint leading to consultation is: diabetes and pain    Visit type: audiovisual    Face to Face time with patient: 9 minutes  11 minutes of total time spent on the encounter, which includes face to face time and non-face to face time preparing to see the patient (eg, review of tests), Obtaining and/or reviewing separately obtained history, Documenting clinical information in the electronic or other health record, Independently interpreting results (not separately reported) and communicating results to the patient/family/caregiver, or Care coordination (not separately reported).         Each patient to whom he or she provides medical services by telemedicine is:  (1) informed  of the relationship between the physician and patient and the respective role of any other health care provider with respect to management of the patient; and (2) notified that he or she may decline to receive medical services by telemedicine and may withdraw from such care at any time.    Notes:     Objective:      Physical Exam  Constitutional:       Appearance: Normal appearance.   Neurological:      Mental Status: He is alert.   Psychiatric:         Mood and Affect: Mood normal.         Behavior: Behavior normal.         Assessment:       1. Type 2 diabetes mellitus with hyperglycemia, without long-term current use of insulin    2. SHANNAN (generalized anxiety disorder)    3. Radicular pain of lumbosacral region    4. Degenerative disc disease, lumbar        Plan:       Type 2 diabetes mellitus with hyperglycemia, without long-term current use of insulin  -     CBC Without Differential; Future; Expected date: 04/29/2024  -     Hemoglobin A1C; Future; Expected date: 04/29/2024  -     TSH; Future; Expected date: 04/29/2024    SHANNAN (generalized anxiety disorder)  -     ALPRAZolam (XANAX) 1 MG tablet; TAKE 1 TABLET (1 MG TOTAL) BY MOUTH THREE TIMES DAILY AS NEEDED FOR ANXIETY.  Dispense: 90 tablet; Refill: 2    Radicular pain of lumbosacral region  -     HYDROcodone-acetaminophen (NORCO)  mg per tablet; Take 1 tablet by mouth 3 (three) times daily as needed for Pain (medically necessary for greater than 7 days).  Dispense: 80 tablet; Refill: 0    Degenerative disc disease, lumbar  -     HYDROcodone-acetaminophen (NORCO)  mg per tablet; Take 1 tablet by mouth 3 (three) times daily as needed for Pain (medically necessary for greater than 7 days).  Dispense: 80 tablet; Refill: 0      Visit today included increased complexity associated with the care of the episodic problem diabetes addressed and managing the longitudinal care of the patient due to the serious and/or complex managed problem(s) as above.  Will  monitor chronic medical issues and continue current plan of care.  F/u with cards as planned with labs in July ; will add      Follow up in about 3 months (around 7/29/2024), or if symptoms worsen or fail to improve.

## 2024-05-01 RX ORDER — FLUTICASONE PROPIONATE 50 MCG
SPRAY, SUSPENSION (ML) NASAL
Qty: 48 G | Refills: 2 | Status: SHIPPED | OUTPATIENT
Start: 2024-05-01

## 2024-05-01 RX ORDER — CITALOPRAM 10 MG/1
10 TABLET ORAL DAILY
Qty: 90 TABLET | Refills: 2 | Status: SHIPPED | OUTPATIENT
Start: 2024-05-01

## 2024-05-15 DIAGNOSIS — E11.9 TYPE 2 DIABETES MELLITUS WITHOUT COMPLICATION: ICD-10-CM

## 2024-06-10 ENCOUNTER — PATIENT MESSAGE (OUTPATIENT)
Dept: FAMILY MEDICINE | Facility: CLINIC | Age: 69
End: 2024-06-10
Payer: MEDICARE

## 2024-06-10 DIAGNOSIS — M54.17 RADICULAR PAIN OF LUMBOSACRAL REGION: ICD-10-CM

## 2024-06-10 DIAGNOSIS — M51.36 DEGENERATIVE DISC DISEASE, LUMBAR: ICD-10-CM

## 2024-06-10 DIAGNOSIS — F41.1 GAD (GENERALIZED ANXIETY DISORDER): ICD-10-CM

## 2024-06-10 RX ORDER — HYDROCODONE BITARTRATE AND ACETAMINOPHEN 10; 325 MG/1; MG/1
1 TABLET ORAL 3 TIMES DAILY PRN
Qty: 80 TABLET | Refills: 0 | Status: CANCELLED | OUTPATIENT
Start: 2024-06-10

## 2024-06-10 NOTE — TELEPHONE ENCOUNTER
No care due was identified.  Health [facility] Embedded Care Due Messages. Reference number: [id number].   6/10/2024 1:48:08 PM CDT

## 2024-06-10 NOTE — TELEPHONE ENCOUNTER
Care Due:                  Date            Visit Type   Department     Provider  --------------------------------------------------------------------------------                                ESTABLISHED                              PATIENT -    [facility]  Last Visit: 04-      VIRTUAL      MEDICINE       MARITZA BIRCH                               -                              PRIMARY      [facility]  Next Visit: 08-      CARE ([facility])   MEDICINE       MARITZA BIRCH                                                            Last  Test          Frequency    Reason                     Performed    Due Date  --------------------------------------------------------------------------------    HBA1C.......  6 months...  metFORMIN................  01- 07-    Health [facility] Embedded Care Due Messages. Reference number: [id number].   6/10/2024 1:01:10 PM CDT

## 2024-06-11 RX ORDER — HYDROCODONE BITARTRATE AND ACETAMINOPHEN 10; 325 MG/1; MG/1
1 TABLET ORAL 3 TIMES DAILY PRN
Qty: 80 TABLET | Refills: 0 | Status: SHIPPED | OUTPATIENT
Start: 2024-06-11

## 2024-06-15 ENCOUNTER — CLINICAL SUPPORT (OUTPATIENT)
Dept: CARDIOLOGY | Facility: HOSPITAL | Age: 69
End: 2024-06-15
Payer: MEDICARE

## 2024-06-15 ENCOUNTER — HOSPITAL ENCOUNTER (OUTPATIENT)
Dept: CARDIOLOGY | Facility: HOSPITAL | Age: 69
Discharge: HOME | End: 2024-06-15
Attending: INTERNAL MEDICINE

## 2024-06-15 DIAGNOSIS — Z95.0 PRESENCE OF CARDIAC PACEMAKER: ICD-10-CM

## 2024-06-15 PROCEDURE — 93294 REM INTERROG EVL PM/LDLS PM: CPT | Mod: ,,, | Performed by: INTERNAL MEDICINE

## 2024-06-15 PROCEDURE — 93296 REM INTERROG EVL PM/IDS: CPT | Mod: PO | Performed by: INTERNAL MEDICINE

## 2024-07-02 LAB
OHS CV AF BURDEN PERCENT: < 1
OHS CV DC REMOTE DEVICE TYPE: NORMAL
OHS CV RV PACING PERCENT: 98 %

## 2024-07-03 ENCOUNTER — PATIENT MESSAGE (OUTPATIENT)
Dept: FAMILY MEDICINE | Facility: CLINIC | Age: 69
End: 2024-07-03
Payer: MEDICARE

## 2024-07-03 DIAGNOSIS — M51.36 DEGENERATIVE DISC DISEASE, LUMBAR: ICD-10-CM

## 2024-07-03 DIAGNOSIS — M54.17 RADICULAR PAIN OF LUMBOSACRAL REGION: ICD-10-CM

## 2024-07-03 DIAGNOSIS — F41.1 GAD (GENERALIZED ANXIETY DISORDER): ICD-10-CM

## 2024-07-03 RX ORDER — HYDROCODONE BITARTRATE AND ACETAMINOPHEN 10; 325 MG/1; MG/1
1 TABLET ORAL 3 TIMES DAILY PRN
Qty: 80 TABLET | Refills: 0 | Status: CANCELLED | OUTPATIENT
Start: 2024-07-03

## 2024-07-03 NOTE — TELEPHONE ENCOUNTER
No care due was identified.  Health [facility] Embedded Care Due Messages. Reference number: [id number].   7/03/2024 12:09:49 PM CDT

## 2024-07-03 NOTE — TELEPHONE ENCOUNTER
No care due was identified.  Health [facility] Embedded Care Due Messages. Reference number: [id number].   7/03/2024 10:12:50 AM CDT

## 2024-07-04 RX ORDER — HYDROCODONE BITARTRATE AND ACETAMINOPHEN 10; 325 MG/1; MG/1
1 TABLET ORAL 3 TIMES DAILY PRN
Qty: 80 TABLET | Refills: 0 | Status: SHIPPED | OUTPATIENT
Start: 2024-07-04

## 2024-07-04 RX ORDER — ALPRAZOLAM 1 MG/1
TABLET ORAL
Qty: 90 TABLET | Refills: 2 | Status: SHIPPED | OUTPATIENT
Start: 2024-07-04

## 2024-07-29 DIAGNOSIS — K21.9 GASTROESOPHAGEAL REFLUX DISEASE, UNSPECIFIED WHETHER ESOPHAGITIS PRESENT: ICD-10-CM

## 2024-07-29 RX ORDER — OMEPRAZOLE 20 MG/1
20 CAPSULE, DELAYED RELEASE ORAL DAILY
Qty: 90 CAPSULE | Refills: 3 | Status: CANCELLED | OUTPATIENT
Start: 2024-07-29

## 2024-07-29 RX ORDER — OMEPRAZOLE 20 MG/1
20 CAPSULE, DELAYED RELEASE ORAL DAILY
Qty: 90 CAPSULE | Refills: 3 | Status: SHIPPED | OUTPATIENT
Start: 2024-07-29

## 2024-07-29 NOTE — PROGRESS NOTES
Subjective:    Patient ID:  Fred Blackwell is a 68 y.o. male who presents for Stroke (CVA- 08/01/23/6mo f/u)        HPI    RECENT ADMISSION TO [facility] WITH CVA, HAS NOT BEEN COMPLIANT WITH MEDICINE,  UP FROM 73, HBA1C 6.6% CMP OK TSH NORMAL, STATES OVERHEATED, ALSO WHEN FEELS GOOD DON'T TAKE MEDS, STATES NOW COMPLAINT WITH MEDS, NO RECORDS AVAILABLE, PT NOT SURE STATES HEART TEST, MICHEAL WAS NEGATIVE AS DISCUSSED WITH DR THOMPSON, ?/ EMBOLIC STROKE, STARTED ELIQUIS, ON BRILINTA, SEE ROS  Past Medical History:   Diagnosis Date    Anxiety     Bell's palsy     Current moderate episode of major depressive disorder without prior episode 4/1/2019    DDD (degenerative disc disease), lumbar     Diabetes mellitus     Fibromyalgia     GERD (gastroesophageal reflux disease)     Hyperlipidemia     Hypertension     Sciatica      Past Surgical History:   Procedure Laterality Date    COLONOSCOPY N/A 12/11/2019    Procedure: COLONOSCOPY;  Surgeon: Mich Mota MD;  Location: [facility] ENDO;  Service: Endoscopy;  Laterality: N/A;    CORONARY ANGIOGRAPHY N/A 6/2/2020    Procedure: ANGIOGRAM, CORONARY ARTERY;  Surgeon: Romario Flaherty MD;  Location: [facility] CATH;  Service: Cardiology;  Laterality: N/A;    LEFT HEART CATHETERIZATION Right 6/2/2020    Procedure: Left heart cath;  Surgeon: Romario Flaherty MD;  Location: [facility] CATH;  Service: Cardiology;  Laterality: Right;    SHOULDER SURGERY       Family History   Problem Relation Age of Onset    Arthritis Mother      Social History     Socioeconomic History    Marital status:    Tobacco Use    Smoking status: Former     Current packs/day: 2.00     Types: Cigarettes    Smokeless tobacco: Never    Tobacco comments:     2 packs/day   Substance and Sexual Activity    Alcohol use: No    Drug use: No       Review of patient's allergies indicates:   Allergen Reactions    Cymbalta [duloxetine] Nausea And Vomiting    Penicillins      Other reaction(s):  Hives       Current Outpatient Medications:     albuterol-ipratropium (DUO-NEB) 2.5 mg-0.5 mg/3 mL nebulizer solution, Take 3 mLs by nebulization every 6 (six) hours as needed for Wheezing. Rescue, Disp: 75 mL, Rfl: 0    ALPRAZolam (XANAX) 1 MG tablet, TAKE 1 TABLET (1 MG TOTAL) BY MOUTH THREE TIMES DAILY AS NEEDED FOR ANXIETY., Disp: 90 tablet, Rfl: 2    amLODIPine (NORVASC) 5 MG tablet, Take 1 tablet (5 mg total) by mouth once daily., Disp: 90 tablet, Rfl: 1    aspirin (ECOTRIN) 81 MG EC tablet, Take 1 tablet (81 mg total) by mouth once daily., Disp: , Rfl: 0    atorvastatin (LIPITOR) 80 MG tablet, TAKE 1 TABLET (80 MG TOTAL) BY MOUTH ONCE DAILY., Disp: 90 tablet, Rfl: 1    blood sugar diagnostic Strp, To check BG 2 (two) times daily, to use with insurance preferred meter, Disp: 200 strip, Rfl: 3    blood-glucose meter kit, To check BG 2 (two) times daily, to use with insurance preferred meter, Disp: 1 each, Rfl: 0    carvediloL (COREG) 3.125 MG tablet, TAKE 1 TABLET (3.125 MG TOTAL) BY MOUTH 2 (TWO) TIMES DAILY WITH MEALS., Disp: 180 tablet, Rfl: 0    citalopram (CELEXA) 10 MG tablet, TAKE 1 TABLET (10 MG TOTAL) BY MOUTH ONCE DAILY., Disp: 90 tablet, Rfl: 2    ELIQUIS 5 mg Tab, Take 5 mg by mouth 2 (two) times daily., Disp: , Rfl:     ezetimibe (ZETIA) 10 mg tablet, TAKE 1 TABLET (10 MG TOTAL) BY MOUTH ONCE DAILY., Disp: 90 tablet, Rfl: 0    fluticasone propionate (FLONASE) 50 mcg/actuation nasal spray, USE 1 SPRAY IN EACH NOSTRIL ONCE DAILY., Disp: 48 g, Rfl: 2    HYDROcodone-acetaminophen (NORCO)  mg per tablet, Take 1 tablet by mouth 3 (three) times daily as needed for Pain (medically necessary for greater than 7 days)., Disp: 80 tablet, Rfl: 0    lancets Misc, To check BG 2 (two) times daily, to use with insurance preferred meter, Disp: 200 each, Rfl: 3    omeprazole (PRILOSEC) 20 MG capsule, Take 1 capsule (20 mg total) by mouth once daily., Disp: 90 capsule, Rfl: 3    olmesartan (BENICAR) 40 MG  tablet, Take 1 tablet (40 mg total) by mouth once daily., Disp: 90 tablet, Rfl: 0    Review of Systems   Constitutional: Negative for chills, diaphoresis, fever, malaise/fatigue, night sweats and weight gain.   HENT:  Negative for congestion and sore throat.    Eyes:  Negative for blurred vision and visual disturbance.   Cardiovascular:  Negative for chest pain, claudication, cyanosis, dyspnea on exertion, irregular heartbeat, leg swelling, near-syncope, orthopnea, palpitations, paroxysmal nocturnal dyspnea and syncope.   Respiratory:  Negative for cough, hemoptysis and shortness of breath.    Endocrine: Negative.    Hematologic/Lymphatic: Negative for adenopathy. Does not bruise/bleed easily.   Skin:  Negative for color change and rash.   Musculoskeletal:  Positive for back pain and joint swelling. Negative for falls.   Gastrointestinal:  Negative for abdominal pain, change in bowel habit, dysphagia, melena, nausea and vomiting.   Genitourinary:  Negative for dysuria and flank pain.   Neurological:  Negative for brief paralysis, dizziness, focal weakness, loss of balance, numbness and weakness.   Psychiatric/Behavioral:  Negative for altered mental status and depression. The patient is nervous/anxious.    Allergic/Immunologic: Negative for hives and persistent infections.        Objective:      Vitals:    08/15/23 1001 08/15/23 1025   BP: (!) 186/101 (!) 164/92   Pulse: (!) 58    Resp: 18    Weight: 106.4 kg (234 lb 9.1 oz)    Height: 6' (1.829 m)    PainSc: 0-No pain      Body mass index is 31.81 kg/m².    Physical Exam  Constitutional:       Appearance: He is obese.   HENT:      Head: Normocephalic and atraumatic.   Eyes:      General: No scleral icterus.     Extraocular Movements: Extraocular movements intact.   Neck:      Vascular: No carotid bruit.   Cardiovascular:      Rate and Rhythm: Normal rate and regular rhythm. No extrasystoles are present.     Pulses: Normal pulses.           Carotid pulses are 2+ on  the right side and 2+ on the left side.       Radial pulses are 2+ on the right side and 2+ on the left side.        Posterior tibial pulses are 2+ on the right side and 2+ on the left side.      Heart sounds: Normal heart sounds.      No friction rub. No gallop.   Pulmonary:      Effort: Pulmonary effort is normal.      Breath sounds: Normal breath sounds and air entry. No rales.   Abdominal:      Palpations: Abdomen is soft.      Tenderness: There is no abdominal tenderness.   Musculoskeletal:      Cervical back: Neck supple.      Right lower leg: No edema.      Left lower leg: No edema.   Skin:     General: Skin is warm and dry.      Capillary Refill: Capillary refill takes less than 2 seconds.   Neurological:      General: No focal deficit present.      Mental Status: He is alert and oriented to person, place, and time.      Cranial Nerves: No cranial nerve deficit.   Psychiatric:         Mood and Affect: Mood normal.         Speech: Speech normal.         Behavior: Behavior normal.               ..    Chemistry        Component Value Date/Time     06/26/2023 0756    K 3.8 06/26/2023 0756     06/26/2023 0756    CO2 24 06/26/2023 0756    BUN 11 06/26/2023 0756    CREATININE 0.8 06/26/2023 0756     (H) 06/26/2023 0756        Component Value Date/Time    CALCIUM 9.4 06/26/2023 0756    ALKPHOS 59 06/26/2023 0756    AST 15 06/26/2023 0756    ALT 13 06/26/2023 0756    BILITOT 0.9 06/26/2023 0756    ESTGFRAFRICA >60.0 06/23/2022 0702    EGFRNONAA >60.0 06/23/2022 0702            ..  Lab Results   Component Value Date    CHOL 217 (H) 06/26/2023    CHOL 132 12/29/2022    CHOL 220 (H) 06/23/2022     Lab Results   Component Value Date    HDL 49 06/26/2023    HDL 47 12/29/2022    HDL 47 06/23/2022     Lab Results   Component Value Date    LDLCALC 150.4 06/26/2023    LDLCALC 73.0 12/29/2022    LDLCALC 155.6 06/23/2022     Lab Results   Component Value Date    TRIG 88 06/26/2023    TRIG 60 12/29/2022     TRIG 87 06/23/2022     Lab Results   Component Value Date    CHOLHDL 22.6 06/26/2023    CHOLHDL 35.6 12/29/2022    CHOLHDL 21.4 06/23/2022     ..  Lab Results   Component Value Date    WBC 8.46 06/26/2023    HGB 15.9 06/26/2023    HCT 47.9 06/26/2023    MCV 87 06/26/2023     06/26/2023       Test(s) Reviewed  I have reviewed the following in detail:  [] Stress test   [] Angiography   [] Echocardiogram   [x] Labs   [x] Other:       Assessment:         ICD-10-CM ICD-9-CM   1. Coronary artery disease involving native coronary artery of native heart without angina pectoris  I25.10 414.01   2. Cerebrovascular accident (CVA) due to embolism of other cerebral artery  I63.49 434.11   3. Bradycardia  R00.1 427.89   4. Aortoiliac stenosis  I70.0 440.0   5. Non compliance w medication regimen  Z91.148 V15.81   6. Obesity, Class I, BMI 30-34.9  E66.9 278.00   7. Mild carotid artery disease  I77.9 447.9   8. Uncontrolled hypertension  I10 401.9     Problem List Items Addressed This Visit          Neuro    Cerebrovascular accident (CVA) due to embolism    Overview     Treated at [facility], no records available  No clinical deficits          Relevant Orders    Cardiac event monitor       Cardiac/Vascular    Essential hypertension    Overview     RF uniretic.           Aortoiliac stenosis    Overview     Noted on imaging from 11/4/10         Coronary artery disease involving native coronary artery of native heart without angina pectoris - Primary    Mild carotid artery disease    Bradycardia    Relevant Orders    Cardiac event monitor       Endocrine    Obesity, Class I, BMI 30-34.9       Palliative Care    Non compliance w medication regimen        Plan:     DC BRILINTA, PATIENT IS ON ELIQUIS CHANGE LOSARTAN TO BEICAR 40, AND DC LISINOPRIL , SHOULD NOT BE ON BOTH ARB AND ACE, TO SEE NEUROLOGIST IN [place] FOR FRANCISCO JAVIER, 4 WEEKS EVENT MONITOR, DISCUSSED THE IMPORTANCE OF COMPLIANCE WITH MEDICATION FOLLOW-UP RECOMMENDATION PATIENT  EXPRESSED UNDERSTANDING EXPLAINED THE RISK AND FATAL EVENTS, NO ANGINA NO OVERT HEART FAILURE NO TIA TYPE SYMPTOMS NO NEAR-SYNCOPE CONTINUE WITH WEIGHT LOSS ABSTAIN FROM ANY TOBACCO USE, RETURN TO CLINIC IN 3 MONTHS, REQUESTED RECORDS FROM [facility]      Coronary artery disease involving native coronary artery of native heart without angina pectoris    Cerebrovascular accident (CVA) due to embolism of other cerebral artery  Comments:  PRESUMED AFIB ??  Orders:  -     Cardiac event monitor; Future    Bradycardia  -     Cardiac event monitor; Future    Aortoiliac stenosis    Non compliance w medication regimen    Obesity, Class I, BMI 30-34.9    Mild carotid artery disease    Uncontrolled hypertension  -     CV US Renal Artery Stenosis Hypertension Complete; Future    Other orders  -     olmesartan (BENICAR) 40 MG tablet; Take 1 tablet (40 mg total) by mouth once daily.  Dispense: 90 tablet; Refill: 0    RTC Low level/low impact aerobic exercise 5x's/wk. Heart healthy diet and risk factor modification.    See labs and med orders.    Aerobic exercise 5x's/wk. Heart healthy diet and risk factor modification.    See labs and med orders.         FAMILY HISTORY:  FH: hypertension     no indicators present

## 2024-07-29 NOTE — TELEPHONE ENCOUNTER
No care due was identified.  [facility] Embedded Care Due Messages. Reference number: [id number].   7/29/2024 4:28:37 PM CDT

## 2024-07-29 NOTE — TELEPHONE ENCOUNTER
No care due was identified.  [facility] Embedded Care Due Messages. Reference number: [id number].   7/29/2024 4:29:37 PM CDT

## 2024-08-05 ENCOUNTER — LAB VISIT (OUTPATIENT)
Dept: LAB | Facility: HOSPITAL | Age: 69
End: 2024-08-05
Attending: FAMILY MEDICINE
Payer: MEDICARE

## 2024-08-05 ENCOUNTER — OFFICE VISIT (OUTPATIENT)
Dept: FAMILY MEDICINE | Facility: CLINIC | Age: 69
End: 2024-08-05
Payer: MEDICARE

## 2024-08-05 VITALS
HEART RATE: 72 BPM | OXYGEN SATURATION: 98 % | HEIGHT: 72 IN | SYSTOLIC BLOOD PRESSURE: 130 MMHG | DIASTOLIC BLOOD PRESSURE: 80 MMHG | WEIGHT: 218.69 LBS | BODY MASS INDEX: 29.62 KG/M2

## 2024-08-05 DIAGNOSIS — F32.1 CURRENT MODERATE EPISODE OF MAJOR DEPRESSIVE DISORDER WITHOUT PRIOR EPISODE: ICD-10-CM

## 2024-08-05 DIAGNOSIS — M54.17 RADICULAR PAIN OF LUMBOSACRAL REGION: ICD-10-CM

## 2024-08-05 DIAGNOSIS — E11.65 TYPE 2 DIABETES MELLITUS WITH HYPERGLYCEMIA, WITHOUT LONG-TERM CURRENT USE OF INSULIN: ICD-10-CM

## 2024-08-05 DIAGNOSIS — I10 ESSENTIAL HYPERTENSION: ICD-10-CM

## 2024-08-05 DIAGNOSIS — E78.2 MIXED HYPERLIPIDEMIA: ICD-10-CM

## 2024-08-05 DIAGNOSIS — I10 ESSENTIAL HYPERTENSION: Primary | ICD-10-CM

## 2024-08-05 DIAGNOSIS — M51.36 DEGENERATIVE DISC DISEASE, LUMBAR: ICD-10-CM

## 2024-08-05 PROBLEM — E78.00 PURE HYPERCHOLESTEROLEMIA: Status: RESOLVED | Noted: 2017-07-20 | Resolved: 2024-08-05

## 2024-08-05 LAB
ALBUMIN SERPL BCP-MCNC: 4.2 G/DL (ref 3.5–5.2)
ALP SERPL-CCNC: 83 U/L (ref 55–135)
ALT SERPL W/O P-5'-P-CCNC: 24 U/L (ref 10–44)
ANION GAP SERPL CALC-SCNC: 11 MMOL/L (ref 8–16)
AST SERPL-CCNC: 19 U/L (ref 10–40)
BILIRUB SERPL-MCNC: 0.4 MG/DL (ref 0.1–1)
BUN SERPL-MCNC: 18 MG/DL (ref 8–23)
CALCIUM SERPL-MCNC: 9.8 MG/DL (ref 8.7–10.5)
CHLORIDE SERPL-SCNC: 105 MMOL/L (ref 95–110)
CO2 SERPL-SCNC: 24 MMOL/L (ref 23–29)
CREAT SERPL-MCNC: 1.1 MG/DL (ref 0.5–1.4)
ERYTHROCYTE [DISTWIDTH] IN BLOOD BY AUTOMATED COUNT: 13.4 % (ref 11.5–14.5)
EST. GFR  (NO RACE VARIABLE): >60 ML/MIN/1.73 M^2
ESTIMATED AVG GLUCOSE: 134 MG/DL (ref 68–131)
GLUCOSE SERPL-MCNC: 132 MG/DL (ref 70–110)
HBA1C MFR BLD: 6.3 % (ref 4–5.6)
HCT VFR BLD AUTO: 48.6 % (ref 40–54)
HGB BLD-MCNC: 15.6 G/DL (ref 14–18)
MCH RBC QN AUTO: 29.2 PG (ref 27–31)
MCHC RBC AUTO-ENTMCNC: 32.1 G/DL (ref 32–36)
MCV RBC AUTO: 91 FL (ref 82–98)
PLATELET # BLD AUTO: 218 K/UL (ref 150–450)
PMV BLD AUTO: 11.1 FL (ref 9.2–12.9)
POTASSIUM SERPL-SCNC: 3.9 MMOL/L (ref 3.5–5.1)
PROT SERPL-MCNC: 7.7 G/DL (ref 6–8.4)
RBC # BLD AUTO: 5.35 M/UL (ref 4.6–6.2)
SODIUM SERPL-SCNC: 140 MMOL/L (ref 136–145)
TSH SERPL DL<=0.005 MIU/L-ACNC: 1.41 UIU/ML (ref 0.4–4)
WBC # BLD AUTO: 7.94 K/UL (ref 3.9–12.7)

## 2024-08-05 PROCEDURE — 99214 OFFICE O/P EST MOD 30 MIN: CPT | Mod: HCNC,S$GLB,, | Performed by: FAMILY MEDICINE

## 2024-08-05 PROCEDURE — 3008F BODY MASS INDEX DOCD: CPT | Mod: HCNC,CPTII,S$GLB, | Performed by: FAMILY MEDICINE

## 2024-08-05 PROCEDURE — 3079F DIAST BP 80-89 MM HG: CPT | Mod: HCNC,CPTII,S$GLB, | Performed by: FAMILY MEDICINE

## 2024-08-05 PROCEDURE — 1125F AMNT PAIN NOTED PAIN PRSNT: CPT | Mod: HCNC,CPTII,S$GLB, | Performed by: FAMILY MEDICINE

## 2024-08-05 PROCEDURE — 84443 ASSAY THYROID STIM HORMONE: CPT | Mod: HCNC | Performed by: FAMILY MEDICINE

## 2024-08-05 PROCEDURE — 3288F FALL RISK ASSESSMENT DOCD: CPT | Mod: HCNC,CPTII,S$GLB, | Performed by: FAMILY MEDICINE

## 2024-08-05 PROCEDURE — 1101F PT FALLS ASSESS-DOCD LE1/YR: CPT | Mod: HCNC,CPTII,S$GLB, | Performed by: FAMILY MEDICINE

## 2024-08-05 PROCEDURE — 4010F ACE/ARB THERAPY RXD/TAKEN: CPT | Mod: HCNC,CPTII,S$GLB, | Performed by: FAMILY MEDICINE

## 2024-08-05 PROCEDURE — 3075F SYST BP GE 130 - 139MM HG: CPT | Mod: HCNC,CPTII,S$GLB, | Performed by: FAMILY MEDICINE

## 2024-08-05 PROCEDURE — 99999 PR PBB SHADOW E&M-EST. PATIENT-LVL III: CPT | Mod: PBBFAC,HCNC,, | Performed by: FAMILY MEDICINE

## 2024-08-05 PROCEDURE — G2211 COMPLEX E/M VISIT ADD ON: HCPCS | Mod: HCNC,S$GLB,, | Performed by: FAMILY MEDICINE

## 2024-08-05 PROCEDURE — 85027 COMPLETE CBC AUTOMATED: CPT | Mod: HCNC | Performed by: FAMILY MEDICINE

## 2024-08-05 PROCEDURE — 3044F HG A1C LEVEL LT 7.0%: CPT | Mod: HCNC,CPTII,S$GLB, | Performed by: FAMILY MEDICINE

## 2024-08-05 PROCEDURE — 80053 COMPREHEN METABOLIC PANEL: CPT | Mod: HCNC | Performed by: FAMILY MEDICINE

## 2024-08-05 PROCEDURE — 36415 COLL VENOUS BLD VENIPUNCTURE: CPT | Mod: HCNC,PO | Performed by: FAMILY MEDICINE

## 2024-08-05 PROCEDURE — 83036 HEMOGLOBIN GLYCOSYLATED A1C: CPT | Mod: HCNC | Performed by: FAMILY MEDICINE

## 2024-08-05 RX ORDER — HYDROCODONE BITARTRATE AND ACETAMINOPHEN 10; 325 MG/1; MG/1
1 TABLET ORAL 3 TIMES DAILY PRN
Qty: 90 TABLET | Refills: 0 | Status: SHIPPED | OUTPATIENT
Start: 2024-08-05

## 2024-08-05 RX ORDER — HYDROCODONE BITARTRATE AND ACETAMINOPHEN 10; 325 MG/1; MG/1
1 TABLET ORAL 3 TIMES DAILY PRN
Qty: 80 TABLET | Refills: 0 | Status: SHIPPED | OUTPATIENT
Start: 2024-08-05 | End: 2024-08-05

## 2024-08-05 RX ORDER — CITALOPRAM 20 MG/1
20 TABLET, FILM COATED ORAL DAILY
Qty: 90 TABLET | Refills: 1 | Status: SHIPPED | OUTPATIENT
Start: 2024-08-05 | End: 2025-08-05

## 2024-09-03 ENCOUNTER — PATIENT MESSAGE (OUTPATIENT)
Dept: FAMILY MEDICINE | Facility: CLINIC | Age: 69
End: 2024-09-03
Payer: MEDICARE

## 2024-09-03 DIAGNOSIS — M54.17 RADICULAR PAIN OF LUMBOSACRAL REGION: ICD-10-CM

## 2024-09-03 DIAGNOSIS — M51.36 DEGENERATIVE DISC DISEASE, LUMBAR: ICD-10-CM

## 2024-09-03 RX ORDER — HYDROCODONE BITARTRATE AND ACETAMINOPHEN 10; 325 MG/1; MG/1
1 TABLET ORAL 3 TIMES DAILY PRN
Qty: 90 TABLET | Refills: 0 | Status: CANCELLED | OUTPATIENT
Start: 2024-09-03

## 2024-09-03 NOTE — TELEPHONE ENCOUNTER
No care due was identified.  [facility] Embedded Care Due Messages. Reference number: [id number].   9/03/2024 5:21:43 PM CDT

## 2024-09-03 NOTE — TELEPHONE ENCOUNTER
No care due was identified.  [facility] Embedded Care Due Messages. Reference number: [id number].   9/03/2024 6:07:35 PM CDT

## 2024-09-04 RX ORDER — HYDROCODONE BITARTRATE AND ACETAMINOPHEN 10; 325 MG/1; MG/1
1 TABLET ORAL 3 TIMES DAILY PRN
Qty: 90 TABLET | Refills: 0 | Status: SHIPPED | OUTPATIENT
Start: 2024-09-04

## 2024-09-04 RX ORDER — HYDROCODONE BITARTRATE AND ACETAMINOPHEN 10; 325 MG/1; MG/1
1 TABLET ORAL 3 TIMES DAILY PRN
Qty: 90 TABLET | Refills: 0 | OUTPATIENT
Start: 2024-09-04

## 2024-09-04 NOTE — TELEPHONE ENCOUNTER
No care due was identified.  [facility] Embedded Care Due Messages. Reference number: [id number].   9/04/2024 3:54:34 PM CDT

## 2024-09-14 ENCOUNTER — CLINICAL SUPPORT (OUTPATIENT)
Dept: CARDIOLOGY | Facility: HOSPITAL | Age: 69
End: 2024-09-14
Payer: MEDICARE

## 2024-09-14 ENCOUNTER — HOSPITAL ENCOUNTER (OUTPATIENT)
Dept: CARDIOLOGY | Facility: HOSPITAL | Age: 69
Discharge: HOME | End: 2024-09-14
Attending: INTERNAL MEDICINE
Payer: MEDICARE

## 2024-09-14 DIAGNOSIS — Z95.0 PRESENCE OF CARDIAC PACEMAKER: ICD-10-CM

## 2024-09-14 PROCEDURE — 93294 REM INTERROG EVL PM/LDLS PM: CPT | Mod: HCNC,,, | Performed by: INTERNAL MEDICINE

## 2024-09-14 PROCEDURE — 93296 REM INTERROG EVL PM/IDS: CPT | Mod: HCNC,PO | Performed by: INTERNAL MEDICINE

## 2024-09-22 LAB
OHS CV AF BURDEN PERCENT: < 1
OHS CV DC REMOTE DEVICE TYPE: NORMAL
OHS CV RV PACING PERCENT: 98 %

## 2024-09-25 DIAGNOSIS — M51.36 DEGENERATIVE DISC DISEASE, LUMBAR: ICD-10-CM

## 2024-09-25 DIAGNOSIS — M54.17 RADICULAR PAIN OF LUMBOSACRAL REGION: ICD-10-CM

## 2024-09-25 DIAGNOSIS — F41.1 GAD (GENERALIZED ANXIETY DISORDER): ICD-10-CM

## 2024-09-25 DIAGNOSIS — E78.00 PURE HYPERCHOLESTEROLEMIA: Chronic | ICD-10-CM

## 2024-09-25 RX ORDER — ALPRAZOLAM 1 MG/1
TABLET ORAL
Qty: 90 TABLET | Refills: 2 | Status: CANCELLED | OUTPATIENT
Start: 2024-09-25

## 2024-09-25 RX ORDER — ALPRAZOLAM 1 MG/1
TABLET ORAL
Qty: 90 TABLET | Refills: 2 | Status: SHIPPED | OUTPATIENT
Start: 2024-09-25

## 2024-09-25 RX ORDER — HYDROCODONE BITARTRATE AND ACETAMINOPHEN 10; 325 MG/1; MG/1
1 TABLET ORAL 3 TIMES DAILY PRN
Qty: 90 TABLET | Refills: 0 | Status: CANCELLED | OUTPATIENT
Start: 2024-09-25

## 2024-09-25 RX ORDER — APIXABAN 5 MG/1
TABLET, FILM COATED ORAL
Qty: 60 TABLET | Refills: 5 | Status: CANCELLED | OUTPATIENT
Start: 2024-09-25

## 2024-09-25 RX ORDER — HYDROCODONE BITARTRATE AND ACETAMINOPHEN 10; 325 MG/1; MG/1
1 TABLET ORAL 3 TIMES DAILY PRN
Qty: 90 TABLET | Refills: 0 | Status: SHIPPED | OUTPATIENT
Start: 2024-09-25

## 2024-09-25 NOTE — TELEPHONE ENCOUNTER
No care due was identified.  [facility] Embedded Care Due Messages. Reference number: [id number].   9/25/2024 4:55:10 PM CDT

## 2024-09-25 NOTE — TELEPHONE ENCOUNTER
No care due was identified.  [facility] Embedded Care Due Messages. Reference number: [id number].   9/25/2024 4:42:25 PM CDT

## 2024-10-23 ENCOUNTER — PATIENT MESSAGE (OUTPATIENT)
Dept: FAMILY MEDICINE | Facility: CLINIC | Age: 69
End: 2024-10-23
Payer: MEDICARE

## 2024-10-28 ENCOUNTER — PATIENT MESSAGE (OUTPATIENT)
Dept: CARDIOLOGY | Facility: HOSPITAL | Age: 69
End: 2024-10-28
Payer: MEDICARE

## 2024-10-30 ENCOUNTER — PATIENT MESSAGE (OUTPATIENT)
Dept: FAMILY MEDICINE | Facility: CLINIC | Age: 69
End: 2024-10-30
Payer: MEDICARE

## 2024-10-30 DIAGNOSIS — M54.17 RADICULAR PAIN OF LUMBOSACRAL REGION: ICD-10-CM

## 2024-10-30 DIAGNOSIS — M51.369 DEGENERATIVE DISC DISEASE, LUMBAR: ICD-10-CM

## 2024-10-30 RX ORDER — HYDROCODONE BITARTRATE AND ACETAMINOPHEN 10; 325 MG/1; MG/1
1 TABLET ORAL 3 TIMES DAILY PRN
Qty: 90 TABLET | Refills: 0 | Status: CANCELLED | OUTPATIENT
Start: 2024-10-30

## 2024-11-01 DIAGNOSIS — M54.17 RADICULAR PAIN OF LUMBOSACRAL REGION: ICD-10-CM

## 2024-11-01 DIAGNOSIS — M51.369 DEGENERATIVE DISC DISEASE, LUMBAR: ICD-10-CM

## 2024-11-01 RX ORDER — HYDROCODONE BITARTRATE AND ACETAMINOPHEN 10; 325 MG/1; MG/1
1 TABLET ORAL 3 TIMES DAILY PRN
Qty: 90 TABLET | Refills: 0 | Status: SHIPPED | OUTPATIENT
Start: 2024-11-01

## 2024-11-07 ENCOUNTER — HOSPITAL ENCOUNTER (OUTPATIENT)
Dept: CARDIOLOGY | Facility: HOSPITAL | Age: 69
Discharge: HOME | End: 2024-11-07
Attending: INTERNAL MEDICINE
Payer: MEDICARE

## 2024-11-07 ENCOUNTER — OFFICE VISIT (OUTPATIENT)
Dept: FAMILY MEDICINE | Facility: CLINIC | Age: 69
End: 2024-11-07
Payer: MEDICARE

## 2024-11-07 VITALS
WEIGHT: 226 LBS | HEART RATE: 75 BPM | SYSTOLIC BLOOD PRESSURE: 132 MMHG | DIASTOLIC BLOOD PRESSURE: 82 MMHG | OXYGEN SATURATION: 98 % | BODY MASS INDEX: 30.65 KG/M2

## 2024-11-07 DIAGNOSIS — Z95.0 STATUS POST PLACEMENT OF CARDIAC PACEMAKER: ICD-10-CM

## 2024-11-07 DIAGNOSIS — I10 ESSENTIAL HYPERTENSION: ICD-10-CM

## 2024-11-07 DIAGNOSIS — E78.00 PURE HYPERCHOLESTEROLEMIA: Chronic | ICD-10-CM

## 2024-11-07 DIAGNOSIS — M51.369 DEGENERATION OF INTERVERTEBRAL DISC OF LUMBAR REGION, UNSPECIFIED WHETHER PAIN PRESENT: ICD-10-CM

## 2024-11-07 DIAGNOSIS — E11.69 TYPE 2 DIABETES MELLITUS WITH OTHER SPECIFIED COMPLICATION, WITHOUT LONG-TERM CURRENT USE OF INSULIN: ICD-10-CM

## 2024-11-07 DIAGNOSIS — Z00.00 WELLNESS EXAMINATION: Primary | ICD-10-CM

## 2024-11-07 DIAGNOSIS — I10 HYPERTENSION, UNSPECIFIED TYPE: ICD-10-CM

## 2024-11-07 DIAGNOSIS — I25.10 CORONARY ARTERY DISEASE INVOLVING NATIVE CORONARY ARTERY OF NATIVE HEART WITHOUT ANGINA PECTORIS: ICD-10-CM

## 2024-11-07 DIAGNOSIS — Z12.5 SCREENING FOR MALIGNANT NEOPLASM OF PROSTATE: ICD-10-CM

## 2024-11-07 DIAGNOSIS — I44.2 COMPLETE HEART BLOCK: ICD-10-CM

## 2024-11-07 PROCEDURE — 93280 PM DEVICE PROGR EVAL DUAL: CPT | Mod: HCNC,PO

## 2024-11-07 PROCEDURE — 99999 PR PBB SHADOW E&M-EST. PATIENT-LVL III: CPT | Mod: PBBFAC,HCNC,, | Performed by: FAMILY MEDICINE

## 2024-11-07 RX ORDER — ATORVASTATIN CALCIUM 80 MG/1
80 TABLET, FILM COATED ORAL DAILY
Qty: 90 TABLET | Refills: 1 | Status: SHIPPED | OUTPATIENT
Start: 2024-11-07

## 2024-11-07 RX ORDER — LISINOPRIL 10 MG/1
10 TABLET ORAL DAILY
Qty: 90 TABLET | Refills: 3 | Status: SHIPPED | OUTPATIENT
Start: 2024-11-07

## 2024-11-07 RX ORDER — EZETIMIBE 10 MG/1
10 TABLET ORAL DAILY
Qty: 90 TABLET | Refills: 1 | Status: SHIPPED | OUTPATIENT
Start: 2024-11-07

## 2024-11-07 RX ORDER — AMLODIPINE BESYLATE 10 MG/1
10 TABLET ORAL DAILY
Qty: 90 TABLET | Refills: 1 | Status: SHIPPED | OUTPATIENT
Start: 2024-11-07 | End: 2025-11-07

## 2024-11-07 NOTE — PROGRESS NOTES
Subjective:       Patient ID: Fred Blackwell is a 69 y.o. male.    Chief Complaint: Follow-up    Here for wellness and follow up multiple chronic medical issues. Doing well overall and in normal state of health.      Follow-up  Pertinent negatives include no chest pain, chills, coughing or fever.     Review of Systems   Constitutional:  Negative for chills and fever.   Respiratory:  Negative for cough, chest tightness and shortness of breath.    Cardiovascular:  Negative for chest pain, palpitations and leg swelling.   Endocrine: Negative for cold intolerance and heat intolerance.   Psychiatric/Behavioral:  Negative for decreased concentration. The patient is not nervous/anxious.        Objective:      Physical Exam  Vitals and nursing note reviewed.   Constitutional:       Appearance: He is well-developed.   HENT:      Head: Normocephalic and atraumatic.   Cardiovascular:      Rate and Rhythm: Normal rate and regular rhythm.      Heart sounds: Normal heart sounds.   Pulmonary:      Effort: Pulmonary effort is normal.      Breath sounds: Normal breath sounds.   Psychiatric:         Mood and Affect: Mood normal.         Behavior: Behavior normal.         Assessment:       1. Wellness examination    2. Screening for malignant neoplasm of prostate    3. Type 2 diabetes mellitus with other specified complication, without long-term current use of insulin    4. Pure hypercholesterolemia    5. Hypertension, unspecified type    6. Coronary artery disease involving native coronary artery of native heart without angina pectoris    7. Degeneration of intervertebral disc of lumbar region, unspecified whether pain present    8. Essential hypertension        Plan:       Wellness examination    Screening for malignant neoplasm of prostate  -     PSA, Screening; Future; Expected date: 11/07/2024    Type 2 diabetes mellitus with other specified complication, without long-term current use of insulin  -     TSH; Future;  Expected date: 11/07/2024  -     Lipid Panel; Future; Expected date: 11/07/2024  -     Hemoglobin A1C; Future; Expected date: 11/07/2024  -     Comprehensive Metabolic Panel; Future; Expected date: 11/07/2024  -     CBC Without Differential; Future; Expected date: 11/07/2024    Pure hypercholesterolemia  Comments:  RESTART MEDS  Orders:  -     amLODIPine (NORVASC) 10 MG tablet; Take 1 tablet (10 mg total) by mouth once daily.  Dispense: 90 tablet; Refill: 1  -     atorvastatin (LIPITOR) 80 MG tablet; Take 1 tablet (80 mg total) by mouth once daily.  Dispense: 90 tablet; Refill: 1  -     ezetimibe (ZETIA) 10 mg tablet; Take 1 tablet (10 mg total) by mouth once daily.  Dispense: 90 tablet; Refill: 1    Hypertension, unspecified type  -     amLODIPine (NORVASC) 10 MG tablet; Take 1 tablet (10 mg total) by mouth once daily.  Dispense: 90 tablet; Refill: 1    Coronary artery disease involving native coronary artery of native heart without angina pectoris  -     lisinopriL 10 MG tablet; Take 1 tablet (10 mg total) by mouth once daily.  Dispense: 90 tablet; Refill: 3    Degeneration of intervertebral disc of lumbar region, unspecified whether pain present    Essential hypertension      Diabetes at goal  Lipid stable  Will monitor chronic medical issues and continue current plan of care.  Visit today included increased complexity associated with the care of the episodic problem diabetes addressed and managing the longitudinal care of the patient due to the serious and/or complex managed problem(s) as above.      Follow up in about 3 months (around 2/7/2025), or if symptoms worsen or fail to improve.

## 2024-11-19 ENCOUNTER — PATIENT MESSAGE (OUTPATIENT)
Dept: CARDIOLOGY | Facility: CLINIC | Age: 69
End: 2024-11-19
Payer: MEDICARE

## 2024-11-26 ENCOUNTER — OFFICE VISIT (OUTPATIENT)
Dept: CARDIOLOGY | Facility: CLINIC | Age: 69
End: 2024-11-26
Payer: MEDICARE

## 2024-11-26 VITALS
WEIGHT: 225.31 LBS | BODY MASS INDEX: 30.52 KG/M2 | DIASTOLIC BLOOD PRESSURE: 82 MMHG | HEART RATE: 60 BPM | SYSTOLIC BLOOD PRESSURE: 138 MMHG | HEIGHT: 72 IN

## 2024-11-26 DIAGNOSIS — Z79.01 LONG TERM (CURRENT) USE OF ANTICOAGULANTS: Chronic | ICD-10-CM

## 2024-11-26 DIAGNOSIS — E78.2 MIXED HYPERLIPIDEMIA: Chronic | ICD-10-CM

## 2024-11-26 DIAGNOSIS — I44.2 COMPLETE HEART BLOCK: Chronic | ICD-10-CM

## 2024-11-26 DIAGNOSIS — M54.17 RADICULAR PAIN OF LUMBOSACRAL REGION: ICD-10-CM

## 2024-11-26 DIAGNOSIS — I10 ESSENTIAL HYPERTENSION: Chronic | ICD-10-CM

## 2024-11-26 DIAGNOSIS — I48.0 PAF (PAROXYSMAL ATRIAL FIBRILLATION): Chronic | ICD-10-CM

## 2024-11-26 DIAGNOSIS — M51.369 DEGENERATIVE DISC DISEASE, LUMBAR: ICD-10-CM

## 2024-11-26 DIAGNOSIS — I34.0 NONRHEUMATIC MITRAL VALVE REGURGITATION: Chronic | ICD-10-CM

## 2024-11-26 DIAGNOSIS — I25.10 CORONARY ARTERY DISEASE INVOLVING NATIVE CORONARY ARTERY OF NATIVE HEART WITHOUT ANGINA PECTORIS: Primary | Chronic | ICD-10-CM

## 2024-11-26 PROCEDURE — 3044F HG A1C LEVEL LT 7.0%: CPT | Mod: HCNC,CPTII,S$GLB, | Performed by: INTERNAL MEDICINE

## 2024-11-26 PROCEDURE — 3075F SYST BP GE 130 - 139MM HG: CPT | Mod: HCNC,CPTII,S$GLB, | Performed by: INTERNAL MEDICINE

## 2024-11-26 PROCEDURE — 99999 PR PBB SHADOW E&M-EST. PATIENT-LVL III: CPT | Mod: PBBFAC,HCNC,, | Performed by: INTERNAL MEDICINE

## 2024-11-26 PROCEDURE — 1101F PT FALLS ASSESS-DOCD LE1/YR: CPT | Mod: HCNC,CPTII,S$GLB, | Performed by: INTERNAL MEDICINE

## 2024-11-26 PROCEDURE — 3008F BODY MASS INDEX DOCD: CPT | Mod: HCNC,CPTII,S$GLB, | Performed by: INTERNAL MEDICINE

## 2024-11-26 PROCEDURE — 3288F FALL RISK ASSESSMENT DOCD: CPT | Mod: HCNC,CPTII,S$GLB, | Performed by: INTERNAL MEDICINE

## 2024-11-26 PROCEDURE — 1125F AMNT PAIN NOTED PAIN PRSNT: CPT | Mod: HCNC,CPTII,S$GLB, | Performed by: INTERNAL MEDICINE

## 2024-11-26 PROCEDURE — 4010F ACE/ARB THERAPY RXD/TAKEN: CPT | Mod: HCNC,CPTII,S$GLB, | Performed by: INTERNAL MEDICINE

## 2024-11-26 PROCEDURE — 99214 OFFICE O/P EST MOD 30 MIN: CPT | Mod: HCNC,S$GLB,, | Performed by: INTERNAL MEDICINE

## 2024-11-26 PROCEDURE — 3079F DIAST BP 80-89 MM HG: CPT | Mod: HCNC,CPTII,S$GLB, | Performed by: INTERNAL MEDICINE

## 2024-11-26 RX ORDER — HYDROCODONE BITARTRATE AND ACETAMINOPHEN 10; 325 MG/1; MG/1
1 TABLET ORAL 3 TIMES DAILY PRN
Qty: 90 TABLET | Refills: 0 | Status: SHIPPED | OUTPATIENT
Start: 2024-11-26

## 2024-11-26 RX ORDER — HYDROCODONE BITARTRATE AND ACETAMINOPHEN 10; 325 MG/1; MG/1
1 TABLET ORAL 3 TIMES DAILY PRN
Qty: 90 TABLET | Refills: 0 | Status: CANCELLED | OUTPATIENT
Start: 2024-11-26

## 2024-11-26 NOTE — TELEPHONE ENCOUNTER
Care Due:                  Date            Visit Type   Department     Provider  --------------------------------------------------------------------------------                                EP -                              PRIMARY      [facility] FAMILY  Last Visit: 11-      CARE ([facility])   MEDICINE       MARITZA BIRCH  Next Visit: None Scheduled  None         None Found                                                            Last  Test          Frequency    Reason                     Performed    Due Date  --------------------------------------------------------------------------------    HBA1C.......  6 months...  metFORMIN................  08- 02-    Lipid Panel.  12 months..  atorvastatin, ezetimibe..  01- 01-    [facility] Embedded Care Due Messages. Reference number: [id number].   11/26/2024 3:22:34 PM CST

## 2024-11-26 NOTE — TELEPHONE ENCOUNTER
No care due was identified.  Health [facility] Embedded Care Due Messages. Reference number: [id number].   11/26/2024 3:29:56 PM CST

## 2024-12-14 ENCOUNTER — CLINICAL SUPPORT (OUTPATIENT)
Dept: CARDIOLOGY | Facility: HOSPITAL | Age: 69
End: 2024-12-14
Payer: MEDICARE

## 2024-12-14 ENCOUNTER — HOSPITAL ENCOUNTER (OUTPATIENT)
Dept: CARDIOLOGY | Facility: HOSPITAL | Age: 69
Discharge: HOME | End: 2024-12-14
Attending: INTERNAL MEDICINE
Payer: MEDICARE

## 2024-12-14 DIAGNOSIS — Z95.0 PRESENCE OF CARDIAC PACEMAKER: ICD-10-CM

## 2024-12-14 PROCEDURE — 93294 REM INTERROG EVL PM/LDLS PM: CPT | Mod: HCNC,,, | Performed by: INTERNAL MEDICINE

## 2024-12-14 PROCEDURE — 93296 REM INTERROG EVL PM/IDS: CPT | Mod: HCNC,PO | Performed by: INTERNAL MEDICINE

## 2024-12-18 LAB
OHS CV AF BURDEN PERCENT: < 1
OHS CV DC REMOTE DEVICE TYPE: NORMAL
OHS CV RV PACING PERCENT: 99 %

## 2024-12-26 DIAGNOSIS — F41.1 GAD (GENERALIZED ANXIETY DISORDER): ICD-10-CM

## 2024-12-26 DIAGNOSIS — M51.369 DEGENERATIVE DISC DISEASE, LUMBAR: ICD-10-CM

## 2024-12-26 DIAGNOSIS — M54.17 RADICULAR PAIN OF LUMBOSACRAL REGION: ICD-10-CM

## 2024-12-26 RX ORDER — HYDROCODONE BITARTRATE AND ACETAMINOPHEN 10; 325 MG/1; MG/1
1 TABLET ORAL 3 TIMES DAILY PRN
Qty: 90 TABLET | Refills: 0 | Status: SHIPPED | OUTPATIENT
Start: 2024-12-26

## 2024-12-26 RX ORDER — ALPRAZOLAM 1 MG/1
TABLET ORAL
Qty: 90 TABLET | Refills: 2 | Status: SHIPPED | OUTPATIENT
Start: 2024-12-26

## 2024-12-26 NOTE — TELEPHONE ENCOUNTER
No care due was identified.  Health [facility] Embedded Care Due Messages. Reference number: [id number].   12/26/2024 8:49:25 AM CST

## 2024-12-27 ENCOUNTER — TELEPHONE (OUTPATIENT)
Dept: FAMILY MEDICINE | Facility: CLINIC | Age: 69
End: 2024-12-27
Payer: MEDICARE

## 2024-12-27 NOTE — TELEPHONE ENCOUNTER
----- Message from Sammie sent at 12/27/2024  1:56 PM CST -----  Contact: pt  Type:  RX Refill Request    PT IS OUT OF MEDS    Who Called: pt    Refill or New Rx: refill    RX Name and Strength: ALPRAZolam (XANAX) 1 MG tablet    How is the patient currently taking it? (ex. 1XDay): as directed    Is this a 30 day or 90 day RX: 30    Preferred Pharmacy with phone number:     [facility] [address] [place] [address]  Phone: [phone] Fax: [phone]    Local or Mail Order: local    Ordering Provider: Josef     Would the patient rather a call back or a response via [facility]?  Call if questions    Best Call Back Number: [phone]    Additional Information:  pt is OUT of meds    Please refill script    Thanks

## 2025-01-13 ENCOUNTER — PATIENT MESSAGE (OUTPATIENT)
Dept: CARDIOLOGY | Facility: CLINIC | Age: 70
End: 2025-01-13
Payer: MEDICARE

## 2025-01-23 ENCOUNTER — PATIENT MESSAGE (OUTPATIENT)
Dept: FAMILY MEDICINE | Facility: CLINIC | Age: 70
End: 2025-01-23
Payer: MEDICARE

## 2025-01-23 DIAGNOSIS — F41.1 GAD (GENERALIZED ANXIETY DISORDER): ICD-10-CM

## 2025-01-23 DIAGNOSIS — M51.369 DEGENERATIVE DISC DISEASE, LUMBAR: ICD-10-CM

## 2025-01-23 DIAGNOSIS — M54.17 RADICULAR PAIN OF LUMBOSACRAL REGION: ICD-10-CM

## 2025-01-23 RX ORDER — ALPRAZOLAM 1 MG/1
TABLET ORAL
Qty: 90 TABLET | Refills: 2 | Status: SHIPPED | OUTPATIENT
Start: 2025-01-23

## 2025-01-23 RX ORDER — HYDROCODONE BITARTRATE AND ACETAMINOPHEN 10; 325 MG/1; MG/1
1 TABLET ORAL 3 TIMES DAILY PRN
Qty: 90 TABLET | Refills: 0 | Status: SHIPPED | OUTPATIENT
Start: 2025-01-23

## 2025-01-23 NOTE — TELEPHONE ENCOUNTER
No care due was identified.  Health [facility] Embedded Care Due Messages. Reference number: [id number].   1/23/2025 9:54:03 AM CST

## 2025-01-30 DIAGNOSIS — Z00.00 ENCOUNTER FOR MEDICARE ANNUAL WELLNESS EXAM: ICD-10-CM

## 2025-02-24 DIAGNOSIS — M54.17 RADICULAR PAIN OF LUMBOSACRAL REGION: ICD-10-CM

## 2025-02-24 DIAGNOSIS — M51.369 DEGENERATIVE DISC DISEASE, LUMBAR: ICD-10-CM

## 2025-02-24 RX ORDER — CITALOPRAM 20 MG/1
20 TABLET, FILM COATED ORAL DAILY
Qty: 90 TABLET | Refills: 1 | Status: SHIPPED | OUTPATIENT
Start: 2025-02-24 | End: 2026-02-24

## 2025-02-24 RX ORDER — HYDROCODONE BITARTRATE AND ACETAMINOPHEN 10; 325 MG/1; MG/1
1 TABLET ORAL 3 TIMES DAILY PRN
Qty: 90 TABLET | Refills: 0 | Status: SHIPPED | OUTPATIENT
Start: 2025-02-24

## 2025-02-24 NOTE — TELEPHONE ENCOUNTER
No care due was identified.  [facility] Embedded Care Due Messages. Reference number: [id number].   2/24/2025 9:59:11 AM CST

## 2025-02-24 NOTE — TELEPHONE ENCOUNTER
----- Message from Vahe sent at 2/24/2025  9:45 AM CST -----  Type:  RX Refill RequestWho Called: ptRefill or New Rx:refill RX Name and Strength:HYDROcodone-acetaminophen (NORCO)  mg per tablet, ALPRAZolam (XANAX) 1 MG tabletHow is the patient currently taking it? (ex. 1XDay):as directedIs this a 30 day or 90 day RX: 90Preferred Pharmacy with phone number [facility] Phone: [phone]: [phone] or Mail Order: localOrdering Provider:Dr Mooreould the patient rather a call back or a response via [facility]? Call [facility] Call Back Number:[phone] Information: nothing further   Please call back to advise. Thanks!

## 2025-02-24 NOTE — TELEPHONE ENCOUNTER
Care Due:                  Date            Visit Type   Department     Provider  --------------------------------------------------------------------------------                                EP -                              PRIMARY      [facility] FAMILY  Last Visit: 11-      CARE ([facility])   MEDICINE       MARITZA BIRCH  Next Visit: None Scheduled  None         None Found                                                            Last  Test          Frequency    Reason                     Performed    Due Date  --------------------------------------------------------------------------------    HBA1C.......  6 months...  metFORMIN................  08- 02-    Lipid Panel.  12 months..  atorvastatin, ezetimibe..  01- 01-    Health [facility] Embedded Care Due Messages. Reference number: [id number].   2/24/2025 9:58:13 AM CST

## 2025-03-15 ENCOUNTER — HOSPITAL ENCOUNTER (OUTPATIENT)
Dept: CARDIOLOGY | Facility: HOSPITAL | Age: 70
Discharge: HOME | End: 2025-03-15
Attending: INTERNAL MEDICINE
Payer: MEDICARE

## 2025-03-15 ENCOUNTER — CLINICAL SUPPORT (OUTPATIENT)
Dept: CARDIOLOGY | Facility: HOSPITAL | Age: 70
End: 2025-03-15
Payer: MEDICARE

## 2025-03-15 DIAGNOSIS — Z95.0 PRESENCE OF CARDIAC PACEMAKER: ICD-10-CM

## 2025-03-15 PROCEDURE — 93296 REM INTERROG EVL PM/IDS: CPT | Mod: HCNC,PO | Performed by: INTERNAL MEDICINE

## 2025-03-15 PROCEDURE — 93294 REM INTERROG EVL PM/LDLS PM: CPT | Mod: HCNC,,, | Performed by: INTERNAL MEDICINE

## 2025-03-24 ENCOUNTER — PATIENT MESSAGE (OUTPATIENT)
Dept: FAMILY MEDICINE | Facility: CLINIC | Age: 70
End: 2025-03-24
Payer: MEDICARE

## 2025-03-24 DIAGNOSIS — M51.369 DEGENERATIVE DISC DISEASE, LUMBAR: ICD-10-CM

## 2025-03-24 DIAGNOSIS — M54.17 RADICULAR PAIN OF LUMBOSACRAL REGION: ICD-10-CM

## 2025-03-24 RX ORDER — HYDROCODONE BITARTRATE AND ACETAMINOPHEN 10; 325 MG/1; MG/1
1 TABLET ORAL 3 TIMES DAILY PRN
Qty: 90 TABLET | Refills: 0 | OUTPATIENT
Start: 2025-03-24

## 2025-03-24 RX ORDER — HYDROCODONE BITARTRATE AND ACETAMINOPHEN 10; 325 MG/1; MG/1
1 TABLET ORAL 3 TIMES DAILY PRN
Qty: 90 TABLET | Refills: 0 | Status: SHIPPED | OUTPATIENT
Start: 2025-03-24

## 2025-03-24 RX ORDER — HYDROCODONE BITARTRATE AND ACETAMINOPHEN 10; 325 MG/1; MG/1
1 TABLET ORAL 3 TIMES DAILY PRN
Qty: 90 TABLET | Refills: 0 | Status: CANCELLED | OUTPATIENT
Start: 2025-03-24

## 2025-03-24 NOTE — TELEPHONE ENCOUNTER
----- Message from Erik sent at 3/24/2025  3:50 PM CDT -----  Contact: Roseanne [phone]  Type:  RX Refill RequestWho Called: Pt daughter MelanieRefill or New Rx:RefillRX Name and Strength:HYDROcodone-acetaminophen (NORCO)  mg per tabletHow is the patient currently taking it? (ex. 1XDay):3 x as neededIs this a 30 day or 90 day RX:30Preferred Pharmacy with phone number:[facility] [address]Cxieq: [phone] Fax: [phone] or Mail Order:localOrdering Provider:Paige the patient rather a call back or a response via [facility]? [facility] Call Back Number:[phone] Additional Information: Roseanne adv pharm sent a req for refill earlier today. Pls call back and adv. Thank you.

## 2025-03-24 NOTE — TELEPHONE ENCOUNTER
No care due was identified.  Health [facility] Embedded Care Due Messages. Reference number: [id number].   3/24/2025 11:00:47 AM CDT

## 2025-03-24 NOTE — TELEPHONE ENCOUNTER
----- Message from [employer] sent at 3/24/2025  4:25 PM CDT -----  Type: RX REFILL REQUESTWho Called: Roseanne (Pt Daughter)Refill or New Rx: RefillRx Name and Strength: HYDROcodone-acetaminophen (NORCO)  mg per tabletPharmacy is waiting for approval. Second request.Would the patient rather a call back or a response via [facility]? [facility] Number: [phone] Information:  [facility] [address]Smgci: [phone] Fax: [phone]

## 2025-03-24 NOTE — TELEPHONE ENCOUNTER
No care due was identified.  [facility] Embedded Care Due Messages. Reference number: [id number].   3/24/2025 12:39:53 PM CDT

## 2025-03-25 LAB
OHS CV AF BURDEN PERCENT: < 1
OHS CV DC REMOTE DEVICE TYPE: NORMAL
OHS CV RV PACING PERCENT: 99 %

## 2025-04-02 ENCOUNTER — TELEPHONE (OUTPATIENT)
Dept: FAMILY MEDICINE | Facility: CLINIC | Age: 70
End: 2025-04-02
Payer: MEDICARE

## 2025-04-02 ENCOUNTER — PATIENT MESSAGE (OUTPATIENT)
Dept: NEUROLOGY | Facility: CLINIC | Age: 70
End: 2025-04-02
Payer: MEDICARE

## 2025-04-02 ENCOUNTER — PATIENT MESSAGE (OUTPATIENT)
Dept: FAMILY MEDICINE | Facility: CLINIC | Age: 70
End: 2025-04-02
Payer: MEDICARE

## 2025-04-02 DIAGNOSIS — I63.89 OTHER CEREBRAL INFARCTION: Primary | ICD-10-CM

## 2025-04-02 NOTE — TELEPHONE ENCOUNTER
Agree with neurology go to ED now; don't care if he doesn't like it there as this could be life threatening; will not see in clinic as not appropriate

## 2025-04-02 NOTE — TELEPHONE ENCOUNTER
Spoke to patients daughter.  Pts daughter states that she thinks pt had a stroke a few months ago.  His speech is now sleered.  Pts daughter states that pt knows what he wants to say but physically can't say it he has to describe it in order for people to understand what he is saying.  Pts daughter states that pt is complaining of headaches.    Pts daughter also states that pt can no longer go to the bank alone.    Pts daughter states that pt does not like going to ER due to past experiences.

## 2025-04-02 NOTE — TELEPHONE ENCOUNTER
"Pt needs to be seen in clinic per cody recommendations to be evaluated..    4-3-2025 slot available.  Slot says "any urgent_my virtual"    Are you okay with me placing him in this slot?    "

## 2025-04-02 NOTE — TELEPHONE ENCOUNTER
About a couple months ago daughter stated that pt had slurred speech but is now not able to speak and daughter has to speak for him pt knows what to say but can not get words out. No medication changes. Daughter stated that pt gait is off and stated has had no falls has not hit his head but does complain about having headaches on the back side of head.

## 2025-04-03 NOTE — TELEPHONE ENCOUNTER
Spoke to patients daughter destin.  Destin stated that she has been in touch with neurology and someone was ordering pt an MRI.  Pts daughter stated that pt is not going to go to the ER and she can not force him to go so she will not be bringing him to the ER.  Destin stated that she would like to schedule the MRI,  I assisted with destin to schedule the mri.

## 2025-04-17 ENCOUNTER — PATIENT MESSAGE (OUTPATIENT)
Dept: FAMILY MEDICINE | Facility: CLINIC | Age: 70
End: 2025-04-17
Payer: MEDICARE

## 2025-04-17 DIAGNOSIS — F41.1 GAD (GENERALIZED ANXIETY DISORDER): ICD-10-CM

## 2025-04-17 DIAGNOSIS — M54.17 RADICULAR PAIN OF LUMBOSACRAL REGION: ICD-10-CM

## 2025-04-17 DIAGNOSIS — M51.369 DEGENERATIVE DISC DISEASE, LUMBAR: ICD-10-CM

## 2025-04-18 NOTE — TELEPHONE ENCOUNTER
No care due was identified.  [facility] Embedded Care Due Messages. Reference number: [id number].   4/17/2025 9:20:44 PM CDT

## 2025-04-21 ENCOUNTER — PATIENT MESSAGE (OUTPATIENT)
Dept: FAMILY MEDICINE | Facility: CLINIC | Age: 70
End: 2025-04-21
Payer: MEDICARE

## 2025-04-21 DIAGNOSIS — F41.1 GAD (GENERALIZED ANXIETY DISORDER): ICD-10-CM

## 2025-04-21 DIAGNOSIS — M51.369 DEGENERATIVE DISC DISEASE, LUMBAR: ICD-10-CM

## 2025-04-21 DIAGNOSIS — M54.17 RADICULAR PAIN OF LUMBOSACRAL REGION: ICD-10-CM

## 2025-04-21 RX ORDER — HYDROCODONE BITARTRATE AND ACETAMINOPHEN 10; 325 MG/1; MG/1
1 TABLET ORAL 3 TIMES DAILY PRN
Qty: 90 TABLET | Refills: 0 | OUTPATIENT
Start: 2025-04-21

## 2025-04-21 RX ORDER — ALPRAZOLAM 1 MG/1
TABLET ORAL
Qty: 90 TABLET | Refills: 2 | OUTPATIENT
Start: 2025-04-21

## 2025-04-21 NOTE — TELEPHONE ENCOUNTER
No care due was identified.  [facility] Embedded Care Due Messages. Reference number: [id number].   4/21/2025 12:22:22 PM CDT

## 2025-04-22 DIAGNOSIS — F41.1 GAD (GENERALIZED ANXIETY DISORDER): ICD-10-CM

## 2025-04-22 DIAGNOSIS — M51.369 DEGENERATIVE DISC DISEASE, LUMBAR: ICD-10-CM

## 2025-04-22 DIAGNOSIS — M54.17 RADICULAR PAIN OF LUMBOSACRAL REGION: ICD-10-CM

## 2025-04-22 RX ORDER — ALPRAZOLAM 1 MG/1
TABLET ORAL
Qty: 90 TABLET | Refills: 2 | Status: CANCELLED | OUTPATIENT
Start: 2025-04-22

## 2025-04-22 RX ORDER — HYDROCODONE BITARTRATE AND ACETAMINOPHEN 10; 325 MG/1; MG/1
1 TABLET ORAL 3 TIMES DAILY PRN
Qty: 90 TABLET | Refills: 0 | Status: CANCELLED | OUTPATIENT
Start: 2025-04-22

## 2025-04-22 NOTE — TELEPHONE ENCOUNTER
No care due was identified.  [facility] Embedded Care Due Messages. Reference number: [id number].   4/22/2025 3:32:05 PM CDT

## 2025-04-22 NOTE — TELEPHONE ENCOUNTER
No care due was identified.  [facility] Embedded Care Due Messages. Reference number: [id number].   4/22/2025 4:32:47 PM CDT

## 2025-04-23 DIAGNOSIS — M51.369 DEGENERATIVE DISC DISEASE, LUMBAR: ICD-10-CM

## 2025-04-23 DIAGNOSIS — F41.1 GAD (GENERALIZED ANXIETY DISORDER): ICD-10-CM

## 2025-04-23 DIAGNOSIS — M54.17 RADICULAR PAIN OF LUMBOSACRAL REGION: ICD-10-CM

## 2025-04-23 RX ORDER — ALPRAZOLAM 1 MG/1
TABLET ORAL
Qty: 90 TABLET | Refills: 0 | Status: SHIPPED | OUTPATIENT
Start: 2025-04-23

## 2025-04-23 RX ORDER — HYDROCODONE BITARTRATE AND ACETAMINOPHEN 10; 325 MG/1; MG/1
1 TABLET ORAL 3 TIMES DAILY PRN
Qty: 90 TABLET | Refills: 0 | Status: CANCELLED | OUTPATIENT
Start: 2025-04-22

## 2025-04-23 RX ORDER — HYDROCODONE BITARTRATE AND ACETAMINOPHEN 10; 325 MG/1; MG/1
1 TABLET ORAL 3 TIMES DAILY PRN
Qty: 90 TABLET | Refills: 0 | Status: SHIPPED | OUTPATIENT
Start: 2025-04-23

## 2025-04-23 RX ORDER — ALPRAZOLAM 1 MG/1
TABLET ORAL
Qty: 90 TABLET | Refills: 2 | Status: CANCELLED | OUTPATIENT
Start: 2025-04-22

## 2025-04-23 NOTE — TELEPHONE ENCOUNTER
No care due was identified.  [facility] Embedded Care Due Messages. Reference number: [id number].   4/23/2025 3:38:48 PM CDT

## 2025-04-23 NOTE — TELEPHONE ENCOUNTER
"----- Message from Sammie sent at 4/23/2025  2:39 PM CDT -----  Contact: pt  Type:  RX Refill RequestWho Called: ptRefill or New Rx: refill RX Name and Strength: 3 medicationsALPRAZolam (XANAX) 1 MG tabletHYDROcodone-acetaminophen (NORCO)  mg per tablet"Heart medication" - unsure which oneHow is the patient currently taking it? (ex. 1XDay): as directedIs this a 30 day or 90 day RX: 90Preferred Pharmacy with phone number: [facility] [address]Qzwgd: [phone] Fax: [phone] or Mail Order: localOrdering Provider: Teresaould the patient rather a call back or a response via [facility]?  Call backBest Call Back Number: [phone]  or [phone] Information:  pt is needing refill on meds. Pharmacy sent refill request for 3 medications. Pt calling in for 3 medications but can't remember what the "heart medication" is and there are several in chartPlease call to adviseThanks  "

## 2025-04-23 NOTE — TELEPHONE ENCOUNTER
----- Message from [employer] [facility] sent at 4/22/2025 12:16 PM CDT -----  Type: Needs Medical AdviceWho Called:  daughter Pharmacy name and phone #:  [facility] [place] [address] [address]Gvndj: [phone] Fax: [phone] Call Back Number: [phone] Information: patient is  calling again to check status on medication refill , patient is out of medication please call to further assist thank you .

## 2025-04-23 NOTE — TELEPHONE ENCOUNTER
Informed patient daughter that her fathers medication was pended to the pcp and that once it has been filled someone from our department will reach out to her.

## 2025-04-23 NOTE — TELEPHONE ENCOUNTER
No care due was identified.  [facility] Embedded Care Due Messages. Reference number: [id number].   4/23/2025 2:32:27 PM CDT

## 2025-04-24 ENCOUNTER — TELEPHONE (OUTPATIENT)
Dept: FAMILY MEDICINE | Facility: CLINIC | Age: 70
End: 2025-04-24
Payer: MEDICARE

## 2025-04-24 NOTE — TELEPHONE ENCOUNTER
----- Message from Mabel sent at 4/23/2025  5:30 PM CDT -----  Regarding: medication  Roseanne [phone] Daughter states someone called her about dad's medication being ready.   When she came to pick it up it was not  here at the pharmacy in [place].   Can someone please reach out to her and let her know the status.   Thank you

## 2025-04-28 ENCOUNTER — OFFICE VISIT (OUTPATIENT)
Dept: FAMILY MEDICINE | Facility: CLINIC | Age: 70
End: 2025-04-28
Payer: MEDICARE

## 2025-04-28 DIAGNOSIS — I10 ESSENTIAL HYPERTENSION: Primary | ICD-10-CM

## 2025-04-28 DIAGNOSIS — F41.1 GAD (GENERALIZED ANXIETY DISORDER): ICD-10-CM

## 2025-04-28 DIAGNOSIS — M54.17 RADICULAR PAIN OF LUMBOSACRAL REGION: ICD-10-CM

## 2025-04-28 DIAGNOSIS — M51.369 DEGENERATIVE DISC DISEASE, LUMBAR: ICD-10-CM

## 2025-04-28 PROCEDURE — 3072F LOW RISK FOR RETINOPATHY: CPT | Mod: CPTII,HCNC,95, | Performed by: FAMILY MEDICINE

## 2025-04-28 PROCEDURE — 98005 SYNCH AUDIO-VIDEO EST LOW 20: CPT | Mod: HCNC,95,, | Performed by: FAMILY MEDICINE

## 2025-04-28 PROCEDURE — 4010F ACE/ARB THERAPY RXD/TAKEN: CPT | Mod: CPTII,HCNC,95, | Performed by: FAMILY MEDICINE

## 2025-04-28 RX ORDER — ALPRAZOLAM 1 MG/1
TABLET ORAL
Qty: 90 TABLET | Refills: 2 | Status: SHIPPED | OUTPATIENT
Start: 2025-05-22

## 2025-04-28 RX ORDER — HYDROCODONE BITARTRATE AND ACETAMINOPHEN 10; 325 MG/1; MG/1
1 TABLET ORAL 3 TIMES DAILY PRN
Qty: 90 TABLET | Refills: 0 | Status: SHIPPED | OUTPATIENT
Start: 2025-05-22

## 2025-04-28 NOTE — PROGRESS NOTES
Subjective:       Patient ID: Fred Blackwell is a 69 y.o. male.    Chief Complaint: Hypertension and Back Pain    Virtual for f/u back pain. Htn and sugar stable recently. Still with pain.      The patient location is: [place]  The chief complaint leading to consultation is: htn and anxiety as well as back pain    Visit type: audiovisual    Face to Face time with patient: 8 minutes  11 minutes of total time spent on the encounter, which includes face to face time and non-face to face time preparing to see the patient (eg, review of tests), Obtaining and/or reviewing separately obtained history, Documenting clinical information in the electronic or other health record, Independently interpreting results (not separately reported) and communicating results to the patient/family/caregiver, or Care coordination (not separately reported).         Each patient to whom he or she provides medical services by telemedicine is:  (1) informed of the relationship between the physician and patient and the respective role of any other health care provider with respect to management of the patient; and (2) notified that he or she may decline to receive medical services by telemedicine and may withdraw from such care at any time.    Notes:     Review of Systems   Constitutional:  Negative for activity change and unexpected weight change.   HENT:  Negative for hearing loss, rhinorrhea and trouble swallowing.    Eyes:  Negative for discharge and visual disturbance.   Respiratory:  Negative for chest tightness and wheezing.    Cardiovascular:  Negative for chest pain and palpitations.   Gastrointestinal:  Negative for blood in stool, constipation, diarrhea and vomiting.   Endocrine: Negative for polydipsia and polyuria.   Genitourinary:  Negative for difficulty urinating, hematuria and urgency.   Musculoskeletal:  Positive for arthralgias and neck pain. Negative for joint swelling.   Neurological:  Positive for headaches. Negative  for weakness.   Psychiatric/Behavioral:  Negative for confusion and dysphoric mood.        Objective:      Physical Exam  Constitutional:       Appearance: Normal appearance.   Neurological:      Mental Status: He is alert.   Psychiatric:         Mood and Affect: Mood normal.         Behavior: Behavior normal.         Assessment:       1. Essential hypertension    2. SHANNAN (generalized anxiety disorder)    3. Radicular pain of lumbosacral region    4. Degenerative disc disease, lumbar        Plan:       Essential hypertension    SHANNAN (generalized anxiety disorder)  -     ALPRAZolam (XANAX) 1 MG tablet; TAKE 1 TABLET (1 MG TOTAL) BY MOUTH THREE TIMES DAILY AS NEEDED FOR ANXIETY.  Dispense: 90 tablet; Refill: 2    Radicular pain of lumbosacral region  -     HYDROcodone-acetaminophen (NORCO)  mg per tablet; Take 1 tablet by mouth 3 (three) times daily as needed for Pain (medically necessary for greater than 7 days).  Dispense: 90 tablet; Refill: 0    Degenerative disc disease, lumbar  -     HYDROcodone-acetaminophen (NORCO)  mg per tablet; Take 1 tablet by mouth 3 (three) times daily as needed for Pain (medically necessary for greater than 7 days).  Dispense: 90 tablet; Refill: 0        Labs before f/u  Refill prn meds with goal to wean as tolerated  Follow up in about 3 months (around 7/28/2025), or if symptoms worsen or fail to improve, for Virtual Visit.

## 2025-04-29 ENCOUNTER — RESULTS FOLLOW-UP (OUTPATIENT)
Dept: NEUROLOGY | Facility: CLINIC | Age: 70
End: 2025-04-29

## 2025-05-21 DIAGNOSIS — M51.369 DEGENERATIVE DISC DISEASE, LUMBAR: ICD-10-CM

## 2025-05-21 DIAGNOSIS — M54.17 RADICULAR PAIN OF LUMBOSACRAL REGION: ICD-10-CM

## 2025-05-21 NOTE — TELEPHONE ENCOUNTER
Care Due:                  Date            Visit Type   Department     Provider  --------------------------------------------------------------------------------                                ESTABLISHED                              PATIENT -    [facility]  Last Visit: 04-      VIRTUAL      MEDICINE       ROBERT Bahena                              ESTABLISHED                              PATIENT -    [facility]  Next Visit: 06-      VIRTUAL      MEDICINE       ROBERT Bahena                                                            Last  Test          Frequency    Reason                     Performed    Due Date  --------------------------------------------------------------------------------    CMP.........  12 months..  atorvastatin, ezetimibe,   08- 08-                             lisinopriL, metFORMIN....    HBA1C.......  6 months...  metFORMIN................  08- 02-    Lipid Panel.  12 months..  atorvastatin, ezetimibe..  01- 01-    [facility] Care Due Messages. Reference number: [id number].   5/21/2025 1:59:07 PM CDT

## 2025-05-22 RX ORDER — HYDROCODONE BITARTRATE AND ACETAMINOPHEN 10; 325 MG/1; MG/1
1 TABLET ORAL 3 TIMES DAILY PRN
Qty: 90 TABLET | Refills: 0 | Status: SHIPPED | OUTPATIENT
Start: 2025-05-22

## 2025-05-31 ENCOUNTER — LAB VISIT (OUTPATIENT)
Dept: LAB | Facility: HOSPITAL | Age: 70
End: 2025-05-31
Attending: INTERNAL MEDICINE
Payer: MEDICARE

## 2025-05-31 DIAGNOSIS — I10 ESSENTIAL HYPERTENSION: Chronic | ICD-10-CM

## 2025-05-31 DIAGNOSIS — I25.10 CORONARY ARTERY DISEASE INVOLVING NATIVE CORONARY ARTERY OF NATIVE HEART WITHOUT ANGINA PECTORIS: Chronic | ICD-10-CM

## 2025-05-31 DIAGNOSIS — Z79.01 LONG TERM (CURRENT) USE OF ANTICOAGULANTS: Chronic | ICD-10-CM

## 2025-05-31 DIAGNOSIS — E11.69 TYPE 2 DIABETES MELLITUS WITH OTHER SPECIFIED COMPLICATION, WITHOUT LONG-TERM CURRENT USE OF INSULIN: ICD-10-CM

## 2025-05-31 DIAGNOSIS — E78.2 MIXED HYPERLIPIDEMIA: Chronic | ICD-10-CM

## 2025-05-31 LAB — HGB BLD-MCNC: 14.2 GM/DL (ref 14–18)

## 2025-05-31 PROCEDURE — 85018 HEMOGLOBIN: CPT | Mod: HCNC

## 2025-05-31 PROCEDURE — 36415 COLL VENOUS BLD VENIPUNCTURE: CPT | Mod: HCNC,PO

## 2025-05-31 PROCEDURE — 80061 LIPID PANEL: CPT | Mod: HCNC

## 2025-05-31 PROCEDURE — 83036 HEMOGLOBIN GLYCOSYLATED A1C: CPT | Mod: HCNC

## 2025-05-31 PROCEDURE — 80053 COMPREHEN METABOLIC PANEL: CPT | Mod: HCNC

## 2025-05-31 PROCEDURE — 83735 ASSAY OF MAGNESIUM: CPT | Mod: HCNC

## 2025-06-01 LAB
ALBUMIN SERPL BCP-MCNC: 4.1 G/DL (ref 3.5–5.2)
ALP SERPL-CCNC: 73 UNIT/L (ref 40–150)
ALT SERPL W/O P-5'-P-CCNC: 24 UNIT/L (ref 10–44)
ANION GAP (OHS): 8 MMOL/L (ref 8–16)
AST SERPL-CCNC: 20 UNIT/L (ref 11–45)
BILIRUB SERPL-MCNC: 0.7 MG/DL (ref 0.1–1)
BUN SERPL-MCNC: 11 MG/DL (ref 8–23)
CALCIUM SERPL-MCNC: 8.7 MG/DL (ref 8.7–10.5)
CHLORIDE SERPL-SCNC: 103 MMOL/L (ref 95–110)
CHOLEST SERPL-MCNC: 114 MG/DL (ref 120–199)
CHOLEST/HDLC SERPL: 2.1 {RATIO} (ref 2–5)
CO2 SERPL-SCNC: 28 MMOL/L (ref 23–29)
CREAT SERPL-MCNC: 0.8 MG/DL (ref 0.5–1.4)
EAG (OHS): 134 MG/DL (ref 68–131)
GFR SERPLBLD CREATININE-BSD FMLA CKD-EPI: >60 ML/MIN/1.73/M2
GLUCOSE SERPL-MCNC: 102 MG/DL (ref 70–110)
HBA1C MFR BLD: 6.3 % (ref 4–5.6)
HDLC SERPL-MCNC: 54 MG/DL (ref 40–75)
HDLC SERPL: 47.4 % (ref 20–50)
LDLC SERPL CALC-MCNC: 50.6 MG/DL (ref 63–159)
MAGNESIUM SERPL-MCNC: 1.5 MG/DL (ref 1.6–2.6)
NONHDLC SERPL-MCNC: 60 MG/DL
POTASSIUM SERPL-SCNC: 4.1 MMOL/L (ref 3.5–5.1)
PROT SERPL-MCNC: 7 GM/DL (ref 6–8.4)
SODIUM SERPL-SCNC: 139 MMOL/L (ref 136–145)
TRIGL SERPL-MCNC: 47 MG/DL (ref 30–150)

## 2025-06-15 DIAGNOSIS — M54.17 RADICULAR PAIN OF LUMBOSACRAL REGION: ICD-10-CM

## 2025-06-15 DIAGNOSIS — M51.369 DEGENERATIVE DISC DISEASE, LUMBAR: ICD-10-CM

## 2025-06-15 NOTE — TELEPHONE ENCOUNTER
No care due was identified.  [facility] Embedded Care Due Messages. Reference number: [id number].   6/15/2025 12:26:45 PM CDT

## 2025-06-16 RX ORDER — HYDROCODONE BITARTRATE AND ACETAMINOPHEN 10; 325 MG/1; MG/1
1 TABLET ORAL 3 TIMES DAILY PRN
Qty: 90 TABLET | Refills: 0 | OUTPATIENT
Start: 2025-06-16

## 2025-06-17 ENCOUNTER — PATIENT MESSAGE (OUTPATIENT)
Dept: FAMILY MEDICINE | Facility: CLINIC | Age: 70
End: 2025-06-17
Payer: MEDICARE

## 2025-06-17 DIAGNOSIS — M51.369 DEGENERATIVE DISC DISEASE, LUMBAR: ICD-10-CM

## 2025-06-17 DIAGNOSIS — M54.17 RADICULAR PAIN OF LUMBOSACRAL REGION: ICD-10-CM

## 2025-06-17 RX ORDER — HYDROCODONE BITARTRATE AND ACETAMINOPHEN 10; 325 MG/1; MG/1
1 TABLET ORAL 3 TIMES DAILY PRN
Qty: 90 TABLET | Refills: 0 | Status: CANCELLED | OUTPATIENT
Start: 2025-06-17

## 2025-06-17 NOTE — TELEPHONE ENCOUNTER
No care due was identified.  [facility] Embedded Care Due Messages. Reference number: [id number].   6/17/2025 11:06:46 AM CDT

## 2025-06-18 ENCOUNTER — PATIENT MESSAGE (OUTPATIENT)
Dept: FAMILY MEDICINE | Facility: CLINIC | Age: 70
End: 2025-06-18
Payer: MEDICARE

## 2025-06-18 DIAGNOSIS — M51.369 DEGENERATIVE DISC DISEASE, LUMBAR: ICD-10-CM

## 2025-06-18 DIAGNOSIS — M54.17 RADICULAR PAIN OF LUMBOSACRAL REGION: ICD-10-CM

## 2025-06-19 RX ORDER — HYDROCODONE BITARTRATE AND ACETAMINOPHEN 10; 325 MG/1; MG/1
1 TABLET ORAL 3 TIMES DAILY PRN
Qty: 90 TABLET | Refills: 0 | Status: SHIPPED | OUTPATIENT
Start: 2025-06-19

## 2025-06-20 DIAGNOSIS — E78.00 PURE HYPERCHOLESTEROLEMIA: Chronic | ICD-10-CM

## 2025-06-29 ENCOUNTER — CLINICAL SUPPORT (OUTPATIENT)
Dept: CARDIOLOGY | Facility: HOSPITAL | Age: 70
End: 2025-06-29
Payer: MEDICARE

## 2025-06-29 ENCOUNTER — HOSPITAL ENCOUNTER (OUTPATIENT)
Dept: CARDIOLOGY | Facility: HOSPITAL | Age: 70
Discharge: HOME | End: 2025-06-29
Attending: INTERNAL MEDICINE
Payer: MEDICARE

## 2025-06-29 DIAGNOSIS — Z95.0 PRESENCE OF CARDIAC PACEMAKER: ICD-10-CM

## 2025-06-29 PROCEDURE — 93294 REM INTERROG EVL PM/LDLS PM: CPT | Mod: HCNC,,, | Performed by: INTERNAL MEDICINE

## 2025-06-29 PROCEDURE — 93296 REM INTERROG EVL PM/IDS: CPT | Mod: HCNC,PO | Performed by: INTERNAL MEDICINE

## 2025-07-09 ENCOUNTER — PATIENT MESSAGE (OUTPATIENT)
Dept: FAMILY MEDICINE | Facility: CLINIC | Age: 70
End: 2025-07-09
Payer: MEDICARE

## 2025-07-09 DIAGNOSIS — R09.89 SINUS COMPLAINT: ICD-10-CM

## 2025-07-09 DIAGNOSIS — R09.82 POST-NASAL DRIP: ICD-10-CM

## 2025-07-09 DIAGNOSIS — J01.80 OTHER ACUTE SINUSITIS, RECURRENCE NOT SPECIFIED: ICD-10-CM

## 2025-07-10 RX ORDER — FLUTICASONE PROPIONATE 50 MCG
SPRAY, SUSPENSION (ML) NASAL
Qty: 48 G | Refills: 3 | Status: SHIPPED | OUTPATIENT
Start: 2025-07-10

## 2025-07-10 NOTE — TELEPHONE ENCOUNTER
Refill Decision Note   Fred Blackwell  is requesting a refill authorization.  Brief Assessment and Rationale for Refill:  Approve     Medication Therapy Plan:        Comments:     Note composed:3:51 AM 07/10/2025

## 2025-07-10 NOTE — TELEPHONE ENCOUNTER
No care due was identified.  [facility] Embedded Care Due Messages. Reference number: [id number].   7/09/2025 9:49:57 PM CDT

## 2025-07-14 ENCOUNTER — TELEPHONE (OUTPATIENT)
Dept: PHARMACY | Facility: CLINIC | Age: 70
End: 2025-07-14
Payer: MEDICARE

## 2025-07-15 ENCOUNTER — PATIENT MESSAGE (OUTPATIENT)
Dept: FAMILY MEDICINE | Facility: CLINIC | Age: 70
End: 2025-07-15
Payer: MEDICARE

## 2025-07-15 DIAGNOSIS — M54.17 RADICULAR PAIN OF LUMBOSACRAL REGION: ICD-10-CM

## 2025-07-15 DIAGNOSIS — M51.369 DEGENERATIVE DISC DISEASE, LUMBAR: ICD-10-CM

## 2025-07-15 RX ORDER — HYDROCODONE BITARTRATE AND ACETAMINOPHEN 10; 325 MG/1; MG/1
1 TABLET ORAL 3 TIMES DAILY PRN
Qty: 90 TABLET | Refills: 0 | Status: CANCELLED | OUTPATIENT
Start: 2025-07-15

## 2025-07-15 NOTE — TELEPHONE ENCOUNTER
No care due was identified.  [facility] Embedded Care Due Messages. Reference number: [id number].   7/15/2025 9:25:29 AM CDT

## 2025-07-15 NOTE — TELEPHONE ENCOUNTER
No care due was identified.  [facility] Embedded Care Due Messages. Reference number: [id number].   7/15/2025 2:28:58 PM CDT

## 2025-07-15 NOTE — TELEPHONE ENCOUNTER
[facility]Population Health Chart Review & Patient Outreach Details For Medication Adherence Project    Reason for Outreach Encounter: 3rd Party payor non-compliance report ([employer], [employer], [facility], etc)  2.  Patient Outreach Method: Reviewed patient chart   3.   Medication in question:    Hypertension Medications              amLODIPine (NORVASC) 10 MG tablet Take 1 tablet (10 mg total) by mouth once daily.    lisinopriL 10 MG tablet Take 1 tablet (10 mg total) by mouth once daily.                 LF 90 ds 6/20/25    4.  Reviewed and or Updates Made To: Patient Chart  5. Outreach Outcomes and/or actions taken: Patient filled medication and is on track to be adherent  Additional Notes:

## 2025-07-16 DIAGNOSIS — M54.17 RADICULAR PAIN OF LUMBOSACRAL REGION: ICD-10-CM

## 2025-07-16 DIAGNOSIS — M51.369 DEGENERATIVE DISC DISEASE, LUMBAR: ICD-10-CM

## 2025-07-16 RX ORDER — HYDROCODONE BITARTRATE AND ACETAMINOPHEN 10; 325 MG/1; MG/1
1 TABLET ORAL 3 TIMES DAILY PRN
Qty: 90 TABLET | Refills: 0 | Status: CANCELLED | OUTPATIENT
Start: 2025-07-15

## 2025-07-16 NOTE — TELEPHONE ENCOUNTER
No care due was identified.  [facility] Embedded Care Due Messages. Reference number: [id number].   7/16/2025 1:57:53 PM CDT

## 2025-07-17 DIAGNOSIS — M51.369 DEGENERATIVE DISC DISEASE, LUMBAR: ICD-10-CM

## 2025-07-17 DIAGNOSIS — M54.17 RADICULAR PAIN OF LUMBOSACRAL REGION: ICD-10-CM

## 2025-07-17 RX ORDER — HYDROCODONE BITARTRATE AND ACETAMINOPHEN 10; 325 MG/1; MG/1
1 TABLET ORAL 3 TIMES DAILY PRN
Qty: 90 TABLET | Refills: 0 | Status: SHIPPED | OUTPATIENT
Start: 2025-07-17

## 2025-07-17 NOTE — TELEPHONE ENCOUNTER
No care due was identified.  [facility] Embedded Care Due Messages. Reference number: [id number].   7/17/2025 12:23:57 PM CDT

## 2025-07-21 LAB
OHS CV AF BURDEN PERCENT: < 1
OHS CV DC REMOTE DEVICE TYPE: NORMAL
OHS CV ICD SHOCK: NO
OHS CV RV PACING PERCENT: 98 %

## 2025-07-31 DIAGNOSIS — E11.9 TYPE 2 DIABETES MELLITUS WITHOUT COMPLICATION: ICD-10-CM

## 2025-08-11 ENCOUNTER — PATIENT MESSAGE (OUTPATIENT)
Dept: FAMILY MEDICINE | Facility: CLINIC | Age: 70
End: 2025-08-11
Payer: MEDICARE

## 2025-08-16 DIAGNOSIS — M54.17 RADICULAR PAIN OF LUMBOSACRAL REGION: ICD-10-CM

## 2025-08-16 DIAGNOSIS — F41.1 GAD (GENERALIZED ANXIETY DISORDER): ICD-10-CM

## 2025-08-16 DIAGNOSIS — I10 HYPERTENSION, UNSPECIFIED TYPE: ICD-10-CM

## 2025-08-16 DIAGNOSIS — E78.00 PURE HYPERCHOLESTEROLEMIA: Chronic | ICD-10-CM

## 2025-08-16 DIAGNOSIS — M51.369 DEGENERATIVE DISC DISEASE, LUMBAR: ICD-10-CM

## 2025-08-16 DIAGNOSIS — K21.9 GASTROESOPHAGEAL REFLUX DISEASE, UNSPECIFIED WHETHER ESOPHAGITIS PRESENT: ICD-10-CM

## 2025-08-17 ENCOUNTER — PATIENT MESSAGE (OUTPATIENT)
Dept: FAMILY MEDICINE | Facility: CLINIC | Age: 70
End: 2025-08-17
Payer: MEDICARE

## 2025-08-17 RX ORDER — AMLODIPINE BESYLATE 10 MG/1
10 TABLET ORAL DAILY
Qty: 90 TABLET | Refills: 1 | Status: SHIPPED | OUTPATIENT
Start: 2025-08-17 | End: 2026-08-17

## 2025-08-17 RX ORDER — OMEPRAZOLE 20 MG/1
20 CAPSULE, DELAYED RELEASE ORAL DAILY
Qty: 90 CAPSULE | Refills: 2 | Status: SHIPPED | OUTPATIENT
Start: 2025-08-17

## 2025-08-18 RX ORDER — HYDROCODONE BITARTRATE AND ACETAMINOPHEN 10; 325 MG/1; MG/1
1 TABLET ORAL 3 TIMES DAILY PRN
Qty: 90 TABLET | Refills: 0 | Status: SHIPPED | OUTPATIENT
Start: 2025-08-18

## 2025-08-18 RX ORDER — ALPRAZOLAM 1 MG/1
TABLET ORAL
Qty: 90 TABLET | Refills: 2 | Status: SHIPPED | OUTPATIENT
Start: 2025-08-18

## 2025-09-04 ENCOUNTER — PATIENT MESSAGE (OUTPATIENT)
Dept: FAMILY MEDICINE | Facility: CLINIC | Age: 70
End: 2025-09-04
Payer: MEDICARE